# Patient Record
Sex: FEMALE | Race: WHITE | Employment: OTHER | URBAN - METROPOLITAN AREA
[De-identification: names, ages, dates, MRNs, and addresses within clinical notes are randomized per-mention and may not be internally consistent; named-entity substitution may affect disease eponyms.]

---

## 2019-08-07 ENCOUNTER — HOSPITAL ENCOUNTER (EMERGENCY)
Age: 79
Discharge: HOME | End: 2019-08-07
Payer: MEDICARE

## 2019-08-07 ENCOUNTER — APPOINTMENT (OUTPATIENT)
Dept: GENERAL RADIOLOGY | Age: 79
End: 2019-08-07
Payer: MEDICARE

## 2019-08-07 ENCOUNTER — APPOINTMENT (OUTPATIENT)
Dept: CT IMAGING | Age: 79
End: 2019-08-07
Payer: MEDICARE

## 2019-08-07 VITALS
WEIGHT: 148 LBS | SYSTOLIC BLOOD PRESSURE: 123 MMHG | HEIGHT: 61 IN | HEART RATE: 86 BPM | TEMPERATURE: 98.4 F | OXYGEN SATURATION: 97 % | BODY MASS INDEX: 27.94 KG/M2 | RESPIRATION RATE: 16 BRPM | DIASTOLIC BLOOD PRESSURE: 83 MMHG

## 2019-08-07 DIAGNOSIS — S52.602A CLOSED FRACTURE OF DISTAL ENDS OF LEFT RADIUS AND ULNA, INITIAL ENCOUNTER: ICD-10-CM

## 2019-08-07 DIAGNOSIS — S80.02XA CONTUSION OF LEFT KNEE, INITIAL ENCOUNTER: ICD-10-CM

## 2019-08-07 DIAGNOSIS — S00.81XA ABRASION OF FACE, INITIAL ENCOUNTER: ICD-10-CM

## 2019-08-07 DIAGNOSIS — S52.502A CLOSED FRACTURE OF DISTAL ENDS OF LEFT RADIUS AND ULNA, INITIAL ENCOUNTER: ICD-10-CM

## 2019-08-07 DIAGNOSIS — S09.90XA INJURY OF HEAD, INITIAL ENCOUNTER: Primary | ICD-10-CM

## 2019-08-07 LAB
ANION GAP SERPL CALCULATED.3IONS-SCNC: 15 MEQ/L (ref 9–15)
APTT: 26.6 SEC (ref 24.4–36.8)
BASOPHILS ABSOLUTE: 0.1 K/UL (ref 0–0.2)
BASOPHILS RELATIVE PERCENT: 0.5 %
BUN BLDV-MCNC: 22 MG/DL (ref 8–23)
CALCIUM SERPL-MCNC: 8.7 MG/DL (ref 8.5–9.9)
CHLORIDE BLD-SCNC: 106 MEQ/L (ref 95–107)
CO2: 21 MEQ/L (ref 20–31)
CREAT SERPL-MCNC: 1.51 MG/DL (ref 0.5–0.9)
EOSINOPHILS ABSOLUTE: 0 K/UL (ref 0–0.7)
EOSINOPHILS RELATIVE PERCENT: 0.3 %
GFR AFRICAN AMERICAN: 40.4
GFR NON-AFRICAN AMERICAN: 33.4
GLUCOSE BLD-MCNC: 161 MG/DL (ref 70–99)
HCT VFR BLD CALC: 32.1 % (ref 37–47)
HEMOGLOBIN: 11.2 G/DL (ref 12–16)
INR BLD: 1
LYMPHOCYTES ABSOLUTE: 0.8 K/UL (ref 1–4.8)
LYMPHOCYTES RELATIVE PERCENT: 5.8 %
MCH RBC QN AUTO: 31.6 PG (ref 27–31.3)
MCHC RBC AUTO-ENTMCNC: 35 % (ref 33–37)
MCV RBC AUTO: 90.3 FL (ref 82–100)
MONOCYTES ABSOLUTE: 0.8 K/UL (ref 0.2–0.8)
MONOCYTES RELATIVE PERCENT: 5.5 %
NEUTROPHILS ABSOLUTE: 12.9 K/UL (ref 1.4–6.5)
NEUTROPHILS RELATIVE PERCENT: 87.9 %
PDW BLD-RTO: 13 % (ref 11.5–14.5)
PLATELET # BLD: 248 K/UL (ref 130–400)
POTASSIUM SERPL-SCNC: 4.3 MEQ/L (ref 3.4–4.9)
PROTHROMBIN TIME: 13.5 SEC (ref 12.3–14.9)
RBC # BLD: 3.55 M/UL (ref 4.2–5.4)
SODIUM BLD-SCNC: 142 MEQ/L (ref 135–144)
WBC # BLD: 14.6 K/UL (ref 4.8–10.8)

## 2019-08-07 PROCEDURE — 85025 COMPLETE CBC W/AUTO DIFF WBC: CPT

## 2019-08-07 PROCEDURE — 96372 THER/PROPH/DIAG INJ SC/IM: CPT

## 2019-08-07 PROCEDURE — 73590 X-RAY EXAM OF LOWER LEG: CPT

## 2019-08-07 PROCEDURE — 6360000002 HC RX W HCPCS: Performed by: NURSE PRACTITIONER

## 2019-08-07 PROCEDURE — 36415 COLL VENOUS BLD VENIPUNCTURE: CPT

## 2019-08-07 PROCEDURE — 6830039000 HC L3 TRAUMA ALERT

## 2019-08-07 PROCEDURE — 70486 CT MAXILLOFACIAL W/O DYE: CPT

## 2019-08-07 PROCEDURE — 85610 PROTHROMBIN TIME: CPT

## 2019-08-07 PROCEDURE — 73110 X-RAY EXAM OF WRIST: CPT

## 2019-08-07 PROCEDURE — 29125 APPL SHORT ARM SPLINT STATIC: CPT

## 2019-08-07 PROCEDURE — 72125 CT NECK SPINE W/O DYE: CPT

## 2019-08-07 PROCEDURE — 70450 CT HEAD/BRAIN W/O DYE: CPT

## 2019-08-07 PROCEDURE — 85730 THROMBOPLASTIN TIME PARTIAL: CPT

## 2019-08-07 PROCEDURE — 73552 X-RAY EXAM OF FEMUR 2/>: CPT

## 2019-08-07 PROCEDURE — 99284 EMERGENCY DEPT VISIT MOD MDM: CPT

## 2019-08-07 PROCEDURE — 73562 X-RAY EXAM OF KNEE 3: CPT

## 2019-08-07 PROCEDURE — 80048 BASIC METABOLIC PNL TOTAL CA: CPT

## 2019-08-07 RX ORDER — ONDANSETRON 4 MG/1
4 TABLET, ORALLY DISINTEGRATING ORAL ONCE
Status: DISCONTINUED | OUTPATIENT
Start: 2019-08-07 | End: 2019-08-07 | Stop reason: HOSPADM

## 2019-08-07 RX ORDER — OXYCODONE HYDROCHLORIDE AND ACETAMINOPHEN 5; 325 MG/1; MG/1
1 TABLET ORAL EVERY 6 HOURS PRN
Qty: 10 TABLET | Refills: 0 | Status: SHIPPED | OUTPATIENT
Start: 2019-08-07 | End: 2019-08-12

## 2019-08-07 RX ORDER — ONDANSETRON 2 MG/ML
4 INJECTION INTRAMUSCULAR; INTRAVENOUS EVERY 10 MIN PRN
Status: DISCONTINUED | OUTPATIENT
Start: 2019-08-07 | End: 2019-08-07 | Stop reason: HOSPADM

## 2019-08-07 RX ADMIN — HYDROMORPHONE HYDROCHLORIDE 0.5 MG: 1 INJECTION, SOLUTION INTRAMUSCULAR; INTRAVENOUS; SUBCUTANEOUS at 14:34

## 2019-08-07 SDOH — HEALTH STABILITY: MENTAL HEALTH: HOW OFTEN DO YOU HAVE A DRINK CONTAINING ALCOHOL?: NEVER

## 2019-08-07 ASSESSMENT — ENCOUNTER SYMPTOMS
SHORTNESS OF BREATH: 0
VOMITING: 0
SORE THROAT: 0
PHOTOPHOBIA: 0
EYE PAIN: 0
RHINORRHEA: 0
ABDOMINAL PAIN: 0
BACK PAIN: 0
DIARRHEA: 0
COUGH: 0
NAUSEA: 0

## 2019-08-07 ASSESSMENT — PAIN SCALES - GENERAL
PAINLEVEL_OUTOF10: 10
PAINLEVEL_OUTOF10: 10

## 2019-08-07 ASSESSMENT — PAIN DESCRIPTION - DESCRIPTORS: DESCRIPTORS: THROBBING

## 2019-08-07 ASSESSMENT — PAIN DESCRIPTION - FREQUENCY: FREQUENCY: CONTINUOUS

## 2019-08-07 ASSESSMENT — PAIN DESCRIPTION - LOCATION: LOCATION: WRIST;KNEE

## 2019-08-07 ASSESSMENT — PAIN DESCRIPTION - ORIENTATION: ORIENTATION: LEFT

## 2019-09-19 ENCOUNTER — HOSPITAL ENCOUNTER (OUTPATIENT)
Dept: ORTHOPEDIC SURGERY | Age: 79
Discharge: HOME | End: 2019-09-21
Payer: MEDICARE

## 2019-09-19 DIAGNOSIS — S52.502G CLOSED FRACTURE OF DISTAL ENDS OF LEFT RADIUS AND ULNA WITH DELAYED HEALING, SUBSEQUENT ENCOUNTER: ICD-10-CM

## 2019-09-19 DIAGNOSIS — S52.602G CLOSED FRACTURE OF DISTAL ENDS OF LEFT RADIUS AND ULNA WITH DELAYED HEALING, SUBSEQUENT ENCOUNTER: ICD-10-CM

## 2019-09-19 PROCEDURE — 73110 X-RAY EXAM OF WRIST: CPT

## 2019-10-17 ENCOUNTER — HOSPITAL ENCOUNTER (OUTPATIENT)
Dept: ORTHOPEDIC SURGERY | Age: 79
Discharge: HOME | End: 2019-10-19
Payer: MEDICARE

## 2019-10-17 DIAGNOSIS — S52.509A CLOSED FRACTURE OF DISTAL END OF RADIUS, UNSPECIFIED FRACTURE MORPHOLOGY, UNSPECIFIED LATERALITY, INITIAL ENCOUNTER: ICD-10-CM

## 2019-10-17 PROCEDURE — 73110 X-RAY EXAM OF WRIST: CPT

## 2019-11-15 ENCOUNTER — HOSPITAL ENCOUNTER (INPATIENT)
Age: 79
LOS: 5 days | Discharge: NURSING, REHAB OR LONG-TERM CARE | DRG: 515 | End: 2019-11-20
Attending: EMERGENCY MEDICINE | Admitting: INTERNAL MEDICINE
Payer: MEDICARE

## 2019-11-15 ENCOUNTER — APPOINTMENT (OUTPATIENT)
Dept: CT IMAGING | Age: 79
DRG: 515 | End: 2019-11-15
Payer: MEDICARE

## 2019-11-15 ENCOUNTER — APPOINTMENT (OUTPATIENT)
Dept: GENERAL RADIOLOGY | Age: 79
DRG: 515 | End: 2019-11-15
Payer: MEDICARE

## 2019-11-15 ENCOUNTER — APPOINTMENT (OUTPATIENT)
Dept: ULTRASOUND IMAGING | Age: 79
DRG: 515 | End: 2019-11-15
Payer: MEDICARE

## 2019-11-15 DIAGNOSIS — T79.6XXA TRAUMATIC RHABDOMYOLYSIS, INITIAL ENCOUNTER (HCC): ICD-10-CM

## 2019-11-15 DIAGNOSIS — N17.9 AKI (ACUTE KIDNEY INJURY) (HCC): Primary | ICD-10-CM

## 2019-11-15 DIAGNOSIS — S82.001A CLOSED NONDISPLACED FRACTURE OF RIGHT PATELLA, UNSPECIFIED FRACTURE MORPHOLOGY, INITIAL ENCOUNTER: ICD-10-CM

## 2019-11-15 DIAGNOSIS — R77.8 ELEVATED TROPONIN: ICD-10-CM

## 2019-11-15 PROBLEM — N17.0 ACUTE KIDNEY INJURY (AKI) WITH ACUTE TUBULAR NECROSIS (ATN) (HCC): Status: ACTIVE | Noted: 2019-11-15

## 2019-11-15 LAB
ALBUMIN SERPL-MCNC: 3.1 G/DL (ref 3.5–4.6)
ALBUMIN SERPL-MCNC: 3.6 G/DL (ref 3.5–4.6)
ALP BLD-CCNC: 77 U/L (ref 40–130)
ALP BLD-CCNC: 99 U/L (ref 40–130)
ALT SERPL-CCNC: 24 U/L (ref 0–33)
ALT SERPL-CCNC: 27 U/L (ref 0–33)
ANION GAP SERPL CALCULATED.3IONS-SCNC: 23 MEQ/L (ref 9–15)
ANION GAP SERPL CALCULATED.3IONS-SCNC: 24 MEQ/L (ref 9–15)
AST SERPL-CCNC: 112 U/L (ref 0–35)
AST SERPL-CCNC: 89 U/L (ref 0–35)
BASOPHILS ABSOLUTE: 0 K/UL (ref 0–0.2)
BASOPHILS RELATIVE PERCENT: 0.1 %
BILIRUB SERPL-MCNC: 0.3 MG/DL (ref 0.2–0.7)
BILIRUB SERPL-MCNC: 0.4 MG/DL (ref 0.2–0.7)
BUN BLDV-MCNC: 53 MG/DL (ref 8–23)
BUN BLDV-MCNC: 59 MG/DL (ref 8–23)
CALCIUM SERPL-MCNC: 7.7 MG/DL (ref 8.5–9.9)
CALCIUM SERPL-MCNC: 8.5 MG/DL (ref 8.5–9.9)
CHLORIDE BLD-SCNC: 95 MEQ/L (ref 95–107)
CHLORIDE BLD-SCNC: 96 MEQ/L (ref 95–107)
CK MB: 73.5 NG/ML (ref 0–3.8)
CO2: 15 MEQ/L (ref 20–31)
CO2: 16 MEQ/L (ref 20–31)
CREAT SERPL-MCNC: 1.78 MG/DL (ref 0.5–0.9)
CREAT SERPL-MCNC: 2.05 MG/DL (ref 0.5–0.9)
CREATINE KINASE-MB INDEX: 1 % (ref 0–3.5)
EOSINOPHILS ABSOLUTE: 0 K/UL (ref 0–0.7)
EOSINOPHILS RELATIVE PERCENT: 0 %
GFR AFRICAN AMERICAN: 28.3
GFR AFRICAN AMERICAN: 33.4
GFR NON-AFRICAN AMERICAN: 23.4
GFR NON-AFRICAN AMERICAN: 27.6
GLOBULIN: 3.3 G/DL (ref 2.3–3.5)
GLOBULIN: 4.4 G/DL (ref 2.3–3.5)
GLUCOSE BLD-MCNC: 119 MG/DL (ref 70–99)
GLUCOSE BLD-MCNC: 157 MG/DL (ref 70–99)
HCT VFR BLD CALC: 40.2 % (ref 37–47)
HEMOGLOBIN: 12.7 G/DL (ref 12–16)
LACTIC ACID: 2.6 MMOL/L (ref 0.5–2.2)
LYMPHOCYTES ABSOLUTE: 1 K/UL (ref 1–4.8)
LYMPHOCYTES RELATIVE PERCENT: 4 %
MAGNESIUM: 2 MG/DL (ref 1.7–2.4)
MCH RBC QN AUTO: 29.3 PG (ref 27–31.3)
MCHC RBC AUTO-ENTMCNC: 31.6 % (ref 33–37)
MCV RBC AUTO: 92.9 FL (ref 82–100)
MONOCYTES ABSOLUTE: 1.2 K/UL (ref 0.2–0.8)
MONOCYTES RELATIVE PERCENT: 4.8 %
NEUTROPHILS ABSOLUTE: 21.8 K/UL (ref 1.4–6.5)
NEUTROPHILS RELATIVE PERCENT: 91 %
PDW BLD-RTO: 13.4 % (ref 11.5–14.5)
PLATELET # BLD: 291 K/UL (ref 130–400)
PLATELET SLIDE REVIEW: ADEQUATE
POTASSIUM REFLEX MAGNESIUM: 3.9 MEQ/L (ref 3.4–4.9)
POTASSIUM SERPL-SCNC: 5.7 MEQ/L (ref 3.4–4.9)
RBC # BLD: 4.33 M/UL (ref 4.2–5.4)
SODIUM BLD-SCNC: 134 MEQ/L (ref 135–144)
SODIUM BLD-SCNC: 135 MEQ/L (ref 135–144)
TOTAL CK: 7232 U/L (ref 0–170)
TOTAL PROTEIN: 6.4 G/DL (ref 6.3–8)
TOTAL PROTEIN: 8 G/DL (ref 6.3–8)
TROPONIN: 0.21 NG/ML (ref 0–0.01)
WBC # BLD: 24 K/UL (ref 4.8–10.8)

## 2019-11-15 PROCEDURE — 93005 ELECTROCARDIOGRAM TRACING: CPT | Performed by: EMERGENCY MEDICINE

## 2019-11-15 PROCEDURE — 73030 X-RAY EXAM OF SHOULDER: CPT

## 2019-11-15 PROCEDURE — 83605 ASSAY OF LACTIC ACID: CPT

## 2019-11-15 PROCEDURE — 72128 CT CHEST SPINE W/O DYE: CPT

## 2019-11-15 PROCEDURE — 72125 CT NECK SPINE W/O DYE: CPT

## 2019-11-15 PROCEDURE — 6360000002 HC RX W HCPCS: Performed by: EMERGENCY MEDICINE

## 2019-11-15 PROCEDURE — 2580000003 HC RX 258: Performed by: EMERGENCY MEDICINE

## 2019-11-15 PROCEDURE — 84484 ASSAY OF TROPONIN QUANT: CPT

## 2019-11-15 PROCEDURE — 82553 CREATINE MB FRACTION: CPT

## 2019-11-15 PROCEDURE — 94667 MNPJ CHEST WALL 1ST: CPT

## 2019-11-15 PROCEDURE — 84300 ASSAY OF URINE SODIUM: CPT

## 2019-11-15 PROCEDURE — 73562 X-RAY EXAM OF KNEE 3: CPT

## 2019-11-15 PROCEDURE — 2700000000 HC OXYGEN THERAPY PER DAY

## 2019-11-15 PROCEDURE — 71250 CT THORAX DX C-: CPT

## 2019-11-15 PROCEDURE — 6830039001 HC L3 TRAUMA PRIORITY

## 2019-11-15 PROCEDURE — 51798 US URINE CAPACITY MEASURE: CPT

## 2019-11-15 PROCEDURE — 80053 COMPREHEN METABOLIC PANEL: CPT

## 2019-11-15 PROCEDURE — 72131 CT LUMBAR SPINE W/O DYE: CPT

## 2019-11-15 PROCEDURE — 94640 AIRWAY INHALATION TREATMENT: CPT

## 2019-11-15 PROCEDURE — 99223 1ST HOSP IP/OBS HIGH 75: CPT | Performed by: SURGERY

## 2019-11-15 PROCEDURE — 6360000002 HC RX W HCPCS: Performed by: NURSE PRACTITIONER

## 2019-11-15 PROCEDURE — 36415 COLL VENOUS BLD VENIPUNCTURE: CPT

## 2019-11-15 PROCEDURE — 82550 ASSAY OF CK (CPK): CPT

## 2019-11-15 PROCEDURE — 96374 THER/PROPH/DIAG INJ IV PUSH: CPT

## 2019-11-15 PROCEDURE — 85025 COMPLETE CBC W/AUTO DIFF WBC: CPT

## 2019-11-15 PROCEDURE — 82570 ASSAY OF URINE CREATININE: CPT

## 2019-11-15 PROCEDURE — 83735 ASSAY OF MAGNESIUM: CPT

## 2019-11-15 PROCEDURE — 76775 US EXAM ABDO BACK WALL LIM: CPT

## 2019-11-15 PROCEDURE — 70450 CT HEAD/BRAIN W/O DYE: CPT

## 2019-11-15 PROCEDURE — 99285 EMERGENCY DEPT VISIT HI MDM: CPT

## 2019-11-15 PROCEDURE — 1210000000 HC MED SURG R&B

## 2019-11-15 PROCEDURE — 96375 TX/PRO/DX INJ NEW DRUG ADDON: CPT

## 2019-11-15 PROCEDURE — 6370000000 HC RX 637 (ALT 250 FOR IP): Performed by: NURSE PRACTITIONER

## 2019-11-15 PROCEDURE — 2580000003 HC RX 258: Performed by: INTERNAL MEDICINE

## 2019-11-15 PROCEDURE — 74176 CT ABD & PELVIS W/O CONTRAST: CPT

## 2019-11-15 RX ORDER — SODIUM CHLORIDE 9 MG/ML
INJECTION, SOLUTION INTRAVENOUS CONTINUOUS
Status: DISCONTINUED | OUTPATIENT
Start: 2019-11-15 | End: 2019-11-16

## 2019-11-15 RX ORDER — IPRATROPIUM BROMIDE AND ALBUTEROL SULFATE 2.5; .5 MG/3ML; MG/3ML
1 SOLUTION RESPIRATORY (INHALATION)
Status: DISCONTINUED | OUTPATIENT
Start: 2019-11-15 | End: 2019-11-16

## 2019-11-15 RX ORDER — FUROSEMIDE 10 MG/ML
40 INJECTION INTRAMUSCULAR; INTRAVENOUS ONCE
Status: DISCONTINUED | OUTPATIENT
Start: 2019-11-15 | End: 2019-11-16

## 2019-11-15 RX ORDER — ATORVASTATIN CALCIUM 10 MG/1
10 TABLET, FILM COATED ORAL DAILY
Status: ON HOLD | COMMUNITY
End: 2019-12-07 | Stop reason: HOSPADM

## 2019-11-15 RX ORDER — LEVOTHYROXINE SODIUM 0.03 MG/1
100 TABLET ORAL DAILY
Status: ON HOLD | COMMUNITY
End: 2019-12-07 | Stop reason: HOSPADM

## 2019-11-15 RX ORDER — LISINOPRIL 20 MG/1
20 TABLET ORAL DAILY
Status: ON HOLD | COMMUNITY
End: 2019-12-07 | Stop reason: HOSPADM

## 2019-11-15 RX ORDER — ALBUTEROL SULFATE 2.5 MG/3ML
2.5 SOLUTION RESPIRATORY (INHALATION) ONCE
Status: COMPLETED | OUTPATIENT
Start: 2019-11-15 | End: 2019-11-15

## 2019-11-15 RX ORDER — 0.9 % SODIUM CHLORIDE 0.9 %
1000 INTRAVENOUS SOLUTION INTRAVENOUS ONCE
Status: COMPLETED | OUTPATIENT
Start: 2019-11-15 | End: 2019-11-15

## 2019-11-15 RX ORDER — HYDROCHLOROTHIAZIDE 12.5 MG/1
12.5 CAPSULE, GELATIN COATED ORAL DAILY
Status: ON HOLD | COMMUNITY
End: 2019-12-07 | Stop reason: HOSPADM

## 2019-11-15 RX ORDER — CARBAMAZEPINE 200 MG/1
200 TABLET ORAL 3 TIMES DAILY
Status: ON HOLD | COMMUNITY
End: 2019-12-07 | Stop reason: HOSPADM

## 2019-11-15 RX ORDER — ACETAMINOPHEN 325 MG/1
650 TABLET ORAL EVERY 4 HOURS PRN
Status: DISCONTINUED | OUTPATIENT
Start: 2019-11-15 | End: 2019-11-20 | Stop reason: HOSPADM

## 2019-11-15 RX ORDER — VENLAFAXINE 37.5 MG/1
37.5 TABLET ORAL 3 TIMES DAILY
Status: ON HOLD | COMMUNITY
End: 2019-12-07 | Stop reason: HOSPADM

## 2019-11-15 RX ORDER — SODIUM POLYSTYRENE SULFONATE 15 G/60ML
15 SUSPENSION ORAL; RECTAL ONCE
Status: DISCONTINUED | OUTPATIENT
Start: 2019-11-15 | End: 2019-11-20 | Stop reason: HOSPADM

## 2019-11-15 RX ORDER — SODIUM CHLORIDE 0.9 % (FLUSH) 0.9 %
10 SYRINGE (ML) INJECTION PRN
Status: DISCONTINUED | OUTPATIENT
Start: 2019-11-15 | End: 2019-11-20 | Stop reason: HOSPADM

## 2019-11-15 RX ORDER — 0.9 % SODIUM CHLORIDE 0.9 %
250 INTRAVENOUS SOLUTION INTRAVENOUS ONCE
Status: COMPLETED | OUTPATIENT
Start: 2019-11-15 | End: 2019-11-15

## 2019-11-15 RX ORDER — HEPARIN SODIUM 5000 [USP'U]/ML
5000 INJECTION, SOLUTION INTRAVENOUS; SUBCUTANEOUS EVERY 8 HOURS SCHEDULED
Status: DISCONTINUED | OUTPATIENT
Start: 2019-11-15 | End: 2019-11-16

## 2019-11-15 RX ORDER — 0.9 % SODIUM CHLORIDE 0.9 %
250 INTRAVENOUS SOLUTION INTRAVENOUS ONCE
Status: COMPLETED | OUTPATIENT
Start: 2019-11-15 | End: 2019-11-16

## 2019-11-15 RX ORDER — SODIUM CHLORIDE 0.9 % (FLUSH) 0.9 %
10 SYRINGE (ML) INJECTION EVERY 12 HOURS SCHEDULED
Status: DISCONTINUED | OUTPATIENT
Start: 2019-11-15 | End: 2019-11-20 | Stop reason: HOSPADM

## 2019-11-15 RX ORDER — FENTANYL CITRATE 50 UG/ML
25 INJECTION, SOLUTION INTRAMUSCULAR; INTRAVENOUS ONCE
Status: COMPLETED | OUTPATIENT
Start: 2019-11-15 | End: 2019-11-15

## 2019-11-15 RX ORDER — ONDANSETRON 2 MG/ML
4 INJECTION INTRAMUSCULAR; INTRAVENOUS EVERY 6 HOURS PRN
Status: DISCONTINUED | OUTPATIENT
Start: 2019-11-15 | End: 2019-11-18 | Stop reason: ALTCHOICE

## 2019-11-15 RX ADMIN — FENTANYL CITRATE 25 MCG: 50 INJECTION, SOLUTION INTRAMUSCULAR; INTRAVENOUS at 13:53

## 2019-11-15 RX ADMIN — SODIUM CHLORIDE 1000 ML: 9 INJECTION, SOLUTION INTRAVENOUS at 15:37

## 2019-11-15 RX ADMIN — SODIUM CHLORIDE 250 ML: 9 INJECTION, SOLUTION INTRAVENOUS at 21:04

## 2019-11-15 RX ADMIN — HEPARIN SODIUM 5000 UNITS: 5000 INJECTION INTRAVENOUS; SUBCUTANEOUS at 22:19

## 2019-11-15 RX ADMIN — SODIUM CHLORIDE 250 ML: 9 INJECTION, SOLUTION INTRAVENOUS at 22:19

## 2019-11-15 RX ADMIN — ALBUTEROL SULFATE 2.5 MG: 2.5 SOLUTION RESPIRATORY (INHALATION) at 16:25

## 2019-11-15 RX ADMIN — ALBUTEROL SULFATE 2.5 MG: 2.5 SOLUTION RESPIRATORY (INHALATION) at 13:48

## 2019-11-15 RX ADMIN — SODIUM CHLORIDE 1000 ML: 9 INJECTION, SOLUTION INTRAVENOUS at 13:53

## 2019-11-15 RX ADMIN — IPRATROPIUM BROMIDE AND ALBUTEROL SULFATE 1 AMPULE: .5; 3 SOLUTION RESPIRATORY (INHALATION) at 19:28

## 2019-11-15 ASSESSMENT — PAIN DESCRIPTION - FREQUENCY
FREQUENCY: CONTINUOUS
FREQUENCY: CONTINUOUS

## 2019-11-15 ASSESSMENT — PAIN DESCRIPTION - LOCATION
LOCATION: BACK;NECK
LOCATION: BACK;HEAD

## 2019-11-15 ASSESSMENT — PAIN SCALES - GENERAL
PAINLEVEL_OUTOF10: 3
PAINLEVEL_OUTOF10: 8

## 2019-11-15 ASSESSMENT — ENCOUNTER SYMPTOMS
ABDOMINAL PAIN: 0
COUGH: 0
VOMITING: 0
SORE THROAT: 0
NAUSEA: 0
SHORTNESS OF BREATH: 0
DIARRHEA: 0
BACK PAIN: 0

## 2019-11-15 ASSESSMENT — PAIN DESCRIPTION - ONSET: ONSET: ON-GOING

## 2019-11-15 ASSESSMENT — PAIN DESCRIPTION - ORIENTATION
ORIENTATION: RIGHT;LEFT
ORIENTATION: LEFT

## 2019-11-15 ASSESSMENT — PAIN DESCRIPTION - PAIN TYPE
TYPE: ACUTE PAIN
TYPE: ACUTE PAIN

## 2019-11-15 ASSESSMENT — PAIN DESCRIPTION - PROGRESSION
CLINICAL_PROGRESSION: GRADUALLY IMPROVING
CLINICAL_PROGRESSION: GRADUALLY IMPROVING

## 2019-11-15 ASSESSMENT — PAIN DESCRIPTION - DESCRIPTORS
DESCRIPTORS: ACHING
DESCRIPTORS: ACHING

## 2019-11-16 ENCOUNTER — APPOINTMENT (OUTPATIENT)
Dept: GENERAL RADIOLOGY | Age: 79
DRG: 515 | End: 2019-11-16
Payer: MEDICARE

## 2019-11-16 ENCOUNTER — APPOINTMENT (OUTPATIENT)
Dept: MRI IMAGING | Age: 79
DRG: 515 | End: 2019-11-16
Payer: MEDICARE

## 2019-11-16 LAB
ALBUMIN SERPL-MCNC: 2.8 G/DL (ref 3.5–4.6)
ALP BLD-CCNC: 69 U/L (ref 40–130)
ALT SERPL-CCNC: 21 U/L (ref 0–33)
ANION GAP SERPL CALCULATED.3IONS-SCNC: 15 MEQ/L (ref 9–15)
AST SERPL-CCNC: 77 U/L (ref 0–35)
BASOPHILS ABSOLUTE: 0 K/UL (ref 0–0.2)
BASOPHILS RELATIVE PERCENT: 0.2 %
BILIRUB SERPL-MCNC: 0.3 MG/DL (ref 0.2–0.7)
BUN BLDV-MCNC: 60 MG/DL (ref 8–23)
CALCIUM SERPL-MCNC: 7.4 MG/DL (ref 8.5–9.9)
CHLORIDE BLD-SCNC: 103 MEQ/L (ref 95–107)
CK MB: 24.6 NG/ML (ref 0–3.8)
CO2: 17 MEQ/L (ref 20–31)
CREAT SERPL-MCNC: 2.05 MG/DL (ref 0.5–0.9)
CREATINE KINASE-MB INDEX: 0.5 % (ref 0–3.5)
CREATININE URINE: 141.9 MG/DL
EOSINOPHILS ABSOLUTE: 0 K/UL (ref 0–0.7)
EOSINOPHILS RELATIVE PERCENT: 0 %
GFR AFRICAN AMERICAN: 28.3
GFR NON-AFRICAN AMERICAN: 23.4
GLOBULIN: 3.3 G/DL (ref 2.3–3.5)
GLUCOSE BLD-MCNC: 129 MG/DL (ref 70–99)
HCT VFR BLD CALC: 32.2 % (ref 37–47)
HEMOGLOBIN: 10.5 G/DL (ref 12–16)
LACTATE DEHYDROGENASE: 327 U/L (ref 135–214)
LACTIC ACID: 1.3 MMOL/L (ref 0.5–2.2)
LV EF: 75 %
LVEF MODALITY: NORMAL
LYMPHOCYTES ABSOLUTE: 2.2 K/UL (ref 1–4.8)
LYMPHOCYTES RELATIVE PERCENT: 12.7 %
MAGNESIUM: 1.7 MG/DL (ref 1.7–2.4)
MCH RBC QN AUTO: 30.4 PG (ref 27–31.3)
MCHC RBC AUTO-ENTMCNC: 32.5 % (ref 33–37)
MCV RBC AUTO: 93.7 FL (ref 82–100)
MONOCYTES ABSOLUTE: 1.8 K/UL (ref 0.2–0.8)
MONOCYTES RELATIVE PERCENT: 10.4 %
NEUTROPHILS ABSOLUTE: 13.3 K/UL (ref 1.4–6.5)
NEUTROPHILS RELATIVE PERCENT: 76.7 %
PDW BLD-RTO: 13.3 % (ref 11.5–14.5)
PLATELET # BLD: 243 K/UL (ref 130–400)
POTASSIUM REFLEX MAGNESIUM: 3.8 MEQ/L (ref 3.4–4.9)
RBC # BLD: 3.44 M/UL (ref 4.2–5.4)
SODIUM BLD-SCNC: 135 MEQ/L (ref 135–144)
SODIUM URINE: 84 MEQ/L
TOTAL CK: 5067 U/L (ref 0–170)
TOTAL PROTEIN: 6.1 G/DL (ref 6.3–8)
WBC # BLD: 17.4 K/UL (ref 4.8–10.8)

## 2019-11-16 PROCEDURE — 6360000002 HC RX W HCPCS: Performed by: NURSE PRACTITIONER

## 2019-11-16 PROCEDURE — 83735 ASSAY OF MAGNESIUM: CPT

## 2019-11-16 PROCEDURE — 2580000003 HC RX 258: Performed by: INTERNAL MEDICINE

## 2019-11-16 PROCEDURE — 6370000000 HC RX 637 (ALT 250 FOR IP): Performed by: INTERNAL MEDICINE

## 2019-11-16 PROCEDURE — 80053 COMPREHEN METABOLIC PANEL: CPT

## 2019-11-16 PROCEDURE — 36415 COLL VENOUS BLD VENIPUNCTURE: CPT

## 2019-11-16 PROCEDURE — 83605 ASSAY OF LACTIC ACID: CPT

## 2019-11-16 PROCEDURE — 6360000002 HC RX W HCPCS: Performed by: INTERNAL MEDICINE

## 2019-11-16 PROCEDURE — 83615 LACTATE (LD) (LDH) ENZYME: CPT

## 2019-11-16 PROCEDURE — 1210000000 HC MED SURG R&B

## 2019-11-16 PROCEDURE — 82553 CREATINE MB FRACTION: CPT

## 2019-11-16 PROCEDURE — 71046 X-RAY EXAM CHEST 2 VIEWS: CPT

## 2019-11-16 PROCEDURE — 94640 AIRWAY INHALATION TREATMENT: CPT

## 2019-11-16 PROCEDURE — 85025 COMPLETE CBC W/AUTO DIFF WBC: CPT

## 2019-11-16 PROCEDURE — 6370000000 HC RX 637 (ALT 250 FOR IP): Performed by: NURSE PRACTITIONER

## 2019-11-16 PROCEDURE — 93306 TTE W/DOPPLER COMPLETE: CPT

## 2019-11-16 PROCEDURE — 94668 MNPJ CHEST WALL SBSQ: CPT

## 2019-11-16 PROCEDURE — 70551 MRI BRAIN STEM W/O DYE: CPT

## 2019-11-16 PROCEDURE — 94761 N-INVAS EAR/PLS OXIMETRY MLT: CPT

## 2019-11-16 PROCEDURE — 51702 INSERT TEMP BLADDER CATH: CPT

## 2019-11-16 PROCEDURE — 94664 DEMO&/EVAL PT USE INHALER: CPT

## 2019-11-16 PROCEDURE — 2700000000 HC OXYGEN THERAPY PER DAY

## 2019-11-16 PROCEDURE — 82550 ASSAY OF CK (CPK): CPT

## 2019-11-16 PROCEDURE — 2500000003 HC RX 250 WO HCPCS: Performed by: INTERNAL MEDICINE

## 2019-11-16 RX ORDER — 0.9 % SODIUM CHLORIDE 0.9 %
500 INTRAVENOUS SOLUTION INTRAVENOUS ONCE
Status: COMPLETED | OUTPATIENT
Start: 2019-11-16 | End: 2019-11-16

## 2019-11-16 RX ORDER — MAGNESIUM SULFATE IN WATER 40 MG/ML
2 INJECTION, SOLUTION INTRAVENOUS ONCE
Status: COMPLETED | OUTPATIENT
Start: 2019-11-16 | End: 2019-11-16

## 2019-11-16 RX ORDER — METHOCARBAMOL 750 MG/1
750 TABLET, FILM COATED ORAL EVERY 8 HOURS PRN
Status: DISCONTINUED | OUTPATIENT
Start: 2019-11-16 | End: 2019-11-16

## 2019-11-16 RX ORDER — LEVOTHYROXINE SODIUM 0.05 MG/1
50 TABLET ORAL DAILY
Status: DISCONTINUED | OUTPATIENT
Start: 2019-11-16 | End: 2019-11-20 | Stop reason: HOSPADM

## 2019-11-16 RX ORDER — MAGNESIUM SULFATE HEPTAHYDRATE 500 MG/ML
2 INJECTION, SOLUTION INTRAMUSCULAR; INTRAVENOUS ONCE
Status: DISCONTINUED | OUTPATIENT
Start: 2019-11-16 | End: 2019-11-16

## 2019-11-16 RX ORDER — IPRATROPIUM BROMIDE AND ALBUTEROL SULFATE 2.5; .5 MG/3ML; MG/3ML
1 SOLUTION RESPIRATORY (INHALATION) 3 TIMES DAILY
Status: DISCONTINUED | OUTPATIENT
Start: 2019-11-16 | End: 2019-11-19

## 2019-11-16 RX ORDER — HYDROCODONE BITARTRATE AND ACETAMINOPHEN 5; 325 MG/1; MG/1
1 TABLET ORAL EVERY 6 HOURS PRN
Status: DISCONTINUED | OUTPATIENT
Start: 2019-11-16 | End: 2019-11-20 | Stop reason: HOSPADM

## 2019-11-16 RX ORDER — ALBUTEROL SULFATE 2.5 MG/3ML
2.5 SOLUTION RESPIRATORY (INHALATION)
Status: DISCONTINUED | OUTPATIENT
Start: 2019-11-16 | End: 2019-11-19

## 2019-11-16 RX ORDER — CYCLOBENZAPRINE HCL 10 MG
10 TABLET ORAL 3 TIMES DAILY PRN
Status: DISCONTINUED | OUTPATIENT
Start: 2019-11-16 | End: 2019-11-16

## 2019-11-16 RX ADMIN — Medication: at 12:14

## 2019-11-16 RX ADMIN — LEVOTHYROXINE SODIUM 50 MCG: 50 TABLET ORAL at 16:10

## 2019-11-16 RX ADMIN — METOPROLOL TARTRATE 25 MG: 25 TABLET ORAL at 21:59

## 2019-11-16 RX ADMIN — MAGNESIUM SULFATE HEPTAHYDRATE 2 G: 40 INJECTION, SOLUTION INTRAVENOUS at 16:46

## 2019-11-16 RX ADMIN — IPRATROPIUM BROMIDE AND ALBUTEROL SULFATE 1 AMPULE: .5; 3 SOLUTION RESPIRATORY (INHALATION) at 08:39

## 2019-11-16 RX ADMIN — HYDROCODONE BITARTRATE AND ACETAMINOPHEN 1 TABLET: 5; 325 TABLET ORAL at 22:36

## 2019-11-16 RX ADMIN — HYDROCODONE BITARTRATE AND ACETAMINOPHEN 1 TABLET: 5; 325 TABLET ORAL at 16:11

## 2019-11-16 RX ADMIN — IPRATROPIUM BROMIDE AND ALBUTEROL SULFATE 1 AMPULE: .5; 3 SOLUTION RESPIRATORY (INHALATION) at 19:02

## 2019-11-16 RX ADMIN — POTASSIUM BICARBONATE 10 MEQ: 782 TABLET, EFFERVESCENT ORAL at 16:10

## 2019-11-16 RX ADMIN — HEPARIN SODIUM 5000 UNITS: 5000 INJECTION INTRAVENOUS; SUBCUTANEOUS at 06:38

## 2019-11-16 RX ADMIN — SODIUM CHLORIDE 500 ML: 9 INJECTION, SOLUTION INTRAVENOUS at 11:08

## 2019-11-16 ASSESSMENT — PAIN DESCRIPTION - LOCATION: LOCATION: KNEE

## 2019-11-16 ASSESSMENT — PAIN SCALES - GENERAL
PAINLEVEL_OUTOF10: 7
PAINLEVEL_OUTOF10: 8

## 2019-11-16 ASSESSMENT — PAIN DESCRIPTION - PAIN TYPE: TYPE: ACUTE PAIN

## 2019-11-16 ASSESSMENT — PAIN DESCRIPTION - ONSET: ONSET: ON-GOING

## 2019-11-16 ASSESSMENT — PAIN DESCRIPTION - ORIENTATION: ORIENTATION: RIGHT

## 2019-11-16 ASSESSMENT — PAIN DESCRIPTION - FREQUENCY: FREQUENCY: INTERMITTENT

## 2019-11-16 ASSESSMENT — PAIN DESCRIPTION - DESCRIPTORS: DESCRIPTORS: DULL;ACHING

## 2019-11-16 ASSESSMENT — PAIN DESCRIPTION - PROGRESSION: CLINICAL_PROGRESSION: GRADUALLY WORSENING

## 2019-11-17 ENCOUNTER — APPOINTMENT (OUTPATIENT)
Dept: ULTRASOUND IMAGING | Age: 79
DRG: 515 | End: 2019-11-17
Payer: MEDICARE

## 2019-11-17 LAB
ALBUMIN SERPL-MCNC: 2.5 G/DL (ref 3.5–4.6)
ALP BLD-CCNC: 61 U/L (ref 40–130)
ALT SERPL-CCNC: 21 U/L (ref 0–33)
ANION GAP SERPL CALCULATED.3IONS-SCNC: 15 MEQ/L (ref 9–15)
AST SERPL-CCNC: 56 U/L (ref 0–35)
BASOPHILS ABSOLUTE: 0.1 K/UL (ref 0–0.2)
BASOPHILS RELATIVE PERCENT: 0.4 %
BILIRUB SERPL-MCNC: 0.3 MG/DL (ref 0.2–0.7)
BUN BLDV-MCNC: 44 MG/DL (ref 8–23)
CALCIUM SERPL-MCNC: 7.6 MG/DL (ref 8.5–9.9)
CHLORIDE BLD-SCNC: 102 MEQ/L (ref 95–107)
CK MB: 7.7 NG/ML (ref 0–3.8)
CO2: 20 MEQ/L (ref 20–31)
CREAT SERPL-MCNC: 1.13 MG/DL (ref 0.5–0.9)
CREATINE KINASE-MB INDEX: 0.3 % (ref 0–3.5)
EOSINOPHILS ABSOLUTE: 0.1 K/UL (ref 0–0.7)
EOSINOPHILS RELATIVE PERCENT: 1.1 %
GFR AFRICAN AMERICAN: 56.4
GFR NON-AFRICAN AMERICAN: 46.6
GLOBULIN: 3.2 G/DL (ref 2.3–3.5)
GLUCOSE BLD-MCNC: 122 MG/DL (ref 70–99)
HCT VFR BLD CALC: 29.1 % (ref 37–47)
HEMOGLOBIN: 9.6 G/DL (ref 12–16)
LYMPHOCYTES ABSOLUTE: 3 K/UL (ref 1–4.8)
LYMPHOCYTES RELATIVE PERCENT: 21.3 %
MCH RBC QN AUTO: 30.7 PG (ref 27–31.3)
MCHC RBC AUTO-ENTMCNC: 33.2 % (ref 33–37)
MCV RBC AUTO: 92.6 FL (ref 82–100)
MONOCYTES ABSOLUTE: 1.8 K/UL (ref 0.2–0.8)
MONOCYTES RELATIVE PERCENT: 12.5 %
NEUTROPHILS ABSOLUTE: 9.1 K/UL (ref 1.4–6.5)
NEUTROPHILS RELATIVE PERCENT: 64.7 %
PDW BLD-RTO: 13.3 % (ref 11.5–14.5)
PLATELET # BLD: 210 K/UL (ref 130–400)
POTASSIUM SERPL-SCNC: 3.6 MEQ/L (ref 3.4–4.9)
PROCALCITONIN: 0.28 NG/ML (ref 0–0.15)
RBC # BLD: 3.14 M/UL (ref 4.2–5.4)
SODIUM BLD-SCNC: 137 MEQ/L (ref 135–144)
TOTAL CK: 2523 U/L (ref 0–170)
TOTAL PROTEIN: 5.7 G/DL (ref 6.3–8)
WBC # BLD: 14.1 K/UL (ref 4.8–10.8)

## 2019-11-17 PROCEDURE — 6360000002 HC RX W HCPCS: Performed by: INTERNAL MEDICINE

## 2019-11-17 PROCEDURE — 6370000000 HC RX 637 (ALT 250 FOR IP): Performed by: NURSE PRACTITIONER

## 2019-11-17 PROCEDURE — 82550 ASSAY OF CK (CPK): CPT

## 2019-11-17 PROCEDURE — 94640 AIRWAY INHALATION TREATMENT: CPT

## 2019-11-17 PROCEDURE — 2580000003 HC RX 258: Performed by: INTERNAL MEDICINE

## 2019-11-17 PROCEDURE — 93005 ELECTROCARDIOGRAM TRACING: CPT | Performed by: INTERNAL MEDICINE

## 2019-11-17 PROCEDURE — 36415 COLL VENOUS BLD VENIPUNCTURE: CPT

## 2019-11-17 PROCEDURE — 2500000003 HC RX 250 WO HCPCS: Performed by: INTERNAL MEDICINE

## 2019-11-17 PROCEDURE — 94761 N-INVAS EAR/PLS OXIMETRY MLT: CPT

## 2019-11-17 PROCEDURE — 93971 EXTREMITY STUDY: CPT

## 2019-11-17 PROCEDURE — 2060000000 HC ICU INTERMEDIATE R&B

## 2019-11-17 PROCEDURE — 6370000000 HC RX 637 (ALT 250 FOR IP): Performed by: INTERNAL MEDICINE

## 2019-11-17 PROCEDURE — P9047 ALBUMIN (HUMAN), 25%, 50ML: HCPCS | Performed by: INTERNAL MEDICINE

## 2019-11-17 PROCEDURE — 80053 COMPREHEN METABOLIC PANEL: CPT

## 2019-11-17 PROCEDURE — 82553 CREATINE MB FRACTION: CPT

## 2019-11-17 PROCEDURE — 84145 PROCALCITONIN (PCT): CPT

## 2019-11-17 PROCEDURE — 2580000003 HC RX 258: Performed by: NURSE PRACTITIONER

## 2019-11-17 PROCEDURE — 2700000000 HC OXYGEN THERAPY PER DAY

## 2019-11-17 PROCEDURE — 51702 INSERT TEMP BLADDER CATH: CPT

## 2019-11-17 PROCEDURE — 85025 COMPLETE CBC W/AUTO DIFF WBC: CPT

## 2019-11-17 RX ORDER — ALBUMIN (HUMAN) 12.5 G/50ML
25 SOLUTION INTRAVENOUS ONCE
Status: COMPLETED | OUTPATIENT
Start: 2019-11-17 | End: 2019-11-17

## 2019-11-17 RX ORDER — MAGNESIUM SULFATE IN WATER 40 MG/ML
2 INJECTION, SOLUTION INTRAVENOUS ONCE
Status: COMPLETED | OUTPATIENT
Start: 2019-11-17 | End: 2019-11-17

## 2019-11-17 RX ORDER — FUROSEMIDE 10 MG/ML
20 INJECTION INTRAMUSCULAR; INTRAVENOUS ONCE
Status: COMPLETED | OUTPATIENT
Start: 2019-11-17 | End: 2019-11-17

## 2019-11-17 RX ADMIN — METOPROLOL TARTRATE 25 MG: 25 TABLET ORAL at 09:51

## 2019-11-17 RX ADMIN — Medication: at 02:37

## 2019-11-17 RX ADMIN — HYDROCODONE BITARTRATE AND ACETAMINOPHEN 1 TABLET: 5; 325 TABLET ORAL at 16:19

## 2019-11-17 RX ADMIN — Medication 10 ML: at 11:41

## 2019-11-17 RX ADMIN — MAGNESIUM SULFATE HEPTAHYDRATE 2 G: 40 INJECTION, SOLUTION INTRAVENOUS at 16:19

## 2019-11-17 RX ADMIN — IPRATROPIUM BROMIDE AND ALBUTEROL SULFATE 1 AMPULE: .5; 3 SOLUTION RESPIRATORY (INHALATION) at 09:07

## 2019-11-17 RX ADMIN — AMIODARONE HYDROCHLORIDE 0.5 MG/MIN: 50 INJECTION, SOLUTION INTRAVENOUS at 17:13

## 2019-11-17 RX ADMIN — HYDROCODONE BITARTRATE AND ACETAMINOPHEN 1 TABLET: 5; 325 TABLET ORAL at 09:51

## 2019-11-17 RX ADMIN — FUROSEMIDE 20 MG: 10 INJECTION, SOLUTION INTRAMUSCULAR; INTRAVENOUS at 09:52

## 2019-11-17 RX ADMIN — IPRATROPIUM BROMIDE AND ALBUTEROL SULFATE 1 AMPULE: .5; 3 SOLUTION RESPIRATORY (INHALATION) at 20:51

## 2019-11-17 RX ADMIN — ALBUMIN (HUMAN) 25 G: 0.25 INJECTION, SOLUTION INTRAVENOUS at 11:41

## 2019-11-17 RX ADMIN — Medication: at 20:40

## 2019-11-17 ASSESSMENT — PAIN SCALES - GENERAL
PAINLEVEL_OUTOF10: 8
PAINLEVEL_OUTOF10: 6
PAINLEVEL_OUTOF10: 0

## 2019-11-18 ENCOUNTER — ANESTHESIA (OUTPATIENT)
Dept: OPERATING ROOM | Age: 79
DRG: 515 | End: 2019-11-18
Payer: MEDICARE

## 2019-11-18 ENCOUNTER — ANESTHESIA EVENT (OUTPATIENT)
Dept: OPERATING ROOM | Age: 79
DRG: 515 | End: 2019-11-18
Payer: MEDICARE

## 2019-11-18 ENCOUNTER — APPOINTMENT (OUTPATIENT)
Dept: GENERAL RADIOLOGY | Age: 79
DRG: 515 | End: 2019-11-18
Payer: MEDICARE

## 2019-11-18 VITALS — OXYGEN SATURATION: 100 % | TEMPERATURE: 97.7 F | SYSTOLIC BLOOD PRESSURE: 150 MMHG | DIASTOLIC BLOOD PRESSURE: 72 MMHG

## 2019-11-18 LAB
ANION GAP SERPL CALCULATED.3IONS-SCNC: 14 MEQ/L (ref 9–15)
BUN BLDV-MCNC: 33 MG/DL (ref 8–23)
CALCIUM SERPL-MCNC: 8.1 MG/DL (ref 8.5–9.9)
CHLORIDE BLD-SCNC: 95 MEQ/L (ref 95–107)
CK MB: 4 NG/ML (ref 0–3.8)
CO2: 22 MEQ/L (ref 20–31)
CREAT SERPL-MCNC: 1.05 MG/DL (ref 0.5–0.9)
CREATINE KINASE-MB INDEX: 0.4 % (ref 0–3.5)
GFR AFRICAN AMERICAN: >60
GFR NON-AFRICAN AMERICAN: 50.7
GLUCOSE BLD-MCNC: 115 MG/DL (ref 70–99)
POTASSIUM SERPL-SCNC: 4.2 MEQ/L (ref 3.4–4.9)
SODIUM BLD-SCNC: 131 MEQ/L (ref 135–144)
TOTAL CK: 936 U/L (ref 0–170)

## 2019-11-18 PROCEDURE — 2580000003 HC RX 258: Performed by: ORTHOPAEDIC SURGERY

## 2019-11-18 PROCEDURE — 2780000010 HC IMPLANT OTHER: Performed by: ORTHOPAEDIC SURGERY

## 2019-11-18 PROCEDURE — 6360000002 HC RX W HCPCS: Performed by: ANESTHESIOLOGY

## 2019-11-18 PROCEDURE — 3600000004 HC SURGERY LEVEL 4 BASE: Performed by: ORTHOPAEDIC SURGERY

## 2019-11-18 PROCEDURE — 64447 NJX AA&/STRD FEMORAL NRV IMG: CPT | Performed by: STUDENT IN AN ORGANIZED HEALTH CARE EDUCATION/TRAINING PROGRAM

## 2019-11-18 PROCEDURE — 6370000000 HC RX 637 (ALT 250 FOR IP): Performed by: INTERNAL MEDICINE

## 2019-11-18 PROCEDURE — 6370000000 HC RX 637 (ALT 250 FOR IP): Performed by: ORTHOPAEDIC SURGERY

## 2019-11-18 PROCEDURE — 2580000003 HC RX 258: Performed by: INTERNAL MEDICINE

## 2019-11-18 PROCEDURE — 2060000000 HC ICU INTERMEDIATE R&B

## 2019-11-18 PROCEDURE — 94761 N-INVAS EAR/PLS OXIMETRY MLT: CPT

## 2019-11-18 PROCEDURE — 76000 FLUOROSCOPY <1 HR PHYS/QHP: CPT

## 2019-11-18 PROCEDURE — 3700000001 HC ADD 15 MINUTES (ANESTHESIA): Performed by: ORTHOPAEDIC SURGERY

## 2019-11-18 PROCEDURE — 80048 BASIC METABOLIC PNL TOTAL CA: CPT

## 2019-11-18 PROCEDURE — 7100000000 HC PACU RECOVERY - FIRST 15 MIN: Performed by: ORTHOPAEDIC SURGERY

## 2019-11-18 PROCEDURE — 2720000010 HC SURG SUPPLY STERILE: Performed by: ORTHOPAEDIC SURGERY

## 2019-11-18 PROCEDURE — 82550 ASSAY OF CK (CPK): CPT

## 2019-11-18 PROCEDURE — 93010 ELECTROCARDIOGRAM REPORT: CPT | Performed by: INTERNAL MEDICINE

## 2019-11-18 PROCEDURE — 3600000014 HC SURGERY LEVEL 4 ADDTL 15MIN: Performed by: ORTHOPAEDIC SURGERY

## 2019-11-18 PROCEDURE — 82553 CREATINE MB FRACTION: CPT

## 2019-11-18 PROCEDURE — 3700000000 HC ANESTHESIA ATTENDED CARE: Performed by: ORTHOPAEDIC SURGERY

## 2019-11-18 PROCEDURE — C1713 ANCHOR/SCREW BN/BN,TIS/BN: HCPCS | Performed by: ORTHOPAEDIC SURGERY

## 2019-11-18 PROCEDURE — 36415 COLL VENOUS BLD VENIPUNCTURE: CPT

## 2019-11-18 PROCEDURE — 94640 AIRWAY INHALATION TREATMENT: CPT

## 2019-11-18 PROCEDURE — 93005 ELECTROCARDIOGRAM TRACING: CPT | Performed by: INTERNAL MEDICINE

## 2019-11-18 PROCEDURE — 6360000002 HC RX W HCPCS: Performed by: ORTHOPAEDIC SURGERY

## 2019-11-18 PROCEDURE — 6360000002 HC RX W HCPCS: Performed by: NURSE ANESTHETIST, CERTIFIED REGISTERED

## 2019-11-18 PROCEDURE — 6360000002 HC RX W HCPCS: Performed by: INTERNAL MEDICINE

## 2019-11-18 PROCEDURE — 6370000000 HC RX 637 (ALT 250 FOR IP): Performed by: NURSE PRACTITIONER

## 2019-11-18 PROCEDURE — 7100000001 HC PACU RECOVERY - ADDTL 15 MIN: Performed by: ORTHOPAEDIC SURGERY

## 2019-11-18 PROCEDURE — 0QSD04Z REPOSITION RIGHT PATELLA WITH INTERNAL FIXATION DEVICE, OPEN APPROACH: ICD-10-PCS | Performed by: ORTHOPAEDIC SURGERY

## 2019-11-18 PROCEDURE — 2580000003 HC RX 258: Performed by: ANESTHESIOLOGY

## 2019-11-18 PROCEDURE — C1769 GUIDE WIRE: HCPCS | Performed by: ORTHOPAEDIC SURGERY

## 2019-11-18 PROCEDURE — 2709999900 HC NON-CHARGEABLE SUPPLY: Performed by: ORTHOPAEDIC SURGERY

## 2019-11-18 DEVICE — SCREW BNE L26MM DIA4MM S STL CANN SHT 1/3 THRD SM HEX SOCK: Type: IMPLANTABLE DEVICE | Site: PATELLA | Status: FUNCTIONAL

## 2019-11-18 DEVICE — SCREW BNE L24MM DIA2.7MM CORT S STL ST T8 STARDRV RECESS: Type: IMPLANTABLE DEVICE | Site: PATELLA | Status: FUNCTIONAL

## 2019-11-18 DEVICE — SCREW BNE L32MM DIA4MM S STL CANN SHT 1/3 THRD SM HEX SOCK: Type: IMPLANTABLE DEVICE | Site: PATELLA | Status: FUNCTIONAL

## 2019-11-18 RX ORDER — HYDROCODONE BITARTRATE AND ACETAMINOPHEN 5; 325 MG/1; MG/1
2 TABLET ORAL PRN
Status: DISCONTINUED | OUTPATIENT
Start: 2019-11-18 | End: 2019-11-18 | Stop reason: HOSPADM

## 2019-11-18 RX ORDER — AMIODARONE HYDROCHLORIDE 200 MG/1
200 TABLET ORAL 2 TIMES DAILY
Status: DISCONTINUED | OUTPATIENT
Start: 2019-11-18 | End: 2019-11-20 | Stop reason: HOSPADM

## 2019-11-18 RX ORDER — FUROSEMIDE 10 MG/ML
20 INJECTION INTRAMUSCULAR; INTRAVENOUS ONCE
Status: COMPLETED | OUTPATIENT
Start: 2019-11-18 | End: 2019-11-18

## 2019-11-18 RX ORDER — ONDANSETRON 2 MG/ML
4 INJECTION INTRAMUSCULAR; INTRAVENOUS EVERY 6 HOURS PRN
Status: DISCONTINUED | OUTPATIENT
Start: 2019-11-18 | End: 2019-11-20 | Stop reason: HOSPADM

## 2019-11-18 RX ORDER — MORPHINE SULFATE 4 MG/ML
4 INJECTION, SOLUTION INTRAMUSCULAR; INTRAVENOUS
Status: DISCONTINUED | OUTPATIENT
Start: 2019-11-18 | End: 2019-11-19

## 2019-11-18 RX ORDER — MORPHINE SULFATE 2 MG/ML
2 INJECTION, SOLUTION INTRAMUSCULAR; INTRAVENOUS
Status: DISCONTINUED | OUTPATIENT
Start: 2019-11-18 | End: 2019-11-19

## 2019-11-18 RX ORDER — SODIUM CHLORIDE, SODIUM LACTATE, POTASSIUM CHLORIDE, CALCIUM CHLORIDE 600; 310; 30; 20 MG/100ML; MG/100ML; MG/100ML; MG/100ML
INJECTION, SOLUTION INTRAVENOUS CONTINUOUS
Status: DISCONTINUED | OUTPATIENT
Start: 2019-11-18 | End: 2019-11-18

## 2019-11-18 RX ORDER — LIDOCAINE HYDROCHLORIDE 10 MG/ML
1 INJECTION, SOLUTION EPIDURAL; INFILTRATION; INTRACAUDAL; PERINEURAL
Status: DISCONTINUED | OUTPATIENT
Start: 2019-11-18 | End: 2019-11-18 | Stop reason: HOSPADM

## 2019-11-18 RX ORDER — MEPERIDINE HYDROCHLORIDE 25 MG/ML
12.5 INJECTION INTRAMUSCULAR; INTRAVENOUS; SUBCUTANEOUS EVERY 5 MIN PRN
Status: DISCONTINUED | OUTPATIENT
Start: 2019-11-18 | End: 2019-11-18 | Stop reason: HOSPADM

## 2019-11-18 RX ORDER — TRAMADOL HYDROCHLORIDE 50 MG/1
50 TABLET ORAL EVERY 6 HOURS PRN
Status: DISCONTINUED | OUTPATIENT
Start: 2019-11-18 | End: 2019-11-20 | Stop reason: HOSPADM

## 2019-11-18 RX ORDER — ROPIVACAINE HYDROCHLORIDE 5 MG/ML
INJECTION, SOLUTION EPIDURAL; INFILTRATION; PERINEURAL PRN
Status: DISCONTINUED | OUTPATIENT
Start: 2019-11-18 | End: 2019-11-18 | Stop reason: SDUPTHER

## 2019-11-18 RX ORDER — MIDAZOLAM HYDROCHLORIDE 1 MG/ML
INJECTION INTRAMUSCULAR; INTRAVENOUS PRN
Status: DISCONTINUED | OUTPATIENT
Start: 2019-11-18 | End: 2019-11-18 | Stop reason: SDUPTHER

## 2019-11-18 RX ORDER — HYDROCODONE BITARTRATE AND ACETAMINOPHEN 5; 325 MG/1; MG/1
1 TABLET ORAL PRN
Status: DISCONTINUED | OUTPATIENT
Start: 2019-11-18 | End: 2019-11-18 | Stop reason: HOSPADM

## 2019-11-18 RX ORDER — ONDANSETRON 2 MG/ML
INJECTION INTRAMUSCULAR; INTRAVENOUS PRN
Status: DISCONTINUED | OUTPATIENT
Start: 2019-11-18 | End: 2019-11-18 | Stop reason: SDUPTHER

## 2019-11-18 RX ORDER — DEXAMETHASONE SODIUM PHOSPHATE 10 MG/ML
INJECTION INTRAMUSCULAR; INTRAVENOUS PRN
Status: DISCONTINUED | OUTPATIENT
Start: 2019-11-18 | End: 2019-11-18 | Stop reason: SDUPTHER

## 2019-11-18 RX ORDER — DIPHENHYDRAMINE HYDROCHLORIDE 50 MG/ML
12.5 INJECTION INTRAMUSCULAR; INTRAVENOUS
Status: DISCONTINUED | OUTPATIENT
Start: 2019-11-18 | End: 2019-11-18 | Stop reason: HOSPADM

## 2019-11-18 RX ORDER — FENTANYL CITRATE 50 UG/ML
50 INJECTION, SOLUTION INTRAMUSCULAR; INTRAVENOUS EVERY 10 MIN PRN
Status: DISCONTINUED | OUTPATIENT
Start: 2019-11-18 | End: 2019-11-18 | Stop reason: HOSPADM

## 2019-11-18 RX ORDER — SODIUM CHLORIDE 0.9 % (FLUSH) 0.9 %
10 SYRINGE (ML) INJECTION PRN
Status: DISCONTINUED | OUTPATIENT
Start: 2019-11-18 | End: 2019-11-18 | Stop reason: HOSPADM

## 2019-11-18 RX ORDER — SODIUM CHLORIDE 0.9 % (FLUSH) 0.9 %
10 SYRINGE (ML) INJECTION EVERY 12 HOURS SCHEDULED
Status: DISCONTINUED | OUTPATIENT
Start: 2019-11-18 | End: 2019-11-18 | Stop reason: HOSPADM

## 2019-11-18 RX ORDER — SODIUM CHLORIDE 9 MG/ML
INJECTION, SOLUTION INTRAVENOUS CONTINUOUS
Status: DISCONTINUED | OUTPATIENT
Start: 2019-11-18 | End: 2019-11-20

## 2019-11-18 RX ORDER — CEFAZOLIN SODIUM 1 G/3ML
INJECTION, POWDER, FOR SOLUTION INTRAMUSCULAR; INTRAVENOUS PRN
Status: DISCONTINUED | OUTPATIENT
Start: 2019-11-18 | End: 2019-11-18 | Stop reason: SDUPTHER

## 2019-11-18 RX ORDER — MAGNESIUM HYDROXIDE 1200 MG/15ML
LIQUID ORAL CONTINUOUS PRN
Status: COMPLETED | OUTPATIENT
Start: 2019-11-18 | End: 2019-11-18

## 2019-11-18 RX ORDER — DOCUSATE SODIUM 100 MG/1
100 CAPSULE, LIQUID FILLED ORAL 2 TIMES DAILY
Status: DISCONTINUED | OUTPATIENT
Start: 2019-11-18 | End: 2019-11-20 | Stop reason: HOSPADM

## 2019-11-18 RX ORDER — METOCLOPRAMIDE HYDROCHLORIDE 5 MG/ML
10 INJECTION INTRAMUSCULAR; INTRAVENOUS
Status: DISCONTINUED | OUTPATIENT
Start: 2019-11-18 | End: 2019-11-18 | Stop reason: HOSPADM

## 2019-11-18 RX ORDER — LIDOCAINE HYDROCHLORIDE 20 MG/ML
INJECTION, SOLUTION INTRAVENOUS PRN
Status: DISCONTINUED | OUTPATIENT
Start: 2019-11-18 | End: 2019-11-18 | Stop reason: SDUPTHER

## 2019-11-18 RX ORDER — PROPOFOL 10 MG/ML
INJECTION, EMULSION INTRAVENOUS PRN
Status: DISCONTINUED | OUTPATIENT
Start: 2019-11-18 | End: 2019-11-18 | Stop reason: SDUPTHER

## 2019-11-18 RX ORDER — ONDANSETRON 2 MG/ML
4 INJECTION INTRAMUSCULAR; INTRAVENOUS
Status: DISCONTINUED | OUTPATIENT
Start: 2019-11-18 | End: 2019-11-18 | Stop reason: HOSPADM

## 2019-11-18 RX ADMIN — LIDOCAINE HYDROCHLORIDE 50 MG: 20 INJECTION, SOLUTION INTRAVENOUS at 15:51

## 2019-11-18 RX ADMIN — MEPERIDINE HYDROCHLORIDE 12.5 MG: 25 INJECTION INTRAMUSCULAR; INTRAVENOUS; SUBCUTANEOUS at 18:08

## 2019-11-18 RX ADMIN — DEXAMETHASONE SODIUM PHOSPHATE 10 MG: 10 INJECTION INTRAMUSCULAR; INTRAVENOUS at 16:05

## 2019-11-18 RX ADMIN — FUROSEMIDE 20 MG: 10 INJECTION, SOLUTION INTRAMUSCULAR; INTRAVENOUS at 14:56

## 2019-11-18 RX ADMIN — IPRATROPIUM BROMIDE AND ALBUTEROL SULFATE 1 AMPULE: .5; 3 SOLUTION RESPIRATORY (INHALATION) at 22:26

## 2019-11-18 RX ADMIN — CEFAZOLIN 2000 MG: 330 INJECTION, POWDER, FOR SOLUTION INTRAMUSCULAR; INTRAVENOUS at 15:31

## 2019-11-18 RX ADMIN — METOPROLOL TARTRATE 25 MG: 25 TABLET ORAL at 09:06

## 2019-11-18 RX ADMIN — AMIODARONE HYDROCHLORIDE 0.5 MG/MIN: 50 INJECTION, SOLUTION INTRAVENOUS at 10:41

## 2019-11-18 RX ADMIN — HYDROCODONE BITARTRATE AND ACETAMINOPHEN 1 TABLET: 5; 325 TABLET ORAL at 09:07

## 2019-11-18 RX ADMIN — DOCUSATE SODIUM 100 MG: 100 CAPSULE, LIQUID FILLED ORAL at 22:31

## 2019-11-18 RX ADMIN — Medication 10 ML: at 22:41

## 2019-11-18 RX ADMIN — ONDANSETRON 4 MG: 2 INJECTION INTRAMUSCULAR; INTRAVENOUS at 16:05

## 2019-11-18 RX ADMIN — LEVOTHYROXINE SODIUM 50 MCG: 50 TABLET ORAL at 07:19

## 2019-11-18 RX ADMIN — SODIUM CHLORIDE, POTASSIUM CHLORIDE, SODIUM LACTATE AND CALCIUM CHLORIDE: 600; 310; 30; 20 INJECTION, SOLUTION INTRAVENOUS at 13:49

## 2019-11-18 RX ADMIN — IPRATROPIUM BROMIDE AND ALBUTEROL SULFATE 1 AMPULE: .5; 3 SOLUTION RESPIRATORY (INHALATION) at 07:21

## 2019-11-18 RX ADMIN — MORPHINE SULFATE 2 MG: 2 INJECTION, SOLUTION INTRAMUSCULAR; INTRAVENOUS at 22:32

## 2019-11-18 RX ADMIN — PROPOFOL 120 MG: 10 INJECTION, EMULSION INTRAVENOUS at 15:51

## 2019-11-18 RX ADMIN — METOPROLOL TARTRATE 25 MG: 25 TABLET ORAL at 22:32

## 2019-11-18 RX ADMIN — ROPIVACAINE HYDROCHLORIDE 30 ML: 5 INJECTION, SOLUTION EPIDURAL; INFILTRATION; PERINEURAL at 14:45

## 2019-11-18 RX ADMIN — MIDAZOLAM HYDROCHLORIDE 2 MG: 2 INJECTION, SOLUTION INTRAMUSCULAR; INTRAVENOUS at 14:40

## 2019-11-18 ASSESSMENT — PULMONARY FUNCTION TESTS
PIF_VALUE: 10
PIF_VALUE: 10
PIF_VALUE: 0
PIF_VALUE: 10
PIF_VALUE: 11
PIF_VALUE: 10
PIF_VALUE: 11
PIF_VALUE: 10
PIF_VALUE: 11
PIF_VALUE: 12
PIF_VALUE: 10
PIF_VALUE: 0
PIF_VALUE: 10
PIF_VALUE: 11
PIF_VALUE: 10
PIF_VALUE: 9
PIF_VALUE: 10
PIF_VALUE: 12
PIF_VALUE: 11
PIF_VALUE: 10
PIF_VALUE: 1
PIF_VALUE: 10
PIF_VALUE: 0
PIF_VALUE: 10
PIF_VALUE: 11
PIF_VALUE: 10
PIF_VALUE: 11
PIF_VALUE: 10
PIF_VALUE: 11
PIF_VALUE: 10
PIF_VALUE: 10
PIF_VALUE: 11
PIF_VALUE: 0
PIF_VALUE: 10
PIF_VALUE: 11
PIF_VALUE: 10
PIF_VALUE: 11
PIF_VALUE: 10
PIF_VALUE: 10
PIF_VALUE: 15
PIF_VALUE: 10
PIF_VALUE: 13
PIF_VALUE: 10
PIF_VALUE: 11
PIF_VALUE: 10
PIF_VALUE: 10
PIF_VALUE: 11
PIF_VALUE: 11
PIF_VALUE: 10
PIF_VALUE: 11
PIF_VALUE: 10
PIF_VALUE: 10
PIF_VALUE: 13
PIF_VALUE: 0
PIF_VALUE: 3
PIF_VALUE: 0
PIF_VALUE: 11
PIF_VALUE: 10
PIF_VALUE: 12
PIF_VALUE: 10
PIF_VALUE: 0
PIF_VALUE: 10
PIF_VALUE: 10
PIF_VALUE: 11
PIF_VALUE: 10
PIF_VALUE: 12
PIF_VALUE: 10
PIF_VALUE: 0
PIF_VALUE: 10
PIF_VALUE: 13
PIF_VALUE: 10
PIF_VALUE: 1
PIF_VALUE: 11
PIF_VALUE: 10

## 2019-11-18 ASSESSMENT — PAIN SCALES - GENERAL
PAINLEVEL_OUTOF10: 5
PAINLEVEL_OUTOF10: 3
PAINLEVEL_OUTOF10: 0
PAINLEVEL_OUTOF10: 6
PAINLEVEL_OUTOF10: 5
PAINLEVEL_OUTOF10: 6
PAINLEVEL_OUTOF10: 5
PAINLEVEL_OUTOF10: 6

## 2019-11-18 ASSESSMENT — PAIN DESCRIPTION - ONSET
ONSET: ON-GOING

## 2019-11-18 ASSESSMENT — PAIN DESCRIPTION - DESCRIPTORS: DESCRIPTORS: ACHING

## 2019-11-18 ASSESSMENT — PAIN DESCRIPTION - LOCATION
LOCATION: HAND;WRIST
LOCATION: HAND;WRIST
LOCATION: KNEE
LOCATION: HAND;WRIST
LOCATION: KNEE
LOCATION: HAND;WRIST

## 2019-11-18 ASSESSMENT — PAIN DESCRIPTION - FREQUENCY: FREQUENCY: INTERMITTENT

## 2019-11-18 ASSESSMENT — PAIN DESCRIPTION - PAIN TYPE: TYPE: ACUTE PAIN

## 2019-11-18 ASSESSMENT — PAIN DESCRIPTION - ORIENTATION
ORIENTATION: RIGHT
ORIENTATION: RIGHT

## 2019-11-18 ASSESSMENT — PAIN DESCRIPTION - PROGRESSION: CLINICAL_PROGRESSION: NOT CHANGED

## 2019-11-19 PROBLEM — I71.21 THORACIC ASCENDING AORTIC ANEURYSM: Status: ACTIVE | Noted: 2017-03-24

## 2019-11-19 PROBLEM — S82.001A CLOSED FRACTURE OF RIGHT PATELLA: Status: ACTIVE | Noted: 2019-11-19

## 2019-11-19 PROBLEM — Z72.0 TOBACCO ABUSE: Status: ACTIVE | Noted: 2019-11-19

## 2019-11-19 PROBLEM — I35.0 AORTIC VALVE STENOSIS: Status: ACTIVE | Noted: 2017-11-20

## 2019-11-19 LAB
ANION GAP SERPL CALCULATED.3IONS-SCNC: 14 MEQ/L (ref 9–15)
BUN BLDV-MCNC: 28 MG/DL (ref 8–23)
CALCIUM SERPL-MCNC: 8.3 MG/DL (ref 8.5–9.9)
CHLORIDE BLD-SCNC: 95 MEQ/L (ref 95–107)
CO2: 25 MEQ/L (ref 20–31)
CREAT SERPL-MCNC: 1.19 MG/DL (ref 0.5–0.9)
GFR AFRICAN AMERICAN: 53.1
GFR NON-AFRICAN AMERICAN: 43.9
GLUCOSE BLD-MCNC: 169 MG/DL (ref 70–99)
HCT VFR BLD CALC: 29.6 % (ref 37–47)
HEMOGLOBIN: 9.9 G/DL (ref 12–16)
MCH RBC QN AUTO: 30.7 PG (ref 27–31.3)
MCHC RBC AUTO-ENTMCNC: 33.5 % (ref 33–37)
MCV RBC AUTO: 91.5 FL (ref 82–100)
PDW BLD-RTO: 13.2 % (ref 11.5–14.5)
PLATELET # BLD: 255 K/UL (ref 130–400)
POTASSIUM SERPL-SCNC: 3.9 MEQ/L (ref 3.4–4.9)
RBC # BLD: 3.23 M/UL (ref 4.2–5.4)
SODIUM BLD-SCNC: 134 MEQ/L (ref 135–144)
WBC # BLD: 13.3 K/UL (ref 4.8–10.8)

## 2019-11-19 PROCEDURE — 97163 PT EVAL HIGH COMPLEX 45 MIN: CPT

## 2019-11-19 PROCEDURE — 97535 SELF CARE MNGMENT TRAINING: CPT

## 2019-11-19 PROCEDURE — 2060000000 HC ICU INTERMEDIATE R&B

## 2019-11-19 PROCEDURE — 6360000002 HC RX W HCPCS: Performed by: ORTHOPAEDIC SURGERY

## 2019-11-19 PROCEDURE — 2580000003 HC RX 258: Performed by: ORTHOPAEDIC SURGERY

## 2019-11-19 PROCEDURE — 85027 COMPLETE CBC AUTOMATED: CPT

## 2019-11-19 PROCEDURE — 6370000000 HC RX 637 (ALT 250 FOR IP): Performed by: ORTHOPAEDIC SURGERY

## 2019-11-19 PROCEDURE — 6370000000 HC RX 637 (ALT 250 FOR IP): Performed by: INTERNAL MEDICINE

## 2019-11-19 PROCEDURE — 97167 OT EVAL HIGH COMPLEX 60 MIN: CPT

## 2019-11-19 PROCEDURE — 93005 ELECTROCARDIOGRAM TRACING: CPT | Performed by: ORTHOPAEDIC SURGERY

## 2019-11-19 PROCEDURE — 93005 ELECTROCARDIOGRAM TRACING: CPT | Performed by: INTERNAL MEDICINE

## 2019-11-19 PROCEDURE — 99222 1ST HOSP IP/OBS MODERATE 55: CPT | Performed by: PHYSICAL MEDICINE & REHABILITATION

## 2019-11-19 PROCEDURE — 94664 DEMO&/EVAL PT USE INHALER: CPT

## 2019-11-19 PROCEDURE — 36415 COLL VENOUS BLD VENIPUNCTURE: CPT

## 2019-11-19 PROCEDURE — 80048 BASIC METABOLIC PNL TOTAL CA: CPT

## 2019-11-19 RX ORDER — CARBAMAZEPINE 200 MG/1
200 TABLET ORAL 3 TIMES DAILY
Status: DISCONTINUED | OUTPATIENT
Start: 2019-11-19 | End: 2019-11-20 | Stop reason: HOSPADM

## 2019-11-19 RX ORDER — VENLAFAXINE 37.5 MG/1
37.5 TABLET ORAL 3 TIMES DAILY
Status: DISCONTINUED | OUTPATIENT
Start: 2019-11-19 | End: 2019-11-20 | Stop reason: HOSPADM

## 2019-11-19 RX ORDER — IPRATROPIUM BROMIDE AND ALBUTEROL SULFATE 2.5; .5 MG/3ML; MG/3ML
1 SOLUTION RESPIRATORY (INHALATION) EVERY 4 HOURS PRN
Status: DISCONTINUED | OUTPATIENT
Start: 2019-11-19 | End: 2019-11-20 | Stop reason: HOSPADM

## 2019-11-19 RX ADMIN — Medication 2 G: at 09:27

## 2019-11-19 RX ADMIN — Medication 10 ML: at 08:32

## 2019-11-19 RX ADMIN — HYDROCODONE BITARTRATE AND ACETAMINOPHEN 1 TABLET: 5; 325 TABLET ORAL at 13:07

## 2019-11-19 RX ADMIN — HYDROCODONE BITARTRATE AND ACETAMINOPHEN 1 TABLET: 5; 325 TABLET ORAL at 06:25

## 2019-11-19 RX ADMIN — DOCUSATE SODIUM 100 MG: 100 CAPSULE, LIQUID FILLED ORAL at 08:32

## 2019-11-19 RX ADMIN — Medication 10 ML: at 20:50

## 2019-11-19 RX ADMIN — DOCUSATE SODIUM 100 MG: 100 CAPSULE, LIQUID FILLED ORAL at 20:47

## 2019-11-19 RX ADMIN — HYDROCODONE BITARTRATE AND ACETAMINOPHEN 1 TABLET: 5; 325 TABLET ORAL at 00:25

## 2019-11-19 RX ADMIN — AMIODARONE HYDROCHLORIDE 200 MG: 200 TABLET ORAL at 00:25

## 2019-11-19 RX ADMIN — METOPROLOL TARTRATE 25 MG: 25 TABLET ORAL at 20:48

## 2019-11-19 RX ADMIN — VENLAFAXINE 37.5 MG: 37.5 TABLET ORAL at 20:48

## 2019-11-19 RX ADMIN — CARBAMAZEPINE 200 MG: 200 TABLET ORAL at 15:48

## 2019-11-19 RX ADMIN — Medication 2 G: at 00:00

## 2019-11-19 RX ADMIN — TRAMADOL HYDROCHLORIDE 50 MG: 50 TABLET, FILM COATED ORAL at 20:47

## 2019-11-19 RX ADMIN — AMIODARONE HYDROCHLORIDE 200 MG: 200 TABLET ORAL at 08:32

## 2019-11-19 RX ADMIN — CARBAMAZEPINE 200 MG: 200 TABLET ORAL at 20:47

## 2019-11-19 RX ADMIN — LEVOTHYROXINE SODIUM 50 MCG: 50 TABLET ORAL at 06:21

## 2019-11-19 RX ADMIN — VENLAFAXINE 37.5 MG: 37.5 TABLET ORAL at 15:48

## 2019-11-19 RX ADMIN — METOPROLOL TARTRATE 25 MG: 25 TABLET ORAL at 08:32

## 2019-11-19 RX ADMIN — AMIODARONE HYDROCHLORIDE 200 MG: 200 TABLET ORAL at 20:47

## 2019-11-19 SDOH — SOCIAL STABILITY: SOCIAL NETWORK: ARE YOU MARRIED, WIDOWED, DIVORCED, SEPARATED, NEVER MARRIED, OR LIVING WITH A PARTNER?: WIDOWED

## 2019-11-19 SDOH — SOCIAL STABILITY: SOCIAL INSECURITY
WITHIN THE LAST YEAR, HAVE TO BEEN RAPED OR FORCED TO HAVE ANY KIND OF SEXUAL ACTIVITY BY YOUR PARTNER OR EX-PARTNER?: NO

## 2019-11-19 SDOH — ECONOMIC STABILITY: FOOD INSECURITY: WITHIN THE PAST 12 MONTHS, THE FOOD YOU BOUGHT JUST DIDN'T LAST AND YOU DIDN'T HAVE MONEY TO GET MORE.: NEVER TRUE

## 2019-11-19 SDOH — SOCIAL STABILITY: SOCIAL NETWORK
DO YOU BELONG TO ANY CLUBS OR ORGANIZATIONS SUCH AS CHURCH GROUPS UNIONS, FRATERNAL OR ATHLETIC GROUPS, OR SCHOOL GROUPS?: YES

## 2019-11-19 SDOH — ECONOMIC STABILITY: FOOD INSECURITY: WITHIN THE PAST 12 MONTHS, YOU WORRIED THAT YOUR FOOD WOULD RUN OUT BEFORE YOU GOT MONEY TO BUY MORE.: NEVER TRUE

## 2019-11-19 SDOH — SOCIAL STABILITY: SOCIAL NETWORK: HOW OFTEN DO YOU ATTENT MEETINGS OF THE CLUB OR ORGANIZATION YOU BELONG TO?: 1 TO 4 TIMES PER YEAR

## 2019-11-19 SDOH — HEALTH STABILITY: MENTAL HEALTH
STRESS IS WHEN SOMEONE FEELS TENSE, NERVOUS, ANXIOUS, OR CAN'T SLEEP AT NIGHT BECAUSE THEIR MIND IS TROUBLED. HOW STRESSED ARE YOU?: ONLY A LITTLE

## 2019-11-19 SDOH — SOCIAL STABILITY: SOCIAL NETWORK: HOW OFTEN DO YOU GET TOGETHER WITH FRIENDS OR RELATIVES?: ONCE A WEEK

## 2019-11-19 SDOH — SOCIAL STABILITY: SOCIAL INSECURITY
WITHIN THE LAST YEAR, HAVE YOU BEEN KICKED, HIT, SLAPPED, OR OTHERWISE PHYSICALLY HURT BY YOUR PARTNER OR EX-PARTNER?: NO

## 2019-11-19 SDOH — SOCIAL STABILITY: SOCIAL INSECURITY: WITHIN THE LAST YEAR, HAVE YOU BEEN AFRAID OF YOUR PARTNER OR EX-PARTNER?: NO

## 2019-11-19 SDOH — HEALTH STABILITY: PHYSICAL HEALTH: ON AVERAGE, HOW MANY DAYS PER WEEK DO YOU ENGAGE IN MODERATE TO STRENUOUS EXERCISE (LIKE A BRISK WALK)?: 0 DAYS

## 2019-11-19 SDOH — SOCIAL STABILITY: SOCIAL NETWORK
IN A TYPICAL WEEK, HOW MANY TIMES DO YOU TALK ON THE PHONE WITH FAMILY, FRIENDS, OR NEIGHBORS?: MORE THAN THREE TIMES A WEEK

## 2019-11-19 SDOH — SOCIAL STABILITY: SOCIAL INSECURITY: WITHIN THE LAST YEAR, HAVE YOU BEEN HUMILIATED OR EMOTIONALLY ABUSED IN OTHER WAYS BY YOUR PARTNER OR EX-PARTNER?: NO

## 2019-11-19 SDOH — ECONOMIC STABILITY: TRANSPORTATION INSECURITY
IN THE PAST 12 MONTHS, HAS LACK OF TRANSPORTATION KEPT YOU FROM MEETINGS, WORK, OR FROM GETTING THINGS NEEDED FOR DAILY LIVING?: NO

## 2019-11-19 SDOH — SOCIAL STABILITY: SOCIAL NETWORK: HOW OFTEN DO YOU ATTEND CHURCH OR RELIGIOUS SERVICES?: MORE THAN 4 TIMES PER YEAR

## 2019-11-19 SDOH — ECONOMIC STABILITY: INCOME INSECURITY: HOW HARD IS IT FOR YOU TO PAY FOR THE VERY BASICS LIKE FOOD, HOUSING, MEDICAL CARE, AND HEATING?: NOT HARD AT ALL

## 2019-11-19 SDOH — ECONOMIC STABILITY: TRANSPORTATION INSECURITY
IN THE PAST 12 MONTHS, HAS THE LACK OF TRANSPORTATION KEPT YOU FROM MEDICAL APPOINTMENTS OR FROM GETTING MEDICATIONS?: NO

## 2019-11-19 ASSESSMENT — PAIN SCALES - GENERAL
PAINLEVEL_OUTOF10: 7
PAINLEVEL_OUTOF10: 2
PAINLEVEL_OUTOF10: 2
PAINLEVEL_OUTOF10: 5
PAINLEVEL_OUTOF10: 6
PAINLEVEL_OUTOF10: 5

## 2019-11-19 ASSESSMENT — PAIN DESCRIPTION - PROGRESSION: CLINICAL_PROGRESSION: GRADUALLY WORSENING

## 2019-11-19 ASSESSMENT — PAIN DESCRIPTION - ORIENTATION
ORIENTATION: RIGHT
ORIENTATION: RIGHT;LOWER
ORIENTATION: RIGHT

## 2019-11-19 ASSESSMENT — PAIN DESCRIPTION - LOCATION
LOCATION: KNEE
LOCATION: BACK;KNEE
LOCATION: KNEE

## 2019-11-19 ASSESSMENT — PAIN DESCRIPTION - PAIN TYPE
TYPE: SURGICAL PAIN
TYPE: SURGICAL PAIN
TYPE: ACUTE PAIN

## 2019-11-19 ASSESSMENT — ENCOUNTER SYMPTOMS
BOWEL INCONTINENCE: 0
ABDOMINAL PAIN: 0

## 2019-11-19 ASSESSMENT — PAIN DESCRIPTION - FREQUENCY: FREQUENCY: INTERMITTENT

## 2019-11-19 ASSESSMENT — PAIN DESCRIPTION - DESCRIPTORS
DESCRIPTORS: ACHING
DESCRIPTORS: ACHING

## 2019-11-19 ASSESSMENT — PAIN DESCRIPTION - ONSET: ONSET: ON-GOING

## 2019-11-20 ENCOUNTER — HOSPITAL ENCOUNTER (INPATIENT)
Age: 79
LOS: 17 days | Discharge: NURSING, REHAB OR LONG-TERM CARE | DRG: 092 | End: 2019-12-07
Attending: PHYSICAL MEDICINE & REHABILITATION | Admitting: PHYSICAL MEDICINE & REHABILITATION
Payer: MEDICARE

## 2019-11-20 VITALS
DIASTOLIC BLOOD PRESSURE: 70 MMHG | RESPIRATION RATE: 18 BRPM | SYSTOLIC BLOOD PRESSURE: 98 MMHG | BODY MASS INDEX: 27.06 KG/M2 | TEMPERATURE: 98.3 F | HEIGHT: 61 IN | OXYGEN SATURATION: 93 % | WEIGHT: 143.3 LBS | HEART RATE: 65 BPM

## 2019-11-20 PROBLEM — M62.82 RHABDOMYOLYSIS: Status: ACTIVE | Noted: 2019-11-20

## 2019-11-20 PROBLEM — I10 HTN (HYPERTENSION): Status: ACTIVE | Noted: 2019-11-20

## 2019-11-20 PROBLEM — E03.9 HYPOTHYROID: Status: ACTIVE | Noted: 2019-11-20

## 2019-11-20 PROBLEM — N18.30 STAGE 3 CHRONIC KIDNEY DISEASE (HCC): Status: ACTIVE | Noted: 2019-11-20

## 2019-11-20 PROBLEM — I48.0 PAROXYSMAL ATRIAL FIBRILLATION (HCC): Status: ACTIVE | Noted: 2019-11-20

## 2019-11-20 PROBLEM — E87.1 HYPONATREMIA: Status: ACTIVE | Noted: 2019-11-20

## 2019-11-20 PROBLEM — R26.9 ABNORMALITY OF GAIT AND MOBILITY: Status: ACTIVE | Noted: 2019-11-20

## 2019-11-20 LAB
ANION GAP SERPL CALCULATED.3IONS-SCNC: 11 MEQ/L (ref 9–15)
BUN BLDV-MCNC: 33 MG/DL (ref 8–23)
CALCIUM SERPL-MCNC: 8.2 MG/DL (ref 8.5–9.9)
CHLORIDE BLD-SCNC: 93 MEQ/L (ref 95–107)
CO2: 27 MEQ/L (ref 20–31)
CREAT SERPL-MCNC: 1.15 MG/DL (ref 0.5–0.9)
EKG ATRIAL RATE: 102 BPM
EKG ATRIAL RATE: 75 BPM
EKG ATRIAL RATE: 76 BPM
EKG ATRIAL RATE: 81 BPM
EKG ATRIAL RATE: 82 BPM
EKG ATRIAL RATE: 92 BPM
EKG P AXIS: -2 DEGREES
EKG P AXIS: -27 DEGREES
EKG P AXIS: -3 DEGREES
EKG P AXIS: 23 DEGREES
EKG P AXIS: 24 DEGREES
EKG P AXIS: 43 DEGREES
EKG P-R INTERVAL: 130 MS
EKG P-R INTERVAL: 136 MS
EKG P-R INTERVAL: 142 MS
EKG P-R INTERVAL: 148 MS
EKG Q-T INTERVAL: 346 MS
EKG Q-T INTERVAL: 362 MS
EKG Q-T INTERVAL: 372 MS
EKG Q-T INTERVAL: 382 MS
EKG Q-T INTERVAL: 392 MS
EKG Q-T INTERVAL: 490 MS
EKG QRS DURATION: 70 MS
EKG QRS DURATION: 72 MS
EKG QRS DURATION: 76 MS
EKG QRS DURATION: 76 MS
EKG QTC CALCULATION (BAZETT): 389 MS
EKG QTC CALCULATION (BAZETT): 422 MS
EKG QTC CALCULATION (BAZETT): 472 MS
EKG QTC CALCULATION (BAZETT): 482 MS
EKG QTC CALCULATION (BAZETT): 484 MS
EKG QTC CALCULATION (BAZETT): 569 MS
EKG R AXIS: -20 DEGREES
EKG R AXIS: -21 DEGREES
EKG R AXIS: -22 DEGREES
EKG R AXIS: -22 DEGREES
EKG R AXIS: -26 DEGREES
EKG R AXIS: -38 DEGREES
EKG T AXIS: -48 DEGREES
EKG T AXIS: -59 DEGREES
EKG T AXIS: -71 DEGREES
EKG T AXIS: 10 DEGREES
EKG T AXIS: 10 DEGREES
EKG T AXIS: 184 DEGREES
EKG VENTRICULAR RATE: 102 BPM
EKG VENTRICULAR RATE: 76 BPM
EKG VENTRICULAR RATE: 81 BPM
EKG VENTRICULAR RATE: 82 BPM
EKG VENTRICULAR RATE: 91 BPM
EKG VENTRICULAR RATE: 92 BPM
GFR AFRICAN AMERICAN: 55.2
GFR NON-AFRICAN AMERICAN: 45.6
GLUCOSE BLD-MCNC: 123 MG/DL (ref 70–99)
HCT VFR BLD CALC: 28.2 % (ref 37–47)
HEMOGLOBIN: 9.3 G/DL (ref 12–16)
MCH RBC QN AUTO: 30.4 PG (ref 27–31.3)
MCHC RBC AUTO-ENTMCNC: 33.1 % (ref 33–37)
MCV RBC AUTO: 92 FL (ref 82–100)
PDW BLD-RTO: 13.3 % (ref 11.5–14.5)
PLATELET # BLD: 258 K/UL (ref 130–400)
POTASSIUM SERPL-SCNC: 3.7 MEQ/L (ref 3.4–4.9)
RBC # BLD: 3.06 M/UL (ref 4.2–5.4)
SODIUM BLD-SCNC: 131 MEQ/L (ref 135–144)
WBC # BLD: 15.2 K/UL (ref 4.8–10.8)

## 2019-11-20 PROCEDURE — 6360000002 HC RX W HCPCS: Performed by: ORTHOPAEDIC SURGERY

## 2019-11-20 PROCEDURE — 6370000000 HC RX 637 (ALT 250 FOR IP): Performed by: INTERNAL MEDICINE

## 2019-11-20 PROCEDURE — 85027 COMPLETE CBC AUTOMATED: CPT

## 2019-11-20 PROCEDURE — 87086 URINE CULTURE/COLONY COUNT: CPT

## 2019-11-20 PROCEDURE — 1180000000 HC REHAB R&B

## 2019-11-20 PROCEDURE — 97535 SELF CARE MNGMENT TRAINING: CPT

## 2019-11-20 PROCEDURE — 99231 SBSQ HOSP IP/OBS SF/LOW 25: CPT | Performed by: PHYSICAL MEDICINE & REHABILITATION

## 2019-11-20 PROCEDURE — 93005 ELECTROCARDIOGRAM TRACING: CPT

## 2019-11-20 PROCEDURE — 94664 DEMO&/EVAL PT USE INHALER: CPT

## 2019-11-20 PROCEDURE — 36415 COLL VENOUS BLD VENIPUNCTURE: CPT

## 2019-11-20 PROCEDURE — 6370000000 HC RX 637 (ALT 250 FOR IP): Performed by: ORTHOPAEDIC SURGERY

## 2019-11-20 PROCEDURE — 2580000003 HC RX 258: Performed by: ORTHOPAEDIC SURGERY

## 2019-11-20 PROCEDURE — 97116 GAIT TRAINING THERAPY: CPT

## 2019-11-20 PROCEDURE — 93005 ELECTROCARDIOGRAM TRACING: CPT | Performed by: ORTHOPAEDIC SURGERY

## 2019-11-20 PROCEDURE — 80048 BASIC METABOLIC PNL TOTAL CA: CPT

## 2019-11-20 PROCEDURE — 81001 URINALYSIS AUTO W/SCOPE: CPT

## 2019-11-20 RX ORDER — HYDROCODONE BITARTRATE AND ACETAMINOPHEN 5; 325 MG/1; MG/1
1 TABLET ORAL EVERY 6 HOURS PRN
Status: CANCELLED | OUTPATIENT
Start: 2019-11-20

## 2019-11-20 RX ORDER — VENLAFAXINE 37.5 MG/1
37.5 TABLET ORAL 3 TIMES DAILY
Status: DISCONTINUED | OUTPATIENT
Start: 2019-11-20 | End: 2019-12-07 | Stop reason: HOSPADM

## 2019-11-20 RX ORDER — LEVOTHYROXINE SODIUM 0.05 MG/1
50 TABLET ORAL DAILY
Status: DISCONTINUED | OUTPATIENT
Start: 2019-11-21 | End: 2019-12-07 | Stop reason: HOSPADM

## 2019-11-20 RX ORDER — CYANOCOBALAMIN 1000 UG/ML
1000 INJECTION INTRAMUSCULAR; SUBCUTANEOUS WEEKLY
Status: DISCONTINUED | OUTPATIENT
Start: 2019-11-20 | End: 2019-12-07 | Stop reason: HOSPADM

## 2019-11-20 RX ORDER — AMIODARONE HYDROCHLORIDE 200 MG/1
200 TABLET ORAL 2 TIMES DAILY
Status: DISCONTINUED | OUTPATIENT
Start: 2019-11-20 | End: 2019-11-21

## 2019-11-20 RX ORDER — LEVOTHYROXINE SODIUM 0.05 MG/1
50 TABLET ORAL DAILY
Status: CANCELLED | OUTPATIENT
Start: 2019-11-21

## 2019-11-20 RX ORDER — TRAMADOL HYDROCHLORIDE 50 MG/1
50 TABLET ORAL EVERY 6 HOURS PRN
Status: DISCONTINUED | OUTPATIENT
Start: 2019-11-20 | End: 2019-11-21

## 2019-11-20 RX ORDER — VENLAFAXINE 37.5 MG/1
37.5 TABLET ORAL 3 TIMES DAILY
Status: CANCELLED | OUTPATIENT
Start: 2019-11-20

## 2019-11-20 RX ORDER — DOCUSATE SODIUM 100 MG/1
100 CAPSULE, LIQUID FILLED ORAL 2 TIMES DAILY
Status: DISCONTINUED | OUTPATIENT
Start: 2019-11-20 | End: 2019-11-22

## 2019-11-20 RX ORDER — IPRATROPIUM BROMIDE AND ALBUTEROL SULFATE 2.5; .5 MG/3ML; MG/3ML
1 SOLUTION RESPIRATORY (INHALATION) EVERY 4 HOURS PRN
Status: DISCONTINUED | OUTPATIENT
Start: 2019-11-20 | End: 2019-12-07 | Stop reason: HOSPADM

## 2019-11-20 RX ORDER — CARBAMAZEPINE 200 MG/1
200 TABLET ORAL 3 TIMES DAILY
Status: CANCELLED | OUTPATIENT
Start: 2019-11-20

## 2019-11-20 RX ORDER — DOCUSATE SODIUM 100 MG/1
100 CAPSULE, LIQUID FILLED ORAL 2 TIMES DAILY
Status: CANCELLED | OUTPATIENT
Start: 2019-11-20

## 2019-11-20 RX ORDER — ACETAMINOPHEN 325 MG/1
650 TABLET ORAL EVERY 4 HOURS PRN
Status: DISCONTINUED | OUTPATIENT
Start: 2019-11-20 | End: 2019-12-07 | Stop reason: HOSPADM

## 2019-11-20 RX ORDER — ONDANSETRON 2 MG/ML
4 INJECTION INTRAMUSCULAR; INTRAVENOUS EVERY 6 HOURS PRN
Status: CANCELLED | OUTPATIENT
Start: 2019-11-20

## 2019-11-20 RX ORDER — TRAMADOL HYDROCHLORIDE 50 MG/1
50 TABLET ORAL EVERY 6 HOURS PRN
Status: CANCELLED | OUTPATIENT
Start: 2019-11-20

## 2019-11-20 RX ORDER — CARBAMAZEPINE 200 MG/1
200 TABLET ORAL 3 TIMES DAILY
Status: DISCONTINUED | OUTPATIENT
Start: 2019-11-20 | End: 2019-11-22

## 2019-11-20 RX ORDER — ACETAMINOPHEN 325 MG/1
650 TABLET ORAL EVERY 4 HOURS PRN
Status: CANCELLED | OUTPATIENT
Start: 2019-11-20

## 2019-11-20 RX ORDER — AMIODARONE HYDROCHLORIDE 200 MG/1
200 TABLET ORAL 2 TIMES DAILY
Status: CANCELLED | OUTPATIENT
Start: 2019-11-20

## 2019-11-20 RX ORDER — ERGOCALCIFEROL 1.25 MG/1
50000 CAPSULE ORAL WEEKLY
Status: DISCONTINUED | OUTPATIENT
Start: 2019-11-20 | End: 2019-12-07 | Stop reason: HOSPADM

## 2019-11-20 RX ORDER — ONDANSETRON 2 MG/ML
4 INJECTION INTRAMUSCULAR; INTRAVENOUS EVERY 6 HOURS PRN
Status: DISCONTINUED | OUTPATIENT
Start: 2019-11-20 | End: 2019-12-07 | Stop reason: HOSPADM

## 2019-11-20 RX ORDER — IPRATROPIUM BROMIDE AND ALBUTEROL SULFATE 2.5; .5 MG/3ML; MG/3ML
1 SOLUTION RESPIRATORY (INHALATION) EVERY 4 HOURS PRN
Status: CANCELLED | OUTPATIENT
Start: 2019-11-20

## 2019-11-20 RX ORDER — HYDROCODONE BITARTRATE AND ACETAMINOPHEN 5; 325 MG/1; MG/1
1 TABLET ORAL EVERY 6 HOURS PRN
Status: DISCONTINUED | OUTPATIENT
Start: 2019-11-20 | End: 2019-11-22

## 2019-11-20 RX ADMIN — CARBAMAZEPINE 200 MG: 200 TABLET ORAL at 09:19

## 2019-11-20 RX ADMIN — VENLAFAXINE 37.5 MG: 37.5 TABLET ORAL at 09:18

## 2019-11-20 RX ADMIN — AMIODARONE HYDROCHLORIDE 200 MG: 200 TABLET ORAL at 21:12

## 2019-11-20 RX ADMIN — ONDANSETRON 4 MG: 2 INJECTION INTRAMUSCULAR; INTRAVENOUS at 14:16

## 2019-11-20 RX ADMIN — VENLAFAXINE 37.5 MG: 37.5 TABLET ORAL at 14:16

## 2019-11-20 RX ADMIN — CARBAMAZEPINE 200 MG: 200 TABLET ORAL at 14:16

## 2019-11-20 RX ADMIN — VENLAFAXINE 37.5 MG: 37.5 TABLET ORAL at 21:12

## 2019-11-20 RX ADMIN — DOCUSATE SODIUM 100 MG: 100 CAPSULE, LIQUID FILLED ORAL at 21:12

## 2019-11-20 RX ADMIN — LEVOTHYROXINE SODIUM 50 MCG: 50 TABLET ORAL at 05:46

## 2019-11-20 RX ADMIN — TRAMADOL HYDROCHLORIDE 50 MG: 50 TABLET, FILM COATED ORAL at 05:46

## 2019-11-20 RX ADMIN — METOPROLOL TARTRATE 25 MG: 25 TABLET ORAL at 09:18

## 2019-11-20 RX ADMIN — Medication 10 ML: at 09:19

## 2019-11-20 RX ADMIN — METOPROLOL TARTRATE 25 MG: 25 TABLET ORAL at 21:12

## 2019-11-20 RX ADMIN — DOCUSATE SODIUM 100 MG: 100 CAPSULE, LIQUID FILLED ORAL at 09:19

## 2019-11-20 RX ADMIN — CARBAMAZEPINE 200 MG: 200 TABLET ORAL at 21:12

## 2019-11-20 RX ADMIN — AMIODARONE HYDROCHLORIDE 200 MG: 200 TABLET ORAL at 09:18

## 2019-11-20 ASSESSMENT — PAIN DESCRIPTION - ORIENTATION
ORIENTATION: RIGHT
ORIENTATION: RIGHT

## 2019-11-20 ASSESSMENT — PAIN DESCRIPTION - LOCATION
LOCATION: KNEE
LOCATION: KNEE

## 2019-11-20 ASSESSMENT — PAIN DESCRIPTION - DESCRIPTORS: DESCRIPTORS: ACHING

## 2019-11-20 ASSESSMENT — PAIN SCALES - GENERAL
PAINLEVEL_OUTOF10: 0
PAINLEVEL_OUTOF10: 2
PAINLEVEL_OUTOF10: 5

## 2019-11-20 ASSESSMENT — PAIN DESCRIPTION - PAIN TYPE
TYPE: SURGICAL PAIN
TYPE: SURGICAL PAIN

## 2019-11-21 LAB
BACTERIA: NEGATIVE /HPF
BILIRUBIN URINE: NEGATIVE
BLOOD, URINE: ABNORMAL
CLARITY: ABNORMAL
COLOR: YELLOW
EPITHELIAL CELLS, UA: ABNORMAL /HPF (ref 0–5)
GLUCOSE URINE: NEGATIVE MG/DL
HCT VFR BLD CALC: 25.5 % (ref 37–47)
HEMOGLOBIN: 8.3 G/DL (ref 12–16)
HYALINE CASTS: ABNORMAL /HPF (ref 0–5)
KETONES, URINE: NEGATIVE MG/DL
LEUKOCYTE ESTERASE, URINE: ABNORMAL
MCH RBC QN AUTO: 30 PG (ref 27–31.3)
MCHC RBC AUTO-ENTMCNC: 32.4 % (ref 33–37)
MCV RBC AUTO: 92.4 FL (ref 82–100)
NITRITE, URINE: NEGATIVE
PDW BLD-RTO: 13.4 % (ref 11.5–14.5)
PH UA: 5 (ref 5–9)
PLATELET # BLD: 244 K/UL (ref 130–400)
PROTEIN UA: 30 MG/DL
RBC # BLD: 2.76 M/UL (ref 4.2–5.4)
RBC UA: ABNORMAL /HPF (ref 0–2)
SPECIFIC GRAVITY UA: 1.02 (ref 1–1.03)
URINE REFLEX TO CULTURE: YES
UROBILINOGEN, URINE: 0.2 E.U./DL
WBC # BLD: 11.8 K/UL (ref 4.8–10.8)
WBC UA: ABNORMAL /HPF (ref 0–5)

## 2019-11-21 PROCEDURE — 97163 PT EVAL HIGH COMPLEX 45 MIN: CPT

## 2019-11-21 PROCEDURE — 6370000000 HC RX 637 (ALT 250 FOR IP): Performed by: PHYSICAL MEDICINE & REHABILITATION

## 2019-11-21 PROCEDURE — 99223 1ST HOSP IP/OBS HIGH 75: CPT | Performed by: PHYSICAL MEDICINE & REHABILITATION

## 2019-11-21 PROCEDURE — 85027 COMPLETE CBC AUTOMATED: CPT

## 2019-11-21 PROCEDURE — 97127 HC OT THER IVNTJ W/FOCUS COG FUNCJ: CPT

## 2019-11-21 PROCEDURE — 6360000002 HC RX W HCPCS: Performed by: PHYSICAL MEDICINE & REHABILITATION

## 2019-11-21 PROCEDURE — 92523 SPEECH SOUND LANG COMPREHEN: CPT

## 2019-11-21 PROCEDURE — 97535 SELF CARE MNGMENT TRAINING: CPT

## 2019-11-21 PROCEDURE — 97110 THERAPEUTIC EXERCISES: CPT

## 2019-11-21 PROCEDURE — 97167 OT EVAL HIGH COMPLEX 60 MIN: CPT

## 2019-11-21 PROCEDURE — 94762 N-INVAS EAR/PLS OXIMTRY CONT: CPT

## 2019-11-21 PROCEDURE — 6370000000 HC RX 637 (ALT 250 FOR IP): Performed by: INTERNAL MEDICINE

## 2019-11-21 PROCEDURE — 1180000000 HC REHAB R&B

## 2019-11-21 PROCEDURE — 36415 COLL VENOUS BLD VENIPUNCTURE: CPT

## 2019-11-21 PROCEDURE — 97112 NEUROMUSCULAR REEDUCATION: CPT

## 2019-11-21 RX ORDER — TRAMADOL HYDROCHLORIDE 50 MG/1
25 TABLET ORAL EVERY 6 HOURS PRN
Status: DISCONTINUED | OUTPATIENT
Start: 2019-11-21 | End: 2019-11-22

## 2019-11-21 RX ORDER — AMIODARONE HYDROCHLORIDE 200 MG/1
200 TABLET ORAL DAILY
Status: DISCONTINUED | OUTPATIENT
Start: 2019-11-22 | End: 2019-12-07 | Stop reason: HOSPADM

## 2019-11-21 RX ORDER — POTASSIUM CHLORIDE 20 MEQ/1
40 TABLET, EXTENDED RELEASE ORAL ONCE
Status: COMPLETED | OUTPATIENT
Start: 2019-11-21 | End: 2019-11-21

## 2019-11-21 RX ORDER — LIDOCAINE 4 G/G
3 PATCH TOPICAL DAILY
Status: DISCONTINUED | OUTPATIENT
Start: 2019-11-21 | End: 2019-11-25

## 2019-11-21 RX ORDER — UBIDECARENONE 100 MG
100 CAPSULE ORAL
Status: DISCONTINUED | OUTPATIENT
Start: 2019-11-21 | End: 2019-12-07 | Stop reason: HOSPADM

## 2019-11-21 RX ADMIN — ERGOCALCIFEROL 50000 UNITS: 1.25 CAPSULE ORAL at 08:41

## 2019-11-21 RX ADMIN — METOPROLOL TARTRATE 25 MG: 25 TABLET ORAL at 21:48

## 2019-11-21 RX ADMIN — CARBAMAZEPINE 200 MG: 200 TABLET ORAL at 08:37

## 2019-11-21 RX ADMIN — DOCUSATE SODIUM 100 MG: 100 CAPSULE, LIQUID FILLED ORAL at 08:37

## 2019-11-21 RX ADMIN — DOCUSATE SODIUM 100 MG: 100 CAPSULE, LIQUID FILLED ORAL at 21:48

## 2019-11-21 RX ADMIN — VENLAFAXINE 37.5 MG: 37.5 TABLET ORAL at 13:38

## 2019-11-21 RX ADMIN — AMIODARONE HYDROCHLORIDE 200 MG: 200 TABLET ORAL at 08:37

## 2019-11-21 RX ADMIN — CYANOCOBALAMIN 1000 MCG: 1000 INJECTION, SOLUTION INTRAMUSCULAR; SUBCUTANEOUS at 13:38

## 2019-11-21 RX ADMIN — LEVOTHYROXINE SODIUM 50 MCG: 50 TABLET ORAL at 05:46

## 2019-11-21 RX ADMIN — ACETAMINOPHEN 650 MG: 325 TABLET ORAL at 08:37

## 2019-11-21 RX ADMIN — CARBAMAZEPINE 200 MG: 200 TABLET ORAL at 21:47

## 2019-11-21 RX ADMIN — Medication 100 MG: at 17:02

## 2019-11-21 RX ADMIN — POTASSIUM CHLORIDE 40 MEQ: 20 TABLET, EXTENDED RELEASE ORAL at 22:00

## 2019-11-21 RX ADMIN — CARBAMAZEPINE 200 MG: 200 TABLET ORAL at 13:38

## 2019-11-21 RX ADMIN — VENLAFAXINE 37.5 MG: 37.5 TABLET ORAL at 08:37

## 2019-11-21 ASSESSMENT — ENCOUNTER SYMPTOMS
PHOTOPHOBIA: 0
EYE REDNESS: 0
STRIDOR: 0
ABDOMINAL PAIN: 0
NAUSEA: 0
BACK PAIN: 1
CONSTIPATION: 1
DIARRHEA: 0
EYE PAIN: 0
SORE THROAT: 0
SHORTNESS OF BREATH: 1
WHEEZING: 0
VOMITING: 0
BLOOD IN STOOL: 0
COUGH: 0

## 2019-11-21 ASSESSMENT — PAIN SCALES - GENERAL
PAINLEVEL_OUTOF10: 4
PAINLEVEL_OUTOF10: 0

## 2019-11-22 ENCOUNTER — APPOINTMENT (OUTPATIENT)
Dept: MRI IMAGING | Age: 79
DRG: 092 | End: 2019-11-22
Attending: PHYSICAL MEDICINE & REHABILITATION
Payer: MEDICARE

## 2019-11-22 ENCOUNTER — APPOINTMENT (OUTPATIENT)
Dept: CT IMAGING | Age: 79
DRG: 092 | End: 2019-11-22
Attending: PHYSICAL MEDICINE & REHABILITATION
Payer: MEDICARE

## 2019-11-22 PROBLEM — R26.9 ABNORMALITY OF GAIT AND MOBILITY: Chronic | Status: ACTIVE | Noted: 2019-11-20

## 2019-11-22 LAB
AMMONIA: 32 UMOL/L (ref 11–51)
ANION GAP SERPL CALCULATED.3IONS-SCNC: 11 MEQ/L (ref 9–15)
BASE EXCESS ARTERIAL: 2 (ref -3–3)
BASOPHILS ABSOLUTE: 0 K/UL (ref 0–0.2)
BASOPHILS RELATIVE PERCENT: 0.2 %
BUN BLDV-MCNC: 31 MG/DL (ref 8–23)
CALCIUM IONIZED: 1.09 MMOL/L (ref 1.12–1.32)
CALCIUM SERPL-MCNC: 8.4 MG/DL (ref 8.5–9.9)
CARBAMAZEPINE LEVEL: 12.4 UG/ML (ref 4–10)
CHLORIDE BLD-SCNC: 95 MEQ/L (ref 95–107)
CO2: 26 MEQ/L (ref 20–31)
CREAT SERPL-MCNC: 1.06 MG/DL (ref 0.5–0.9)
EKG ATRIAL RATE: 69 BPM
EKG P AXIS: -12 DEGREES
EKG P-R INTERVAL: 166 MS
EKG Q-T INTERVAL: 378 MS
EKG QRS DURATION: 82 MS
EKG QTC CALCULATION (BAZETT): 405 MS
EKG R AXIS: -26 DEGREES
EKG T AXIS: 66 DEGREES
EKG VENTRICULAR RATE: 69 BPM
EOSINOPHILS ABSOLUTE: 0.2 K/UL (ref 0–0.7)
EOSINOPHILS RELATIVE PERCENT: 2.2 %
GFR AFRICAN AMERICAN: >60
GFR AFRICAN AMERICAN: >60
GFR NON-AFRICAN AMERICAN: 50.1
GFR NON-AFRICAN AMERICAN: 54
GLUCOSE BLD-MCNC: 100 MG/DL (ref 70–99)
GLUCOSE BLD-MCNC: 105 MG/DL (ref 60–115)
GLUCOSE BLD-MCNC: 115 MG/DL (ref 60–115)
HCO3 ARTERIAL: 24.5 MMOL/L (ref 21–29)
HCT VFR BLD CALC: 25.7 % (ref 37–47)
HEMOGLOBIN: 8.4 G/DL (ref 12–16)
HEMOGLOBIN: 8.8 GM/DL (ref 12–16)
LACTATE: 0.64 MMOL/L (ref 0.4–2)
LYMPHOCYTES ABSOLUTE: 2.3 K/UL (ref 1–4.8)
LYMPHOCYTES RELATIVE PERCENT: 24.4 %
MCH RBC QN AUTO: 30.3 PG (ref 27–31.3)
MCHC RBC AUTO-ENTMCNC: 32.6 % (ref 33–37)
MCV RBC AUTO: 93.1 FL (ref 82–100)
MONOCYTES ABSOLUTE: 1.2 K/UL (ref 0.2–0.8)
MONOCYTES RELATIVE PERCENT: 12.2 %
NEUTROPHILS ABSOLUTE: 5.8 K/UL (ref 1.4–6.5)
NEUTROPHILS RELATIVE PERCENT: 61 %
O2 SAT, ARTERIAL: 97 % (ref 93–100)
PCO2 ARTERIAL: 31 MM HG (ref 35–45)
PDW BLD-RTO: 13.3 % (ref 11.5–14.5)
PERFORMED ON: ABNORMAL
PERFORMED ON: NORMAL
PH ARTERIAL: 7.51 (ref 7.35–7.45)
PLATELET # BLD: 214 K/UL (ref 130–400)
PO2 ARTERIAL: 78 MM HG (ref 75–108)
POC CHLORIDE: 101 MEQ/L (ref 99–110)
POC CREATININE: 1 MG/DL (ref 0.6–1.2)
POC HEMATOCRIT: 26 % (ref 36–48)
POC POTASSIUM: 3.8 MEQ/L (ref 3.5–5.1)
POC SAMPLE TYPE: ABNORMAL
POC SODIUM: 135 MEQ/L (ref 136–145)
POTASSIUM SERPL-SCNC: 4.2 MEQ/L (ref 3.4–4.9)
RBC # BLD: 2.76 M/UL (ref 4.2–5.4)
SODIUM BLD-SCNC: 132 MEQ/L (ref 135–144)
TCO2 ARTERIAL: 25 (ref 22–29)
TOTAL CK: 149 U/L (ref 0–170)
URINE CULTURE, ROUTINE: NORMAL
WBC # BLD: 9.4 K/UL (ref 4.8–10.8)

## 2019-11-22 PROCEDURE — 92610 EVALUATE SWALLOWING FUNCTION: CPT

## 2019-11-22 PROCEDURE — 84295 ASSAY OF SERUM SODIUM: CPT

## 2019-11-22 PROCEDURE — 70551 MRI BRAIN STEM W/O DYE: CPT

## 2019-11-22 PROCEDURE — 93005 ELECTROCARDIOGRAM TRACING: CPT

## 2019-11-22 PROCEDURE — 85014 HEMATOCRIT: CPT

## 2019-11-22 PROCEDURE — 99233 SBSQ HOSP IP/OBS HIGH 50: CPT | Performed by: PHYSICAL MEDICINE & REHABILITATION

## 2019-11-22 PROCEDURE — 82435 ASSAY OF BLOOD CHLORIDE: CPT

## 2019-11-22 PROCEDURE — 2580000003 HC RX 258: Performed by: PHYSICAL MEDICINE & REHABILITATION

## 2019-11-22 PROCEDURE — 97127 HC OT THER IVNTJ W/FOCUS COG FUNCJ: CPT

## 2019-11-22 PROCEDURE — 97535 SELF CARE MNGMENT TRAINING: CPT

## 2019-11-22 PROCEDURE — 84132 ASSAY OF SERUM POTASSIUM: CPT

## 2019-11-22 PROCEDURE — 80156 ASSAY CARBAMAZEPINE TOTAL: CPT

## 2019-11-22 PROCEDURE — 82330 ASSAY OF CALCIUM: CPT

## 2019-11-22 PROCEDURE — 82565 ASSAY OF CREATININE: CPT

## 2019-11-22 PROCEDURE — 1180000000 HC REHAB R&B

## 2019-11-22 PROCEDURE — 36600 WITHDRAWAL OF ARTERIAL BLOOD: CPT

## 2019-11-22 PROCEDURE — 82550 ASSAY OF CK (CPK): CPT

## 2019-11-22 PROCEDURE — 6370000000 HC RX 637 (ALT 250 FOR IP): Performed by: INTERNAL MEDICINE

## 2019-11-22 PROCEDURE — 83605 ASSAY OF LACTIC ACID: CPT

## 2019-11-22 PROCEDURE — 6370000000 HC RX 637 (ALT 250 FOR IP): Performed by: PHYSICAL MEDICINE & REHABILITATION

## 2019-11-22 PROCEDURE — 99223 1ST HOSP IP/OBS HIGH 75: CPT | Performed by: NURSE PRACTITIONER

## 2019-11-22 PROCEDURE — 82140 ASSAY OF AMMONIA: CPT

## 2019-11-22 PROCEDURE — 82803 BLOOD GASES ANY COMBINATION: CPT

## 2019-11-22 PROCEDURE — 97110 THERAPEUTIC EXERCISES: CPT

## 2019-11-22 PROCEDURE — 97530 THERAPEUTIC ACTIVITIES: CPT

## 2019-11-22 PROCEDURE — 82948 REAGENT STRIP/BLOOD GLUCOSE: CPT

## 2019-11-22 PROCEDURE — 70450 CT HEAD/BRAIN W/O DYE: CPT

## 2019-11-22 PROCEDURE — 85025 COMPLETE CBC W/AUTO DIFF WBC: CPT

## 2019-11-22 PROCEDURE — 80048 BASIC METABOLIC PNL TOTAL CA: CPT

## 2019-11-22 PROCEDURE — 99233 SBSQ HOSP IP/OBS HIGH 50: CPT | Performed by: PSYCHIATRY & NEUROLOGY

## 2019-11-22 PROCEDURE — 36415 COLL VENOUS BLD VENIPUNCTURE: CPT

## 2019-11-22 RX ORDER — SODIUM CHLORIDE 9 MG/ML
INJECTION, SOLUTION INTRAVENOUS CONTINUOUS
Status: DISPENSED | OUTPATIENT
Start: 2019-11-22 | End: 2019-11-23

## 2019-11-22 RX ORDER — CARBAMAZEPINE 200 MG/1
200 TABLET ORAL 2 TIMES DAILY
Status: DISCONTINUED | OUTPATIENT
Start: 2019-11-22 | End: 2019-12-07 | Stop reason: HOSPADM

## 2019-11-22 RX ORDER — DOCUSATE SODIUM 100 MG/1
100 CAPSULE, LIQUID FILLED ORAL DAILY
Status: DISCONTINUED | OUTPATIENT
Start: 2019-11-23 | End: 2019-11-22

## 2019-11-22 RX ADMIN — Medication 100 MG: at 06:04

## 2019-11-22 RX ADMIN — CARBAMAZEPINE 200 MG: 200 TABLET ORAL at 09:51

## 2019-11-22 RX ADMIN — LEVOTHYROXINE SODIUM 50 MCG: 50 TABLET ORAL at 06:03

## 2019-11-22 RX ADMIN — Medication 100 MG: at 17:34

## 2019-11-22 RX ADMIN — DOCUSATE SODIUM 100 MG: 100 CAPSULE, LIQUID FILLED ORAL at 09:51

## 2019-11-22 RX ADMIN — SODIUM CHLORIDE: 9 INJECTION, SOLUTION INTRAVENOUS at 17:35

## 2019-11-22 RX ADMIN — AMIODARONE HYDROCHLORIDE 200 MG: 200 TABLET ORAL at 09:51

## 2019-11-22 RX ADMIN — METOPROLOL TARTRATE 25 MG: 25 TABLET ORAL at 09:51

## 2019-11-22 ASSESSMENT — ENCOUNTER SYMPTOMS
COLOR CHANGE: 0
TROUBLE SWALLOWING: 0
WHEEZING: 0
COUGH: 0
SWOLLEN GLANDS: 0
VOMITING: 0
VISUAL CHANGE: 0

## 2019-11-23 ENCOUNTER — APPOINTMENT (OUTPATIENT)
Dept: ULTRASOUND IMAGING | Age: 79
DRG: 092 | End: 2019-11-23
Attending: PHYSICAL MEDICINE & REHABILITATION
Payer: MEDICARE

## 2019-11-23 PROCEDURE — 1180000000 HC REHAB R&B

## 2019-11-23 PROCEDURE — 6370000000 HC RX 637 (ALT 250 FOR IP): Performed by: NURSE PRACTITIONER

## 2019-11-23 PROCEDURE — 97110 THERAPEUTIC EXERCISES: CPT

## 2019-11-23 PROCEDURE — 6370000000 HC RX 637 (ALT 250 FOR IP): Performed by: PHYSICAL MEDICINE & REHABILITATION

## 2019-11-23 PROCEDURE — 6370000000 HC RX 637 (ALT 250 FOR IP): Performed by: INTERNAL MEDICINE

## 2019-11-23 PROCEDURE — 97535 SELF CARE MNGMENT TRAINING: CPT

## 2019-11-23 PROCEDURE — 97530 THERAPEUTIC ACTIVITIES: CPT

## 2019-11-23 PROCEDURE — 93880 EXTRACRANIAL BILAT STUDY: CPT

## 2019-11-23 PROCEDURE — 99232 SBSQ HOSP IP/OBS MODERATE 35: CPT | Performed by: PHYSICAL MEDICINE & REHABILITATION

## 2019-11-23 RX ORDER — ASPIRIN 81 MG/1
81 TABLET, CHEWABLE ORAL DAILY
Status: DISCONTINUED | OUTPATIENT
Start: 2019-11-24 | End: 2019-12-07 | Stop reason: HOSPADM

## 2019-11-23 RX ORDER — ATORVASTATIN CALCIUM 80 MG/1
80 TABLET, FILM COATED ORAL DAILY
Status: DISCONTINUED | OUTPATIENT
Start: 2019-11-23 | End: 2019-12-07 | Stop reason: HOSPADM

## 2019-11-23 RX ADMIN — Medication 100 MG: at 07:04

## 2019-11-23 RX ADMIN — ATORVASTATIN CALCIUM 80 MG: 80 TABLET, FILM COATED ORAL at 20:35

## 2019-11-23 RX ADMIN — CARBAMAZEPINE 200 MG: 200 TABLET ORAL at 20:35

## 2019-11-23 RX ADMIN — AMIODARONE HYDROCHLORIDE 200 MG: 200 TABLET ORAL at 08:58

## 2019-11-23 RX ADMIN — METOPROLOL TARTRATE 25 MG: 25 TABLET ORAL at 08:57

## 2019-11-23 RX ADMIN — LEVOTHYROXINE SODIUM 50 MCG: 50 TABLET ORAL at 07:04

## 2019-11-23 RX ADMIN — CARBAMAZEPINE 200 MG: 200 TABLET ORAL at 08:57

## 2019-11-23 RX ADMIN — ASPIRIN 325 MG: 325 TABLET, DELAYED RELEASE ORAL at 03:48

## 2019-11-23 RX ADMIN — Medication 100 MG: at 17:26

## 2019-11-23 ASSESSMENT — PAIN SCALES - GENERAL
PAINLEVEL_OUTOF10: 0
PAINLEVEL_OUTOF10: 5

## 2019-11-23 ASSESSMENT — PAIN DESCRIPTION - LOCATION: LOCATION: BACK

## 2019-11-23 ASSESSMENT — PAIN DESCRIPTION - DESCRIPTORS: DESCRIPTORS: ACHING

## 2019-11-23 ASSESSMENT — PAIN DESCRIPTION - PAIN TYPE: TYPE: ACUTE PAIN

## 2019-11-24 LAB
CHOLESTEROL, TOTAL: 131 MG/DL (ref 0–199)
HDLC SERPL-MCNC: 45 MG/DL (ref 40–59)
LDL CHOLESTEROL CALCULATED: 58 MG/DL (ref 0–129)
TRIGL SERPL-MCNC: 142 MG/DL (ref 0–150)

## 2019-11-24 PROCEDURE — 99232 SBSQ HOSP IP/OBS MODERATE 35: CPT | Performed by: PHYSICAL MEDICINE & REHABILITATION

## 2019-11-24 PROCEDURE — 6370000000 HC RX 637 (ALT 250 FOR IP): Performed by: NURSE PRACTITIONER

## 2019-11-24 PROCEDURE — 6370000000 HC RX 637 (ALT 250 FOR IP): Performed by: INTERNAL MEDICINE

## 2019-11-24 PROCEDURE — 1180000000 HC REHAB R&B

## 2019-11-24 PROCEDURE — 6370000000 HC RX 637 (ALT 250 FOR IP): Performed by: PHYSICAL MEDICINE & REHABILITATION

## 2019-11-24 PROCEDURE — 97116 GAIT TRAINING THERAPY: CPT

## 2019-11-24 PROCEDURE — 97110 THERAPEUTIC EXERCISES: CPT

## 2019-11-24 PROCEDURE — 36415 COLL VENOUS BLD VENIPUNCTURE: CPT

## 2019-11-24 PROCEDURE — 99233 SBSQ HOSP IP/OBS HIGH 50: CPT | Performed by: PSYCHIATRY & NEUROLOGY

## 2019-11-24 PROCEDURE — 80061 LIPID PANEL: CPT

## 2019-11-24 RX ADMIN — AMIODARONE HYDROCHLORIDE 200 MG: 200 TABLET ORAL at 08:44

## 2019-11-24 RX ADMIN — CARBAMAZEPINE 200 MG: 200 TABLET ORAL at 20:32

## 2019-11-24 RX ADMIN — CARBAMAZEPINE 200 MG: 200 TABLET ORAL at 08:44

## 2019-11-24 RX ADMIN — LEVOTHYROXINE SODIUM 50 MCG: 50 TABLET ORAL at 06:43

## 2019-11-24 RX ADMIN — Medication 100 MG: at 17:59

## 2019-11-24 RX ADMIN — METOPROLOL TARTRATE 25 MG: 25 TABLET ORAL at 08:44

## 2019-11-24 RX ADMIN — Medication 100 MG: at 06:43

## 2019-11-24 RX ADMIN — ASPIRIN 81 MG 81 MG: 81 TABLET ORAL at 08:50

## 2019-11-24 RX ADMIN — ATORVASTATIN CALCIUM 80 MG: 80 TABLET, FILM COATED ORAL at 08:50

## 2019-11-24 ASSESSMENT — PAIN SCALES - GENERAL
PAINLEVEL_OUTOF10: 0
PAINLEVEL_OUTOF10: 0

## 2019-11-25 PROCEDURE — 97116 GAIT TRAINING THERAPY: CPT

## 2019-11-25 PROCEDURE — 6370000000 HC RX 637 (ALT 250 FOR IP): Performed by: INTERNAL MEDICINE

## 2019-11-25 PROCEDURE — 97127 HC OT THER IVNTJ W/FOCUS COG FUNCJ: CPT

## 2019-11-25 PROCEDURE — 6370000000 HC RX 637 (ALT 250 FOR IP): Performed by: NURSE PRACTITIONER

## 2019-11-25 PROCEDURE — 2500000003 HC RX 250 WO HCPCS: Performed by: PHYSICAL MEDICINE & REHABILITATION

## 2019-11-25 PROCEDURE — 6370000000 HC RX 637 (ALT 250 FOR IP): Performed by: PHYSICAL MEDICINE & REHABILITATION

## 2019-11-25 PROCEDURE — 99233 SBSQ HOSP IP/OBS HIGH 50: CPT | Performed by: PSYCHIATRY & NEUROLOGY

## 2019-11-25 PROCEDURE — 1180000000 HC REHAB R&B

## 2019-11-25 PROCEDURE — 97530 THERAPEUTIC ACTIVITIES: CPT

## 2019-11-25 PROCEDURE — 97110 THERAPEUTIC EXERCISES: CPT

## 2019-11-25 PROCEDURE — 99233 SBSQ HOSP IP/OBS HIGH 50: CPT | Performed by: PHYSICAL MEDICINE & REHABILITATION

## 2019-11-25 PROCEDURE — 97535 SELF CARE MNGMENT TRAINING: CPT

## 2019-11-25 RX ORDER — LIDOCAINE 4 G/G
3 PATCH TOPICAL DAILY PRN
Status: DISCONTINUED | OUTPATIENT
Start: 2019-11-25 | End: 2019-12-07 | Stop reason: HOSPADM

## 2019-11-25 RX ADMIN — ACETAMINOPHEN 650 MG: 325 TABLET ORAL at 14:13

## 2019-11-25 RX ADMIN — Medication 100 MG: at 05:34

## 2019-11-25 RX ADMIN — MICONAZOLE NITRATE: 20 POWDER TOPICAL at 22:31

## 2019-11-25 RX ADMIN — Medication 100 MG: at 14:13

## 2019-11-25 RX ADMIN — CARBAMAZEPINE 200 MG: 200 TABLET ORAL at 08:46

## 2019-11-25 RX ADMIN — AMIODARONE HYDROCHLORIDE 200 MG: 200 TABLET ORAL at 08:46

## 2019-11-25 RX ADMIN — ATORVASTATIN CALCIUM 80 MG: 80 TABLET, FILM COATED ORAL at 08:46

## 2019-11-25 RX ADMIN — CARBAMAZEPINE 200 MG: 200 TABLET ORAL at 19:51

## 2019-11-25 RX ADMIN — METOPROLOL TARTRATE 25 MG: 25 TABLET ORAL at 08:46

## 2019-11-25 RX ADMIN — LEVOTHYROXINE SODIUM 50 MCG: 50 TABLET ORAL at 05:34

## 2019-11-25 RX ADMIN — ASPIRIN 81 MG 81 MG: 81 TABLET ORAL at 08:46

## 2019-11-25 ASSESSMENT — ENCOUNTER SYMPTOMS
CHEST TIGHTNESS: 0
TROUBLE SWALLOWING: 0
COLOR CHANGE: 0
SHORTNESS OF BREATH: 0
NAUSEA: 0
VOMITING: 0
WHEEZING: 0
COUGH: 0

## 2019-11-25 ASSESSMENT — PAIN SCALES - GENERAL
PAINLEVEL_OUTOF10: 0
PAINLEVEL_OUTOF10: 0
PAINLEVEL_OUTOF10: 5
PAINLEVEL_OUTOF10: 4
PAINLEVEL_OUTOF10: 0
PAINLEVEL_OUTOF10: 0
PAINLEVEL_OUTOF10: 5

## 2019-11-25 ASSESSMENT — PAIN DESCRIPTION - ORIENTATION
ORIENTATION: RIGHT
ORIENTATION: RIGHT

## 2019-11-25 ASSESSMENT — PAIN DESCRIPTION - LOCATION
LOCATION: KNEE;LEG
LOCATION: LEG

## 2019-11-25 ASSESSMENT — PAIN DESCRIPTION - DESCRIPTORS
DESCRIPTORS: SORE
DESCRIPTORS: ACHING

## 2019-11-26 PROCEDURE — 1180000000 HC REHAB R&B

## 2019-11-26 PROCEDURE — 97535 SELF CARE MNGMENT TRAINING: CPT

## 2019-11-26 PROCEDURE — 92526 ORAL FUNCTION THERAPY: CPT

## 2019-11-26 PROCEDURE — 6370000000 HC RX 637 (ALT 250 FOR IP): Performed by: NURSE PRACTITIONER

## 2019-11-26 PROCEDURE — 97110 THERAPEUTIC EXERCISES: CPT

## 2019-11-26 PROCEDURE — 51702 INSERT TEMP BLADDER CATH: CPT

## 2019-11-26 PROCEDURE — 6370000000 HC RX 637 (ALT 250 FOR IP): Performed by: INTERNAL MEDICINE

## 2019-11-26 PROCEDURE — 6370000000 HC RX 637 (ALT 250 FOR IP): Performed by: PHYSICAL MEDICINE & REHABILITATION

## 2019-11-26 PROCEDURE — 97116 GAIT TRAINING THERAPY: CPT

## 2019-11-26 PROCEDURE — 97112 NEUROMUSCULAR REEDUCATION: CPT

## 2019-11-26 PROCEDURE — 92507 TX SP LANG VOICE COMM INDIV: CPT

## 2019-11-26 PROCEDURE — 99232 SBSQ HOSP IP/OBS MODERATE 35: CPT | Performed by: PHYSICAL MEDICINE & REHABILITATION

## 2019-11-26 PROCEDURE — 97530 THERAPEUTIC ACTIVITIES: CPT

## 2019-11-26 PROCEDURE — 95816 EEG AWAKE AND DROWSY: CPT

## 2019-11-26 RX ORDER — ONDANSETRON 4 MG/1
4 TABLET, FILM COATED ORAL EVERY 8 HOURS PRN
Status: DISCONTINUED | OUTPATIENT
Start: 2019-11-26 | End: 2019-12-07 | Stop reason: HOSPADM

## 2019-11-26 RX ADMIN — AMIODARONE HYDROCHLORIDE 200 MG: 200 TABLET ORAL at 10:03

## 2019-11-26 RX ADMIN — CARBAMAZEPINE 200 MG: 200 TABLET ORAL at 20:17

## 2019-11-26 RX ADMIN — Medication 100 MG: at 10:02

## 2019-11-26 RX ADMIN — ATORVASTATIN CALCIUM 80 MG: 80 TABLET, FILM COATED ORAL at 10:02

## 2019-11-26 RX ADMIN — CARBAMAZEPINE 200 MG: 200 TABLET ORAL at 10:02

## 2019-11-26 RX ADMIN — ASPIRIN 81 MG 81 MG: 81 TABLET ORAL at 10:02

## 2019-11-26 RX ADMIN — METOPROLOL TARTRATE 25 MG: 25 TABLET ORAL at 10:02

## 2019-11-26 RX ADMIN — LEVOTHYROXINE SODIUM 50 MCG: 50 TABLET ORAL at 06:22

## 2019-11-26 RX ADMIN — ACETAMINOPHEN 650 MG: 325 TABLET ORAL at 02:31

## 2019-11-26 RX ADMIN — Medication 100 MG: at 13:54

## 2019-11-26 RX ADMIN — MICONAZOLE NITRATE: 20 POWDER TOPICAL at 10:06

## 2019-11-26 RX ADMIN — ONDANSETRON HYDROCHLORIDE 4 MG: 4 TABLET, FILM COATED ORAL at 13:54

## 2019-11-26 ASSESSMENT — PAIN SCALES - GENERAL
PAINLEVEL_OUTOF10: 0
PAINLEVEL_OUTOF10: 5
PAINLEVEL_OUTOF10: 0
PAINLEVEL_OUTOF10: 3
PAINLEVEL_OUTOF10: 0

## 2019-11-26 ASSESSMENT — PAIN DESCRIPTION - FREQUENCY: FREQUENCY: CONTINUOUS

## 2019-11-26 ASSESSMENT — PAIN DESCRIPTION - PROGRESSION
CLINICAL_PROGRESSION: NOT CHANGED
CLINICAL_PROGRESSION: NOT CHANGED

## 2019-11-26 ASSESSMENT — PAIN - FUNCTIONAL ASSESSMENT: PAIN_FUNCTIONAL_ASSESSMENT: ACTIVITIES ARE NOT PREVENTED

## 2019-11-26 ASSESSMENT — PAIN DESCRIPTION - ORIENTATION
ORIENTATION: LOWER
ORIENTATION: RIGHT

## 2019-11-26 ASSESSMENT — PAIN DESCRIPTION - PAIN TYPE
TYPE: ACUTE PAIN
TYPE: ACUTE PAIN

## 2019-11-26 ASSESSMENT — PAIN DESCRIPTION - DESCRIPTORS
DESCRIPTORS: ACHING
DESCRIPTORS: ACHING;SORE

## 2019-11-26 ASSESSMENT — PAIN DESCRIPTION - LOCATION
LOCATION: BACK
LOCATION: KNEE

## 2019-11-26 ASSESSMENT — PAIN DESCRIPTION - ONSET: ONSET: ON-GOING

## 2019-11-27 PROCEDURE — 97112 NEUROMUSCULAR REEDUCATION: CPT

## 2019-11-27 PROCEDURE — 92507 TX SP LANG VOICE COMM INDIV: CPT

## 2019-11-27 PROCEDURE — 6370000000 HC RX 637 (ALT 250 FOR IP): Performed by: PHYSICAL MEDICINE & REHABILITATION

## 2019-11-27 PROCEDURE — 92526 ORAL FUNCTION THERAPY: CPT

## 2019-11-27 PROCEDURE — 97116 GAIT TRAINING THERAPY: CPT

## 2019-11-27 PROCEDURE — 99212 OFFICE O/P EST SF 10 MIN: CPT

## 2019-11-27 PROCEDURE — 6370000000 HC RX 637 (ALT 250 FOR IP): Performed by: INTERNAL MEDICINE

## 2019-11-27 PROCEDURE — 97530 THERAPEUTIC ACTIVITIES: CPT

## 2019-11-27 PROCEDURE — 6370000000 HC RX 637 (ALT 250 FOR IP): Performed by: NURSE PRACTITIONER

## 2019-11-27 PROCEDURE — 99232 SBSQ HOSP IP/OBS MODERATE 35: CPT | Performed by: PHYSICAL MEDICINE & REHABILITATION

## 2019-11-27 PROCEDURE — 97535 SELF CARE MNGMENT TRAINING: CPT

## 2019-11-27 PROCEDURE — 6360000002 HC RX W HCPCS: Performed by: PHYSICAL MEDICINE & REHABILITATION

## 2019-11-27 PROCEDURE — 1180000000 HC REHAB R&B

## 2019-11-27 PROCEDURE — 99233 SBSQ HOSP IP/OBS HIGH 50: CPT | Performed by: PSYCHIATRY & NEUROLOGY

## 2019-11-27 RX ORDER — FAMOTIDINE 20 MG/1
20 TABLET, FILM COATED ORAL DAILY
Status: DISCONTINUED | OUTPATIENT
Start: 2019-11-27 | End: 2019-12-07 | Stop reason: HOSPADM

## 2019-11-27 RX ADMIN — APIXABAN 5 MG: 5 TABLET, FILM COATED ORAL at 21:16

## 2019-11-27 RX ADMIN — LEVOTHYROXINE SODIUM 50 MCG: 50 TABLET ORAL at 06:07

## 2019-11-27 RX ADMIN — ERGOCALCIFEROL 50000 UNITS: 1.25 CAPSULE ORAL at 09:12

## 2019-11-27 RX ADMIN — Medication 100 MG: at 13:37

## 2019-11-27 RX ADMIN — ATORVASTATIN CALCIUM 80 MG: 80 TABLET, FILM COATED ORAL at 09:12

## 2019-11-27 RX ADMIN — ACETAMINOPHEN 650 MG: 325 TABLET ORAL at 13:40

## 2019-11-27 RX ADMIN — Medication 100 MG: at 09:12

## 2019-11-27 RX ADMIN — FAMOTIDINE 20 MG: 20 TABLET, FILM COATED ORAL at 17:00

## 2019-11-27 RX ADMIN — CYANOCOBALAMIN 1000 MCG: 1000 INJECTION, SOLUTION INTRAMUSCULAR; SUBCUTANEOUS at 09:14

## 2019-11-27 RX ADMIN — AMIODARONE HYDROCHLORIDE 200 MG: 200 TABLET ORAL at 09:12

## 2019-11-27 RX ADMIN — METOPROLOL TARTRATE 12.5 MG: 25 TABLET ORAL at 21:15

## 2019-11-27 RX ADMIN — CARBAMAZEPINE 200 MG: 200 TABLET ORAL at 21:16

## 2019-11-27 RX ADMIN — ASPIRIN 81 MG 81 MG: 81 TABLET ORAL at 09:12

## 2019-11-27 RX ADMIN — MICONAZOLE NITRATE: 20 POWDER TOPICAL at 10:29

## 2019-11-27 RX ADMIN — MICONAZOLE NITRATE: 20 POWDER TOPICAL at 21:17

## 2019-11-27 RX ADMIN — CARBAMAZEPINE 200 MG: 200 TABLET ORAL at 09:12

## 2019-11-27 ASSESSMENT — PAIN DESCRIPTION - ORIENTATION: ORIENTATION: RIGHT

## 2019-11-27 ASSESSMENT — PAIN DESCRIPTION - LOCATION: LOCATION: KNEE

## 2019-11-27 ASSESSMENT — ENCOUNTER SYMPTOMS
COUGH: 0
SHORTNESS OF BREATH: 0
TROUBLE SWALLOWING: 0
WHEEZING: 0
ABDOMINAL PAIN: 0
CHEST TIGHTNESS: 0
COLOR CHANGE: 0
VOMITING: 0
NAUSEA: 0

## 2019-11-27 ASSESSMENT — PAIN DESCRIPTION - DESCRIPTORS: DESCRIPTORS: ACHING

## 2019-11-27 ASSESSMENT — PAIN SCALES - GENERAL
PAINLEVEL_OUTOF10: 0
PAINLEVEL_OUTOF10: 5
PAINLEVEL_OUTOF10: 5

## 2019-11-28 PROCEDURE — 6370000000 HC RX 637 (ALT 250 FOR IP): Performed by: INTERNAL MEDICINE

## 2019-11-28 PROCEDURE — 1180000000 HC REHAB R&B

## 2019-11-28 PROCEDURE — 6370000000 HC RX 637 (ALT 250 FOR IP): Performed by: PHYSICAL MEDICINE & REHABILITATION

## 2019-11-28 PROCEDURE — 6370000000 HC RX 637 (ALT 250 FOR IP): Performed by: NURSE PRACTITIONER

## 2019-11-28 PROCEDURE — 99232 SBSQ HOSP IP/OBS MODERATE 35: CPT | Performed by: PHYSICAL MEDICINE & REHABILITATION

## 2019-11-28 RX ADMIN — APIXABAN 5 MG: 5 TABLET, FILM COATED ORAL at 20:26

## 2019-11-28 RX ADMIN — FAMOTIDINE 20 MG: 20 TABLET, FILM COATED ORAL at 09:18

## 2019-11-28 RX ADMIN — CARBAMAZEPINE 200 MG: 200 TABLET ORAL at 09:17

## 2019-11-28 RX ADMIN — Medication 100 MG: at 09:17

## 2019-11-28 RX ADMIN — CARBAMAZEPINE 200 MG: 200 TABLET ORAL at 20:26

## 2019-11-28 RX ADMIN — LEVOTHYROXINE SODIUM 50 MCG: 50 TABLET ORAL at 06:12

## 2019-11-28 RX ADMIN — APIXABAN 5 MG: 5 TABLET, FILM COATED ORAL at 09:17

## 2019-11-28 RX ADMIN — MICONAZOLE NITRATE: 20 POWDER TOPICAL at 20:34

## 2019-11-28 RX ADMIN — AMIODARONE HYDROCHLORIDE 200 MG: 200 TABLET ORAL at 09:18

## 2019-11-28 RX ADMIN — MICONAZOLE NITRATE: 20 POWDER TOPICAL at 09:19

## 2019-11-28 RX ADMIN — Medication 100 MG: at 12:55

## 2019-11-28 RX ADMIN — METOPROLOL TARTRATE 12.5 MG: 25 TABLET ORAL at 09:16

## 2019-11-28 RX ADMIN — ATORVASTATIN CALCIUM 80 MG: 80 TABLET, FILM COATED ORAL at 09:17

## 2019-11-28 RX ADMIN — ASPIRIN 81 MG 81 MG: 81 TABLET ORAL at 09:18

## 2019-11-28 ASSESSMENT — PAIN SCALES - GENERAL
PAINLEVEL_OUTOF10: 0
PAINLEVEL_OUTOF10: 0

## 2019-11-29 PROCEDURE — 97535 SELF CARE MNGMENT TRAINING: CPT

## 2019-11-29 PROCEDURE — 97530 THERAPEUTIC ACTIVITIES: CPT

## 2019-11-29 PROCEDURE — 6370000000 HC RX 637 (ALT 250 FOR IP): Performed by: PHYSICAL MEDICINE & REHABILITATION

## 2019-11-29 PROCEDURE — 6370000000 HC RX 637 (ALT 250 FOR IP): Performed by: INTERNAL MEDICINE

## 2019-11-29 PROCEDURE — 1180000000 HC REHAB R&B

## 2019-11-29 PROCEDURE — 6370000000 HC RX 637 (ALT 250 FOR IP): Performed by: NURSE PRACTITIONER

## 2019-11-29 PROCEDURE — 97110 THERAPEUTIC EXERCISES: CPT

## 2019-11-29 PROCEDURE — 97116 GAIT TRAINING THERAPY: CPT

## 2019-11-29 PROCEDURE — 99232 SBSQ HOSP IP/OBS MODERATE 35: CPT | Performed by: PHYSICAL MEDICINE & REHABILITATION

## 2019-11-29 PROCEDURE — 99232 SBSQ HOSP IP/OBS MODERATE 35: CPT | Performed by: PSYCHIATRY & NEUROLOGY

## 2019-11-29 RX ADMIN — APIXABAN 5 MG: 5 TABLET, FILM COATED ORAL at 09:51

## 2019-11-29 RX ADMIN — ACETAMINOPHEN 650 MG: 325 TABLET ORAL at 21:31

## 2019-11-29 RX ADMIN — METOPROLOL TARTRATE 12.5 MG: 25 TABLET ORAL at 09:51

## 2019-11-29 RX ADMIN — Medication 100 MG: at 14:43

## 2019-11-29 RX ADMIN — LEVOTHYROXINE SODIUM 50 MCG: 50 TABLET ORAL at 06:04

## 2019-11-29 RX ADMIN — CARBAMAZEPINE 200 MG: 200 TABLET ORAL at 21:31

## 2019-11-29 RX ADMIN — AMIODARONE HYDROCHLORIDE 200 MG: 200 TABLET ORAL at 09:51

## 2019-11-29 RX ADMIN — MICONAZOLE NITRATE: 20 POWDER TOPICAL at 09:53

## 2019-11-29 RX ADMIN — METOPROLOL TARTRATE 12.5 MG: 25 TABLET ORAL at 21:30

## 2019-11-29 RX ADMIN — ACETAMINOPHEN 650 MG: 325 TABLET ORAL at 00:32

## 2019-11-29 RX ADMIN — ACETAMINOPHEN 650 MG: 325 TABLET ORAL at 09:51

## 2019-11-29 RX ADMIN — Medication 100 MG: at 09:51

## 2019-11-29 RX ADMIN — ATORVASTATIN CALCIUM 80 MG: 80 TABLET, FILM COATED ORAL at 21:31

## 2019-11-29 RX ADMIN — CARBAMAZEPINE 200 MG: 200 TABLET ORAL at 09:51

## 2019-11-29 RX ADMIN — ACETAMINOPHEN 650 MG: 325 TABLET ORAL at 14:43

## 2019-11-29 RX ADMIN — FAMOTIDINE 20 MG: 20 TABLET, FILM COATED ORAL at 09:51

## 2019-11-29 RX ADMIN — ASPIRIN 81 MG 81 MG: 81 TABLET ORAL at 09:51

## 2019-11-29 RX ADMIN — MICONAZOLE NITRATE: 20 POWDER TOPICAL at 21:31

## 2019-11-29 RX ADMIN — APIXABAN 5 MG: 5 TABLET, FILM COATED ORAL at 21:31

## 2019-11-29 ASSESSMENT — PAIN SCALES - GENERAL
PAINLEVEL_OUTOF10: 5
PAINLEVEL_OUTOF10: 5
PAINLEVEL_OUTOF10: 0
PAINLEVEL_OUTOF10: 5
PAINLEVEL_OUTOF10: 3
PAINLEVEL_OUTOF10: 0

## 2019-11-29 ASSESSMENT — PAIN DESCRIPTION - LOCATION
LOCATION: KNEE

## 2019-11-29 ASSESSMENT — PAIN DESCRIPTION - DESCRIPTORS
DESCRIPTORS: ACHING

## 2019-11-29 ASSESSMENT — PAIN DESCRIPTION - ORIENTATION
ORIENTATION: RIGHT

## 2019-11-29 ASSESSMENT — PAIN DESCRIPTION - FREQUENCY: FREQUENCY: INTERMITTENT

## 2019-11-29 ASSESSMENT — PAIN DESCRIPTION - PAIN TYPE
TYPE: ACUTE PAIN
TYPE: ACUTE PAIN

## 2019-11-30 PROCEDURE — 6370000000 HC RX 637 (ALT 250 FOR IP): Performed by: PHYSICAL MEDICINE & REHABILITATION

## 2019-11-30 PROCEDURE — 6370000000 HC RX 637 (ALT 250 FOR IP): Performed by: INTERNAL MEDICINE

## 2019-11-30 PROCEDURE — 6370000000 HC RX 637 (ALT 250 FOR IP): Performed by: NURSE PRACTITIONER

## 2019-11-30 PROCEDURE — 97127 HC SP THER IVNTJ W/FOCUS COG FUNCJ: CPT

## 2019-11-30 PROCEDURE — 1180000000 HC REHAB R&B

## 2019-11-30 PROCEDURE — 95816 EEG AWAKE AND DROWSY: CPT | Performed by: PSYCHIATRY & NEUROLOGY

## 2019-11-30 PROCEDURE — 92507 TX SP LANG VOICE COMM INDIV: CPT

## 2019-11-30 RX ADMIN — MICONAZOLE NITRATE: 20 POWDER TOPICAL at 08:52

## 2019-11-30 RX ADMIN — APIXABAN 5 MG: 5 TABLET, FILM COATED ORAL at 20:00

## 2019-11-30 RX ADMIN — METOPROLOL TARTRATE 12.5 MG: 25 TABLET ORAL at 08:53

## 2019-11-30 RX ADMIN — CARBAMAZEPINE 200 MG: 200 TABLET ORAL at 08:54

## 2019-11-30 RX ADMIN — Medication 100 MG: at 12:43

## 2019-11-30 RX ADMIN — LEVOTHYROXINE SODIUM 50 MCG: 50 TABLET ORAL at 06:13

## 2019-11-30 RX ADMIN — Medication 100 MG: at 08:53

## 2019-11-30 RX ADMIN — MICONAZOLE NITRATE: 20 POWDER TOPICAL at 20:01

## 2019-11-30 RX ADMIN — ATORVASTATIN CALCIUM 80 MG: 80 TABLET, FILM COATED ORAL at 20:00

## 2019-11-30 RX ADMIN — FAMOTIDINE 20 MG: 20 TABLET, FILM COATED ORAL at 08:53

## 2019-11-30 RX ADMIN — AMIODARONE HYDROCHLORIDE 200 MG: 200 TABLET ORAL at 08:53

## 2019-11-30 RX ADMIN — ACETAMINOPHEN 650 MG: 325 TABLET ORAL at 08:55

## 2019-11-30 RX ADMIN — APIXABAN 5 MG: 5 TABLET, FILM COATED ORAL at 08:53

## 2019-11-30 RX ADMIN — ASPIRIN 81 MG 81 MG: 81 TABLET ORAL at 08:53

## 2019-11-30 RX ADMIN — CARBAMAZEPINE 200 MG: 200 TABLET ORAL at 20:00

## 2019-11-30 ASSESSMENT — PAIN SCALES - GENERAL
PAINLEVEL_OUTOF10: 5
PAINLEVEL_OUTOF10: 5
PAINLEVEL_OUTOF10: 0
PAINLEVEL_OUTOF10: 0

## 2019-12-01 LAB
BACTERIA: ABNORMAL /HPF
BILIRUBIN URINE: NEGATIVE
BLOOD, URINE: ABNORMAL
CLARITY: ABNORMAL
COLOR: YELLOW
EPITHELIAL CELLS, UA: ABNORMAL /HPF (ref 0–5)
GLUCOSE URINE: NEGATIVE MG/DL
HYALINE CASTS: ABNORMAL /HPF (ref 0–5)
KETONES, URINE: NEGATIVE MG/DL
LEUKOCYTE ESTERASE, URINE: ABNORMAL
NITRITE, URINE: POSITIVE
PH UA: 5.5 (ref 5–9)
PROTEIN UA: 30 MG/DL
RBC UA: >100 /HPF (ref 0–5)
SPECIFIC GRAVITY UA: 1.02 (ref 1–1.03)
URINE REFLEX TO CULTURE: YES
UROBILINOGEN, URINE: 0.2 E.U./DL
WBC UA: >100 /HPF (ref 0–5)

## 2019-12-01 PROCEDURE — 6370000000 HC RX 637 (ALT 250 FOR IP): Performed by: NURSE PRACTITIONER

## 2019-12-01 PROCEDURE — 81001 URINALYSIS AUTO W/SCOPE: CPT

## 2019-12-01 PROCEDURE — 6370000000 HC RX 637 (ALT 250 FOR IP): Performed by: INTERNAL MEDICINE

## 2019-12-01 PROCEDURE — 6370000000 HC RX 637 (ALT 250 FOR IP): Performed by: PHYSICAL MEDICINE & REHABILITATION

## 2019-12-01 PROCEDURE — 87077 CULTURE AEROBIC IDENTIFY: CPT

## 2019-12-01 PROCEDURE — 51702 INSERT TEMP BLADDER CATH: CPT

## 2019-12-01 PROCEDURE — 87186 SC STD MICRODIL/AGAR DIL: CPT

## 2019-12-01 PROCEDURE — 1180000000 HC REHAB R&B

## 2019-12-01 PROCEDURE — 87086 URINE CULTURE/COLONY COUNT: CPT

## 2019-12-01 RX ORDER — CIPROFLOXACIN 500 MG/1
500 TABLET, FILM COATED ORAL EVERY 12 HOURS SCHEDULED
Status: DISCONTINUED | OUTPATIENT
Start: 2019-12-01 | End: 2019-12-07 | Stop reason: HOSPADM

## 2019-12-01 RX ADMIN — CARBAMAZEPINE 200 MG: 200 TABLET ORAL at 10:52

## 2019-12-01 RX ADMIN — Medication 100 MG: at 10:50

## 2019-12-01 RX ADMIN — LEVOTHYROXINE SODIUM 50 MCG: 50 TABLET ORAL at 05:38

## 2019-12-01 RX ADMIN — FAMOTIDINE 20 MG: 20 TABLET, FILM COATED ORAL at 10:51

## 2019-12-01 RX ADMIN — CARBAMAZEPINE 200 MG: 200 TABLET ORAL at 20:12

## 2019-12-01 RX ADMIN — MICONAZOLE NITRATE: 20 POWDER TOPICAL at 10:55

## 2019-12-01 RX ADMIN — ATORVASTATIN CALCIUM 80 MG: 80 TABLET, FILM COATED ORAL at 20:12

## 2019-12-01 RX ADMIN — APIXABAN 5 MG: 5 TABLET, FILM COATED ORAL at 10:52

## 2019-12-01 RX ADMIN — ACETAMINOPHEN 650 MG: 325 TABLET ORAL at 00:06

## 2019-12-01 RX ADMIN — ASPIRIN 81 MG 81 MG: 81 TABLET ORAL at 10:51

## 2019-12-01 RX ADMIN — ONDANSETRON HYDROCHLORIDE 4 MG: 4 TABLET, FILM COATED ORAL at 10:51

## 2019-12-01 RX ADMIN — CIPROFLOXACIN 500 MG: 500 TABLET, FILM COATED ORAL at 20:12

## 2019-12-01 RX ADMIN — AMIODARONE HYDROCHLORIDE 200 MG: 200 TABLET ORAL at 10:51

## 2019-12-01 RX ADMIN — APIXABAN 5 MG: 5 TABLET, FILM COATED ORAL at 20:12

## 2019-12-01 RX ADMIN — Medication 100 MG: at 13:41

## 2019-12-01 RX ADMIN — METOPROLOL TARTRATE 12.5 MG: 25 TABLET ORAL at 10:51

## 2019-12-01 ASSESSMENT — PAIN DESCRIPTION - PAIN TYPE: TYPE: ACUTE PAIN

## 2019-12-01 ASSESSMENT — PAIN SCALES - GENERAL
PAINLEVEL_OUTOF10: 0
PAINLEVEL_OUTOF10: 5

## 2019-12-01 ASSESSMENT — PAIN DESCRIPTION - DESCRIPTORS: DESCRIPTORS: ACHING

## 2019-12-01 ASSESSMENT — PAIN DESCRIPTION - PROGRESSION: CLINICAL_PROGRESSION: NOT CHANGED

## 2019-12-01 ASSESSMENT — PAIN - FUNCTIONAL ASSESSMENT: PAIN_FUNCTIONAL_ASSESSMENT: ACTIVITIES ARE NOT PREVENTED

## 2019-12-01 ASSESSMENT — PAIN DESCRIPTION - FREQUENCY: FREQUENCY: INTERMITTENT

## 2019-12-01 ASSESSMENT — PAIN DESCRIPTION - ORIENTATION: ORIENTATION: RIGHT

## 2019-12-01 ASSESSMENT — PAIN DESCRIPTION - ONSET: ONSET: ON-GOING

## 2019-12-01 ASSESSMENT — PAIN DESCRIPTION - LOCATION: LOCATION: KNEE

## 2019-12-02 LAB
ANION GAP SERPL CALCULATED.3IONS-SCNC: 10 MEQ/L (ref 9–15)
BUN BLDV-MCNC: 16 MG/DL (ref 8–23)
CALCIUM SERPL-MCNC: 7.7 MG/DL (ref 8.5–9.9)
CHLORIDE BLD-SCNC: 101 MEQ/L (ref 95–107)
CO2: 23 MEQ/L (ref 20–31)
CREAT SERPL-MCNC: 1.25 MG/DL (ref 0.5–0.9)
GFR AFRICAN AMERICAN: 50.2
GFR NON-AFRICAN AMERICAN: 41.5
GLUCOSE BLD-MCNC: 110 MG/DL (ref 70–99)
HCT VFR BLD CALC: 25.3 % (ref 37–47)
HEMOGLOBIN: 8.4 G/DL (ref 12–16)
MCH RBC QN AUTO: 30.5 PG (ref 27–31.3)
MCHC RBC AUTO-ENTMCNC: 33 % (ref 33–37)
MCV RBC AUTO: 92.4 FL (ref 82–100)
PDW BLD-RTO: 13.7 % (ref 11.5–14.5)
PLATELET # BLD: 372 K/UL (ref 130–400)
POTASSIUM SERPL-SCNC: 4.6 MEQ/L (ref 3.4–4.9)
RBC # BLD: 2.74 M/UL (ref 4.2–5.4)
SODIUM BLD-SCNC: 134 MEQ/L (ref 135–144)
WBC # BLD: 7 K/UL (ref 4.8–10.8)

## 2019-12-02 PROCEDURE — 80048 BASIC METABOLIC PNL TOTAL CA: CPT

## 2019-12-02 PROCEDURE — 6370000000 HC RX 637 (ALT 250 FOR IP): Performed by: NURSE PRACTITIONER

## 2019-12-02 PROCEDURE — 6370000000 HC RX 637 (ALT 250 FOR IP): Performed by: PHYSICAL MEDICINE & REHABILITATION

## 2019-12-02 PROCEDURE — 97535 SELF CARE MNGMENT TRAINING: CPT

## 2019-12-02 PROCEDURE — 6370000000 HC RX 637 (ALT 250 FOR IP): Performed by: INTERNAL MEDICINE

## 2019-12-02 PROCEDURE — 97112 NEUROMUSCULAR REEDUCATION: CPT

## 2019-12-02 PROCEDURE — 36415 COLL VENOUS BLD VENIPUNCTURE: CPT

## 2019-12-02 PROCEDURE — 97116 GAIT TRAINING THERAPY: CPT

## 2019-12-02 PROCEDURE — 99232 SBSQ HOSP IP/OBS MODERATE 35: CPT | Performed by: PHYSICAL MEDICINE & REHABILITATION

## 2019-12-02 PROCEDURE — 99222 1ST HOSP IP/OBS MODERATE 55: CPT | Performed by: INTERNAL MEDICINE

## 2019-12-02 PROCEDURE — 1180000000 HC REHAB R&B

## 2019-12-02 PROCEDURE — 97127 HC SP THER IVNTJ W/FOCUS COG FUNCJ: CPT

## 2019-12-02 PROCEDURE — 97150 GROUP THERAPEUTIC PROCEDURES: CPT

## 2019-12-02 PROCEDURE — 85027 COMPLETE CBC AUTOMATED: CPT

## 2019-12-02 RX ORDER — OXYMETAZOLINE HYDROCHLORIDE 0.05 G/100ML
2 SPRAY NASAL 2 TIMES DAILY
Status: DISPENSED | OUTPATIENT
Start: 2019-12-02 | End: 2019-12-05

## 2019-12-02 RX ADMIN — APIXABAN 5 MG: 5 TABLET, FILM COATED ORAL at 08:35

## 2019-12-02 RX ADMIN — ACETAMINOPHEN 650 MG: 325 TABLET ORAL at 00:44

## 2019-12-02 RX ADMIN — ACETAMINOPHEN 650 MG: 325 TABLET ORAL at 20:29

## 2019-12-02 RX ADMIN — ATORVASTATIN CALCIUM 80 MG: 80 TABLET, FILM COATED ORAL at 20:30

## 2019-12-02 RX ADMIN — Medication 2 SPRAY: at 20:34

## 2019-12-02 RX ADMIN — MICONAZOLE NITRATE: 20 POWDER TOPICAL at 08:36

## 2019-12-02 RX ADMIN — ACETAMINOPHEN 650 MG: 325 TABLET ORAL at 14:30

## 2019-12-02 RX ADMIN — AMIODARONE HYDROCHLORIDE 200 MG: 200 TABLET ORAL at 08:29

## 2019-12-02 RX ADMIN — ASPIRIN 81 MG 81 MG: 81 TABLET ORAL at 08:29

## 2019-12-02 RX ADMIN — MICONAZOLE NITRATE: 20 POWDER TOPICAL at 20:33

## 2019-12-02 RX ADMIN — LEVOTHYROXINE SODIUM 50 MCG: 50 TABLET ORAL at 05:48

## 2019-12-02 RX ADMIN — CIPROFLOXACIN 500 MG: 500 TABLET, FILM COATED ORAL at 20:30

## 2019-12-02 RX ADMIN — CIPROFLOXACIN 500 MG: 500 TABLET, FILM COATED ORAL at 08:29

## 2019-12-02 RX ADMIN — METOPROLOL TARTRATE 12.5 MG: 25 TABLET ORAL at 08:29

## 2019-12-02 RX ADMIN — CARBAMAZEPINE 200 MG: 200 TABLET ORAL at 20:30

## 2019-12-02 RX ADMIN — FAMOTIDINE 20 MG: 20 TABLET, FILM COATED ORAL at 08:29

## 2019-12-02 RX ADMIN — APIXABAN 5 MG: 5 TABLET, FILM COATED ORAL at 20:29

## 2019-12-02 RX ADMIN — Medication 100 MG: at 08:28

## 2019-12-02 RX ADMIN — METOPROLOL TARTRATE 12.5 MG: 25 TABLET ORAL at 20:30

## 2019-12-02 RX ADMIN — CARBAMAZEPINE 200 MG: 200 TABLET ORAL at 08:29

## 2019-12-02 ASSESSMENT — ENCOUNTER SYMPTOMS
WHEEZING: 0
COLOR CHANGE: 0
CHEST TIGHTNESS: 0
CONSTIPATION: 0
NAUSEA: 0
VOMITING: 0
TROUBLE SWALLOWING: 0
SHORTNESS OF BREATH: 0
DIARRHEA: 0
COUGH: 0

## 2019-12-02 ASSESSMENT — PAIN SCALES - GENERAL
PAINLEVEL_OUTOF10: 5
PAINLEVEL_OUTOF10: 0
PAINLEVEL_OUTOF10: 5
PAINLEVEL_OUTOF10: 5
PAINLEVEL_OUTOF10: 2
PAINLEVEL_OUTOF10: 0

## 2019-12-02 ASSESSMENT — PAIN DESCRIPTION - LOCATION
LOCATION: KNEE
LOCATION: KNEE

## 2019-12-02 ASSESSMENT — PAIN DESCRIPTION - DESCRIPTORS
DESCRIPTORS: ACHING
DESCRIPTORS: THROBBING

## 2019-12-02 ASSESSMENT — PAIN DESCRIPTION - ORIENTATION
ORIENTATION: RIGHT
ORIENTATION: RIGHT

## 2019-12-03 LAB
ORGANISM: ABNORMAL
URINE CULTURE, ROUTINE: ABNORMAL

## 2019-12-03 PROCEDURE — 51798 US URINE CAPACITY MEASURE: CPT

## 2019-12-03 PROCEDURE — 6370000000 HC RX 637 (ALT 250 FOR IP): Performed by: NURSE PRACTITIONER

## 2019-12-03 PROCEDURE — 99232 SBSQ HOSP IP/OBS MODERATE 35: CPT | Performed by: INTERNAL MEDICINE

## 2019-12-03 PROCEDURE — 6370000000 HC RX 637 (ALT 250 FOR IP): Performed by: PHYSICAL MEDICINE & REHABILITATION

## 2019-12-03 PROCEDURE — 6370000000 HC RX 637 (ALT 250 FOR IP): Performed by: INTERNAL MEDICINE

## 2019-12-03 PROCEDURE — 99232 SBSQ HOSP IP/OBS MODERATE 35: CPT | Performed by: PHYSICAL MEDICINE & REHABILITATION

## 2019-12-03 PROCEDURE — 99233 SBSQ HOSP IP/OBS HIGH 50: CPT | Performed by: PSYCHIATRY & NEUROLOGY

## 2019-12-03 PROCEDURE — 2700000000 HC OXYGEN THERAPY PER DAY

## 2019-12-03 PROCEDURE — 97535 SELF CARE MNGMENT TRAINING: CPT

## 2019-12-03 PROCEDURE — 97112 NEUROMUSCULAR REEDUCATION: CPT

## 2019-12-03 PROCEDURE — 1180000000 HC REHAB R&B

## 2019-12-03 PROCEDURE — 97127 HC SP THER IVNTJ W/FOCUS COG FUNCJ: CPT

## 2019-12-03 PROCEDURE — 97530 THERAPEUTIC ACTIVITIES: CPT

## 2019-12-03 PROCEDURE — 97116 GAIT TRAINING THERAPY: CPT

## 2019-12-03 RX ORDER — LIDOCAINE HYDROCHLORIDE 20 MG/ML
JELLY TOPICAL PRN
Status: DISCONTINUED | OUTPATIENT
Start: 2019-12-03 | End: 2019-12-07 | Stop reason: HOSPADM

## 2019-12-03 RX ADMIN — AMIODARONE HYDROCHLORIDE 200 MG: 200 TABLET ORAL at 08:42

## 2019-12-03 RX ADMIN — MICONAZOLE NITRATE: 20 POWDER TOPICAL at 08:43

## 2019-12-03 RX ADMIN — CARBAMAZEPINE 200 MG: 200 TABLET ORAL at 08:41

## 2019-12-03 RX ADMIN — ATORVASTATIN CALCIUM 80 MG: 80 TABLET, FILM COATED ORAL at 21:38

## 2019-12-03 RX ADMIN — Medication 2 SPRAY: at 21:39

## 2019-12-03 RX ADMIN — APIXABAN 5 MG: 5 TABLET, FILM COATED ORAL at 21:39

## 2019-12-03 RX ADMIN — Medication 100 MG: at 08:42

## 2019-12-03 RX ADMIN — ACETAMINOPHEN 650 MG: 325 TABLET ORAL at 21:38

## 2019-12-03 RX ADMIN — LEVOTHYROXINE SODIUM 50 MCG: 50 TABLET ORAL at 06:15

## 2019-12-03 RX ADMIN — CARBAMAZEPINE 200 MG: 200 TABLET ORAL at 21:38

## 2019-12-03 RX ADMIN — METOPROLOL TARTRATE 12.5 MG: 25 TABLET ORAL at 21:38

## 2019-12-03 RX ADMIN — CIPROFLOXACIN 500 MG: 500 TABLET, FILM COATED ORAL at 21:38

## 2019-12-03 RX ADMIN — CIPROFLOXACIN 500 MG: 500 TABLET, FILM COATED ORAL at 08:43

## 2019-12-03 RX ADMIN — APIXABAN 5 MG: 5 TABLET, FILM COATED ORAL at 08:41

## 2019-12-03 RX ADMIN — MICONAZOLE NITRATE: 20 POWDER TOPICAL at 21:39

## 2019-12-03 RX ADMIN — FAMOTIDINE 20 MG: 20 TABLET, FILM COATED ORAL at 08:42

## 2019-12-03 RX ADMIN — ASPIRIN 81 MG 81 MG: 81 TABLET ORAL at 08:42

## 2019-12-03 RX ADMIN — Medication 2 SPRAY: at 08:43

## 2019-12-03 ASSESSMENT — PAIN SCALES - GENERAL
PAINLEVEL_OUTOF10: 0
PAINLEVEL_OUTOF10: 0
PAINLEVEL_OUTOF10: 2

## 2019-12-03 ASSESSMENT — PAIN DESCRIPTION - PAIN TYPE: TYPE: ACUTE PAIN

## 2019-12-04 PROCEDURE — 6370000000 HC RX 637 (ALT 250 FOR IP): Performed by: INTERNAL MEDICINE

## 2019-12-04 PROCEDURE — 92526 ORAL FUNCTION THERAPY: CPT

## 2019-12-04 PROCEDURE — 97116 GAIT TRAINING THERAPY: CPT

## 2019-12-04 PROCEDURE — 6370000000 HC RX 637 (ALT 250 FOR IP): Performed by: PHYSICAL MEDICINE & REHABILITATION

## 2019-12-04 PROCEDURE — 97127 HC SP THER IVNTJ W/FOCUS COG FUNCJ: CPT

## 2019-12-04 PROCEDURE — 6370000000 HC RX 637 (ALT 250 FOR IP): Performed by: NURSE PRACTITIONER

## 2019-12-04 PROCEDURE — 97110 THERAPEUTIC EXERCISES: CPT

## 2019-12-04 PROCEDURE — 2700000000 HC OXYGEN THERAPY PER DAY

## 2019-12-04 PROCEDURE — 1180000000 HC REHAB R&B

## 2019-12-04 PROCEDURE — 97530 THERAPEUTIC ACTIVITIES: CPT

## 2019-12-04 PROCEDURE — 99232 SBSQ HOSP IP/OBS MODERATE 35: CPT | Performed by: PSYCHIATRY & NEUROLOGY

## 2019-12-04 PROCEDURE — 6360000002 HC RX W HCPCS: Performed by: PHYSICAL MEDICINE & REHABILITATION

## 2019-12-04 PROCEDURE — 97535 SELF CARE MNGMENT TRAINING: CPT

## 2019-12-04 PROCEDURE — 99232 SBSQ HOSP IP/OBS MODERATE 35: CPT | Performed by: PHYSICAL MEDICINE & REHABILITATION

## 2019-12-04 RX ADMIN — APIXABAN 5 MG: 5 TABLET, FILM COATED ORAL at 09:29

## 2019-12-04 RX ADMIN — CIPROFLOXACIN 500 MG: 500 TABLET, FILM COATED ORAL at 09:34

## 2019-12-04 RX ADMIN — FAMOTIDINE 20 MG: 20 TABLET, FILM COATED ORAL at 09:29

## 2019-12-04 RX ADMIN — ERGOCALCIFEROL 50000 UNITS: 1.25 CAPSULE ORAL at 09:29

## 2019-12-04 RX ADMIN — Medication 2 SPRAY: at 09:40

## 2019-12-04 RX ADMIN — CYANOCOBALAMIN 1000 MCG: 1000 INJECTION, SOLUTION INTRAMUSCULAR; SUBCUTANEOUS at 12:57

## 2019-12-04 RX ADMIN — LEVOTHYROXINE SODIUM 50 MCG: 50 TABLET ORAL at 06:28

## 2019-12-04 RX ADMIN — CIPROFLOXACIN 500 MG: 500 TABLET, FILM COATED ORAL at 19:41

## 2019-12-04 RX ADMIN — Medication 100 MG: at 12:56

## 2019-12-04 RX ADMIN — CARBAMAZEPINE 200 MG: 200 TABLET ORAL at 19:42

## 2019-12-04 RX ADMIN — ATORVASTATIN CALCIUM 80 MG: 80 TABLET, FILM COATED ORAL at 19:42

## 2019-12-04 RX ADMIN — ACETAMINOPHEN 650 MG: 325 TABLET ORAL at 09:29

## 2019-12-04 RX ADMIN — MICONAZOLE NITRATE: 20 POWDER TOPICAL at 09:40

## 2019-12-04 RX ADMIN — APIXABAN 5 MG: 5 TABLET, FILM COATED ORAL at 19:42

## 2019-12-04 RX ADMIN — ASPIRIN 81 MG 81 MG: 81 TABLET ORAL at 09:29

## 2019-12-04 ASSESSMENT — ENCOUNTER SYMPTOMS
COUGH: 0
VOMITING: 0
COLOR CHANGE: 0
TROUBLE SWALLOWING: 0
CHEST TIGHTNESS: 0
SHORTNESS OF BREATH: 0
WHEEZING: 0
NAUSEA: 0

## 2019-12-04 ASSESSMENT — PAIN SCALES - GENERAL
PAINLEVEL_OUTOF10: 5
PAINLEVEL_OUTOF10: 0
PAINLEVEL_OUTOF10: 0
PAINLEVEL_OUTOF10: 5

## 2019-12-04 ASSESSMENT — PAIN DESCRIPTION - LOCATION
LOCATION: KNEE
LOCATION: LEG

## 2019-12-04 ASSESSMENT — PAIN DESCRIPTION - ORIENTATION
ORIENTATION: RIGHT
ORIENTATION: RIGHT

## 2019-12-04 ASSESSMENT — PAIN DESCRIPTION - DESCRIPTORS: DESCRIPTORS: ACHING

## 2019-12-04 ASSESSMENT — PAIN SCALES - WONG BAKER: WONGBAKER_NUMERICALRESPONSE: 2

## 2019-12-04 ASSESSMENT — PAIN DESCRIPTION - PAIN TYPE: TYPE: ACUTE PAIN

## 2019-12-05 PROCEDURE — 97112 NEUROMUSCULAR REEDUCATION: CPT

## 2019-12-05 PROCEDURE — 6370000000 HC RX 637 (ALT 250 FOR IP): Performed by: PHYSICAL MEDICINE & REHABILITATION

## 2019-12-05 PROCEDURE — 97530 THERAPEUTIC ACTIVITIES: CPT

## 2019-12-05 PROCEDURE — 97535 SELF CARE MNGMENT TRAINING: CPT

## 2019-12-05 PROCEDURE — 99232 SBSQ HOSP IP/OBS MODERATE 35: CPT | Performed by: INTERNAL MEDICINE

## 2019-12-05 PROCEDURE — 97116 GAIT TRAINING THERAPY: CPT

## 2019-12-05 PROCEDURE — 97127 HC SP THER IVNTJ W/FOCUS COG FUNCJ: CPT

## 2019-12-05 PROCEDURE — 6370000000 HC RX 637 (ALT 250 FOR IP): Performed by: NURSE PRACTITIONER

## 2019-12-05 PROCEDURE — 6370000000 HC RX 637 (ALT 250 FOR IP): Performed by: INTERNAL MEDICINE

## 2019-12-05 PROCEDURE — 97110 THERAPEUTIC EXERCISES: CPT

## 2019-12-05 PROCEDURE — 99231 SBSQ HOSP IP/OBS SF/LOW 25: CPT | Performed by: PHYSICAL MEDICINE & REHABILITATION

## 2019-12-05 PROCEDURE — 1180000000 HC REHAB R&B

## 2019-12-05 RX ADMIN — LEVOTHYROXINE SODIUM 50 MCG: 50 TABLET ORAL at 06:07

## 2019-12-05 RX ADMIN — ASPIRIN 81 MG 81 MG: 81 TABLET ORAL at 09:42

## 2019-12-05 RX ADMIN — FAMOTIDINE 20 MG: 20 TABLET, FILM COATED ORAL at 09:42

## 2019-12-05 RX ADMIN — CARBAMAZEPINE 200 MG: 200 TABLET ORAL at 09:41

## 2019-12-05 RX ADMIN — ACETAMINOPHEN 650 MG: 325 TABLET ORAL at 13:15

## 2019-12-05 RX ADMIN — APIXABAN 5 MG: 5 TABLET, FILM COATED ORAL at 09:42

## 2019-12-05 RX ADMIN — ATORVASTATIN CALCIUM 80 MG: 80 TABLET, FILM COATED ORAL at 23:00

## 2019-12-05 RX ADMIN — Medication 100 MG: at 12:25

## 2019-12-05 RX ADMIN — MICONAZOLE NITRATE: 20 POWDER TOPICAL at 09:44

## 2019-12-05 RX ADMIN — CIPROFLOXACIN 500 MG: 500 TABLET, FILM COATED ORAL at 23:00

## 2019-12-05 RX ADMIN — MICONAZOLE NITRATE: 20 POWDER TOPICAL at 23:01

## 2019-12-05 RX ADMIN — APIXABAN 5 MG: 5 TABLET, FILM COATED ORAL at 23:00

## 2019-12-05 RX ADMIN — CARBAMAZEPINE 200 MG: 200 TABLET ORAL at 23:00

## 2019-12-05 RX ADMIN — ACETAMINOPHEN 650 MG: 325 TABLET ORAL at 23:00

## 2019-12-05 RX ADMIN — METOPROLOL TARTRATE 12.5 MG: 25 TABLET ORAL at 23:00

## 2019-12-05 RX ADMIN — Medication 100 MG: at 09:42

## 2019-12-05 RX ADMIN — AMIODARONE HYDROCHLORIDE 200 MG: 200 TABLET ORAL at 09:39

## 2019-12-05 RX ADMIN — CIPROFLOXACIN 500 MG: 500 TABLET, FILM COATED ORAL at 09:41

## 2019-12-05 ASSESSMENT — PAIN DESCRIPTION - LOCATION: LOCATION: LEG;KNEE

## 2019-12-05 ASSESSMENT — PAIN DESCRIPTION - FREQUENCY: FREQUENCY: INTERMITTENT

## 2019-12-05 ASSESSMENT — PAIN SCALES - GENERAL
PAINLEVEL_OUTOF10: 5
PAINLEVEL_OUTOF10: 0
PAINLEVEL_OUTOF10: 0
PAINLEVEL_OUTOF10: 3
PAINLEVEL_OUTOF10: 0

## 2019-12-05 ASSESSMENT — PAIN DESCRIPTION - ORIENTATION: ORIENTATION: RIGHT

## 2019-12-05 ASSESSMENT — PAIN DESCRIPTION - DESCRIPTORS: DESCRIPTORS: ACHING

## 2019-12-05 ASSESSMENT — PAIN DESCRIPTION - PAIN TYPE: TYPE: ACUTE PAIN

## 2019-12-06 PROCEDURE — 94762 N-INVAS EAR/PLS OXIMTRY CONT: CPT

## 2019-12-06 PROCEDURE — 97116 GAIT TRAINING THERAPY: CPT

## 2019-12-06 PROCEDURE — 6370000000 HC RX 637 (ALT 250 FOR IP): Performed by: INTERNAL MEDICINE

## 2019-12-06 PROCEDURE — 97110 THERAPEUTIC EXERCISES: CPT

## 2019-12-06 PROCEDURE — 6370000000 HC RX 637 (ALT 250 FOR IP): Performed by: PHYSICAL MEDICINE & REHABILITATION

## 2019-12-06 PROCEDURE — 97535 SELF CARE MNGMENT TRAINING: CPT

## 2019-12-06 PROCEDURE — 6370000000 HC RX 637 (ALT 250 FOR IP): Performed by: NURSE PRACTITIONER

## 2019-12-06 PROCEDURE — 97112 NEUROMUSCULAR REEDUCATION: CPT

## 2019-12-06 PROCEDURE — 99231 SBSQ HOSP IP/OBS SF/LOW 25: CPT | Performed by: PHYSICAL MEDICINE & REHABILITATION

## 2019-12-06 PROCEDURE — 1180000000 HC REHAB R&B

## 2019-12-06 RX ADMIN — ACETAMINOPHEN 650 MG: 325 TABLET ORAL at 04:38

## 2019-12-06 RX ADMIN — CIPROFLOXACIN 500 MG: 500 TABLET, FILM COATED ORAL at 08:50

## 2019-12-06 RX ADMIN — FAMOTIDINE 20 MG: 20 TABLET, FILM COATED ORAL at 08:50

## 2019-12-06 RX ADMIN — Medication 100 MG: at 13:20

## 2019-12-06 RX ADMIN — ASPIRIN 81 MG 81 MG: 81 TABLET ORAL at 08:50

## 2019-12-06 RX ADMIN — METOPROLOL TARTRATE 12.5 MG: 25 TABLET ORAL at 13:20

## 2019-12-06 RX ADMIN — APIXABAN 5 MG: 5 TABLET, FILM COATED ORAL at 19:51

## 2019-12-06 RX ADMIN — APIXABAN 5 MG: 5 TABLET, FILM COATED ORAL at 08:50

## 2019-12-06 RX ADMIN — Medication 100 MG: at 08:50

## 2019-12-06 RX ADMIN — CARBAMAZEPINE 200 MG: 200 TABLET ORAL at 19:51

## 2019-12-06 RX ADMIN — LEVOTHYROXINE SODIUM 50 MCG: 50 TABLET ORAL at 06:24

## 2019-12-06 RX ADMIN — AMIODARONE HYDROCHLORIDE 200 MG: 200 TABLET ORAL at 08:50

## 2019-12-06 RX ADMIN — CARBAMAZEPINE 200 MG: 200 TABLET ORAL at 08:50

## 2019-12-06 RX ADMIN — METOPROLOL TARTRATE 12.5 MG: 25 TABLET ORAL at 19:51

## 2019-12-06 RX ADMIN — MICONAZOLE NITRATE: 20 POWDER TOPICAL at 08:55

## 2019-12-06 RX ADMIN — CIPROFLOXACIN 500 MG: 500 TABLET, FILM COATED ORAL at 19:51

## 2019-12-06 RX ADMIN — ATORVASTATIN CALCIUM 80 MG: 80 TABLET, FILM COATED ORAL at 19:51

## 2019-12-06 ASSESSMENT — PAIN SCALES - GENERAL: PAINLEVEL_OUTOF10: 4

## 2019-12-07 VITALS
RESPIRATION RATE: 18 BRPM | HEIGHT: 60 IN | WEIGHT: 157.19 LBS | OXYGEN SATURATION: 100 % | HEART RATE: 69 BPM | TEMPERATURE: 98 F | BODY MASS INDEX: 30.86 KG/M2 | SYSTOLIC BLOOD PRESSURE: 131 MMHG | DIASTOLIC BLOOD PRESSURE: 74 MMHG

## 2019-12-07 PROCEDURE — 99238 HOSP IP/OBS DSCHRG MGMT 30/<: CPT | Performed by: PHYSICAL MEDICINE & REHABILITATION

## 2019-12-07 PROCEDURE — 51798 US URINE CAPACITY MEASURE: CPT

## 2019-12-07 PROCEDURE — 6370000000 HC RX 637 (ALT 250 FOR IP): Performed by: PHYSICAL MEDICINE & REHABILITATION

## 2019-12-07 PROCEDURE — 6370000000 HC RX 637 (ALT 250 FOR IP): Performed by: INTERNAL MEDICINE

## 2019-12-07 PROCEDURE — 6370000000 HC RX 637 (ALT 250 FOR IP): Performed by: NURSE PRACTITIONER

## 2019-12-07 RX ORDER — ERGOCALCIFEROL 1.25 MG/1
50000 CAPSULE ORAL WEEKLY
Qty: 5 CAPSULE | Refills: 0 | Status: SHIPPED | OUTPATIENT
Start: 2019-12-11

## 2019-12-07 RX ORDER — ATORVASTATIN CALCIUM 80 MG/1
80 TABLET, FILM COATED ORAL DAILY
Qty: 30 TABLET | Refills: 3 | Status: SHIPPED | OUTPATIENT
Start: 2019-12-07 | End: 2020-01-08

## 2019-12-07 RX ORDER — CIPROFLOXACIN 500 MG/1
500 TABLET, FILM COATED ORAL EVERY 12 HOURS SCHEDULED
Qty: 20 TABLET | Refills: 0 | Status: ON HOLD | OUTPATIENT
Start: 2019-12-07 | End: 2019-12-16

## 2019-12-07 RX ORDER — CARBAMAZEPINE 200 MG/1
200 TABLET ORAL 2 TIMES DAILY
Qty: 90 TABLET | Refills: 3 | Status: SHIPPED | OUTPATIENT
Start: 2019-12-07

## 2019-12-07 RX ORDER — AMIODARONE HYDROCHLORIDE 200 MG/1
200 TABLET ORAL DAILY
Qty: 30 TABLET | Refills: 3 | Status: SHIPPED | OUTPATIENT
Start: 2019-12-07

## 2019-12-07 RX ORDER — LEVOTHYROXINE SODIUM 0.05 MG/1
50 TABLET ORAL DAILY
Qty: 30 TABLET | Refills: 3 | Status: SHIPPED | OUTPATIENT
Start: 2019-12-08 | End: 2020-02-18 | Stop reason: SDUPTHER

## 2019-12-07 RX ORDER — IPRATROPIUM BROMIDE AND ALBUTEROL SULFATE 2.5; .5 MG/3ML; MG/3ML
3 SOLUTION RESPIRATORY (INHALATION) EVERY 4 HOURS PRN
Qty: 360 ML | Refills: 0 | Status: SHIPPED | OUTPATIENT
Start: 2019-12-07

## 2019-12-07 RX ORDER — FAMOTIDINE 20 MG/1
20 TABLET, FILM COATED ORAL DAILY
Qty: 60 TABLET | Refills: 3 | Status: ON HOLD | OUTPATIENT
Start: 2019-12-07 | End: 2019-12-20 | Stop reason: HOSPADM

## 2019-12-07 RX ORDER — UBIDECARENONE 100 MG
100 CAPSULE ORAL
Qty: 60 CAPSULE | Refills: 0 | Status: SHIPPED | OUTPATIENT
Start: 2019-12-07

## 2019-12-07 RX ORDER — ASPIRIN 81 MG/1
81 TABLET, CHEWABLE ORAL DAILY
Qty: 30 TABLET | Refills: 3 | Status: SHIPPED | OUTPATIENT
Start: 2019-12-07

## 2019-12-07 RX ADMIN — LEVOTHYROXINE SODIUM 50 MCG: 50 TABLET ORAL at 05:55

## 2019-12-07 RX ADMIN — CARBAMAZEPINE 200 MG: 200 TABLET ORAL at 08:58

## 2019-12-07 RX ADMIN — AMIODARONE HYDROCHLORIDE 200 MG: 200 TABLET ORAL at 08:58

## 2019-12-07 RX ADMIN — FAMOTIDINE 20 MG: 20 TABLET, FILM COATED ORAL at 08:58

## 2019-12-07 RX ADMIN — ASPIRIN 81 MG 81 MG: 81 TABLET ORAL at 08:57

## 2019-12-07 RX ADMIN — CIPROFLOXACIN 500 MG: 500 TABLET, FILM COATED ORAL at 08:59

## 2019-12-07 RX ADMIN — Medication 100 MG: at 12:49

## 2019-12-07 RX ADMIN — APIXABAN 5 MG: 5 TABLET, FILM COATED ORAL at 08:58

## 2019-12-07 RX ADMIN — Medication 100 MG: at 08:58

## 2019-12-08 ASSESSMENT — ENCOUNTER SYMPTOMS
SHORTNESS OF BREATH: 0
CHEST TIGHTNESS: 0
NAUSEA: 0
COLOR CHANGE: 0
VOMITING: 0
WHEEZING: 0
TROUBLE SWALLOWING: 0
COUGH: 0

## 2019-12-16 ENCOUNTER — HOSPITAL ENCOUNTER (INPATIENT)
Age: 79
LOS: 4 days | Discharge: NURSING, REHAB OR LONG-TERM CARE | DRG: 811 | End: 2019-12-20
Attending: EMERGENCY MEDICINE | Admitting: INTERNAL MEDICINE
Payer: MEDICARE

## 2019-12-16 DIAGNOSIS — D62 ACUTE BLOOD LOSS ANEMIA: Primary | ICD-10-CM

## 2019-12-16 DIAGNOSIS — K92.1 GASTROINTESTINAL HEMORRHAGE WITH MELENA: ICD-10-CM

## 2019-12-16 LAB
ABO/RH: NORMAL
ALBUMIN SERPL-MCNC: 3.3 G/DL (ref 3.5–4.6)
ALP BLD-CCNC: 70 U/L (ref 40–130)
ALT SERPL-CCNC: 9 U/L (ref 0–33)
ANION GAP SERPL CALCULATED.3IONS-SCNC: 12 MEQ/L (ref 9–15)
ANTIBODY SCREEN: NORMAL
APTT: 35.3 SEC (ref 24.4–36.8)
AST SERPL-CCNC: 27 U/L (ref 0–35)
BILIRUB SERPL-MCNC: <0.2 MG/DL (ref 0.2–0.7)
BLOOD BANK DISPENSE STATUS: NORMAL
BLOOD BANK PRODUCT CODE: NORMAL
BPU ID: NORMAL
BUN BLDV-MCNC: 24 MG/DL (ref 8–23)
CALCIUM SERPL-MCNC: 8 MG/DL (ref 8.5–9.9)
CHLORIDE BLD-SCNC: 103 MEQ/L (ref 95–107)
CO2: 22 MEQ/L (ref 20–31)
CREAT SERPL-MCNC: 1.29 MG/DL (ref 0.5–0.9)
DESCRIPTION BLOOD BANK: NORMAL
GFR AFRICAN AMERICAN: 48.4
GFR NON-AFRICAN AMERICAN: 40
GLOBULIN: 2.7 G/DL (ref 2.3–3.5)
GLUCOSE BLD-MCNC: 106 MG/DL (ref 70–99)
HCT VFR BLD CALC: 15.8 % (ref 37–47)
HCT VFR BLD CALC: 20.3 % (ref 37–47)
HEMOGLOBIN: 5.3 G/DL (ref 12–16)
HEMOGLOBIN: 6.8 G/DL (ref 12–16)
INR BLD: 1.5
MCH RBC QN AUTO: 31.9 PG (ref 27–31.3)
MCHC RBC AUTO-ENTMCNC: 33.8 % (ref 33–37)
MCV RBC AUTO: 94.5 FL (ref 82–100)
PDW BLD-RTO: 16.1 % (ref 11.5–14.5)
PLATELET # BLD: 295 K/UL (ref 130–400)
POTASSIUM SERPL-SCNC: 4.2 MEQ/L (ref 3.4–4.9)
PROTHROMBIN TIME: 18.7 SEC (ref 12.3–14.9)
RBC # BLD: 1.67 M/UL (ref 4.2–5.4)
SODIUM BLD-SCNC: 137 MEQ/L (ref 135–144)
TOTAL PROTEIN: 6 G/DL (ref 6.3–8)
WBC # BLD: 7.9 K/UL (ref 4.8–10.8)

## 2019-12-16 PROCEDURE — 85014 HEMATOCRIT: CPT

## 2019-12-16 PROCEDURE — 86901 BLOOD TYPING SEROLOGIC RH(D): CPT

## 2019-12-16 PROCEDURE — 85027 COMPLETE CBC AUTOMATED: CPT

## 2019-12-16 PROCEDURE — 2580000003 HC RX 258: Performed by: PHYSICIAN ASSISTANT

## 2019-12-16 PROCEDURE — 86850 RBC ANTIBODY SCREEN: CPT

## 2019-12-16 PROCEDURE — 36415 COLL VENOUS BLD VENIPUNCTURE: CPT

## 2019-12-16 PROCEDURE — 85610 PROTHROMBIN TIME: CPT

## 2019-12-16 PROCEDURE — 94664 DEMO&/EVAL PT USE INHALER: CPT

## 2019-12-16 PROCEDURE — P9016 RBC LEUKOCYTES REDUCED: HCPCS

## 2019-12-16 PROCEDURE — 85018 HEMOGLOBIN: CPT

## 2019-12-16 PROCEDURE — 6370000000 HC RX 637 (ALT 250 FOR IP): Performed by: INTERNAL MEDICINE

## 2019-12-16 PROCEDURE — 99222 1ST HOSP IP/OBS MODERATE 55: CPT | Performed by: INTERNAL MEDICINE

## 2019-12-16 PROCEDURE — 80053 COMPREHEN METABOLIC PANEL: CPT

## 2019-12-16 PROCEDURE — 86900 BLOOD TYPING SEROLOGIC ABO: CPT

## 2019-12-16 PROCEDURE — 94760 N-INVAS EAR/PLS OXIMETRY 1: CPT

## 2019-12-16 PROCEDURE — 99284 EMERGENCY DEPT VISIT MOD MDM: CPT

## 2019-12-16 PROCEDURE — 2060000000 HC ICU INTERMEDIATE R&B

## 2019-12-16 PROCEDURE — 36430 TRANSFUSION BLD/BLD COMPNT: CPT

## 2019-12-16 PROCEDURE — 86923 COMPATIBILITY TEST ELECTRIC: CPT

## 2019-12-16 PROCEDURE — 85730 THROMBOPLASTIN TIME PARTIAL: CPT

## 2019-12-16 RX ORDER — AMIODARONE HYDROCHLORIDE 200 MG/1
200 TABLET ORAL DAILY
Status: DISCONTINUED | OUTPATIENT
Start: 2019-12-16 | End: 2019-12-20 | Stop reason: HOSPADM

## 2019-12-16 RX ORDER — ONDANSETRON 2 MG/ML
4 INJECTION INTRAMUSCULAR; INTRAVENOUS EVERY 6 HOURS PRN
Status: DISCONTINUED | OUTPATIENT
Start: 2019-12-16 | End: 2019-12-20 | Stop reason: HOSPADM

## 2019-12-16 RX ORDER — ATORVASTATIN CALCIUM 80 MG/1
80 TABLET, FILM COATED ORAL DAILY
Status: DISCONTINUED | OUTPATIENT
Start: 2019-12-16 | End: 2019-12-20 | Stop reason: HOSPADM

## 2019-12-16 RX ORDER — ACETAMINOPHEN 325 MG/1
650 TABLET ORAL EVERY 4 HOURS PRN
Status: DISCONTINUED | OUTPATIENT
Start: 2019-12-16 | End: 2019-12-20 | Stop reason: HOSPADM

## 2019-12-16 RX ORDER — LEVOTHYROXINE SODIUM 0.05 MG/1
50 TABLET ORAL DAILY
Status: DISCONTINUED | OUTPATIENT
Start: 2019-12-16 | End: 2019-12-20 | Stop reason: HOSPADM

## 2019-12-16 RX ORDER — IPRATROPIUM BROMIDE AND ALBUTEROL SULFATE 2.5; .5 MG/3ML; MG/3ML
3 SOLUTION RESPIRATORY (INHALATION) EVERY 4 HOURS PRN
Status: DISCONTINUED | OUTPATIENT
Start: 2019-12-16 | End: 2019-12-20 | Stop reason: HOSPADM

## 2019-12-16 RX ORDER — FOLIC ACID 1 MG/1
1 TABLET ORAL DAILY
COMMUNITY

## 2019-12-16 RX ORDER — SODIUM CHLORIDE 0.9 % (FLUSH) 0.9 %
10 SYRINGE (ML) INJECTION PRN
Status: DISCONTINUED | OUTPATIENT
Start: 2019-12-16 | End: 2019-12-20 | Stop reason: HOSPADM

## 2019-12-16 RX ORDER — 0.9 % SODIUM CHLORIDE 0.9 %
250 INTRAVENOUS SOLUTION INTRAVENOUS ONCE
Status: DISCONTINUED | OUTPATIENT
Start: 2019-12-16 | End: 2019-12-20 | Stop reason: HOSPADM

## 2019-12-16 RX ORDER — SODIUM CHLORIDE 9 MG/ML
10 INJECTION INTRAVENOUS EVERY 12 HOURS
Status: DISCONTINUED | OUTPATIENT
Start: 2019-12-16 | End: 2019-12-20 | Stop reason: HOSPADM

## 2019-12-16 RX ORDER — SODIUM CHLORIDE 0.9 % (FLUSH) 0.9 %
10 SYRINGE (ML) INJECTION EVERY 12 HOURS SCHEDULED
Status: DISCONTINUED | OUTPATIENT
Start: 2019-12-16 | End: 2019-12-20 | Stop reason: HOSPADM

## 2019-12-16 RX ORDER — SODIUM CHLORIDE 9 MG/ML
INJECTION, SOLUTION INTRAVENOUS CONTINUOUS
Status: DISCONTINUED | OUTPATIENT
Start: 2019-12-16 | End: 2019-12-17

## 2019-12-16 RX ORDER — CHOLECALCIFEROL (VITAMIN D3) 125 MCG
500 CAPSULE ORAL DAILY
COMMUNITY

## 2019-12-16 RX ORDER — PANTOPRAZOLE SODIUM 40 MG/10ML
40 INJECTION, POWDER, LYOPHILIZED, FOR SOLUTION INTRAVENOUS EVERY 12 HOURS
Status: DISCONTINUED | OUTPATIENT
Start: 2019-12-16 | End: 2019-12-20 | Stop reason: HOSPADM

## 2019-12-16 RX ORDER — CARBAMAZEPINE 200 MG/1
200 TABLET ORAL 2 TIMES DAILY
Status: DISCONTINUED | OUTPATIENT
Start: 2019-12-16 | End: 2019-12-20 | Stop reason: HOSPADM

## 2019-12-16 RX ADMIN — CARBAMAZEPINE 200 MG: 200 TABLET ORAL at 22:13

## 2019-12-16 RX ADMIN — Medication 10 ML: at 22:14

## 2019-12-16 RX ADMIN — METOPROLOL TARTRATE 12.5 MG: 25 TABLET ORAL at 22:12

## 2019-12-16 ASSESSMENT — PAIN SCALES - GENERAL
PAINLEVEL_OUTOF10: 0
PAINLEVEL_OUTOF10: 0

## 2019-12-16 ASSESSMENT — ENCOUNTER SYMPTOMS
PHOTOPHOBIA: 0
ABDOMINAL DISTENTION: 0
COLOR CHANGE: 0
VOMITING: 0
RHINORRHEA: 0
FACIAL SWELLING: 0
WHEEZING: 0
SHORTNESS OF BREATH: 0
ABDOMINAL PAIN: 0
EYE DISCHARGE: 0

## 2019-12-17 ENCOUNTER — ANESTHESIA (OUTPATIENT)
Dept: OPERATING ROOM | Age: 79
DRG: 811 | End: 2019-12-17
Payer: MEDICARE

## 2019-12-17 ENCOUNTER — ANESTHESIA EVENT (OUTPATIENT)
Dept: OPERATING ROOM | Age: 79
DRG: 811 | End: 2019-12-17
Payer: MEDICARE

## 2019-12-17 VITALS
DIASTOLIC BLOOD PRESSURE: 66 MMHG | OXYGEN SATURATION: 99 % | SYSTOLIC BLOOD PRESSURE: 121 MMHG | RESPIRATION RATE: 15 BRPM

## 2019-12-17 LAB
ANION GAP SERPL CALCULATED.3IONS-SCNC: 11 MEQ/L (ref 9–15)
BASOPHILS ABSOLUTE: 0.1 K/UL (ref 0–0.2)
BASOPHILS RELATIVE PERCENT: 1 %
BUN BLDV-MCNC: 19 MG/DL (ref 8–23)
CALCIUM SERPL-MCNC: 7.8 MG/DL (ref 8.5–9.9)
CHLORIDE BLD-SCNC: 105 MEQ/L (ref 95–107)
CO2: 21 MEQ/L (ref 20–31)
CREAT SERPL-MCNC: 1.11 MG/DL (ref 0.5–0.9)
EOSINOPHILS ABSOLUTE: 0.3 K/UL (ref 0–0.7)
EOSINOPHILS RELATIVE PERCENT: 3.1 %
GFR AFRICAN AMERICAN: 57.5
GFR NON-AFRICAN AMERICAN: 47.5
GLUCOSE BLD-MCNC: 87 MG/DL (ref 70–99)
HCT VFR BLD CALC: 30.4 % (ref 37–47)
HCT VFR BLD CALC: 30.6 % (ref 37–47)
HCT VFR BLD CALC: 31.5 % (ref 37–47)
HCT VFR BLD CALC: 32.4 % (ref 37–47)
HCT VFR BLD CALC: 33.6 % (ref 37–47)
HEMOGLOBIN: 10.5 G/DL (ref 12–16)
HEMOGLOBIN: 10.6 G/DL (ref 12–16)
HEMOGLOBIN: 10.7 G/DL (ref 12–16)
HEMOGLOBIN: 10.9 G/DL (ref 12–16)
HEMOGLOBIN: 11.4 G/DL (ref 12–16)
LYMPHOCYTES ABSOLUTE: 2.9 K/UL (ref 1–4.8)
LYMPHOCYTES RELATIVE PERCENT: 33.1 %
MCH RBC QN AUTO: 31.2 PG (ref 27–31.3)
MCHC RBC AUTO-ENTMCNC: 34.1 % (ref 33–37)
MCV RBC AUTO: 91.5 FL (ref 82–100)
MONOCYTES ABSOLUTE: 0.9 K/UL (ref 0.2–0.8)
MONOCYTES RELATIVE PERCENT: 10.4 %
NEUTROPHILS ABSOLUTE: 4.7 K/UL (ref 1.4–6.5)
NEUTROPHILS RELATIVE PERCENT: 52.4 %
PDW BLD-RTO: 14.9 % (ref 11.5–14.5)
PLATELET # BLD: 255 K/UL (ref 130–400)
POTASSIUM REFLEX MAGNESIUM: 3.9 MEQ/L (ref 3.4–4.9)
RBC # BLD: 3.67 M/UL (ref 4.2–5.4)
SODIUM BLD-SCNC: 137 MEQ/L (ref 135–144)
WBC # BLD: 8.9 K/UL (ref 4.8–10.8)

## 2019-12-17 PROCEDURE — 97166 OT EVAL MOD COMPLEX 45 MIN: CPT

## 2019-12-17 PROCEDURE — 3609017100 HC EGD: Performed by: INTERNAL MEDICINE

## 2019-12-17 PROCEDURE — 85025 COMPLETE CBC W/AUTO DIFF WBC: CPT

## 2019-12-17 PROCEDURE — 36415 COLL VENOUS BLD VENIPUNCTURE: CPT

## 2019-12-17 PROCEDURE — 6360000002 HC RX W HCPCS: Performed by: PHYSICIAN ASSISTANT

## 2019-12-17 PROCEDURE — 7100000000 HC PACU RECOVERY - FIRST 15 MIN: Performed by: INTERNAL MEDICINE

## 2019-12-17 PROCEDURE — 3700000000 HC ANESTHESIA ATTENDED CARE: Performed by: INTERNAL MEDICINE

## 2019-12-17 PROCEDURE — 0DJ08ZZ INSPECTION OF UPPER INTESTINAL TRACT, VIA NATURAL OR ARTIFICIAL OPENING ENDOSCOPIC: ICD-10-PCS | Performed by: INTERNAL MEDICINE

## 2019-12-17 PROCEDURE — 7100000001 HC PACU RECOVERY - ADDTL 15 MIN: Performed by: INTERNAL MEDICINE

## 2019-12-17 PROCEDURE — 6360000002 HC RX W HCPCS: Performed by: NURSE ANESTHETIST, CERTIFIED REGISTERED

## 2019-12-17 PROCEDURE — 94150 VITAL CAPACITY TEST: CPT

## 2019-12-17 PROCEDURE — 85014 HEMATOCRIT: CPT

## 2019-12-17 PROCEDURE — 80048 BASIC METABOLIC PNL TOTAL CA: CPT

## 2019-12-17 PROCEDURE — 2709999900 HC NON-CHARGEABLE SUPPLY: Performed by: INTERNAL MEDICINE

## 2019-12-17 PROCEDURE — 2580000003 HC RX 258: Performed by: PHYSICIAN ASSISTANT

## 2019-12-17 PROCEDURE — 97162 PT EVAL MOD COMPLEX 30 MIN: CPT

## 2019-12-17 PROCEDURE — 6370000000 HC RX 637 (ALT 250 FOR IP): Performed by: INTERNAL MEDICINE

## 2019-12-17 PROCEDURE — C9113 INJ PANTOPRAZOLE SODIUM, VIA: HCPCS | Performed by: PHYSICIAN ASSISTANT

## 2019-12-17 PROCEDURE — 2060000000 HC ICU INTERMEDIATE R&B

## 2019-12-17 PROCEDURE — 43235 EGD DIAGNOSTIC BRUSH WASH: CPT | Performed by: INTERNAL MEDICINE

## 2019-12-17 PROCEDURE — 85018 HEMOGLOBIN: CPT

## 2019-12-17 RX ORDER — METOCLOPRAMIDE HYDROCHLORIDE 5 MG/ML
10 INJECTION INTRAMUSCULAR; INTRAVENOUS
Status: DISCONTINUED | OUTPATIENT
Start: 2019-12-17 | End: 2019-12-17 | Stop reason: HOSPADM

## 2019-12-17 RX ORDER — LIDOCAINE HYDROCHLORIDE 10 MG/ML
1 INJECTION, SOLUTION EPIDURAL; INFILTRATION; INTRACAUDAL; PERINEURAL
Status: DISCONTINUED | OUTPATIENT
Start: 2019-12-17 | End: 2019-12-17 | Stop reason: HOSPADM

## 2019-12-17 RX ORDER — SODIUM CHLORIDE 0.9 % (FLUSH) 0.9 %
10 SYRINGE (ML) INJECTION EVERY 12 HOURS SCHEDULED
Status: DISCONTINUED | OUTPATIENT
Start: 2019-12-17 | End: 2019-12-17 | Stop reason: HOSPADM

## 2019-12-17 RX ORDER — SODIUM CHLORIDE 9 MG/ML
INJECTION, SOLUTION INTRAVENOUS CONTINUOUS
Status: DISCONTINUED | OUTPATIENT
Start: 2019-12-17 | End: 2019-12-17

## 2019-12-17 RX ORDER — FENTANYL CITRATE 50 UG/ML
50 INJECTION, SOLUTION INTRAMUSCULAR; INTRAVENOUS EVERY 10 MIN PRN
Status: DISCONTINUED | OUTPATIENT
Start: 2019-12-17 | End: 2019-12-17 | Stop reason: HOSPADM

## 2019-12-17 RX ORDER — ONDANSETRON 2 MG/ML
4 INJECTION INTRAMUSCULAR; INTRAVENOUS
Status: DISCONTINUED | OUTPATIENT
Start: 2019-12-17 | End: 2019-12-17 | Stop reason: HOSPADM

## 2019-12-17 RX ORDER — HYDROCODONE BITARTRATE AND ACETAMINOPHEN 5; 325 MG/1; MG/1
2 TABLET ORAL PRN
Status: DISCONTINUED | OUTPATIENT
Start: 2019-12-17 | End: 2019-12-17 | Stop reason: HOSPADM

## 2019-12-17 RX ORDER — MEPERIDINE HYDROCHLORIDE 25 MG/ML
12.5 INJECTION INTRAMUSCULAR; INTRAVENOUS; SUBCUTANEOUS EVERY 5 MIN PRN
Status: DISCONTINUED | OUTPATIENT
Start: 2019-12-17 | End: 2019-12-17 | Stop reason: HOSPADM

## 2019-12-17 RX ORDER — DIPHENHYDRAMINE HYDROCHLORIDE 50 MG/ML
12.5 INJECTION INTRAMUSCULAR; INTRAVENOUS
Status: DISCONTINUED | OUTPATIENT
Start: 2019-12-17 | End: 2019-12-17 | Stop reason: HOSPADM

## 2019-12-17 RX ORDER — PROPOFOL 10 MG/ML
INJECTION, EMULSION INTRAVENOUS PRN
Status: DISCONTINUED | OUTPATIENT
Start: 2019-12-17 | End: 2019-12-17 | Stop reason: SDUPTHER

## 2019-12-17 RX ORDER — SODIUM CHLORIDE 0.9 % (FLUSH) 0.9 %
10 SYRINGE (ML) INJECTION PRN
Status: DISCONTINUED | OUTPATIENT
Start: 2019-12-17 | End: 2019-12-17 | Stop reason: HOSPADM

## 2019-12-17 RX ORDER — HYDROCODONE BITARTRATE AND ACETAMINOPHEN 5; 325 MG/1; MG/1
1 TABLET ORAL PRN
Status: DISCONTINUED | OUTPATIENT
Start: 2019-12-17 | End: 2019-12-17 | Stop reason: HOSPADM

## 2019-12-17 RX ADMIN — METOPROLOL TARTRATE 12.5 MG: 25 TABLET ORAL at 08:02

## 2019-12-17 RX ADMIN — Medication 10 ML: at 03:05

## 2019-12-17 RX ADMIN — CARBAMAZEPINE 200 MG: 200 TABLET ORAL at 08:02

## 2019-12-17 RX ADMIN — Medication 10 ML: at 12:26

## 2019-12-17 RX ADMIN — METOPROLOL TARTRATE 12.5 MG: 25 TABLET ORAL at 19:57

## 2019-12-17 RX ADMIN — Medication 10 ML: at 03:04

## 2019-12-17 RX ADMIN — Medication 10 ML: at 08:03

## 2019-12-17 RX ADMIN — CARBAMAZEPINE 200 MG: 200 TABLET ORAL at 19:56

## 2019-12-17 RX ADMIN — PANTOPRAZOLE SODIUM 40 MG: 40 INJECTION, POWDER, FOR SOLUTION INTRAVENOUS at 03:03

## 2019-12-17 RX ADMIN — Medication 10 ML: at 19:57

## 2019-12-17 RX ADMIN — PANTOPRAZOLE SODIUM 40 MG: 40 INJECTION, POWDER, FOR SOLUTION INTRAVENOUS at 12:25

## 2019-12-17 RX ADMIN — LEVOTHYROXINE SODIUM 50 MCG: 50 TABLET ORAL at 08:03

## 2019-12-17 RX ADMIN — AMIODARONE HYDROCHLORIDE 200 MG: 200 TABLET ORAL at 08:02

## 2019-12-17 RX ADMIN — PROPOFOL 50 MG: 10 INJECTION, EMULSION INTRAVENOUS at 15:53

## 2019-12-17 RX ADMIN — ATORVASTATIN CALCIUM 80 MG: 80 TABLET, FILM COATED ORAL at 08:02

## 2019-12-17 ASSESSMENT — PAIN SCALES - GENERAL
PAINLEVEL_OUTOF10: 0

## 2019-12-17 ASSESSMENT — PAIN DESCRIPTION - ORIENTATION
ORIENTATION: RIGHT
ORIENTATION: RIGHT

## 2019-12-17 ASSESSMENT — PULMONARY FUNCTION TESTS
PIF_VALUE: 0
PIF_VALUE: 1
PIF_VALUE: 0

## 2019-12-17 ASSESSMENT — PAIN DESCRIPTION - LOCATION
LOCATION: KNEE
LOCATION: KNEE

## 2019-12-17 ASSESSMENT — PAIN DESCRIPTION - PROGRESSION
CLINICAL_PROGRESSION: NOT CHANGED

## 2019-12-17 ASSESSMENT — PAIN DESCRIPTION - DESCRIPTORS: DESCRIPTORS: DULL

## 2019-12-17 ASSESSMENT — PAIN DESCRIPTION - PAIN TYPE
TYPE: ACUTE PAIN
TYPE: ACUTE PAIN

## 2019-12-17 ASSESSMENT — PAIN DESCRIPTION - FREQUENCY: FREQUENCY: INTERMITTENT

## 2019-12-18 LAB
ANION GAP SERPL CALCULATED.3IONS-SCNC: 15 MEQ/L (ref 9–15)
BASOPHILS ABSOLUTE: 0.1 K/UL (ref 0–0.2)
BASOPHILS RELATIVE PERCENT: 1 %
BUN BLDV-MCNC: 14 MG/DL (ref 8–23)
CALCIUM SERPL-MCNC: 7.6 MG/DL (ref 8.5–9.9)
CHLORIDE BLD-SCNC: 102 MEQ/L (ref 95–107)
CO2: 13 MEQ/L (ref 20–31)
CREAT SERPL-MCNC: 0.95 MG/DL (ref 0.5–0.9)
EOSINOPHILS ABSOLUTE: 0.1 K/UL (ref 0–0.7)
EOSINOPHILS RELATIVE PERCENT: 1.5 %
GFR AFRICAN AMERICAN: >60
GFR NON-AFRICAN AMERICAN: 56.9
GLUCOSE BLD-MCNC: 100 MG/DL (ref 70–99)
GLUCOSE BLD-MCNC: 122 MG/DL (ref 60–115)
HCT VFR BLD CALC: 31.6 % (ref 37–47)
HCT VFR BLD CALC: 31.9 % (ref 37–47)
HCT VFR BLD CALC: 32.3 % (ref 37–47)
HCT VFR BLD CALC: 34.5 % (ref 37–47)
HEMOGLOBIN: 10.6 G/DL (ref 12–16)
HEMOGLOBIN: 10.8 G/DL (ref 12–16)
HEMOGLOBIN: 10.8 G/DL (ref 12–16)
HEMOGLOBIN: 11.4 G/DL (ref 12–16)
LYMPHOCYTES ABSOLUTE: 2 K/UL (ref 1–4.8)
LYMPHOCYTES RELATIVE PERCENT: 20.9 %
MCH RBC QN AUTO: 30.6 PG (ref 27–31.3)
MCHC RBC AUTO-ENTMCNC: 33 % (ref 33–37)
MCV RBC AUTO: 92.8 FL (ref 82–100)
MONOCYTES ABSOLUTE: 1.1 K/UL (ref 0.2–0.8)
MONOCYTES RELATIVE PERCENT: 11.1 %
NEUTROPHILS ABSOLUTE: 6.4 K/UL (ref 1.4–6.5)
NEUTROPHILS RELATIVE PERCENT: 65.5 %
PDW BLD-RTO: 15.5 % (ref 11.5–14.5)
PERFORMED ON: ABNORMAL
PLATELET # BLD: 258 K/UL (ref 130–400)
POTASSIUM REFLEX MAGNESIUM: 4.8 MEQ/L (ref 3.4–4.9)
RBC # BLD: 3.71 M/UL (ref 4.2–5.4)
REASON FOR REJECTION: NORMAL
REJECTED TEST: NORMAL
SODIUM BLD-SCNC: 130 MEQ/L (ref 135–144)
WBC # BLD: 9.7 K/UL (ref 4.8–10.8)

## 2019-12-18 PROCEDURE — 2060000000 HC ICU INTERMEDIATE R&B

## 2019-12-18 PROCEDURE — 80048 BASIC METABOLIC PNL TOTAL CA: CPT

## 2019-12-18 PROCEDURE — 97535 SELF CARE MNGMENT TRAINING: CPT

## 2019-12-18 PROCEDURE — 6370000000 HC RX 637 (ALT 250 FOR IP): Performed by: INTERNAL MEDICINE

## 2019-12-18 PROCEDURE — 85014 HEMATOCRIT: CPT

## 2019-12-18 PROCEDURE — 97116 GAIT TRAINING THERAPY: CPT

## 2019-12-18 PROCEDURE — 99232 SBSQ HOSP IP/OBS MODERATE 35: CPT | Performed by: INTERNAL MEDICINE

## 2019-12-18 PROCEDURE — 85025 COMPLETE CBC W/AUTO DIFF WBC: CPT

## 2019-12-18 PROCEDURE — C9113 INJ PANTOPRAZOLE SODIUM, VIA: HCPCS | Performed by: PHYSICIAN ASSISTANT

## 2019-12-18 PROCEDURE — 2580000003 HC RX 258: Performed by: PHYSICIAN ASSISTANT

## 2019-12-18 PROCEDURE — 36415 COLL VENOUS BLD VENIPUNCTURE: CPT

## 2019-12-18 PROCEDURE — 2500000003 HC RX 250 WO HCPCS: Performed by: INTERNAL MEDICINE

## 2019-12-18 PROCEDURE — 6360000002 HC RX W HCPCS: Performed by: PHYSICIAN ASSISTANT

## 2019-12-18 PROCEDURE — 85018 HEMOGLOBIN: CPT

## 2019-12-18 RX ADMIN — PANTOPRAZOLE SODIUM 40 MG: 40 INJECTION, POWDER, FOR SOLUTION INTRAVENOUS at 08:50

## 2019-12-18 RX ADMIN — CARBAMAZEPINE 200 MG: 200 TABLET ORAL at 21:27

## 2019-12-18 RX ADMIN — LEVOTHYROXINE SODIUM 50 MCG: 50 TABLET ORAL at 06:30

## 2019-12-18 RX ADMIN — METOPROLOL TARTRATE 12.5 MG: 25 TABLET ORAL at 21:27

## 2019-12-18 RX ADMIN — PANTOPRAZOLE SODIUM 40 MG: 40 INJECTION, POWDER, FOR SOLUTION INTRAVENOUS at 21:28

## 2019-12-18 RX ADMIN — METOPROLOL TARTRATE 12.5 MG: 25 TABLET ORAL at 08:50

## 2019-12-18 RX ADMIN — AMIODARONE HYDROCHLORIDE 200 MG: 200 TABLET ORAL at 08:50

## 2019-12-18 RX ADMIN — Medication 10 ML: at 08:49

## 2019-12-18 RX ADMIN — MICONAZOLE NITRATE: 20 POWDER TOPICAL at 23:49

## 2019-12-18 RX ADMIN — ATORVASTATIN CALCIUM 80 MG: 80 TABLET, FILM COATED ORAL at 21:27

## 2019-12-18 RX ADMIN — Medication 10 ML: at 08:51

## 2019-12-18 RX ADMIN — CARBAMAZEPINE 200 MG: 200 TABLET ORAL at 08:50

## 2019-12-18 ASSESSMENT — PAIN SCALES - GENERAL
PAINLEVEL_OUTOF10: 0

## 2019-12-19 LAB
ANION GAP SERPL CALCULATED.3IONS-SCNC: 11 MEQ/L (ref 9–15)
BACTERIA: NEGATIVE /HPF
BASOPHILS ABSOLUTE: 0.1 K/UL (ref 0–0.2)
BASOPHILS RELATIVE PERCENT: 1 %
BILIRUBIN URINE: NEGATIVE
BLOOD, URINE: ABNORMAL
BUN BLDV-MCNC: 12 MG/DL (ref 8–23)
CALCIUM SERPL-MCNC: 7.5 MG/DL (ref 8.5–9.9)
CHLORIDE BLD-SCNC: 100 MEQ/L (ref 95–107)
CLARITY: CLEAR
CO2: 19 MEQ/L (ref 20–31)
COLOR: YELLOW
CREAT SERPL-MCNC: 0.96 MG/DL (ref 0.5–0.9)
EOSINOPHILS ABSOLUTE: 0.3 K/UL (ref 0–0.7)
EOSINOPHILS RELATIVE PERCENT: 3.5 %
EPITHELIAL CELLS, UA: NORMAL /HPF (ref 0–5)
GFR AFRICAN AMERICAN: >60
GFR NON-AFRICAN AMERICAN: 56.2
GLUCOSE BLD-MCNC: 86 MG/DL (ref 70–99)
GLUCOSE URINE: NEGATIVE MG/DL
HCT VFR BLD CALC: 29.6 % (ref 37–47)
HCT VFR BLD CALC: 30.6 % (ref 37–47)
HCT VFR BLD CALC: 31.2 % (ref 37–47)
HCT VFR BLD CALC: 33 % (ref 37–47)
HEMOGLOBIN: 10 G/DL (ref 12–16)
HEMOGLOBIN: 10.4 G/DL (ref 12–16)
HEMOGLOBIN: 10.5 G/DL (ref 12–16)
HEMOGLOBIN: 10.9 G/DL (ref 12–16)
HYALINE CASTS: NORMAL /HPF (ref 0–5)
KETONES, URINE: NEGATIVE MG/DL
LEUKOCYTE ESTERASE, URINE: NEGATIVE
LYMPHOCYTES ABSOLUTE: 2.7 K/UL (ref 1–4.8)
LYMPHOCYTES RELATIVE PERCENT: 35.3 %
MAGNESIUM: 1.9 MG/DL (ref 1.7–2.4)
MCH RBC QN AUTO: 31.6 PG (ref 27–31.3)
MCHC RBC AUTO-ENTMCNC: 34 % (ref 33–37)
MCV RBC AUTO: 93.2 FL (ref 82–100)
MONOCYTES ABSOLUTE: 0.8 K/UL (ref 0.2–0.8)
MONOCYTES RELATIVE PERCENT: 10.6 %
NEUTROPHILS ABSOLUTE: 3.8 K/UL (ref 1.4–6.5)
NEUTROPHILS RELATIVE PERCENT: 49.6 %
NITRITE, URINE: NEGATIVE
PDW BLD-RTO: 15.1 % (ref 11.5–14.5)
PH UA: 5.5 (ref 5–9)
PLATELET # BLD: 240 K/UL (ref 130–400)
POTASSIUM REFLEX MAGNESIUM: 3.4 MEQ/L (ref 3.4–4.9)
PROTEIN UA: NEGATIVE MG/DL
RBC # BLD: 3.28 M/UL (ref 4.2–5.4)
RBC UA: NORMAL /HPF (ref 0–5)
SODIUM BLD-SCNC: 130 MEQ/L (ref 135–144)
SPECIFIC GRAVITY UA: 1.02 (ref 1–1.03)
UROBILINOGEN, URINE: 0.2 E.U./DL
WBC # BLD: 7.7 K/UL (ref 4.8–10.8)
WBC UA: NORMAL /HPF (ref 0–5)

## 2019-12-19 PROCEDURE — 2580000003 HC RX 258: Performed by: PHYSICIAN ASSISTANT

## 2019-12-19 PROCEDURE — 85025 COMPLETE CBC W/AUTO DIFF WBC: CPT

## 2019-12-19 PROCEDURE — 85014 HEMATOCRIT: CPT

## 2019-12-19 PROCEDURE — 6360000002 HC RX W HCPCS: Performed by: PHYSICIAN ASSISTANT

## 2019-12-19 PROCEDURE — 99232 SBSQ HOSP IP/OBS MODERATE 35: CPT | Performed by: INTERNAL MEDICINE

## 2019-12-19 PROCEDURE — 2060000000 HC ICU INTERMEDIATE R&B

## 2019-12-19 PROCEDURE — 6370000000 HC RX 637 (ALT 250 FOR IP): Performed by: INTERNAL MEDICINE

## 2019-12-19 PROCEDURE — 36415 COLL VENOUS BLD VENIPUNCTURE: CPT

## 2019-12-19 PROCEDURE — C9113 INJ PANTOPRAZOLE SODIUM, VIA: HCPCS | Performed by: PHYSICIAN ASSISTANT

## 2019-12-19 PROCEDURE — 81001 URINALYSIS AUTO W/SCOPE: CPT

## 2019-12-19 PROCEDURE — 83735 ASSAY OF MAGNESIUM: CPT

## 2019-12-19 PROCEDURE — 85018 HEMOGLOBIN: CPT

## 2019-12-19 PROCEDURE — 80048 BASIC METABOLIC PNL TOTAL CA: CPT

## 2019-12-19 RX ADMIN — CARBAMAZEPINE 200 MG: 200 TABLET ORAL at 20:38

## 2019-12-19 RX ADMIN — METOPROLOL TARTRATE 12.5 MG: 25 TABLET ORAL at 08:42

## 2019-12-19 RX ADMIN — Medication 10 ML: at 20:38

## 2019-12-19 RX ADMIN — MICONAZOLE NITRATE: 20 POWDER TOPICAL at 20:30

## 2019-12-19 RX ADMIN — ATORVASTATIN CALCIUM 80 MG: 80 TABLET, FILM COATED ORAL at 20:38

## 2019-12-19 RX ADMIN — AMIODARONE HYDROCHLORIDE 200 MG: 200 TABLET ORAL at 08:42

## 2019-12-19 RX ADMIN — METOPROLOL TARTRATE 12.5 MG: 25 TABLET ORAL at 20:38

## 2019-12-19 RX ADMIN — Medication 10 ML: at 08:49

## 2019-12-19 RX ADMIN — PANTOPRAZOLE SODIUM 40 MG: 40 INJECTION, POWDER, FOR SOLUTION INTRAVENOUS at 08:48

## 2019-12-19 RX ADMIN — LEVOTHYROXINE SODIUM 50 MCG: 50 TABLET ORAL at 06:31

## 2019-12-19 RX ADMIN — MICONAZOLE NITRATE: 20 POWDER TOPICAL at 08:43

## 2019-12-19 RX ADMIN — CARBAMAZEPINE 200 MG: 200 TABLET ORAL at 08:42

## 2019-12-19 RX ADMIN — Medication 10 ML: at 20:44

## 2019-12-19 RX ADMIN — PANTOPRAZOLE SODIUM 40 MG: 40 INJECTION, POWDER, FOR SOLUTION INTRAVENOUS at 20:38

## 2019-12-19 ASSESSMENT — PAIN SCALES - GENERAL: PAINLEVEL_OUTOF10: 0

## 2019-12-19 ASSESSMENT — PAIN SCALES - WONG BAKER: WONGBAKER_NUMERICALRESPONSE: 0

## 2019-12-20 VITALS
SYSTOLIC BLOOD PRESSURE: 111 MMHG | HEART RATE: 58 BPM | RESPIRATION RATE: 18 BRPM | DIASTOLIC BLOOD PRESSURE: 59 MMHG | HEIGHT: 60 IN | OXYGEN SATURATION: 98 % | WEIGHT: 154.32 LBS | BODY MASS INDEX: 30.3 KG/M2 | TEMPERATURE: 98.2 F

## 2019-12-20 LAB
ANION GAP SERPL CALCULATED.3IONS-SCNC: 12 MEQ/L (ref 9–15)
BASOPHILS ABSOLUTE: 0.1 K/UL (ref 0–0.2)
BASOPHILS RELATIVE PERCENT: 1.1 %
BUN BLDV-MCNC: 19 MG/DL (ref 8–23)
CALCIUM SERPL-MCNC: 7.6 MG/DL (ref 8.5–9.9)
CHLORIDE BLD-SCNC: 103 MEQ/L (ref 95–107)
CO2: 20 MEQ/L (ref 20–31)
CREAT SERPL-MCNC: 1.13 MG/DL (ref 0.5–0.9)
EOSINOPHILS ABSOLUTE: 0.3 K/UL (ref 0–0.7)
EOSINOPHILS RELATIVE PERCENT: 3.9 %
GFR AFRICAN AMERICAN: 56.3
GFR NON-AFRICAN AMERICAN: 46.6
GLUCOSE BLD-MCNC: 98 MG/DL (ref 70–99)
HCT VFR BLD CALC: 29.6 % (ref 37–47)
HCT VFR BLD CALC: 32.2 % (ref 37–47)
HCT VFR BLD CALC: 33.7 % (ref 37–47)
HEMOGLOBIN: 10 G/DL (ref 12–16)
HEMOGLOBIN: 10.7 G/DL (ref 12–16)
HEMOGLOBIN: 11 G/DL (ref 12–16)
LYMPHOCYTES ABSOLUTE: 2.4 K/UL (ref 1–4.8)
LYMPHOCYTES RELATIVE PERCENT: 31.4 %
MCH RBC QN AUTO: 31.3 PG (ref 27–31.3)
MCHC RBC AUTO-ENTMCNC: 33.7 % (ref 33–37)
MCV RBC AUTO: 92.8 FL (ref 82–100)
MONOCYTES ABSOLUTE: 0.9 K/UL (ref 0.2–0.8)
MONOCYTES RELATIVE PERCENT: 11.3 %
NEUTROPHILS ABSOLUTE: 4 K/UL (ref 1.4–6.5)
NEUTROPHILS RELATIVE PERCENT: 52.3 %
PDW BLD-RTO: 15.3 % (ref 11.5–14.5)
PLATELET # BLD: 251 K/UL (ref 130–400)
POTASSIUM REFLEX MAGNESIUM: 3.9 MEQ/L (ref 3.4–4.9)
RBC # BLD: 3.19 M/UL (ref 4.2–5.4)
SODIUM BLD-SCNC: 135 MEQ/L (ref 135–144)
WBC # BLD: 7.6 K/UL (ref 4.8–10.8)

## 2019-12-20 PROCEDURE — C9113 INJ PANTOPRAZOLE SODIUM, VIA: HCPCS | Performed by: PHYSICIAN ASSISTANT

## 2019-12-20 PROCEDURE — 80048 BASIC METABOLIC PNL TOTAL CA: CPT

## 2019-12-20 PROCEDURE — 6370000000 HC RX 637 (ALT 250 FOR IP): Performed by: INTERNAL MEDICINE

## 2019-12-20 PROCEDURE — 85014 HEMATOCRIT: CPT

## 2019-12-20 PROCEDURE — 6360000002 HC RX W HCPCS: Performed by: PHYSICIAN ASSISTANT

## 2019-12-20 PROCEDURE — 85018 HEMOGLOBIN: CPT

## 2019-12-20 PROCEDURE — 2580000003 HC RX 258: Performed by: PHYSICIAN ASSISTANT

## 2019-12-20 PROCEDURE — 97116 GAIT TRAINING THERAPY: CPT

## 2019-12-20 PROCEDURE — 36415 COLL VENOUS BLD VENIPUNCTURE: CPT

## 2019-12-20 PROCEDURE — 97535 SELF CARE MNGMENT TRAINING: CPT

## 2019-12-20 PROCEDURE — 85025 COMPLETE CBC W/AUTO DIFF WBC: CPT

## 2019-12-20 PROCEDURE — 99231 SBSQ HOSP IP/OBS SF/LOW 25: CPT | Performed by: INTERNAL MEDICINE

## 2019-12-20 RX ORDER — PANTOPRAZOLE SODIUM 40 MG/1
40 TABLET, DELAYED RELEASE ORAL
Qty: 60 TABLET | Refills: 0 | Status: SHIPPED | OUTPATIENT
Start: 2019-12-20

## 2019-12-20 RX ADMIN — LEVOTHYROXINE SODIUM 50 MCG: 50 TABLET ORAL at 05:39

## 2019-12-20 RX ADMIN — CARBAMAZEPINE 200 MG: 200 TABLET ORAL at 08:57

## 2019-12-20 RX ADMIN — Medication 10 ML: at 08:58

## 2019-12-20 RX ADMIN — Medication 10 ML: at 08:59

## 2019-12-20 RX ADMIN — PANTOPRAZOLE SODIUM 40 MG: 40 INJECTION, POWDER, FOR SOLUTION INTRAVENOUS at 08:59

## 2019-12-20 RX ADMIN — METOPROLOL TARTRATE 12.5 MG: 25 TABLET ORAL at 08:58

## 2019-12-20 RX ADMIN — Medication 10 ML: at 09:00

## 2019-12-20 RX ADMIN — AMIODARONE HYDROCHLORIDE 200 MG: 200 TABLET ORAL at 08:57

## 2019-12-20 RX ADMIN — MICONAZOLE NITRATE: 20 POWDER TOPICAL at 08:59

## 2019-12-20 ASSESSMENT — PAIN SCALES - WONG BAKER: WONGBAKER_NUMERICALRESPONSE: 0

## 2019-12-30 ENCOUNTER — TELEPHONE (OUTPATIENT)
Dept: GASTROENTEROLOGY | Age: 79
End: 2019-12-30

## 2019-12-30 LAB
HCT VFR BLD CALC: 30.8 % (ref 37–47)
HEMOGLOBIN: 10.3 G/DL (ref 12–16)
MCH RBC QN AUTO: 31.3 PG (ref 27–31.3)
MCHC RBC AUTO-ENTMCNC: 33.5 % (ref 33–37)
MCV RBC AUTO: 93.5 FL (ref 82–100)
PDW BLD-RTO: 14.8 % (ref 11.5–14.5)
PLATELET # BLD: 201 K/UL (ref 130–400)
RBC # BLD: 3.29 M/UL (ref 4.2–5.4)
WBC # BLD: 6.8 K/UL (ref 4.8–10.8)

## 2019-12-30 NOTE — TELEPHONE ENCOUNTER
Patient was a inpatient Dr. John Fletcher wanted patient to have a outpatient Colonoscopy. Can you place a order.  Thanks

## 2020-01-02 RX ORDER — SODIUM, POTASSIUM,MAG SULFATES 17.5-3.13G
SOLUTION, RECONSTITUTED, ORAL ORAL
Qty: 1 BOTTLE | Refills: 0 | Status: SHIPPED | OUTPATIENT
Start: 2020-01-02 | End: 2020-01-08

## 2020-01-06 LAB
BASOPHILS ABSOLUTE: 0.1 K/UL (ref 0–0.2)
BASOPHILS RELATIVE PERCENT: 0.9 %
EOSINOPHILS ABSOLUTE: 0.1 K/UL (ref 0–0.7)
EOSINOPHILS RELATIVE PERCENT: 1.6 %
HCT VFR BLD CALC: 35.6 % (ref 37–47)
HEMOGLOBIN: 11.9 G/DL (ref 12–16)
LYMPHOCYTES ABSOLUTE: 0.9 K/UL (ref 1–4.8)
LYMPHOCYTES RELATIVE PERCENT: 14.6 %
MCH RBC QN AUTO: 31.5 PG (ref 27–31.3)
MCHC RBC AUTO-ENTMCNC: 33.5 % (ref 33–37)
MCV RBC AUTO: 94.2 FL (ref 82–100)
MONOCYTES ABSOLUTE: 0.7 K/UL (ref 0.2–0.8)
MONOCYTES RELATIVE PERCENT: 11.1 %
NEUTROPHILS ABSOLUTE: 4.6 K/UL (ref 1.4–6.5)
NEUTROPHILS RELATIVE PERCENT: 71.8 %
PDW BLD-RTO: 15.3 % (ref 11.5–14.5)
PLATELET # BLD: 389 K/UL (ref 130–400)
RBC # BLD: 3.78 M/UL (ref 4.2–5.4)
TSH SERPL DL<=0.05 MIU/L-ACNC: 36.81 UIU/ML (ref 0.44–3.86)
WBC # BLD: 6.4 K/UL (ref 4.8–10.8)

## 2020-01-08 ENCOUNTER — APPOINTMENT (OUTPATIENT)
Dept: GENERAL RADIOLOGY | Age: 80
DRG: 501 | End: 2020-01-08
Payer: MEDICARE

## 2020-01-08 ENCOUNTER — HOSPITAL ENCOUNTER (INPATIENT)
Age: 80
LOS: 8 days | Discharge: NURSING, REHAB OR LONG-TERM CARE | DRG: 501 | End: 2020-01-16
Attending: STUDENT IN AN ORGANIZED HEALTH CARE EDUCATION/TRAINING PROGRAM | Admitting: INTERNAL MEDICINE
Payer: MEDICARE

## 2020-01-08 LAB
ALBUMIN SERPL-MCNC: 3 G/DL (ref 3.5–4.6)
ALP BLD-CCNC: 88 U/L (ref 40–130)
ALT SERPL-CCNC: 56 U/L (ref 0–33)
ANION GAP SERPL CALCULATED.3IONS-SCNC: 13 MEQ/L (ref 9–15)
APTT: 33.1 SEC (ref 24.4–36.8)
AST SERPL-CCNC: 200 U/L (ref 0–35)
BACTERIA: ABNORMAL /HPF
BASOPHILS ABSOLUTE: 0 K/UL (ref 0–0.2)
BASOPHILS RELATIVE PERCENT: 0.5 %
BILIRUB SERPL-MCNC: 0.3 MG/DL (ref 0.2–0.7)
BILIRUBIN URINE: NEGATIVE
BLOOD, URINE: ABNORMAL
BUN BLDV-MCNC: 17 MG/DL (ref 8–23)
CALCIUM SERPL-MCNC: 8.5 MG/DL (ref 8.5–9.9)
CHLORIDE BLD-SCNC: 94 MEQ/L (ref 95–107)
CK MB: 14.2 NG/ML (ref 0–3.8)
CK MB: 15.7 NG/ML (ref 0–3.8)
CLARITY: ABNORMAL
CO2: 26 MEQ/L (ref 20–31)
COLOR: YELLOW
CREAT SERPL-MCNC: 1.12 MG/DL (ref 0.5–0.9)
CREATINE KINASE-MB INDEX: 0.5 % (ref 0–3.5)
CREATINE KINASE-MB INDEX: 0.5 % (ref 0–3.5)
EKG ATRIAL RATE: 66 BPM
EKG P AXIS: -11 DEGREES
EKG P-R INTERVAL: 178 MS
EKG Q-T INTERVAL: 418 MS
EKG QRS DURATION: 84 MS
EKG QTC CALCULATION (BAZETT): 438 MS
EKG R AXIS: -24 DEGREES
EKG T AXIS: 216 DEGREES
EKG VENTRICULAR RATE: 66 BPM
EOSINOPHILS ABSOLUTE: 0 K/UL (ref 0–0.7)
EOSINOPHILS RELATIVE PERCENT: 0.1 %
EPITHELIAL CELLS, UA: ABNORMAL /HPF (ref 0–5)
GFR AFRICAN AMERICAN: 56.9
GFR NON-AFRICAN AMERICAN: 47
GLOBULIN: 4.4 G/DL (ref 2.3–3.5)
GLUCOSE BLD-MCNC: 132 MG/DL (ref 70–99)
GLUCOSE URINE: NEGATIVE MG/DL
HCT VFR BLD CALC: 37.3 % (ref 37–47)
HEMOGLOBIN: 12.5 G/DL (ref 12–16)
HYALINE CASTS: ABNORMAL /HPF (ref 0–5)
INR BLD: 1
KETONES, URINE: NEGATIVE MG/DL
LACTIC ACID: 1.2 MMOL/L (ref 0.5–2.2)
LACTIC ACID: 2.4 MMOL/L (ref 0.5–2.2)
LEUKOCYTE ESTERASE, URINE: NEGATIVE
LYMPHOCYTES ABSOLUTE: 1.1 K/UL (ref 1–4.8)
LYMPHOCYTES RELATIVE PERCENT: 11.9 %
MAGNESIUM: 1.8 MG/DL (ref 1.7–2.4)
MCH RBC QN AUTO: 31.2 PG (ref 27–31.3)
MCHC RBC AUTO-ENTMCNC: 33.4 % (ref 33–37)
MCV RBC AUTO: 93.4 FL (ref 82–100)
MONOCYTES ABSOLUTE: 0.9 K/UL (ref 0.2–0.8)
MONOCYTES RELATIVE PERCENT: 9.9 %
NEUTROPHILS ABSOLUTE: 7.3 K/UL (ref 1.4–6.5)
NEUTROPHILS RELATIVE PERCENT: 77.6 %
NITRITE, URINE: NEGATIVE
PDW BLD-RTO: 15.4 % (ref 11.5–14.5)
PH UA: 8.5 (ref 5–9)
PLATELET # BLD: 405 K/UL (ref 130–400)
POTASSIUM SERPL-SCNC: 4.9 MEQ/L (ref 3.4–4.9)
PROTEIN UA: 30 MG/DL
PROTHROMBIN TIME: 13.8 SEC (ref 12.3–14.9)
RBC # BLD: 4 M/UL (ref 4.2–5.4)
RBC UA: ABNORMAL /HPF (ref 0–5)
SODIUM BLD-SCNC: 133 MEQ/L (ref 135–144)
SPECIFIC GRAVITY UA: 1.02 (ref 1–1.03)
T4 FREE: 1.15 NG/DL (ref 0.84–1.68)
TOTAL CK: 2921 U/L (ref 0–170)
TOTAL CK: 3147 U/L (ref 0–170)
TOTAL PROTEIN: 7.4 G/DL (ref 6.3–8)
TROPONIN: 0.25 NG/ML (ref 0–0.01)
TSH REFLEX: 32.77 UIU/ML (ref 0.44–3.86)
URINE REFLEX TO CULTURE: YES
UROBILINOGEN, URINE: 0.2 E.U./DL
WBC # BLD: 9.4 K/UL (ref 4.8–10.8)
WBC UA: ABNORMAL /HPF (ref 0–5)

## 2020-01-08 PROCEDURE — 87077 CULTURE AEROBIC IDENTIFY: CPT

## 2020-01-08 PROCEDURE — 6370000000 HC RX 637 (ALT 250 FOR IP): Performed by: NURSE PRACTITIONER

## 2020-01-08 PROCEDURE — 83605 ASSAY OF LACTIC ACID: CPT

## 2020-01-08 PROCEDURE — 85025 COMPLETE CBC W/AUTO DIFF WBC: CPT

## 2020-01-08 PROCEDURE — 2580000003 HC RX 258: Performed by: NURSE PRACTITIONER

## 2020-01-08 PROCEDURE — 83735 ASSAY OF MAGNESIUM: CPT

## 2020-01-08 PROCEDURE — 85610 PROTHROMBIN TIME: CPT

## 2020-01-08 PROCEDURE — 87040 BLOOD CULTURE FOR BACTERIA: CPT

## 2020-01-08 PROCEDURE — 94664 DEMO&/EVAL PT USE INHALER: CPT

## 2020-01-08 PROCEDURE — 2580000003 HC RX 258: Performed by: INTERNAL MEDICINE

## 2020-01-08 PROCEDURE — 1210000000 HC MED SURG R&B

## 2020-01-08 PROCEDURE — 6370000000 HC RX 637 (ALT 250 FOR IP): Performed by: INTERNAL MEDICINE

## 2020-01-08 PROCEDURE — 82553 CREATINE MB FRACTION: CPT

## 2020-01-08 PROCEDURE — 84443 ASSAY THYROID STIM HORMONE: CPT

## 2020-01-08 PROCEDURE — 87186 SC STD MICRODIL/AGAR DIL: CPT

## 2020-01-08 PROCEDURE — 82550 ASSAY OF CK (CPK): CPT

## 2020-01-08 PROCEDURE — 99222 1ST HOSP IP/OBS MODERATE 55: CPT | Performed by: INTERNAL MEDICINE

## 2020-01-08 PROCEDURE — 71045 X-RAY EXAM CHEST 1 VIEW: CPT

## 2020-01-08 PROCEDURE — 99285 EMERGENCY DEPT VISIT HI MDM: CPT

## 2020-01-08 PROCEDURE — 85730 THROMBOPLASTIN TIME PARTIAL: CPT

## 2020-01-08 PROCEDURE — 36415 COLL VENOUS BLD VENIPUNCTURE: CPT

## 2020-01-08 PROCEDURE — 87086 URINE CULTURE/COLONY COUNT: CPT

## 2020-01-08 PROCEDURE — 80053 COMPREHEN METABOLIC PANEL: CPT

## 2020-01-08 PROCEDURE — 81001 URINALYSIS AUTO W/SCOPE: CPT

## 2020-01-08 PROCEDURE — 93005 ELECTROCARDIOGRAM TRACING: CPT | Performed by: NURSE PRACTITIONER

## 2020-01-08 PROCEDURE — 84439 ASSAY OF FREE THYROXINE: CPT

## 2020-01-08 PROCEDURE — 84484 ASSAY OF TROPONIN QUANT: CPT

## 2020-01-08 RX ORDER — SODIUM CHLORIDE 0.9 % (FLUSH) 0.9 %
10 SYRINGE (ML) INJECTION EVERY 12 HOURS SCHEDULED
Status: DISCONTINUED | OUTPATIENT
Start: 2020-01-08 | End: 2020-01-16 | Stop reason: HOSPADM

## 2020-01-08 RX ORDER — SENNA AND DOCUSATE SODIUM 50; 8.6 MG/1; MG/1
2 TABLET, FILM COATED ORAL NIGHTLY
COMMUNITY

## 2020-01-08 RX ORDER — CHOLECALCIFEROL (VITAMIN D3) 125 MCG
500 CAPSULE ORAL DAILY
Status: DISCONTINUED | OUTPATIENT
Start: 2020-01-08 | End: 2020-01-16 | Stop reason: HOSPADM

## 2020-01-08 RX ORDER — ASPIRIN 81 MG/1
324 TABLET, CHEWABLE ORAL ONCE
Status: COMPLETED | OUTPATIENT
Start: 2020-01-08 | End: 2020-01-08

## 2020-01-08 RX ORDER — 0.9 % SODIUM CHLORIDE 0.9 %
1000 INTRAVENOUS SOLUTION INTRAVENOUS ONCE
Status: COMPLETED | OUTPATIENT
Start: 2020-01-08 | End: 2020-01-08

## 2020-01-08 RX ORDER — SODIUM CHLORIDE 9 MG/ML
INJECTION, SOLUTION INTRAVENOUS CONTINUOUS
Status: DISCONTINUED | OUTPATIENT
Start: 2020-01-08 | End: 2020-01-16 | Stop reason: HOSPADM

## 2020-01-08 RX ORDER — SODIUM CHLORIDE 9 MG/ML
INJECTION, SOLUTION INTRAVENOUS
Status: DISPENSED
Start: 2020-01-08 | End: 2020-01-09

## 2020-01-08 RX ORDER — CARBAMAZEPINE 200 MG/1
200 TABLET ORAL 2 TIMES DAILY
Status: DISCONTINUED | OUTPATIENT
Start: 2020-01-08 | End: 2020-01-16 | Stop reason: HOSPADM

## 2020-01-08 RX ORDER — ASPIRIN 81 MG/1
81 TABLET, CHEWABLE ORAL DAILY
Status: DISCONTINUED | OUTPATIENT
Start: 2020-01-08 | End: 2020-01-16 | Stop reason: HOSPADM

## 2020-01-08 RX ORDER — IPRATROPIUM BROMIDE AND ALBUTEROL SULFATE 2.5; .5 MG/3ML; MG/3ML
3 SOLUTION RESPIRATORY (INHALATION) EVERY 4 HOURS PRN
Status: DISCONTINUED | OUTPATIENT
Start: 2020-01-08 | End: 2020-01-09

## 2020-01-08 RX ORDER — ACETAMINOPHEN 325 MG/1
650 TABLET ORAL EVERY 4 HOURS PRN
Status: DISCONTINUED | OUTPATIENT
Start: 2020-01-08 | End: 2020-01-16 | Stop reason: HOSPADM

## 2020-01-08 RX ORDER — BISACODYL 10 MG
10 SUPPOSITORY, RECTAL RECTAL DAILY PRN
COMMUNITY

## 2020-01-08 RX ORDER — AMIODARONE HYDROCHLORIDE 200 MG/1
200 TABLET ORAL DAILY
Status: DISCONTINUED | OUTPATIENT
Start: 2020-01-08 | End: 2020-01-16 | Stop reason: HOSPADM

## 2020-01-08 RX ORDER — SODIUM CHLORIDE 0.9 % (FLUSH) 0.9 %
10 SYRINGE (ML) INJECTION PRN
Status: DISCONTINUED | OUTPATIENT
Start: 2020-01-08 | End: 2020-01-16 | Stop reason: HOSPADM

## 2020-01-08 RX ORDER — OYSTER SHELL CALCIUM WITH VITAMIN D 500; 200 MG/1; [IU]/1
1 TABLET, FILM COATED ORAL 2 TIMES DAILY
COMMUNITY

## 2020-01-08 RX ORDER — NITROFURANTOIN 25; 75 MG/1; MG/1
100 CAPSULE ORAL EVERY 12 HOURS SCHEDULED
Status: DISCONTINUED | OUTPATIENT
Start: 2020-01-08 | End: 2020-01-08

## 2020-01-08 RX ORDER — ACETAMINOPHEN 325 MG/1
650 TABLET ORAL EVERY 4 HOURS PRN
COMMUNITY

## 2020-01-08 RX ORDER — GUAIFENESIN/DEXTROMETHORPHAN 100-10MG/5
5 SYRUP ORAL EVERY 6 HOURS PRN
COMMUNITY

## 2020-01-08 RX ORDER — LEVOTHYROXINE SODIUM 0.12 MG/1
125 TABLET ORAL DAILY
Status: DISCONTINUED | OUTPATIENT
Start: 2020-01-09 | End: 2020-01-16 | Stop reason: HOSPADM

## 2020-01-08 RX ORDER — NITROFURANTOIN 25; 75 MG/1; MG/1
100 CAPSULE ORAL EVERY 12 HOURS SCHEDULED
Status: DISCONTINUED | OUTPATIENT
Start: 2020-01-08 | End: 2020-01-16 | Stop reason: HOSPADM

## 2020-01-08 RX ORDER — ONDANSETRON 2 MG/ML
4 INJECTION INTRAMUSCULAR; INTRAVENOUS EVERY 6 HOURS PRN
Status: DISCONTINUED | OUTPATIENT
Start: 2020-01-08 | End: 2020-01-16 | Stop reason: HOSPADM

## 2020-01-08 RX ORDER — FOLIC ACID 1 MG/1
1 TABLET ORAL DAILY
Status: DISCONTINUED | OUTPATIENT
Start: 2020-01-08 | End: 2020-01-16 | Stop reason: HOSPADM

## 2020-01-08 RX ORDER — MIRTAZAPINE 15 MG/1
7.5 TABLET, FILM COATED ORAL NIGHTLY
Status: DISCONTINUED | OUTPATIENT
Start: 2020-01-08 | End: 2020-01-13

## 2020-01-08 RX ORDER — MIRTAZAPINE 15 MG/1
7.5 TABLET, FILM COATED ORAL NIGHTLY
COMMUNITY

## 2020-01-08 RX ORDER — PANTOPRAZOLE SODIUM 40 MG/1
40 TABLET, DELAYED RELEASE ORAL
Status: DISCONTINUED | OUTPATIENT
Start: 2020-01-08 | End: 2020-01-16 | Stop reason: HOSPADM

## 2020-01-08 RX ADMIN — CARBAMAZEPINE 200 MG: 200 TABLET ORAL at 20:07

## 2020-01-08 RX ADMIN — SODIUM CHLORIDE 1000 ML: 9 INJECTION, SOLUTION INTRAVENOUS at 12:50

## 2020-01-08 RX ADMIN — PANTOPRAZOLE SODIUM 40 MG: 40 TABLET, DELAYED RELEASE ORAL at 15:54

## 2020-01-08 RX ADMIN — SODIUM CHLORIDE 1000 ML: 9 INJECTION, SOLUTION INTRAVENOUS at 11:07

## 2020-01-08 RX ADMIN — SODIUM CHLORIDE: 9 INJECTION, SOLUTION INTRAVENOUS at 15:39

## 2020-01-08 RX ADMIN — METOPROLOL TARTRATE 12.5 MG: 25 TABLET, FILM COATED ORAL at 20:07

## 2020-01-08 RX ADMIN — MIRTAZAPINE 7.5 MG: 15 TABLET, FILM COATED ORAL at 20:07

## 2020-01-08 RX ADMIN — ASPIRIN 81 MG 324 MG: 81 TABLET ORAL at 12:50

## 2020-01-08 RX ADMIN — Medication 10 ML: at 20:11

## 2020-01-08 RX ADMIN — NITROFURANTOIN (MONOHYDRATE/MACROCRYSTALS) 100 MG: 75; 25 CAPSULE ORAL at 15:54

## 2020-01-08 RX ADMIN — SODIUM CHLORIDE 1000 ML: 9 INJECTION, SOLUTION INTRAVENOUS at 19:08

## 2020-01-08 RX ADMIN — SODIUM CHLORIDE: 9 INJECTION, SOLUTION INTRAVENOUS at 23:07

## 2020-01-08 ASSESSMENT — ENCOUNTER SYMPTOMS
EYE PAIN: 0
NAUSEA: 0
COUGH: 0
VOMITING: 0
BACK PAIN: 0
DIARRHEA: 0
SHORTNESS OF BREATH: 0
RHINORRHEA: 0
SORE THROAT: 0
PHOTOPHOBIA: 0
ABDOMINAL PAIN: 0

## 2020-01-08 NOTE — H&P
atraumatic without obvious deformity. Pupils equal, round, and reactive to light. Extra ocular muscles intact. Conjunctivae/corneas clear. Neck: Supple, with full range of motion. No jugular venous distention. Trachea midline. Respiratory:  Normal respiratory effort. Clear to auscultation, bilaterally without Rales/Wheezes/Rhonchi. Cardiovascular:  Regular rate and rhythm with normal S1/S2 without murmurs, rubs or gallops. Abdomen: Soft, non-tender, non-distended with normal bowel sounds. Musculoskeletal:  No clubbing, cyanosis or edema bilaterally. Full range of motion without deformity. Skin: Skin color, texture, turgor normal.  No rashes or lesions. Neurologic:    Alert and oriented, thought content appropriate, normal insight, generalized  weakness  Capillary Refill: Brisk,< 3 seconds   Peripheral Pulses: +2 palpable, equal bilaterally       Labs:     Recent Labs     01/06/20  1220 01/08/20  1100   WBC 6.4 9.4   HGB 11.9* 12.5   HCT 35.6* 37.3    405*     Recent Labs     01/06/20  1220 01/08/20  1100    133*   K 3.6 4.9   CL 96 94*   CO2 26 26   BUN 15 17   CREATININE 1.10* 1.12*   CALCIUM 8.4* 8.5     Recent Labs     01/06/20  1220 01/08/20  1100   * 200*   ALT 43* 56*   BILITOT 0.3 0.3   ALKPHOS 96 88     Recent Labs     01/08/20  1100   INR 1.0     Recent Labs     01/08/20  1100   CKTOTAL 3,147*   TROPONINI 0.254*       Urinalysis:      Lab Results   Component Value Date    NITRU Negative 01/08/2020    WBCUA 0-2 01/08/2020    BACTERIA RARE 01/08/2020    RBCUA 0-2 01/08/2020    BLOODU LARGE 01/08/2020    SPECGRAV 1.019 01/08/2020    GLUCOSEU Negative 01/08/2020       Radiology:     CXR: I have reviewed the CXR with the following interpretation:   EKG:  I have reviewed the EKG with the following interpretation:     XR CHEST PORTABLE   Final Result   Impression:     1. Stable, enlarged cardiomediastinal silhouette with thoracic aortic vascular calcifications.    2. No acute cardiopulmonary disease identified. ASSESSMENT/Plan :    Rhabdomyolysis/LASHAE: aggressive hydration with IV fluid, monitor CK/CKMB level for downward trend, avoid nephrotoxins, monitor daily BMP for improvement    Severe Hypothyroidism : check T4 level and increase Levothyroxine to 125mcg, consult endocrinology     Weakness: d/t all above and UTI, tx as above, and monitor for improvement , f/u blood and urine culture     Elevated troponin:pt denies CP, obtain serial troponin, repeat EKG, telemetry monitoring, consult cardiology    UTI: start oral macrobid d/t several atb allergies and f/u sensitivity result    DVT Prophylaxis:Lovenox   Diet: cardiac   Code Status: full    Dispo - inpatient      Franck Celis, APRN - CNP    Thank you Rene Mancia MD for the opportunity to be involved in this patient's care. If you have any questions or concerns please feel free to contact me.

## 2020-01-08 NOTE — ED NOTES
Bed: 08  Expected date: 1/8/20  Expected time: 10:43 AM  Means of arrival: [facility]  Comments:  68 yo female from [address] [place] [address] in mental status  Patient states she is just tired  Vital Signs stable      Becki Cherry RN  01/08/20 1045

## 2020-01-08 NOTE — CARE COORDINATION
[facility] EMERGENCY [facility] AT [place] Case Management Initial Discharge Assessment    Met with Patient to discuss discharge plan. PCP: Rene Mancia MD                                Date of Last Visit:  A couple of months    If no PCP, list provided? N/A    Discharge Planning    Living Arrangements: independently at home    Who do you live with? Self      Who helps you with your care:  self    If lives at home:     Do you have any barriers navigating in your home? yes - \" couple of steps into house. \"    Patient can perform ADL? No    Current Services (outpatient and in home) :  From SNF ([address] [place] [address] for rehab s/p knee cap fx with)    Dialysis: No     Is transportation available to get to your appointments? Yes    DME Equipment:  [employer] used at [facility], does not have a walker at home     Respiratory equipment: None    Respiratory provider:  no     Pharmacy:  yes - Drug [address] [place] [address] with Medication Assistance Program?  No      Patient agreeable to [employer]? No    Patient agreeable to SNF/Rehab? Yes, Company currently at [facility] for rehab s/p knee cap fracture and surgical repair    Other discharge needs identified? Other to be determined    Freedom of choice list provided with basic dialogue that supports the patient's individualized plan of care/goals and shares the quality data associated with the providers. N/A    Does Patient Have a High-Risk for Readmission Diagnosis (CHF, PN, MI, COPD)? No  Needs reassessed at later time. The plan for Transition of Care is related to the following treatment goals: Expects to be Discharged home after SNF to Son and Daughter in [facility]. Initial Discharge Plan? (Note: please see concurrent daily documentation for any updates after initial note).     To be determined     The Patient and/or patient representative: Patient was provided with choice of any post-acute providers for care and equipment and agrees with discharge plan  No    Electronically signed by Priscilla Middleton

## 2020-01-08 NOTE — ED NOTES
Report given to [facility], awaiting room to be cleaned.       Tanvi Sultana RN  01/08/20 6560

## 2020-01-08 NOTE — PROGRESS NOTES
[facility] Respiratory Therapy Evaluation   Current Order:  duoneb Q4 PRN      Home Regimen: yes      Ordering Physician: elizabeth  Re-evaluation Date:  1/11     Diagnosis: cardiac      Patient Status: Stable / Unstable + Physician notified    The following MDI Criteria must be met in order to convert aerosol to MDI with spacer. If unable to meet, MDI will be converted to aerosol:  []  Patient able to demonstrate the ability to use MDI effectively  []  Patient alert and cooperative  []  Patient able to take deep breath with 5-10 second hold  []  Medication(s) available in this delivery method   []  Peak flow greater than or equal to 200 ml/min            Current Order Substituted To  (same drug, same frequency)   Aerosol to MDI [] Albuterol Sulfate 0.083% unit dose by aerosol Albuterol Sulfate MDI 2 puffs by inhalation with spacer    [] Levalbuterol 1.25 mg unit dose by aerosol Levalbuterol MDI 2 puffs by inhalation with spacer    [] Levalbuterol 0.63 mg unit dose by aerosol Levalbuterol MDI 2 puffs by inhalation with spacer    [] Ipratropium Bromide 0.02% unit dose by aerosol Ipratropium Bromide MDI 2 puffs by inhalation with spacer    [] Duoneb (Ipratropium + Albuterol) unit dose by aerosol Ipratropium MDI + Albuterol MDI 2 puffs by inhalation w/spacer   MDI to Aerosol [] Albuterol Sulfate MDI Albuterol Sulfate 0.083% unit dose by aerosol    [] Levalbuterol MDI 2 puffs by inhalation Levalbuterol 1.25 mg unit dose by aerosol    [] Ipratropium Bromide MDI by inhalation Ipratropium Bromide 0.02% unit dose by aerosol    [] Combivent (Ipratropium + Albuterol) MDI by inhalation Duoneb (Ipratropium + Albuterol) unit dose by aerosol   Treatment Assessment [Frequency/Schedule]:  Change frequency to: _______________no changes ___________________________________per Protocol, P&T, MEC      Points 0 1 2 3 4   Pulmonary Status  Non-Smoker  [x]   Smoking history   < 20 pack years  []   Smoking history  ?  20 pack years  []

## 2020-01-08 NOTE — PROGRESS NOTES
Patient was seen and examined in the ED. Full note to follow soon.      VSS, negative rapid flu    # dehydration- IVF, replete electrolytes   # rhabdomyolysis  # hypothyroidism- increase synthroid, check t3/4, endocrine consult   # elevated troponin- no chest pain, likely noncardiac injury related, tele, trend CE's, cardiology consult       Faustina Franklin, [facility] Medicine

## 2020-01-08 NOTE — ED PROVIDER NOTES
PAST MEDICAL HISTORY     Past Medical History:   Diagnosis Date    Aortic stenosis     High cholesterol     HTN (hypertension)     Hypothyroid     New onset a-fib [facility])      Past Surgical History:   Procedure Laterality Date    PATELLA FRACTURE SURGERY Right 11/18/2019    RIGHT PATELLA OPEN REDUCTION INTERNAL FIXATION  ROOM 180 performed by Aurora Rivera MD at [address] N/A 12/17/2019    EGD performed by Parveen Pryor MD at [facility] History     Socioeconomic History    Marital status:      Spouse name: None    Number of children: 2    Years of education: 15    Highest education level: 12th grade   Occupational History    Occupation:      Comment: [employer]   Social Needs    Financial resource strain: Not hard at all   "[employer] insecurity:     Worry: Never true     Inability: Never true   [employer] needs:     Medical: No     Non-medical: No   Tobacco Use    Smoking status: Current Every Day Smoker     Packs/day: 0.50     Years: 25.00     Pack years: 12.50    Smokeless tobacco: Never Used   Substance and Sexual Activity    Alcohol use: Never     Frequency: Never    Drug use: Never    Sexual activity: None   Lifestyle    Physical activity:     Days per week: 0 days     Minutes per session: None    Stress: Only a little   Relationships    Social connections:     Talks on phone: More than three times a week     Gets together: Once a week     Attends Ep[employer] service: More than 4 times per year     Active member of club or organization: Yes     Attends meetings of clubs or organizations: 1 to 4 times per year     Relationship status:      Intimate partner violence:     Fear of current or ex partner: No     Emotionally abused: No     Physically abused: No     Forced sexual activity: No   Other Topics Concern    None   Social History Narrative         Lives With: Alone  Son Lee lives in [place] fulltime t the [employer] office    Type of Home: Apartment(1st floor) in [facility]: Two level, Able to Live on Main level with bedroom/bathroom(Full flight to second floor)    Home Access: Stairs to enter without rails    Entrance Stairs - Number of Steps: 2    Bathroom Shower/Tub: Tub/Shower unit    Bathroom Equipment: (None)    Home Equipment: (N/A)    ADL Assistance: Independent    Homemaking Assistance: Independent    Homemaking Responsibilities: Yes    Meal Prep Responsibility: Primary    Laundry Responsibility: Primary    Cleaning Responsibility: Primary    Ambulation Assistance: Independent(No AD)    Transfer Assistance: Independent    Active : Yes    Additional Comments: States that she was down on ground X 72 hours following fall       SCREENINGS             PHYSICAL EXAM    (up to 7 for level 4, 8 or more for level 5)     ED Triage Vitals   BP Temp Temp Source Pulse Resp SpO2 Height Weight   01/08/20 1053 01/08/20 1050 01/08/20 1050 01/08/20 1053 01/08/20 1053 01/08/20 1053 01/08/20 1050 01/08/20 1050   98/70 98 °F (36.7 °C) Oral 66 16 95 % 5' (1.524 m) 151 lb (68.5 kg)       Physical Exam  Vitals signs and nursing note reviewed. Constitutional:       General: She is not in acute distress. Appearance: Normal appearance. She is well-developed. She is not diaphoretic. Comments: Appears fatigued. HENT:      Head: Normocephalic and atraumatic. Eyes:      General: Lids are normal.      Conjunctiva/sclera: Conjunctivae normal.   Neck:      Musculoskeletal: Normal range of motion and neck supple. Cardiovascular:      Rate and Rhythm: Normal rate and regular rhythm. Pulses: Normal pulses. Heart sounds: Normal heart sounds. Pulmonary:      Effort: Pulmonary effort is normal.      Breath sounds: Normal breath sounds. Abdominal:      General: Bowel sounds are normal.      Palpations: Abdomen is soft. Tenderness: There is no tenderness.    Lymphadenopathy:      Cervical: [facility], 01/08/2020 12:05, by MILLER   URINE RT REFLEX TO CULTURE - Abnormal; Notable for the following components:    Clarity, UA CLOUDY (*)     Blood, Urine LARGE (*)     Protein, UA 30 (*)     All other components within normal limits   LACTIC ACID, PLASMA - Abnormal; Notable for the following components:    Lactic Acid 2.4 (*)     All other components within normal limits   TROPONIN - Abnormal; Notable for the following components:    Troponin 0.254 (*)     All other components within normal limits    Narrative:     CALL  Gnosalez  [facility] tel. [phone],  TSH and Troponin called to [facility], 01/08/2020 12:05, by MILLER   CK - Abnormal; Notable for the following components: Total CK 3,147 (*)     All other components within normal limits   TSH WITH REFLEX - Abnormal; Notable for the following components:    TSH 32.770 (*)     All other components within normal limits    Narrative:     CALL  Gonsalez  [facility] tel. [phone],  TSH and Troponin called to [facility], 01/08/2020 12:05, by Juvenal 9 - Abnormal; Notable for the following components:    Bacteria, UA RARE (*)     All other components within normal limits   CULTURE BLOOD #2   CULTURE BLOOD #1   URINE CULTURE   MAGNESIUM    Narrative:     CALL  [facility] tel. [phone],  TSH and Troponin called to [facility], 01/08/2020 12:05, by MILLER   APTT   PROTIME-INR   LACTIC ACID, PLASMA   CKMB & RELATIVE PERCENT   T4, FREE       All other labs were within normal range or not returned as of this dictation. EMERGENCY DEPARTMENT COURSE and DIFFERENTIAL DIAGNOSIS/MDM:   Vitals:    Vitals:    01/08/20 1050 01/08/20 1053   BP:  98/70   Pulse:  66   Resp:  16   Temp: 98 °F (36.7 °C)    TempSrc: Oral    SpO2:  95%   Weight: 151 lb (68.5 kg)    Height: 5' (1.524 m)             MDM The patient was also seen and evaluated by Rae Miramontes DO.     patient is fatigued but nontoxic in no distress. Vital signs are noted. She is mildly hypertensive.   She is not

## 2020-01-09 ENCOUNTER — APPOINTMENT (OUTPATIENT)
Dept: GENERAL RADIOLOGY | Age: 80
DRG: 501 | End: 2020-01-09
Payer: MEDICARE

## 2020-01-09 LAB
ANION GAP SERPL CALCULATED.3IONS-SCNC: 13 MEQ/L (ref 9–15)
BASOPHILS ABSOLUTE: 0 K/UL (ref 0–0.2)
BASOPHILS RELATIVE PERCENT: 0.4 %
BUN BLDV-MCNC: 11 MG/DL (ref 8–23)
CALCIUM SERPL-MCNC: 7.7 MG/DL (ref 8.5–9.9)
CHLORIDE BLD-SCNC: 104 MEQ/L (ref 95–107)
CK MB: 12.3 NG/ML (ref 0–3.8)
CK MB: 12.9 NG/ML (ref 0–3.8)
CO2: 21 MEQ/L (ref 20–31)
CREAT SERPL-MCNC: 0.68 MG/DL (ref 0.5–0.9)
CREATINE KINASE-MB INDEX: 0.5 % (ref 0–3.5)
CREATINE KINASE-MB INDEX: 0.5 % (ref 0–3.5)
EOSINOPHILS ABSOLUTE: 0 K/UL (ref 0–0.7)
EOSINOPHILS RELATIVE PERCENT: 0.4 %
GFR AFRICAN AMERICAN: >60
GFR NON-AFRICAN AMERICAN: >60
GLUCOSE BLD-MCNC: 93 MG/DL (ref 70–99)
HCT VFR BLD CALC: 30.2 % (ref 37–47)
HEMOGLOBIN: 10 G/DL (ref 12–16)
LYMPHOCYTES ABSOLUTE: 2.1 K/UL (ref 1–4.8)
LYMPHOCYTES RELATIVE PERCENT: 25.2 %
MAGNESIUM: 1.5 MG/DL (ref 1.7–2.4)
MCH RBC QN AUTO: 31.6 PG (ref 27–31.3)
MCHC RBC AUTO-ENTMCNC: 33.1 % (ref 33–37)
MCV RBC AUTO: 95.4 FL (ref 82–100)
MONOCYTES ABSOLUTE: 0.7 K/UL (ref 0.2–0.8)
MONOCYTES RELATIVE PERCENT: 7.7 %
NEUTROPHILS ABSOLUTE: 5.6 K/UL (ref 1.4–6.5)
NEUTROPHILS RELATIVE PERCENT: 66.3 %
PDW BLD-RTO: 15.2 % (ref 11.5–14.5)
PLATELET # BLD: 334 K/UL (ref 130–400)
POTASSIUM REFLEX MAGNESIUM: 3.2 MEQ/L (ref 3.4–4.9)
RBC # BLD: 3.17 M/UL (ref 4.2–5.4)
SODIUM BLD-SCNC: 138 MEQ/L (ref 135–144)
TOTAL CK: 2406 U/L (ref 0–170)
TOTAL CK: 2443 U/L (ref 0–170)
TROPONIN: 0.18 NG/ML (ref 0–0.01)
TROPONIN: 0.19 NG/ML (ref 0–0.01)
TROPONIN: 0.21 NG/ML (ref 0–0.01)
WBC # BLD: 8.4 K/UL (ref 4.8–10.8)

## 2020-01-09 PROCEDURE — 6370000000 HC RX 637 (ALT 250 FOR IP): Performed by: INTERNAL MEDICINE

## 2020-01-09 PROCEDURE — 82553 CREATINE MB FRACTION: CPT

## 2020-01-09 PROCEDURE — 6360000002 HC RX W HCPCS: Performed by: INTERNAL MEDICINE

## 2020-01-09 PROCEDURE — 71045 X-RAY EXAM CHEST 1 VIEW: CPT

## 2020-01-09 PROCEDURE — 84484 ASSAY OF TROPONIN QUANT: CPT

## 2020-01-09 PROCEDURE — 6370000000 HC RX 637 (ALT 250 FOR IP): Performed by: NURSE PRACTITIONER

## 2020-01-09 PROCEDURE — 83735 ASSAY OF MAGNESIUM: CPT

## 2020-01-09 PROCEDURE — 80048 BASIC METABOLIC PNL TOTAL CA: CPT

## 2020-01-09 PROCEDURE — 85025 COMPLETE CBC W/AUTO DIFF WBC: CPT

## 2020-01-09 PROCEDURE — 82550 ASSAY OF CK (CPK): CPT

## 2020-01-09 PROCEDURE — 2580000003 HC RX 258: Performed by: INTERNAL MEDICINE

## 2020-01-09 PROCEDURE — 1210000000 HC MED SURG R&B

## 2020-01-09 PROCEDURE — 2580000003 HC RX 258: Performed by: NURSE PRACTITIONER

## 2020-01-09 PROCEDURE — 99232 SBSQ HOSP IP/OBS MODERATE 35: CPT | Performed by: PHYSICIAN ASSISTANT

## 2020-01-09 PROCEDURE — 73560 X-RAY EXAM OF KNEE 1 OR 2: CPT

## 2020-01-09 PROCEDURE — 36415 COLL VENOUS BLD VENIPUNCTURE: CPT

## 2020-01-09 RX ORDER — MAGNESIUM SULFATE IN WATER 40 MG/ML
2 INJECTION, SOLUTION INTRAVENOUS ONCE
Status: COMPLETED | OUTPATIENT
Start: 2020-01-09 | End: 2020-01-09

## 2020-01-09 RX ORDER — IPRATROPIUM BROMIDE AND ALBUTEROL SULFATE 2.5; .5 MG/3ML; MG/3ML
1 SOLUTION RESPIRATORY (INHALATION) EVERY 4 HOURS PRN
Status: DISCONTINUED | OUTPATIENT
Start: 2020-01-09 | End: 2020-01-16 | Stop reason: HOSPADM

## 2020-01-09 RX ADMIN — CYANOCOBALAMIN TAB 500 MCG 500 MCG: 500 TAB at 10:51

## 2020-01-09 RX ADMIN — PANTOPRAZOLE SODIUM 40 MG: 40 TABLET, DELAYED RELEASE ORAL at 05:10

## 2020-01-09 RX ADMIN — LEVOTHYROXINE SODIUM 125 MCG: 125 TABLET ORAL at 05:10

## 2020-01-09 RX ADMIN — ASPIRIN 81 MG 81 MG: 81 TABLET ORAL at 10:50

## 2020-01-09 RX ADMIN — SODIUM CHLORIDE: 9 INJECTION, SOLUTION INTRAVENOUS at 05:10

## 2020-01-09 RX ADMIN — SODIUM CHLORIDE: 9 INJECTION, SOLUTION INTRAVENOUS at 21:33

## 2020-01-09 RX ADMIN — PANTOPRAZOLE SODIUM 40 MG: 40 TABLET, DELAYED RELEASE ORAL at 18:41

## 2020-01-09 RX ADMIN — METOPROLOL TARTRATE 12.5 MG: 25 TABLET, FILM COATED ORAL at 21:35

## 2020-01-09 RX ADMIN — FOLIC ACID 1 MG: 1 TABLET ORAL at 10:51

## 2020-01-09 RX ADMIN — AMIODARONE HYDROCHLORIDE 200 MG: 200 TABLET ORAL at 10:50

## 2020-01-09 RX ADMIN — CARBAMAZEPINE 200 MG: 200 TABLET ORAL at 21:34

## 2020-01-09 RX ADMIN — NITROFURANTOIN (MONOHYDRATE/MACROCRYSTALS) 100 MG: 75; 25 CAPSULE ORAL at 10:51

## 2020-01-09 RX ADMIN — MIRTAZAPINE 7.5 MG: 15 TABLET, FILM COATED ORAL at 21:34

## 2020-01-09 RX ADMIN — CARBAMAZEPINE 200 MG: 200 TABLET ORAL at 10:50

## 2020-01-09 RX ADMIN — MAGNESIUM SULFATE HEPTAHYDRATE 2 G: 40 INJECTION, SOLUTION INTRAVENOUS at 10:51

## 2020-01-09 RX ADMIN — Medication 10 ML: at 21:33

## 2020-01-09 RX ADMIN — Medication 10 ML: at 10:51

## 2020-01-09 RX ADMIN — NITROFURANTOIN (MONOHYDRATE/MACROCRYSTALS) 100 MG: 75; 25 CAPSULE ORAL at 21:35

## 2020-01-09 RX ADMIN — METOPROLOL TARTRATE 12.5 MG: 25 TABLET, FILM COATED ORAL at 10:50

## 2020-01-09 ASSESSMENT — PAIN SCALES - GENERAL
PAINLEVEL_OUTOF10: 0
PAINLEVEL_OUTOF10: 0

## 2020-01-09 NOTE — PROGRESS NOTES
suspension 30 mL  30 mL Oral Daily PRN Ladena Bones, APRN - CNP        ondansetron (ZOFRAN) injection 4 mg  4 mg Intravenous Q6H PRN Ladena Bones, APRN - CNP        acetaminophen (TYLENOL) tablet 650 mg  650 mg Oral Q4H PRN Ladena Bones, APRN - CNP        0.9 % sodium chloride infusion   Intravenous Continuous Arpit Delgado,  mL/hr at 01/09/20 0510      levothyroxine (SYNTHROID) tablet 125 mcg  125 mcg Oral Daily Ladena Bones, APRN - CNP   125 mcg at 01/09/20 0510    nitrofurantoin (macrocrystal-monohydrate) (MACROBID) capsule 100 mg  100 mg Oral 2 times per day Ladena Bones, APRN - CNP   100 mg at 01/08/20 1554    amiodarone (CORDARONE) tablet 200 mg  200 mg Oral Daily Jeffrey Garcia, DO        acetaminophen (TYLENOL) tablet 650 mg  650 mg Oral Q4H PRN Arpit Dickersonught, DO        aspirin chewable tablet 81 mg  81 mg Oral Daily Jeffrey Garcia, DO        carBAMazepine (TEGRETOL) tablet 200 mg  200 mg Oral BID Arpit Fought, DO   200 mg at 30/84/24 0330    folic acid (FOLVITE) tablet 1 mg  1 mg Oral Daily Jeffrey Garcia, DO        ipratropium-albuterol (DUONEB) nebulizer solution 3 mL  3 mL Inhalation Q4H PRN Arpit Fought, DO        metoprolol tartrate (LOPRESSOR) tablet 12.5 mg  12.5 mg Oral BID Arpit Fought, DO   12.5 mg at 01/08/20 2007    mirtazapine (REMERON) tablet 7.5 mg  7.5 mg Oral Nightly Arpit Fought, DO   7.5 mg at 01/08/20 2007    pantoprazole (PROTONIX) tablet 40 mg  40 mg Oral BID AC Jeffrey Garcia, DO   40 mg at 01/09/20 0510    vitamin B-12 (CYANOCOBALAMIN) tablet 500 mcg  500 mcg Oral Daily Arpit Fought, DO         Assessment and Plan: Active problems:    # dehydration- IVF, replete electrolytes. Will decrease IVF given mild dyspnea.    # rhabdomyolysis- improving, resume IVF  # wheezing/ h/o COPD- start prn duoneb, CXR portable ordered   # hypothyroidism- increase synthroid, check t3/4, endocrine consult   # elevated troponin- no chest pain, likely noncardiac injury related, tele, trend CE's, cardiology consult      DVT/PPx        DISCHARGE PLANNING  Pending improvement in labs        Electronically signed by Tony Sams DO on 1/9/2020 at 9:59 AM

## 2020-01-09 NOTE — PROGRESS NOTES
chloride 100 mL/hr at 01/09/20 1050       Objective:   Vitals: BP (!) 163/90   Pulse 72   Temp 97.7 °F (36.5 °C) (Oral)   Resp 16   Ht 5' (1.524 m)   Wt 151 lb (68.5 kg)   LMP  (LMP Unknown)   SpO2 96%   BMI 29.49 kg/m²    Wt Readings from Last 3 Encounters:   01/08/20 151 lb (68.5 kg)   12/20/19 154 lb 5.2 oz (70 kg)   12/02/19 157 lb 3 oz (71.3 kg)        General appearance: alert, appears stated age, cooperative and no distress  Skin: Skin color, texture, turgor normal. No rashes or lesions. Neck: no lymphadenopathy  Lungs: clear to auscultation bilaterally  Heart: regular rate and rhythm, S1, S2 normal, no murmur, click, rub or gallop  Abdomen: soft, non-tender. Bowel sounds normal. No masses,  no organomegaly. Extremities: extremities normal, atraumatic, no cyanosis or edema    Patient Active Problem List:     Acute kidney injury (LASHAE) with acute tubular necrosis (ATN) (HCC)     Anxiety state     Aortic valve stenosis     Esophageal reflux     Essential hypertension, benign     Hyperlipidemia     Seborrheic keratosis     Thoracic ascending aortic aneurysm (HCC)     Tobacco abuse     Vitamin D deficiency     Closed fracture of right patella     Abnormality of gait and mobility due to Impaired Mobility and ADL's due to Rhabdomyolysis, Right Patellar Fx. [facility] admit 11/20/19.      Paroxysmal atrial fibrillation (HCC)     HTN (hypertension)     Stage 3 chronic kidney disease ([facility] [address]     Rhabdomyolysis     Hyponatremia     Hypothyroid     BMI 25.0-25.9,adult     Acute blood loss anemia            Electronically signed by MELVINA Kong on 1/9/2020 at 3:36 PM

## 2020-01-09 NOTE — CONSULTS
Yes Terrie Cabrera, DO   vitamin B-12 (CYANOCOBALAMIN) 500 MCG tablet Take 500 mcg by mouth daily   Yes Historical Provider, MD   folic acid (FOLVITE) 1 MG tablet Take 1 mg by mouth daily   Yes Historical Provider, MD   aspirin 81 MG chewable tablet Take 1 tablet by mouth daily 12/7/19  Yes Dacia Lion DO   amiodarone (CORDARONE) 200 MG tablet Take 1 tablet by mouth daily 12/7/19  Yes Shanelle Khanna, DO   ipratropium-albuterol (DUONEB) 0.5-2.5 (3) MG/3ML SOLN nebulizer solution Inhale 3 mLs into the lungs every 4 hours as needed for Shortness of Breath 12/7/19  Yes Dacia Lion DO   carBAMazepine (TEGRETOL) 200 MG tablet Take 1 tablet by mouth 2 times daily 12/7/19  Yes Dacia Lion DO   coenzyme Q10 100 MG CAPS capsule Take 1 capsule by mouth 2 times daily (before meals) 12/7/19  Yes Dacia Lion DO   levothyroxine (SYNTHROID) 50 MCG tablet Take 1 tablet by mouth Daily  Patient taking differently: Take 75 mcg by mouth Daily  12/8/19  Yes Dacia Lion DO   vitamin D (ERGOCALCIFEROL) 1.25 MG (10821 UT) CAPS capsule Take 1 capsule by mouth once a week  Patient taking differently: Take 50,000 Units by mouth once a week mondays 12/11/19  Yes Dacia Lion DO   metoprolol tartrate (LOPRESSOR) 25 MG tablet Take 0.5 tablets by mouth 2 times daily 12/7/19  Yes Zain Helton DO       Current Medications:   sodium chloride 100 mL/hr at 01/09/20 1050       IV Medications:  Current Facility-Administered Medications   Medication Dose Route Frequency Provider Last Rate Last Dose    ipratropium-albuterol (DUONEB) nebulizer solution 1 ampule  1 ampule Inhalation Q4H PRN Celestine Garcia DO        sodium chloride flush 0.9 % injection 10 mL  10 mL Intravenous 2 times per day EDDIE Beasley - CNP   10 mL at 01/09/20 1051    sodium chloride flush 0.9 % injection 10 mL  10 mL Intravenous PRN EDDIE Beasley - CNP        magnesium hydroxide (MILK OF MAGNESIA) 400 MG/5ML suspension 30 mL  30 mL Height:           Intake/Output Summary (Last 24 hours) at 1/9/2020 1656  Last data filed at 1/9/2020 9917  Gross per 24 hour   Intake 2270 ml   Output --   Net 2270 ml       Patient Vitals for the past 96 hrs (Last 3 readings):   Weight   01/08/20 1050 151 lb (68.5 kg)       Physical Examination:  GENERAL APPEARANCE: Well developed, well nourished, in no acute distress. CHEST: Symmetric and non-tender. INTEGUMENT: Skin warm and dry, without gross excoriationis or lesions. HEENT: No gross abnormalities of conjunctiva, teeth, gums, oral mucosa  NECK: Supple, no JVD, no bruit. Thyroid not palpable. Carotid upstrokes normal.  NEURO/PSHCY: Alert and oriented x3; appropriate behavior and responses and responses, grossly normal cerebellar function with normal balance and coordination  LUNGS: Clear to auscultation bilaterally; normal respiratory effort. HEART: Rate and rhythm regular with 3/6 systolic ejection murmur right upper sternal border rating for the carotid arteries; no gallop appreciated. There are no rubs, clicks or heaves. PMI nondisplaced. ABDOMEN: Soft, nontender, no palpable hepatosplenomegaly, no mases, no bruits. Abdominal aorta not noted to be enlarged. MUSCULOSKELETAL: Ambulatory with normal tandem gait. EXTREMITIES: Warm with good color, no clubbing or cyanois. There is no edema noted. PERIPHERAL VASCULAR: Pulses present and equally palpable; 2+ throughout. No femoral bruits. Diagnostics:    EKG:  Normal sinus rhythm  T wave abnormality, consider anterior ischemia  Abnormal ECG    Telemetry: normal sinus . Echocardiogram Summary:   Image quality is fair (7/10)   Left ventricular ejection fraction is visually estimated at 75%. Left ventricular size is decreased ,probably volume depleted. E/A flow reversal noted. Suggestive of diastolic dysfunction. Moderate concentric left ventricular hypertrophy. No regional wall motion abnormality is apparent.    Moderately dilated left atrium. Moderately dilated left atrium. LA measured 4.5cm in transverse diameter. Normal right atrial dimension with no evidence of thrombus or mass noted. Normal right ventricular size with preserved RV function. Aortic valve leaflets are severely thickened. Tricuspid aortic valve. The aortic valve area is 0.8 cm2 with a maximum gradient of 83 mmHg and a   mean gradient of 40 mmHg. Aortic valve leaflets are severely thickened. There is mild aortic regurgitation. Aortic root is mildly dilated. The mitral valve was not well visualized . Mild (1+) mitral regurgitation is present. Tricuspid valve was not well visualized. No evidence of tricuspid regurgitation,hence PA systolic pressure could   not be estimated. No evidence of any pericardial effusion. No evidence of pleural effusion. No prior echocardiogram report is available for comparison.    Signature   ----------------------------------------------------------------   Electronically signed by Leonie Ortiz MD(Interpreting   physician) on 11/16/2019 05:39 PM      Lab Data:  BMP:  Recent Labs     01/08/20  1100 01/09/20  0638   * 138   K 4.9 3.2*   CL 94* 104   CO2 26 21   BUN 17 11   CREATININE 1.12* 0.68   LABGLOM 47.0* >60.0       CBC:  Recent Labs     01/08/20  1100 01/09/20  0638   WBC 9.4 8.4   RBC 4.00* 3.17*   HGB 12.5 10.0*   HCT 37.3 30.2*   MCV 93.4 95.4   MCH 31.2 31.6*   MCHC 33.4 33.1   RDW 15.4* 15.2*   * 334       Cardiac Enzymes:   Recent Labs     01/08/20  1100 01/08/20  2044 01/09/20  0638 01/09/20  1411   CKTOTAL 3,147* 2,921* 2,443*  2,406*  --    CKMB 15.7* 14.2* 12.9*  12.3*  --    TROPONINI 0.254*  --  0.179* 0.194*       Hepatic Function Panel:  Recent Labs     01/08/20  1100   ALKPHOS 88   ALT 56*   *   PROT 7.4   BILITOT 0.3   LABALBU 3.0*       Magnesium:  Recent Labs     01/09/20  0638   MG 1.5*       INR:  Recent Labs     01/08/20  1100   PROTIME 13.8   INR 1.0       TSH:  Lab Results   Component Value Date    TSH 36.810 01/06/2020       Lipid Profile:  Lab Results   Component Value Date    TRIG 142 11/24/2019    HDL 45 11/24/2019    LDLCALC 58 11/24/2019       Lactate Level:  Recent Labs     01/08/20  2044   LACTA 1.2       CMP:  Recent Labs     01/08/20  1100 01/09/20  0638   * 138   K 4.9 3.2*   CL 94* 104   CO2 26 21   BUN 17 11   CREATININE 1.12* 0.68   GLUCOSE 132* 93   CALCIUM 8.5 7.7*   PROT 7.4  --    LABALBU 3.0*  --    BILITOT 0.3  --    ALKPHOS 88  --    *  --    ALT 56*  --        Radiology:   XR KNEE RIGHT (1-2 VIEWS)   Final Result   1. Postoperative changes as detailed above, see above for details. No hardware failure or new fracture. XR CHEST PORTABLE   Final Result   AREA OF ATELECTASIS, PATCHY TO COALESCENT INFILTRATE LEFT LOWER LOBE WITH TRACE LEFT PLEURAL EFFUSION SUPERIMPOSED UPON COPD. XR CHEST PORTABLE   Final Result   Impression:     1. Stable, enlarged cardiomediastinal silhouette with thoracic aortic vascular calcifications. 2. No acute cardiopulmonary disease identified. Impression:     1. Recurrent rhabdomyolysis. 2.  Acute kidney injury. 3.  Elevated troponin levels. Suspect this is related to interference with the assay secondary to highly elevated CPK levels. Doubt acute coronary syndrome. No angina pectoris. No overt congestive heart failure. Nonspecific anterior ST-T wave changes on EKG. 4.  Paroxysmal atrial fibrillation. Currently remains in normal sinus rhythm. 5.  Severe calcific aortic stenosis. Currently compensated. 6.  Severe hypothyroidism. 7.  Urinary tract infection. 8. Volume depletion. Improved. 9.  Recent right patellar fracture requiring surgery secondary to accidental fall. Plan-  1. Continue levothyroxine 125 mcg p.o. daily  2. Repeat thyroid laboratory studies in 6 weeks  3. Patient may be discharged from endocrinology standpoint  4.  Endocrinology will sign off this

## 2020-01-10 LAB
ANION GAP SERPL CALCULATED.3IONS-SCNC: 13 MEQ/L (ref 9–15)
BASOPHILS ABSOLUTE: 0 K/UL (ref 0–0.2)
BASOPHILS RELATIVE PERCENT: 0.3 %
BUN BLDV-MCNC: 7 MG/DL (ref 8–23)
CALCIUM SERPL-MCNC: 7.2 MG/DL (ref 8.5–9.9)
CHLORIDE BLD-SCNC: 103 MEQ/L (ref 95–107)
CK MB: 12.7 NG/ML (ref 0–3.8)
CO2: 20 MEQ/L (ref 20–31)
CREAT SERPL-MCNC: 0.62 MG/DL (ref 0.5–0.9)
CREATINE KINASE-MB INDEX: 0.7 % (ref 0–3.5)
EOSINOPHILS ABSOLUTE: 0 K/UL (ref 0–0.7)
EOSINOPHILS RELATIVE PERCENT: 0.4 %
GFR AFRICAN AMERICAN: >60
GFR NON-AFRICAN AMERICAN: >60
GLUCOSE BLD-MCNC: 95 MG/DL (ref 70–99)
HCT VFR BLD CALC: 28.5 % (ref 37–47)
HEMOGLOBIN: 9.5 G/DL (ref 12–16)
LYMPHOCYTES ABSOLUTE: 1.5 K/UL (ref 1–4.8)
LYMPHOCYTES RELATIVE PERCENT: 20.3 %
MAGNESIUM: 1.9 MG/DL (ref 1.7–2.4)
MCH RBC QN AUTO: 31.4 PG (ref 27–31.3)
MCHC RBC AUTO-ENTMCNC: 33.4 % (ref 33–37)
MCV RBC AUTO: 93.8 FL (ref 82–100)
MONOCYTES ABSOLUTE: 0.5 K/UL (ref 0.2–0.8)
MONOCYTES RELATIVE PERCENT: 6.5 %
NEUTROPHILS ABSOLUTE: 5.5 K/UL (ref 1.4–6.5)
NEUTROPHILS RELATIVE PERCENT: 72.5 %
PDW BLD-RTO: 15 % (ref 11.5–14.5)
PLATELET # BLD: 305 K/UL (ref 130–400)
POTASSIUM REFLEX MAGNESIUM: 2.9 MEQ/L (ref 3.4–4.9)
RBC # BLD: 3.04 M/UL (ref 4.2–5.4)
SODIUM BLD-SCNC: 136 MEQ/L (ref 135–144)
TOTAL CK: 1950 U/L (ref 0–170)
TROPONIN: 0.2 NG/ML (ref 0–0.01)
TROPONIN: 0.21 NG/ML (ref 0–0.01)
WBC # BLD: 7.6 K/UL (ref 4.8–10.8)

## 2020-01-10 PROCEDURE — 84484 ASSAY OF TROPONIN QUANT: CPT

## 2020-01-10 PROCEDURE — 82550 ASSAY OF CK (CPK): CPT

## 2020-01-10 PROCEDURE — 97162 PT EVAL MOD COMPLEX 30 MIN: CPT

## 2020-01-10 PROCEDURE — 80048 BASIC METABOLIC PNL TOTAL CA: CPT

## 2020-01-10 PROCEDURE — 82553 CREATINE MB FRACTION: CPT

## 2020-01-10 PROCEDURE — 1210000000 HC MED SURG R&B

## 2020-01-10 PROCEDURE — 93010 ELECTROCARDIOGRAM REPORT: CPT | Performed by: INTERNAL MEDICINE

## 2020-01-10 PROCEDURE — 6370000000 HC RX 637 (ALT 250 FOR IP): Performed by: NURSE PRACTITIONER

## 2020-01-10 PROCEDURE — 2580000003 HC RX 258: Performed by: INTERNAL MEDICINE

## 2020-01-10 PROCEDURE — 6370000000 HC RX 637 (ALT 250 FOR IP): Performed by: INTERNAL MEDICINE

## 2020-01-10 PROCEDURE — 6360000002 HC RX W HCPCS: Performed by: INTERNAL MEDICINE

## 2020-01-10 PROCEDURE — 85025 COMPLETE CBC W/AUTO DIFF WBC: CPT

## 2020-01-10 PROCEDURE — 83735 ASSAY OF MAGNESIUM: CPT

## 2020-01-10 PROCEDURE — 36415 COLL VENOUS BLD VENIPUNCTURE: CPT

## 2020-01-10 PROCEDURE — 97166 OT EVAL MOD COMPLEX 45 MIN: CPT

## 2020-01-10 RX ORDER — POTASSIUM CHLORIDE 7.45 MG/ML
10 INJECTION INTRAVENOUS
Status: DISPENSED | OUTPATIENT
Start: 2020-01-10 | End: 2020-01-10

## 2020-01-10 RX ORDER — POTASSIUM CHLORIDE 20 MEQ/1
40 TABLET, EXTENDED RELEASE ORAL ONCE
Status: COMPLETED | OUTPATIENT
Start: 2020-01-10 | End: 2020-01-10

## 2020-01-10 RX ADMIN — POTASSIUM CHLORIDE 40 MEQ: 20 TABLET, EXTENDED RELEASE ORAL at 10:00

## 2020-01-10 RX ADMIN — METOPROLOL TARTRATE 12.5 MG: 25 TABLET, FILM COATED ORAL at 20:45

## 2020-01-10 RX ADMIN — AMIODARONE HYDROCHLORIDE 200 MG: 200 TABLET ORAL at 10:00

## 2020-01-10 RX ADMIN — LEVOTHYROXINE SODIUM 125 MCG: 125 TABLET ORAL at 05:05

## 2020-01-10 RX ADMIN — FOLIC ACID 1 MG: 1 TABLET ORAL at 10:00

## 2020-01-10 RX ADMIN — METOPROLOL TARTRATE 12.5 MG: 25 TABLET, FILM COATED ORAL at 10:00

## 2020-01-10 RX ADMIN — CARBAMAZEPINE 200 MG: 200 TABLET ORAL at 10:21

## 2020-01-10 RX ADMIN — NITROFURANTOIN (MONOHYDRATE/MACROCRYSTALS) 100 MG: 75; 25 CAPSULE ORAL at 09:59

## 2020-01-10 RX ADMIN — CYANOCOBALAMIN TAB 500 MCG 500 MCG: 500 TAB at 10:00

## 2020-01-10 RX ADMIN — MIRTAZAPINE 7.5 MG: 15 TABLET, FILM COATED ORAL at 20:45

## 2020-01-10 RX ADMIN — ASPIRIN 81 MG 81 MG: 81 TABLET ORAL at 10:00

## 2020-01-10 RX ADMIN — CARBAMAZEPINE 200 MG: 200 TABLET ORAL at 20:45

## 2020-01-10 RX ADMIN — SODIUM CHLORIDE: 9 INJECTION, SOLUTION INTRAVENOUS at 05:05

## 2020-01-10 RX ADMIN — POTASSIUM CHLORIDE 10 MEQ: 7.46 INJECTION, SOLUTION INTRAVENOUS at 13:30

## 2020-01-10 RX ADMIN — NITROFURANTOIN (MONOHYDRATE/MACROCRYSTALS) 100 MG: 75; 25 CAPSULE ORAL at 20:45

## 2020-01-10 RX ADMIN — PANTOPRAZOLE SODIUM 40 MG: 40 TABLET, DELAYED RELEASE ORAL at 05:05

## 2020-01-10 ASSESSMENT — PAIN SCALES - GENERAL
PAINLEVEL_OUTOF10: 0

## 2020-01-10 NOTE — PROGRESS NOTES
Physical Therapy Med Surg Initial Assessment  Facility[facility]Department[facility]  Room: C078/E137-40       NAME: Zoey Rodriguez  : 1940 (67 y.o.)  MRN: [id number]  CODE STATUS: Full Code    Date of Service: 1/10/2020    Patient Diagnosis(es): Rhabdomyolysis [M62.82]   Chief Complaint   Patient presents with    Fatigue     Patient Active Problem List    Diagnosis Date Noted    Acute blood loss anemia 2019    Abnormality of gait and mobility due to Impaired Mobility and ADL's due to Rhabdomyolysis, Right Patellar Fx.   [facility] admit 19. 2019    Paroxysmal atrial fibrillation ([facility] 2019    HTN (hypertension) 2019    Stage 3 chronic kidney disease ([address] 2019    Rhabdomyolysis 2019    Hyponatremia 2019    Hypothyroid 2019    BMI 25.0-25.9,adult 2019    Tobacco abuse 2019    Closed fracture of right patella 2019    Acute kidney injury (LASHAE) with acute tubular necrosis (ATN) ([address] 11/15/2019    Aortic valve stenosis 2017    Thoracic ascending aortic aneurysm ([facility] [address] 2017    Vitamin D deficiency 2013    Seborrheic keratosis 10/09/2012    Anxiety state 2008    Esophageal reflux 2008    Hyperlipidemia 2003    Essential hypertension, benign 2002        Past Medical History:   Diagnosis Date    Aortic stenosis     Chronic kidney disease     COPD (chronic obstructive pulmonary disease) (HCC)     Heart failure (HCC)     High cholesterol     HTN (hypertension)     Hypothyroid     New onset a-fib ([address]      Past Surgical History:   Procedure Laterality Date    PATELLA FRACTURE SURGERY Right 2019    RIGHT PATELLA OPEN REDUCTION INTERNAL FIXATION  ROOM 180 performed by Kaylin Morrison MD at [address] [place] [address] N/A 2019    EGD performed by Suraj Burleson MD at [facility] OR       Chart Reviewed: Yes  Patient assessed for rehabilitation services?: Yes  General Comment  Comments: 6 weeks post op R patellar ORIF-weightbearing as tolerated in the knee immobilizer     Restrictions:  Restrictions/Precautions: Weight Bearing  Lower Extremity Weight Bearing Restrictions  Right Lower Extremity Weight Bearing: Weight Bearing As Tolerated  Partial Weight Bearing Percentage Or Pounds: \"weightbearing as tolerated in the knee immobilizer\" per ortho     SUBJECTIVE: Subjective: Pt agreeble to PT taylor. Pt reports that she has been at [facility] for rehab. Prior to this she was at [facility] inpatient rehab. Pt from home indep prior to R patellar ORIF    Pain  Pre Treatment Pain Screening  Pain at present: 0  Scale Used: Numeric Score    Post Treatment Pain Screening:   Pain Screening  Patient Currently in Pain: No  Pain Assessment  Pain Assessment: 0-10  Pain Level: 0    Prior Level of Function:  Social/Functional History  Lives With: Alone  Type of Home: Apartment  Home Layout: One level  Home Access: Level entry  Bathroom Shower/Tub: Tub/Shower unit  ADL Assistance: Independent  Homemaking Assistance: Independent  Ambulation Assistance: Independent  Transfer Assistance: Independent  Active :  Yes  Additional Comments: Has been at [facility] since late November for R knee fx    OBJECTIVE:   Vision: Impaired  Vision Exceptions: Wears glasses at all times  Hearing: Within functional limits    Cognition:  Overall Orientation Status: Within Functional Limits    Observation/Palpation  Posture: Poor(difficulty obtaining upright standing posture)  Observation: resting in bed, no acute distress noted, knee immobizer donned (readjusted for proper fit)    ROM:  RLE General AROM: hip and ankle WFL, knee NT d/t knee immobilizer s/p ORIF (6 wks post op)  LLE AROM : WFL    Strength:  Strength RLE  Comment: grossly 3/5  Strength LLE  Comment: grossly 3+/5  Strength Other  Other: impaired core strength based on mobility assessment    Neuro:  Balance  Sitting - Static: Fair(VC and tactile cues for risk for falls, and improve QOL. REQUIRES PT FOLLOW UP: Yes      PLAN OF CARE:  Plan  Times per week: 3-6  Current Treatment Recommendations: Strengthening, Functional Mobility Training, Neuromuscular Re-education, Home Exercise Program, Equipment Evaluation, Education, & procurement, Safety Education & Training, Gait Training, Transfer Training, Balance Training, Endurance Training, Stair training, Patient/Caregiver Education & Training, Positioning, Modalities  Safety Devices  Type of devices:  All fall risk precautions in place, Bed alarm in place, Left in bed, Call light within reach    Goals:  Long term goals  Long term goal 1: bed mobility with CGA  Long term goal 2: functional transfers with CGA  Long term goal 3: amb 10ft with LRAD and Min A  Long term goal 4: indep with HEP to improve LE strength  Long term goal 5: standing with 2ww and CGA x 60 seconds     AMPAC (6 CLICK) BASIC MOBILITY  AM-PAC Inpatient Mobility Raw Score : 11     Therapy Time:   Individual   Time In 1537   Time Out 1553   Minutes [id number] [place], [place], 01/10/20 at 4:05 PM

## 2020-01-10 NOTE — PROGRESS NOTES
Cardiology Progress Note      Rounding Cardiologist:  Brijesh Raymundo MD  Primary Cardiologist: Dr. Allie Russo     Date:  1/10/2020  Patient:  Yann Dewey  YOB: 1940  MRN:  [id number]       Gunzing 9 was seen and examined today at bedside. Her overnight history is negative. No chest pain or shortness of breath. Very poor appetite. Feels weak and doesn't want to get up. Consult HPI:   Yann Dewey is a 67 y.o.  female patient who is being at the request of EDDIE Perez for inpatient consultation of elevated troponin levels. She was admitted on 1/8/2020. Previous [address] and [address] [address] records have been reviewed in detail. She has a history of severe calcific aortic stenosis. She also has a history of paroxysmal atrial fibrillation, essential hypertension, COPD, hypothyroidism. and chronic kidney disease. She was admitted in November with an episode of rhabdomyolysis related to an accidental fall and a prolonged period of mobility. She was subsequently seen in follow-up by Dr. Delphine Feng and conservative treatment was recommended for now. The patient presented back to the emergency department with complaints of feeling extremely fatigued and weak. She was noted to have elevated CPK levels consistent with underlying rhabdomyolysis. She was also noted to have some mildly elevated troponin levels. She does not have any history of atherosclerotic heart disease. Remote myocardial perfusion study was negative for ischemia. She currently is resting comfortably. She does not have any other specific complaints. She denies any recent chest pain or shortness breath. She denies any orthopnea, PND, or increasing peripheral edema. She denies any palpitations, lightheadedness, near-syncope, or syncope. She denies any fever, chills, or cough. She denies any nausea, vomiting, or diaphoresis.   She denies any hemoptysis, hematemesis, melena, or Component Value Date    TSH 36.810 01/06/2020       Lipid Profile:  Lab Results   Component Value Date    TRIG 142 11/24/2019    HDL 45 11/24/2019    LDLCALC 58 11/24/2019       Lactate Level:  Recent Labs     01/08/20  2044   LACTA 1.2       CMP:  Recent Labs     01/08/20  1100 01/09/20  0638 01/10/20  0620   * 138 136   K 4.9 3.2* 2.9*   CL 94* 104 103   CO2 26 21 20   BUN 17 11 7*   CREATININE 1.12* 0.68 0.62   GLUCOSE 132* 93 95   CALCIUM 8.5 7.7* 7.2*   PROT 7.4  --   --    LABALBU 3.0*  --   --    BILITOT 0.3  --   --    ALKPHOS 88  --   --    *  --   --    ALT 56*  --   --        RADIOLOGY     XR KNEE RIGHT (1-2 VIEWS)   Final Result   1. Postoperative changes as detailed above, see above for details. No hardware failure or new fracture. XR CHEST PORTABLE   Final Result   AREA OF ATELECTASIS, PATCHY TO COALESCENT INFILTRATE LEFT LOWER LOBE WITH TRACE LEFT PLEURAL EFFUSION SUPERIMPOSED UPON COPD. XR CHEST PORTABLE   Final Result   Impression:     1. Stable, enlarged cardiomediastinal silhouette with thoracic aortic vascular calcifications. 2. No acute cardiopulmonary disease identified. CURRENT MEDICATIONS       sodium chloride flush  10 mL Intravenous 2 times per day    levothyroxine  125 mcg Oral Daily    nitrofurantoin (macrocrystal-monohydrate)  100 mg Oral 2 times per day    amiodarone  200 mg Oral Daily    aspirin  81 mg Oral Daily    carBAMazepine  200 mg Oral BID    folic acid  1 mg Oral Daily    metoprolol tartrate  12.5 mg Oral BID    mirtazapine  7.5 mg Oral Nightly    pantoprazole  40 mg Oral BID AC    vitamin B-12  500 mcg Oral Daily       INTRAVENOUS MEDICATIONS       sodium chloride 75 mL/hr at 01/10/20 1022       ASSESSMENT     1. Recurrent rhabdomyolysis. 2.  Acute kidney injury. 3.  Elevated troponin levels. Suspect this is related to interference with the assay secondary to highly elevated CPK levels. Doubt acute coronary syndrome. No angina pectoris. No overt congestive heart failure. Nonspecific anterior ST-T wave changes on EKG. 4.  Paroxysmal atrial fibrillation. Currently remains in normal sinus rhythm. 5.  Severe calcific aortic stenosis. Currently compensated. 6.  Severe hypothyroidism. 7.  Urinary tract infection. 8. Volume depletion. Improved. 9.  Recent right patellar fracture requiring surgery secondary to accidental fall. PLAN     Conservative cardiac care. Cautious hydration. I spoke with patient regarding her need for better nutrition and motivation to participate in physical therapy. She likely need eventual outpatient cardiac catheterization and possible TAVR implant. Please do not hesitate to call with questions.   Electronically signed by Gerber Ramos MD, [facility] on 1/10/2020 at 4:55 PM

## 2020-01-10 NOTE — PROGRESS NOTES
Dr. Hans Sicard received critical lab and did not wish to have any further interventions regarding it. Pt is A&O x3, has leg immobilizer in place with mepalex on R knee cap. There is 2 open areas on that knee that are documented in the chart. Her sacrum is reddened but intact. She used the bedpan this evening and had a large soft/formed BM. Has been repositioned every 2 hours. Pt denies any pain at this time. Shift assessment performed and documented, will continue to monitor pt.      Electronically signed by Karina Aiken RN on 1/10/2020 at 3:04 AM

## 2020-01-10 NOTE — PLAN OF CARE
See OT evaluation for all goals and OT POC.  Electronically signed by JACK Augustin/L on 1/10/2020 at 4:12 PM

## 2020-01-10 NOTE — PROGRESS NOTES
Perfectserved  regarding pt. She has developed a moist weak cough and her breath sounds are very wet sounding when auscultated. Pt has a history of CHF so it concerned me that her fluids were running at 100mL/hr with this new development.  said hold fluids for now. Will continue to monitor.      Electronically signed by Tracey Naranjo RN on 1/10/2020 at 5:32 AM

## 2020-01-10 NOTE — PROGRESS NOTES
Received critical lab value of 0.208 which was trending upward. Contact Dr. Nicolle Pete to let her know, awaiting orders.     Electronically signed by Lazarus Gerhard, RN on 1/9/2020 at 10:15 PM

## 2020-01-10 NOTE — PROGRESS NOTES
mL at 01/09/20 2133    sodium chloride flush 0.9 % injection 10 mL  10 mL Intravenous PRN Carlen Hamper, APRN - CNP        magnesium hydroxide (MILK OF MAGNESIA) 400 MG/5ML suspension 30 mL  30 mL Oral Daily PRN Carlen Hamper, APRN - CNP        ondansetron (ZOFRAN) injection 4 mg  4 mg Intravenous Q6H PRN Carlen Hamper, APRN - CNP        acetaminophen (TYLENOL) tablet 650 mg  650 mg Oral Q4H PRN Carlen Hamper, APRN - CNP        0.9 % sodium chloride infusion   Intravenous Continuous Erle Roads, DO 75 mL/hr at 01/10/20 1022      levothyroxine (SYNTHROID) tablet 125 mcg  125 mcg Oral Daily Carlen Hamper, APRN - CNP   125 mcg at 01/10/20 0505    nitrofurantoin (macrocrystal-monohydrate) (MACROBID) capsule 100 mg  100 mg Oral 2 times per day Carlen Hamper, APRN - CNP   100 mg at 01/10/20 0959    amiodarone (CORDARONE) tablet 200 mg  200 mg Oral Daily Erle Roads, DO   200 mg at 01/10/20 1000    acetaminophen (TYLENOL) tablet 650 mg  650 mg Oral Q4H PRN Erle Roads, DO        aspirin chewable tablet 81 mg  81 mg Oral Daily Erle Roads, DO   81 mg at 01/10/20 1000    carBAMazepine (TEGRETOL) tablet 200 mg  200 mg Oral BID Erle Roads, DO   200 mg at 45/83/17 9481    folic acid (FOLVITE) tablet 1 mg  1 mg Oral Daily Erle Roads, DO   1 mg at 01/10/20 1000    metoprolol tartrate (LOPRESSOR) tablet 12.5 mg  12.5 mg Oral BID Erle Roads, DO   12.5 mg at 01/10/20 1000    mirtazapine (REMERON) tablet 7.5 mg  7.5 mg Oral Nightly Erle Roads, DO   7.5 mg at 01/09/20 2134    pantoprazole (PROTONIX) tablet 40 mg  40 mg Oral BID AC Yazid R Jose, DO   40 mg at 01/10/20 0505    vitamin B-12 (CYANOCOBALAMIN) tablet 500 mcg  500 mcg Oral Daily Erle Roads, DO   500 mcg at 01/09/20 1051     Assessment and Plan: Active problems:    # dehydration- IVF, replete electrolytes. Decrease IV fluid to 75/hour.      # rhabdomyolysis- improving, resume IVF    # wheezing/ h/o

## 2020-01-10 NOTE — PROGRESS NOTES
old woman from SNF who demonstrates the above deficits which impact her ability to perform ADLs and IADLs. Pt. would benefit from skilled OT to maximize independence and safety with ADL tasks.    Performance deficits / Impairments: Decreased functional mobility , Decreased ADL status, Decreased strength, Decreased endurance, Decreased balance, Decreased high-level IADLs, Decreased fine motor control, Decreased coordination  Prognosis: Good  Discharge Recommendations: Continue to assess pending progress  Decision Making: Medium Complexity  History: Pt's medical history is moderately complex  Exam: Pt. has 8 performance deficits  Assistance / Modification: Pt. requires mod A    Six Click Score   How much help for putting on and taking off regular lower body clothing?: A Lot  How much help for Bathing?: A Lot  How much help for Toileting?: A Lot  How much help for putting on and taking off regular upper body clothing?: A Little  How much help for taking care of personal grooming?: None  How much help for eating meals?: None  AM-[facility] Inpatient Daily Activity Raw Score: 17  AM-PAC Inpatient ADL T-Scale Score : 37.26  ADL Inpatient CMS 0-100% Score: 50.11    Plan:  Plan  Times per week: 1-3x  Plan weeks: Length of acute stay  Current Treatment Recommendations: Strengthening, Balance Training, Functional Mobility Training, Endurance Training, Pain Management, Safety Education & Training, Patient/Caregiver Education & Training, Equipment Evaluation, Education, & procurement, Positioning, Self-Care / ADL, Home Management Training    Goals:   Patient will:    - Improve functional endurance to tolerate/complete 60 mins of ADL's  - Be Mod I in UB ADLs   - Be Min A in LB ADLs  - Be Min A in ADL transfers without LOB  - Be Min A in toileting tasks  - Improve B hand fine motor coordination to [facility] in order to manage clothing fasteners/self-care containers in a timely manner  - Improve B UE strength and endurance to 3+/5 in order to participate in self-care activities as projected. - Access appropriate D/C site with as few architectural barriers as possible. - Sequence self-care tasks with no verbal cues for safety    Patient Goal: Patient goals : \"I want to get moving\"     Discussed and agreed upon: Yes Comments:     Therapy Time:   OT Individual Minutes  Time In: 1536  Time Out: 3328  Minutes: 12    Eval: 12 minutes     Electronically signed by:     TIM Dobbs  1/10/2020, 4:10 PM

## 2020-01-11 LAB
ANION GAP SERPL CALCULATED.3IONS-SCNC: 14 MEQ/L (ref 9–15)
BASOPHILS ABSOLUTE: 0 K/UL (ref 0–0.2)
BASOPHILS RELATIVE PERCENT: 0.5 %
BUN BLDV-MCNC: 6 MG/DL (ref 8–23)
CALCIUM SERPL-MCNC: 7.4 MG/DL (ref 8.5–9.9)
CHLORIDE BLD-SCNC: 102 MEQ/L (ref 95–107)
CK MB: 15.9 NG/ML (ref 0–3.8)
CO2: 20 MEQ/L (ref 20–31)
CREAT SERPL-MCNC: 0.65 MG/DL (ref 0.5–0.9)
CREATINE KINASE-MB INDEX: 0.6 % (ref 0–3.5)
EOSINOPHILS ABSOLUTE: 0.1 K/UL (ref 0–0.7)
EOSINOPHILS RELATIVE PERCENT: 1.8 %
GFR AFRICAN AMERICAN: >60
GFR NON-AFRICAN AMERICAN: >60
GLUCOSE BLD-MCNC: 84 MG/DL (ref 70–99)
HCT VFR BLD CALC: 30.7 % (ref 37–47)
HEMOGLOBIN: 10.3 G/DL (ref 12–16)
LYMPHOCYTES ABSOLUTE: 2.3 K/UL (ref 1–4.8)
LYMPHOCYTES RELATIVE PERCENT: 30.3 %
MAGNESIUM: 1.6 MG/DL (ref 1.7–2.4)
MCH RBC QN AUTO: 31.4 PG (ref 27–31.3)
MCHC RBC AUTO-ENTMCNC: 33.6 % (ref 33–37)
MCV RBC AUTO: 93.5 FL (ref 82–100)
MONOCYTES ABSOLUTE: 0.7 K/UL (ref 0.2–0.8)
MONOCYTES RELATIVE PERCENT: 8.6 %
NEUTROPHILS ABSOLUTE: 4.5 K/UL (ref 1.4–6.5)
NEUTROPHILS RELATIVE PERCENT: 58.8 %
ORGANISM: ABNORMAL
PDW BLD-RTO: 14.9 % (ref 11.5–14.5)
PLATELET # BLD: 330 K/UL (ref 130–400)
POTASSIUM REFLEX MAGNESIUM: 3.4 MEQ/L (ref 3.4–4.9)
RBC # BLD: 3.28 M/UL (ref 4.2–5.4)
SODIUM BLD-SCNC: 136 MEQ/L (ref 135–144)
TOTAL CK: 2768 U/L (ref 0–170)
URINE CULTURE, ROUTINE: ABNORMAL
WBC # BLD: 7.7 K/UL (ref 4.8–10.8)

## 2020-01-11 PROCEDURE — 80048 BASIC METABOLIC PNL TOTAL CA: CPT

## 2020-01-11 PROCEDURE — 6370000000 HC RX 637 (ALT 250 FOR IP): Performed by: NURSE PRACTITIONER

## 2020-01-11 PROCEDURE — 82553 CREATINE MB FRACTION: CPT

## 2020-01-11 PROCEDURE — 82550 ASSAY OF CK (CPK): CPT

## 2020-01-11 PROCEDURE — 36415 COLL VENOUS BLD VENIPUNCTURE: CPT

## 2020-01-11 PROCEDURE — 83735 ASSAY OF MAGNESIUM: CPT

## 2020-01-11 PROCEDURE — 2580000003 HC RX 258: Performed by: INTERNAL MEDICINE

## 2020-01-11 PROCEDURE — 1210000000 HC MED SURG R&B

## 2020-01-11 PROCEDURE — 85025 COMPLETE CBC W/AUTO DIFF WBC: CPT

## 2020-01-11 PROCEDURE — 2580000003 HC RX 258: Performed by: NURSE PRACTITIONER

## 2020-01-11 PROCEDURE — 6370000000 HC RX 637 (ALT 250 FOR IP): Performed by: INTERNAL MEDICINE

## 2020-01-11 RX ADMIN — NITROFURANTOIN (MONOHYDRATE/MACROCRYSTALS) 100 MG: 75; 25 CAPSULE ORAL at 21:56

## 2020-01-11 RX ADMIN — CARBAMAZEPINE 200 MG: 200 TABLET ORAL at 08:24

## 2020-01-11 RX ADMIN — PANTOPRAZOLE SODIUM 40 MG: 40 TABLET, DELAYED RELEASE ORAL at 07:49

## 2020-01-11 RX ADMIN — SODIUM CHLORIDE: 9 INJECTION, SOLUTION INTRAVENOUS at 16:19

## 2020-01-11 RX ADMIN — MIRTAZAPINE 7.5 MG: 15 TABLET, FILM COATED ORAL at 21:56

## 2020-01-11 RX ADMIN — CARBAMAZEPINE 200 MG: 200 TABLET ORAL at 21:56

## 2020-01-11 RX ADMIN — SODIUM CHLORIDE: 9 INJECTION, SOLUTION INTRAVENOUS at 04:02

## 2020-01-11 RX ADMIN — ASPIRIN 81 MG 81 MG: 81 TABLET ORAL at 08:25

## 2020-01-11 RX ADMIN — FOLIC ACID 1 MG: 1 TABLET ORAL at 08:24

## 2020-01-11 RX ADMIN — Medication 10 ML: at 22:01

## 2020-01-11 RX ADMIN — METOPROLOL TARTRATE 12.5 MG: 25 TABLET, FILM COATED ORAL at 21:56

## 2020-01-11 RX ADMIN — PANTOPRAZOLE SODIUM 40 MG: 40 TABLET, DELAYED RELEASE ORAL at 16:14

## 2020-01-11 RX ADMIN — NITROFURANTOIN (MONOHYDRATE/MACROCRYSTALS) 100 MG: 75; 25 CAPSULE ORAL at 08:25

## 2020-01-11 RX ADMIN — CYANOCOBALAMIN TAB 500 MCG 500 MCG: 500 TAB at 08:25

## 2020-01-11 RX ADMIN — METOPROLOL TARTRATE 12.5 MG: 25 TABLET, FILM COATED ORAL at 08:24

## 2020-01-11 RX ADMIN — AMIODARONE HYDROCHLORIDE 200 MG: 200 TABLET ORAL at 08:24

## 2020-01-11 RX ADMIN — LEVOTHYROXINE SODIUM 125 MCG: 125 TABLET ORAL at 07:49

## 2020-01-11 NOTE — PROGRESS NOTES
Cardiology Progress Note      Rounding Cardiologist:  Dana Leonard MD  Primary Cardiologist: Dr. Andrea Hernández     Date:  1/11/2020  Patient:  Tisha Aviles  YOB: 1940  MRN:  [id number]       Gunzing 9 was seen and examined today at bedside. Her overnight history is negative. No chest pain or shortness of breath. Very poor appetite. Feels weak and doesn't want to get up. Consult HPI:   Tisha Aviles is a 67 y.o.  female patient who is being at the request of EDDIE Jiang for inpatient consultation of elevated troponin levels. She was admitted on 1/8/2020. Previous [address] [place] [address] and [facility] records have been reviewed in detail. She has a history of severe calcific aortic stenosis. She also has a history of paroxysmal atrial fibrillation, essential hypertension, COPD, hypothyroidism. and chronic kidney disease. She was admitted in November with an episode of rhabdomyolysis related to an accidental fall and a prolonged period of mobility. She was subsequently seen in follow-up by Dr. Emili White and conservative treatment was recommended for now. The patient presented back to the emergency department with complaints of feeling extremely fatigued and weak. She was noted to have elevated CPK levels consistent with underlying rhabdomyolysis. She was also noted to have some mildly elevated troponin levels. She does not have any history of atherosclerotic heart disease. Remote myocardial perfusion study was negative for ischemia. She currently is resting comfortably. She does not have any other specific complaints. She denies any recent chest pain or shortness breath. She denies any orthopnea, PND, or increasing peripheral edema. She denies any palpitations, lightheadedness, near-syncope, or syncope. She denies any fever, chills, or cough. She denies any nausea, vomiting, or diaphoresis.   She denies any hemoptysis, hematemesis, melena, or function. Aortic valve leaflets are severely thickened. Tricuspid aortic valve. The aortic valve area is 0.8 cm2 with a maximum gradient of 83 mmHg and a   mean gradient of 40 mmHg. Aortic valve leaflets are severely thickened. There is mild aortic regurgitation. Aortic root is mildly dilated. The mitral valve was not well visualized . Mild (1+) mitral regurgitation is present. Tricuspid valve was not well visualized. No evidence of tricuspid regurgitation,hence PA systolic pressure could   not be estimated. No evidence of any pericardial effusion. No evidence of pleural effusion. No prior echocardiogram report is available for comparison. Signature   ----------------------------------------------------------------   Electronically signed by Leonie Delgado MD(Interpreting   physician) on 11/16/2019 05:39 PM      LAB DATA   BMP:  Recent Labs     01/09/20  6729 01/10/20  0620 01/11/20  0635    136 136   K 3.2* 2.9* 3.4    103 102   CO2 21 20 20   BUN 11 7* 6*   CREATININE 0.68 0.62 0.65   LABGLOM >60.0 >60.0 >60.0       CBC:  Recent Labs     01/09/20  0638 01/10/20  0620 01/11/20  0635   WBC 8.4 7.6 7.7   RBC 3.17* 3.04* 3.28*   HGB 10.0* 9.5* 10.3*   HCT 30.2* 28.5* 30.7*   MCV 95.4 93.8 93.5   MCH 31.6* 31.4* 31.4*   MCHC 33.1 33.4 33.6   RDW 15.2* 15.0* 14.9*    305 330       Cardiac Enzymes:   Recent Labs     01/09/20  0638  01/09/20  1955 01/10/20  0202 01/10/20  0620 01/11/20  0635   CKTOTAL 2,443*  2,406*  --   --   --  1,950* 2,768*   CKMB 12.9*  12.3*  --   --   --  12.7* 15.9*   TROPONINI 0.179*   < > 0.208* 0.205* 0.210*  --     < > = values in this interval not displayed.        Hepatic Function Panel:  Recent Labs     01/08/20  1100   ALKPHOS 88   ALT 56*   *   PROT 7.4   BILITOT 0.3   LABALBU 3.0*       Magnesium:  Recent Labs     01/11/20  0635   MG 1.6*       INR:  Recent Labs     01/08/20  1100   PROTIME 13.8   INR 1.0       TSH:  Lab Results Component Value Date    TSH 36.810 01/06/2020       Lipid Profile:  Lab Results   Component Value Date    TRIG 142 11/24/2019    HDL 45 11/24/2019    LDLCALC 58 11/24/2019       Lactate Level:  Recent Labs     01/08/20  2044   LACTA 1.2       CMP:  Recent Labs     01/08/20  1100 01/09/20  0638 01/10/20  0620 01/11/20  0635   * 138 136 136   K 4.9 3.2* 2.9* 3.4   CL 94* 104 103 102   CO2 26 21 20 20   BUN 17 11 7* 6*   CREATININE 1.12* 0.68 0.62 0.65   GLUCOSE 132* 93 95 84   CALCIUM 8.5 7.7* 7.2* 7.4*   PROT 7.4  --   --   --    LABALBU 3.0*  --   --   --    BILITOT 0.3  --   --   --    ALKPHOS 88  --   --   --    *  --   --   --    ALT 56*  --   --   --        RADIOLOGY     XR KNEE RIGHT (1-2 VIEWS)   Final Result   1. Postoperative changes as detailed above, see above for details. No hardware failure or new fracture. XR CHEST PORTABLE   Final Result   AREA OF ATELECTASIS, PATCHY TO COALESCENT INFILTRATE LEFT LOWER LOBE WITH TRACE LEFT PLEURAL EFFUSION SUPERIMPOSED UPON COPD. XR CHEST PORTABLE   Final Result   Impression:     1. Stable, enlarged cardiomediastinal silhouette with thoracic aortic vascular calcifications. 2. No acute cardiopulmonary disease identified. CURRENT MEDICATIONS       sodium chloride flush  10 mL Intravenous 2 times per day    levothyroxine  125 mcg Oral Daily    nitrofurantoin (macrocrystal-monohydrate)  100 mg Oral 2 times per day    amiodarone  200 mg Oral Daily    aspirin  81 mg Oral Daily    carBAMazepine  200 mg Oral BID    folic acid  1 mg Oral Daily    metoprolol tartrate  12.5 mg Oral BID    mirtazapine  7.5 mg Oral Nightly    pantoprazole  40 mg Oral BID AC    vitamin B-12  500 mcg Oral Daily       INTRAVENOUS MEDICATIONS       sodium chloride 75 mL/hr at 01/11/20 0402       ASSESSMENT     1. Recurrent rhabdomyolysis. 2.  Acute kidney injury. 3.  Elevated troponin levels.  Suspect this is related to interference with the

## 2020-01-11 NOTE — PROGRESS NOTES
Physician Progress Note    2020   12:26 PM    Name:  Jessica Alexander  MRN:    [id number]      Day: 3     Admit Date: 2020 10:48 AM  PCP: Mavis Garland MD    Code Status:  Full Code    Subjective:     Patient is doing about the same since yesterday. Her appetite still low. She is not moving much. She worked with PT yesterday. She seemed resistant to getting out of the bed. Her CK is worse today. She has a moist cough. Physical Examination:      Vitals:  BP (!) 150/78   Pulse 64   Temp 97.9 °F (36.6 °C) (Oral)   Resp 17   Ht 5' (1.524 m)   Wt 151 lb (68.5 kg)   LMP  (LMP Unknown)   SpO2 95%   BMI 29.49 kg/m²   Temp (24hrs), Av.4 °F (36.9 °C), Min:97.9 °F (36.6 °C), Max:98.8 °F (37.1 °C)      General appearance: alert, cooperative and no distress  Mental Status: oriented to person, place and time and normal affect  Lungs: clear to auscultation bilaterally, normal effort  Heart: regular rate and rhythm, no murmur  Abdomen: soft, nontender, nondistended, bowel sounds present, no masses  Extremities: no edema, redness, tenderness in the calves  Skin: no gross lesions, rashes    Data:     Labs:  Recent Labs     01/10/20  0620 20  0635   WBC 7.6 7.7   HGB 9.5* 10.3*    330     Recent Labs     01/10/20  0620 20  0635    136   K 2.9* 3.4    102   CO2 20 20   BUN 7* 6*   CREATININE 0.62 0.65   GLUCOSE 95 84     No results for input(s): AST, ALT, ALB, BILITOT, ALKPHOS in the last 72 hours.     Current Facility-Administered Medications   Medication Dose Route Frequency Provider Last Rate Last Dose    ipratropium-albuterol (DUONEB) nebulizer solution 1 ampule  1 ampule Inhalation Q4H PRN Ann Melena, DO        sodium chloride flush 0.9 % injection 10 mL  10 mL Intravenous 2 times per day EDDIE Sidhu - CNP   10 mL at 20 2133    sodium chloride flush 0.9 % injection 10 mL  10 mL Intravenous PRN EDDIE Sidhu - CNP        magnesium hydroxide elevated troponin- no chest pain, likely noncardiac injury related, tele, stop trending CE's, discussed with cardiologist, no plan for any intervention. Think elevated enzymes are related to rhabdo. # Chronic Diastolic CHF/HFpEF  -Watch her respiratory status while on IV hydration. Resume home meds. #LASHAE ruled out    #Recent right knee surgery-has some pain there. Seen by orthopedics. No new recommendation. Follow-up as outpatient. #Sickle deconditioning  -PT/OT.   Encourage out of bed to the chair.     DVT/PPx        DISCHARGE PLANNING  Pending improvement in labs        Electronically signed by Luke Crow DO on 1/11/2020 at 12:26 PM

## 2020-01-12 LAB
ANION GAP SERPL CALCULATED.3IONS-SCNC: 10 MEQ/L (ref 9–15)
BASOPHILS ABSOLUTE: 0.1 K/UL (ref 0–0.2)
BASOPHILS RELATIVE PERCENT: 0.8 %
BUN BLDV-MCNC: 6 MG/DL (ref 8–23)
CALCIUM SERPL-MCNC: 7.5 MG/DL (ref 8.5–9.9)
CHLORIDE BLD-SCNC: 102 MEQ/L (ref 95–107)
CK MB: 19.7 NG/ML (ref 0–3.8)
CO2: 19 MEQ/L (ref 20–31)
CREAT SERPL-MCNC: 0.59 MG/DL (ref 0.5–0.9)
CREATINE KINASE-MB INDEX: 0.6 % (ref 0–3.5)
EOSINOPHILS ABSOLUTE: 0.2 K/UL (ref 0–0.7)
EOSINOPHILS RELATIVE PERCENT: 3.1 %
GFR AFRICAN AMERICAN: >60
GFR NON-AFRICAN AMERICAN: >60
GLUCOSE BLD-MCNC: 84 MG/DL (ref 70–99)
HCT VFR BLD CALC: 34.1 % (ref 37–47)
HEMOGLOBIN: 11 G/DL (ref 12–16)
LYMPHOCYTES ABSOLUTE: 2 K/UL (ref 1–4.8)
LYMPHOCYTES RELATIVE PERCENT: 25.6 %
MAGNESIUM: 1.6 MG/DL (ref 1.7–2.4)
MCH RBC QN AUTO: 31.5 PG (ref 27–31.3)
MCHC RBC AUTO-ENTMCNC: 32.4 % (ref 33–37)
MCV RBC AUTO: 97.1 FL (ref 82–100)
MONOCYTES ABSOLUTE: 0.7 K/UL (ref 0.2–0.8)
MONOCYTES RELATIVE PERCENT: 9.2 %
NEUTROPHILS ABSOLUTE: 4.9 K/UL (ref 1.4–6.5)
NEUTROPHILS RELATIVE PERCENT: 61.3 %
PDW BLD-RTO: 15.2 % (ref 11.5–14.5)
PLATELET # BLD: 337 K/UL (ref 130–400)
POTASSIUM REFLEX MAGNESIUM: 3.4 MEQ/L (ref 3.4–4.9)
RBC # BLD: 3.51 M/UL (ref 4.2–5.4)
SODIUM BLD-SCNC: 131 MEQ/L (ref 135–144)
TOTAL CK: 3039 U/L (ref 0–170)
WBC # BLD: 7.9 K/UL (ref 4.8–10.8)

## 2020-01-12 PROCEDURE — 1210000000 HC MED SURG R&B

## 2020-01-12 PROCEDURE — 83735 ASSAY OF MAGNESIUM: CPT

## 2020-01-12 PROCEDURE — 80048 BASIC METABOLIC PNL TOTAL CA: CPT

## 2020-01-12 PROCEDURE — 6370000000 HC RX 637 (ALT 250 FOR IP): Performed by: INTERNAL MEDICINE

## 2020-01-12 PROCEDURE — 2580000003 HC RX 258: Performed by: NURSE PRACTITIONER

## 2020-01-12 PROCEDURE — 6360000002 HC RX W HCPCS: Performed by: INTERNAL MEDICINE

## 2020-01-12 PROCEDURE — 36415 COLL VENOUS BLD VENIPUNCTURE: CPT

## 2020-01-12 PROCEDURE — 82553 CREATINE MB FRACTION: CPT

## 2020-01-12 PROCEDURE — 2580000003 HC RX 258: Performed by: INTERNAL MEDICINE

## 2020-01-12 PROCEDURE — 85025 COMPLETE CBC W/AUTO DIFF WBC: CPT

## 2020-01-12 PROCEDURE — 82550 ASSAY OF CK (CPK): CPT

## 2020-01-12 PROCEDURE — 6370000000 HC RX 637 (ALT 250 FOR IP): Performed by: NURSE PRACTITIONER

## 2020-01-12 RX ORDER — MAGNESIUM SULFATE IN WATER 40 MG/ML
4 INJECTION, SOLUTION INTRAVENOUS ONCE
Status: COMPLETED | OUTPATIENT
Start: 2020-01-12 | End: 2020-01-12

## 2020-01-12 RX ORDER — POTASSIUM CHLORIDE 20 MEQ/1
60 TABLET, EXTENDED RELEASE ORAL ONCE
Status: COMPLETED | OUTPATIENT
Start: 2020-01-12 | End: 2020-01-12

## 2020-01-12 RX ORDER — AMLODIPINE BESYLATE 5 MG/1
5 TABLET ORAL DAILY
Status: DISCONTINUED | OUTPATIENT
Start: 2020-01-12 | End: 2020-01-16 | Stop reason: HOSPADM

## 2020-01-12 RX ADMIN — LEVOTHYROXINE SODIUM 125 MCG: 125 TABLET ORAL at 07:11

## 2020-01-12 RX ADMIN — Medication 10 ML: at 22:43

## 2020-01-12 RX ADMIN — FOLIC ACID 1 MG: 1 TABLET ORAL at 11:23

## 2020-01-12 RX ADMIN — CARBAMAZEPINE 200 MG: 200 TABLET ORAL at 22:40

## 2020-01-12 RX ADMIN — PANTOPRAZOLE SODIUM 40 MG: 40 TABLET, DELAYED RELEASE ORAL at 15:04

## 2020-01-12 RX ADMIN — AMLODIPINE BESYLATE 5 MG: 5 TABLET ORAL at 13:04

## 2020-01-12 RX ADMIN — SODIUM CHLORIDE: 9 INJECTION, SOLUTION INTRAVENOUS at 22:40

## 2020-01-12 RX ADMIN — PANTOPRAZOLE SODIUM 40 MG: 40 TABLET, DELAYED RELEASE ORAL at 07:11

## 2020-01-12 RX ADMIN — ASPIRIN 81 MG 81 MG: 81 TABLET ORAL at 11:23

## 2020-01-12 RX ADMIN — POTASSIUM CHLORIDE 60 MEQ: 20 TABLET, EXTENDED RELEASE ORAL at 13:05

## 2020-01-12 RX ADMIN — METOPROLOL TARTRATE 12.5 MG: 25 TABLET, FILM COATED ORAL at 22:40

## 2020-01-12 RX ADMIN — SODIUM CHLORIDE: 9 INJECTION, SOLUTION INTRAVENOUS at 15:04

## 2020-01-12 RX ADMIN — NITROFURANTOIN (MONOHYDRATE/MACROCRYSTALS) 100 MG: 75; 25 CAPSULE ORAL at 11:23

## 2020-01-12 RX ADMIN — CARBAMAZEPINE 200 MG: 200 TABLET ORAL at 11:51

## 2020-01-12 RX ADMIN — MAGNESIUM SULFATE HEPTAHYDRATE 4 G: 40 INJECTION, SOLUTION INTRAVENOUS at 15:28

## 2020-01-12 RX ADMIN — MIRTAZAPINE 7.5 MG: 15 TABLET, FILM COATED ORAL at 22:41

## 2020-01-12 RX ADMIN — NITROFURANTOIN (MONOHYDRATE/MACROCRYSTALS) 100 MG: 75; 25 CAPSULE ORAL at 22:40

## 2020-01-12 RX ADMIN — Medication 10 ML: at 11:24

## 2020-01-12 RX ADMIN — METOPROLOL TARTRATE 12.5 MG: 25 TABLET, FILM COATED ORAL at 11:22

## 2020-01-12 RX ADMIN — CYANOCOBALAMIN TAB 500 MCG 500 MCG: 500 TAB at 11:23

## 2020-01-12 RX ADMIN — AMIODARONE HYDROCHLORIDE 200 MG: 200 TABLET ORAL at 11:23

## 2020-01-12 ASSESSMENT — PAIN SCALES - GENERAL: PAINLEVEL_OUTOF10: 0

## 2020-01-12 NOTE — PROGRESS NOTES
Physician Progress Note    2020   12:17 PM    Name:  Kulwinder Billy  MRN:    [id number]      Day: 4     Admit Date: 2020 10:48 AM  PCP: Joselyn Pride MD    Code Status:  Full Code    Subjective:     Patient denies dyspnea and cough today. She however has not moved much since yesterday although she was out of the bed for about an hour. She has not worked with physical therapy yet. She is not eating much also. Her magnesium and potassium are low. Physical Examination:      Vitals:  BP (!) 176/82   Pulse 68   Temp 97.9 °F (36.6 °C) (Oral)   Resp 16   Ht 5' (1.524 m)   Wt 151 lb (68.5 kg)   LMP  (LMP Unknown)   SpO2 96%   BMI 29.49 kg/m²   Temp (24hrs), Av °F (36.7 °C), Min:97.9 °F (36.6 °C), Max:98.1 °F (36.7 °C)      General appearance: alert, cooperative and no distress  Mental Status: oriented to person, place and time and normal affect  Lungs: clear to auscultation bilaterally, normal effort  Heart: regular rate and rhythm, no murmur  Abdomen: soft, nontender, nondistended, bowel sounds present, no masses  Extremities: no edema, redness, tenderness in the calves  Skin: no gross lesions, rashes    Data:     Labs:  Recent Labs     20  0635 20  0741   WBC 7.7 7.9   HGB 10.3* 11.0*    337     Recent Labs     20  0635 20  0741    131*   K 3.4 3.4    102   CO2 20 19*   BUN 6* 6*   CREATININE 0.65 0.59   GLUCOSE 84 84     No results for input(s): AST, ALT, ALB, BILITOT, ALKPHOS in the last 72 hours.     Current Facility-Administered Medications   Medication Dose Route Frequency Provider Last Rate Last Dose    amLODIPine (NORVASC) tablet 5 mg  5 mg Oral Daily Robert Bishop MD        magnesium sulfate 4 g in 100 mL IVPB premix  4 g Intravenous Once Jacy Ring, DO        potassium chloride (KLOR-CON M) extended release tablet 60 mEq  60 mEq Oral Once Jacy Ring, DO        ipratropium-albuterol (DUONEB) nebulizer solution 1 ampule  1

## 2020-01-12 NOTE — PROGRESS NOTES
Improved. 9.  Recent right patellar fracture requiring surgery secondary to accidental fall. 10.  Worsening HTN. PLAN     Conservative cardiac care. Will add amlodipine 5 mg daily in lieu of HCTZ or lisinopril. Continue amiodarone and low dose beta blocker. I once spoke with patient regarding her need for better nutrition and motivation to participate in physical therapy. She likely need eventual outpatient cardiac catheterization and possible TAVR implant. Please do not hesitate to call with questions.   Electronically signed by Harsh Pritchard MD, [facility] on 1/12/2020 at 11:48 AM

## 2020-01-13 LAB
ANION GAP SERPL CALCULATED.3IONS-SCNC: 13 MEQ/L (ref 9–15)
BASOPHILS ABSOLUTE: 0.1 K/UL (ref 0–0.2)
BASOPHILS RELATIVE PERCENT: 0.8 %
BLOOD CULTURE, ROUTINE: NORMAL
BUN BLDV-MCNC: 6 MG/DL (ref 8–23)
CALCIUM SERPL-MCNC: 7.5 MG/DL (ref 8.5–9.9)
CHLORIDE BLD-SCNC: 102 MEQ/L (ref 95–107)
CK MB: 21.2 NG/ML (ref 0–3.8)
CO2: 19 MEQ/L (ref 20–31)
CREAT SERPL-MCNC: 0.59 MG/DL (ref 0.5–0.9)
CREATINE KINASE-MB INDEX: 0.8 % (ref 0–3.5)
CULTURE, BLOOD 2: NORMAL
EOSINOPHILS ABSOLUTE: 0.3 K/UL (ref 0–0.7)
EOSINOPHILS RELATIVE PERCENT: 3.3 %
GFR AFRICAN AMERICAN: >60
GFR NON-AFRICAN AMERICAN: >60
GLUCOSE BLD-MCNC: 84 MG/DL (ref 70–99)
HCT VFR BLD CALC: 31.8 % (ref 37–47)
HEMOGLOBIN: 10.7 G/DL (ref 12–16)
LYMPHOCYTES ABSOLUTE: 1.7 K/UL (ref 1–4.8)
LYMPHOCYTES RELATIVE PERCENT: 22.5 %
MAGNESIUM: 2.1 MG/DL (ref 1.7–2.4)
MCH RBC QN AUTO: 31.5 PG (ref 27–31.3)
MCHC RBC AUTO-ENTMCNC: 33.7 % (ref 33–37)
MCV RBC AUTO: 93.6 FL (ref 82–100)
MONOCYTES ABSOLUTE: 0.8 K/UL (ref 0.2–0.8)
MONOCYTES RELATIVE PERCENT: 9.9 %
NEUTROPHILS ABSOLUTE: 4.8 K/UL (ref 1.4–6.5)
NEUTROPHILS RELATIVE PERCENT: 63.5 %
PDW BLD-RTO: 14.9 % (ref 11.5–14.5)
PLATELET # BLD: 346 K/UL (ref 130–400)
POTASSIUM REFLEX MAGNESIUM: 3.8 MEQ/L (ref 3.4–4.9)
RBC # BLD: 3.4 M/UL (ref 4.2–5.4)
SODIUM BLD-SCNC: 134 MEQ/L (ref 135–144)
TOTAL CK: 2774 U/L (ref 0–170)
WBC # BLD: 7.6 K/UL (ref 4.8–10.8)

## 2020-01-13 PROCEDURE — 99222 1ST HOSP IP/OBS MODERATE 55: CPT | Performed by: PSYCHIATRY & NEUROLOGY

## 2020-01-13 PROCEDURE — 2580000003 HC RX 258: Performed by: NURSE PRACTITIONER

## 2020-01-13 PROCEDURE — 85025 COMPLETE CBC W/AUTO DIFF WBC: CPT

## 2020-01-13 PROCEDURE — 80048 BASIC METABOLIC PNL TOTAL CA: CPT

## 2020-01-13 PROCEDURE — 6370000000 HC RX 637 (ALT 250 FOR IP): Performed by: INTERNAL MEDICINE

## 2020-01-13 PROCEDURE — 97535 SELF CARE MNGMENT TRAINING: CPT

## 2020-01-13 PROCEDURE — 36415 COLL VENOUS BLD VENIPUNCTURE: CPT

## 2020-01-13 PROCEDURE — 6370000000 HC RX 637 (ALT 250 FOR IP): Performed by: NURSE PRACTITIONER

## 2020-01-13 PROCEDURE — 83735 ASSAY OF MAGNESIUM: CPT

## 2020-01-13 PROCEDURE — 1210000000 HC MED SURG R&B

## 2020-01-13 PROCEDURE — 6370000000 HC RX 637 (ALT 250 FOR IP): Performed by: PSYCHIATRY & NEUROLOGY

## 2020-01-13 PROCEDURE — 2580000003 HC RX 258: Performed by: INTERNAL MEDICINE

## 2020-01-13 PROCEDURE — 82550 ASSAY OF CK (CPK): CPT

## 2020-01-13 PROCEDURE — 82553 CREATINE MB FRACTION: CPT

## 2020-01-13 RX ORDER — MIRTAZAPINE 15 MG/1
15 TABLET, FILM COATED ORAL NIGHTLY
Status: DISCONTINUED | OUTPATIENT
Start: 2020-01-13 | End: 2020-01-16 | Stop reason: HOSPADM

## 2020-01-13 RX ADMIN — AMLODIPINE BESYLATE 5 MG: 5 TABLET ORAL at 10:55

## 2020-01-13 RX ADMIN — SODIUM CHLORIDE: 9 INJECTION, SOLUTION INTRAVENOUS at 07:15

## 2020-01-13 RX ADMIN — AMIODARONE HYDROCHLORIDE 200 MG: 200 TABLET ORAL at 10:54

## 2020-01-13 RX ADMIN — FOLIC ACID 1 MG: 1 TABLET ORAL at 10:55

## 2020-01-13 RX ADMIN — CYANOCOBALAMIN TAB 500 MCG 500 MCG: 500 TAB at 10:54

## 2020-01-13 RX ADMIN — CARBAMAZEPINE 200 MG: 200 TABLET ORAL at 10:55

## 2020-01-13 RX ADMIN — Medication 10 ML: at 21:07

## 2020-01-13 RX ADMIN — METOPROLOL TARTRATE 12.5 MG: 25 TABLET, FILM COATED ORAL at 21:04

## 2020-01-13 RX ADMIN — MIRTAZAPINE 15 MG: 15 TABLET, FILM COATED ORAL at 21:03

## 2020-01-13 RX ADMIN — CARBAMAZEPINE 200 MG: 200 TABLET ORAL at 21:03

## 2020-01-13 RX ADMIN — ASPIRIN 81 MG 81 MG: 81 TABLET ORAL at 10:54

## 2020-01-13 RX ADMIN — METOPROLOL TARTRATE 12.5 MG: 25 TABLET, FILM COATED ORAL at 10:57

## 2020-01-13 RX ADMIN — NITROFURANTOIN (MONOHYDRATE/MACROCRYSTALS) 100 MG: 75; 25 CAPSULE ORAL at 10:54

## 2020-01-13 RX ADMIN — NITROFURANTOIN (MONOHYDRATE/MACROCRYSTALS) 100 MG: 75; 25 CAPSULE ORAL at 21:03

## 2020-01-13 RX ADMIN — SODIUM CHLORIDE: 9 INJECTION, SOLUTION INTRAVENOUS at 21:03

## 2020-01-13 ASSESSMENT — PAIN SCALES - GENERAL: PAINLEVEL_OUTOF10: 0

## 2020-01-13 NOTE — CONSULTS
Packs/day: 0.50     Years: 25.00     Pack years: 12.50    Smokeless tobacco: Never Used   Substance and Sexual Activity    Alcohol use: Never     Frequency: Never    Drug use: Never    Sexual activity: Not on file   Lifestyle    Physical activity:     Days per week: 0 days     Minutes per session: Not on file    Stress: Only a little   Relationships    Social connections:     Talks on phone: More than three times a week     Gets together: Once a week     Attends Congregation service: More than 4 times per year     Active member of club or organization: Yes     Attends meetings of clubs or organizations: 1 to 4 times per year     Relationship status:     Intimate partner violence:     Fear of current or ex partner: No     Emotionally abused: No     Physically abused: No     Forced sexual activity: No   Other Topics Concern    Not on file   Social History Narrative         Lives With: Florence Tao lives in [place] fulltime t [address]    Type of Home: Apartment(1st floor) in [facility] [address]: Two level, Able to Live on Main level with bedroom/bathroom(Full flight to second floor)    Home Access: Stairs to enter without rails    Entrance Stairs - Number of Steps: 2    Bathroom Shower/Tub: Tub/Shower unit    Bathroom Equipment: (None)    Home Equipment: (N/A)    ADL Assistance: Independent    Homemaking Assistance: Independent    Homemaking Responsibilities: Yes    Meal Prep Responsibility: Primary    Laundry Responsibility: Primary    Cleaning Responsibility: Primary    Ambulation Assistance: Independent(No AD)    Transfer Assistance: Independent    Active :  Yes    Additional Comments: States that she was down on ground X 72 hours following fall       REVIEW OF SYSTEMS    Constitutional: [] fever  [] chills  [] weight loss  []weakness [] Other:  Eyes:  [] photophobia  [] discharge [] acuity change   [] Diplopia   [] Other:  HENT:  [] sore throat  [] ear pain [] Tinnitus  102 102   CO2 20 19* 19*   BUN 6* 6* 6*   CREATININE 0.65 0.59 0.59   GLUCOSE 84 84 84     No results for input(s): BILITOT, ALKPHOS, AST, ALT in the last 72 hours. No results found for: Wadie Balls, LABBENZ, CANNAB, COCAINESCRN, LABMETH, OPIATESCREENURINE, PHENCYCLIDINESCREENURINE, PPXUR, ETOH  Lab Results   Component Value Date    TSH 36.810 2020     No results found for: LITHIUM  Lab Results   Component Value Date    CBMZ 12.4 (H) 2019     No results found for: LITHIUM, VALPROATE    FURTHER LABS ORDERED :      Radiology   Xr Knee Right (1-2 Views)    Result Date: 2020  Patient MRN: [id number] : 1940 Age:  67 years Gender: Female Order Date: 2020 1:30 PM. Exam: XR KNEE RIGHT (1-2 VIEWS) Number of Views: 2 Indication:  History of right open reduction and internal fixation patella repair Comparison: 11/15/2019 Findings: Multifocal areas of hardware traversing a comminuted patellar fracture seen on previous exam. No hardware failure. Vascular calcifications. No new fracture. 1. Postoperative changes as detailed above, see above for details. No hardware failure or new fracture. Xr Chest Portable    Result Date: 2020  EXAMINATION: CHEST AP PORTABLE  CLINICAL HISTORY: Wheezing COMPARISONS: 2020  FINDINGS: Single view of the chest are submitted. The cardiac silhouette is enlarged. Pulmonary vascular is attenuated, lungs are hyperinflated. Interstitium. Right sided trachea. Area of atelectasis, patchy to coalescent infiltrate left lower lobe with trace left pleural effusion. Pneumothoraces. AREA OF ATELECTASIS, PATCHY TO COALESCENT INFILTRATE LEFT LOWER LOBE WITH TRACE LEFT PLEURAL EFFUSION SUPERIMPOSED UPON COPD.     Xr Chest Portable    Result Date: 2020  Patient MRN: [id number] : 1940 Age:  67 years Gender: Female Order Date: 2020 11:00 AM. Exam: XR CHEST

## 2020-01-13 NOTE — PROGRESS NOTES
diaphoresis. She denies any hemoptysis, hematemesis, melena, or hematochezia. VITALS     Vitals:    01/11/20 0758 01/11/20 1939 01/12/20 0720 01/12/20 1956   BP: (!) 150/78 (!) 156/81 (!) 176/82 (!) 145/70   Pulse: 64 64 68 65   Resp: 17 16 16 16   Temp: 97.9 °F (36.6 °C) 98.1 °F (36.7 °C) 97.9 °F (36.6 °C) 98.1 °F (36.7 °C)   TempSrc: Oral Oral Oral Oral   SpO2: 95% 95% 96% 96%   Weight:       Height:         No intake or output data in the 24 hours ending 01/13/20 1604    No data found. PHYSICAL EXAM   GENERAL:  Well developed, well nourished, in no acute distress. Appears fatigued. CHEST:  Symmetric and nontender. NEURO/PSYCH:  Alert and oriented times three with approppriate behavior and responses. NECK:  Supple, no JVD, no bruit. LUNGS:  Clear to auscultation bilaterally, normal respiratory effort. HEART:  Rate and rhythm regular with III/VI JIMMY RUSB, c/w AS no gallop appreciated. There are no rubs, clicks or heaves. EXTREMITIES:  Warm with good color, no clubbing or cyanosis. There is no edema noted. PERIPHERAL VASCULAR:  Pulses present and equally palpable; 2+ throughout. DIAGNOSTIC RESULTS   EKG:  Normal sinus rhythm  T wave abnormality, consider anterior ischemia  Abnormal ECG     Telemetry: normal sinus . Echocardiogram Summary:   Image quality is fair (7/10)   Left ventricular ejection fraction is visually estimated at 75%. Left ventricular size is decreased ,probably volume depleted. E/A flow reversal noted. Suggestive of diastolic dysfunction. Moderate concentric left ventricular hypertrophy. No regional wall motion abnormality is apparent. Moderately dilated left atrium. Moderately dilated left atrium. LA measured 4.5cm in transverse diameter. Normal right atrial dimension with no evidence of thrombus or mass noted. Normal right ventricular size with preserved RV function. Aortic valve leaflets are severely thickened. Tricuspid aortic valve.    The aortic valve area is 0.8 cm2 with a maximum gradient of 83 mmHg and a   mean gradient of 40 mmHg. Aortic valve leaflets are severely thickened. There is mild aortic regurgitation. Aortic root is mildly dilated. The mitral valve was not well visualized . Mild (1+) mitral regurgitation is present. Tricuspid valve was not well visualized. No evidence of tricuspid regurgitation,hence PA systolic pressure could   not be estimated. No evidence of any pericardial effusion. No evidence of pleural effusion. No prior echocardiogram report is available for comparison. Signature   ----------------------------------------------------------------   Electronically signed by Roslyn [facility]so [facility](Interpreting   physician) on 11/16/2019 05:39 PM      LAB DATA   BMP:  Recent Labs     01/11/20  0635 01/12/20  0741 01/13/20  0652    131* 134*   K 3.4 3.4 3.8    102 102   CO2 20 19* 19*   BUN 6* 6* 6*   CREATININE 0.65 0.59 0.59   LABGLOM >60.0 >60.0 >60.0       CBC:  Recent Labs     01/11/20  0635 01/12/20  0741 01/13/20  0652   WBC 7.7 7.9 7.6   RBC 3.28* 3.51* 3.40*   HGB 10.3* 11.0* 10.7*   HCT 30.7* 34.1* 31.8*   MCV 93.5 97.1 93.6   MCH 31.4* 31.5* 31.5*   MCHC 33.6 32.4* 33.7   RDW 14.9* 15.2* 14.9*    337 346       Cardiac Enzymes:   Recent Labs     01/11/20  0635 01/12/20  0741 01/13/20  0652   CKTOTAL 2,768* 3,039* 2,774*   CKMB 15.9* 19.7* 21.2*       Magnesium:  Recent Labs     01/13/20  0652   MG 2.1     TSH:  Lab Results   Component Value Date    TSH 36.810 01/06/2020       Lipid Profile:  Lab Results   Component Value Date    TRIG 142 11/24/2019    HDL 45 11/24/2019    LDLCALC 58 11/24/2019       CMP:  Recent Labs     01/11/20  0635 01/12/20  0741 01/13/20  0652    131* 134*   K 3.4 3.4 3.8    102 102   CO2 20 19* 19*   BUN 6* 6* 6*   CREATININE 0.65 0.59 0.59   GLUCOSE 84 84 84   CALCIUM 7.4* 7.5* 7.5*       RADIOLOGY     XR KNEE RIGHT (1-2 VIEWS)   Final Result   1. if need be. Continue amiodarone and low dose beta blocker. Rhabdo will resolve faster as thyroid normalizes. May need to change amio to different agent if thyroid can't be controlled. I once spoke with patient regarding her need for better nutrition and motivation to participate in physical therapy. She likely need eventual outpatient cardiac catheterization and possible TAVR implant. Will keep her appointment with Dr. Tomasa Jacob later this month to review options. Cardiology to sign off. Please do not hesitate to call with questions.   Electronically signed by Jono Miguel MD, [facility] on 1/13/2020 at 4:04 PM

## 2020-01-13 NOTE — PROGRESS NOTES
Patient takes a lot of encouraging to take medications, eat or get OOB. In with PT, and it took x2 assist to have patient sit on side of bed. Attempted to use sera steady, but unsuccessful. Perwick in place, but when perwick dislocated, patient did not let know she was wet. Currently working. Patient denies any pain or need for patient medication, but when attempting to use sera steady patient stated her knee hurt. No reddness noted in right knee.

## 2020-01-13 NOTE — PROGRESS NOTES
Hospitalist Progress Note      PCP: Hermelinda Bellamy MD    Date of Admission: 1/8/2020    Chief Complaint:      Subjective: :Seen and examined at bedside. No new complaints today. Medications:  Reviewed    Infusion Medications    sodium chloride 150 mL/hr at 01/13/20 0715     Scheduled Medications    amLODIPine  5 mg Oral Daily    sodium chloride flush  10 mL Intravenous 2 times per day    levothyroxine  125 mcg Oral Daily    nitrofurantoin (macrocrystal-monohydrate)  100 mg Oral 2 times per day    amiodarone  200 mg Oral Daily    aspirin  81 mg Oral Daily    carBAMazepine  200 mg Oral BID    folic acid  1 mg Oral Daily    metoprolol tartrate  12.5 mg Oral BID    mirtazapine  7.5 mg Oral Nightly    pantoprazole  40 mg Oral BID AC    vitamin B-12  500 mcg Oral Daily     PRN Meds: ipratropium-albuterol, sodium chloride flush, magnesium hydroxide, ondansetron, acetaminophen, acetaminophen    No intake or output data in the 24 hours ending 01/13/20 0955    Exam:    BP (!) 145/70   Pulse 65   Temp 98.1 °F (36.7 °C) (Oral)   Resp 16   Ht 5' (1.524 m)   Wt 151 lb (68.5 kg)   LMP  (LMP Unknown)   SpO2 96%   BMI 29.49 kg/m²     General appearance: No apparent distress, appears stated age and cooperative. HEENT: Pupils equal, round, and reactive to light. Conjunctivae/corneas clear. Neck: Supple, with full range of motion. No jugular venous distention. Trachea midline. Respiratory:  Normal respiratory effort. Clear to auscultation, bilaterally without Rales/Wheezes/Rhonchi. Cardiovascular: Regular rate and rhythm with normal S1/S2 without murmurs, rubs or gallops. Abdomen: Soft, non-tender, non-distended with normal bowel sounds. Musculoskeletal: No clubbing, cyanosis or edema bilaterally. Full range of motion without deformity. Skin: Skin color, texture, turgor normal.  No rashes or lesions. Neuro: Non Focal. Symetrical motor and tone. Nl Comprehension, Alert,awake and oriented.  NL CN. Symetrical tone and reflexes. Psychiatric: Flat affect         Labs:   Recent Labs     01/11/20  0635 01/12/20  0741 01/13/20  0652   WBC 7.7 7.9 7.6   HGB 10.3* 11.0* 10.7*   HCT 30.7* 34.1* 31.8*    337 346     Recent Labs     01/11/20  0635 01/12/20  0741 01/13/20  0652    131* 134*   K 3.4 3.4 3.8    102 102   CO2 20 19* 19*   BUN 6* 6* 6*   CREATININE 0.65 0.59 0.59   CALCIUM 7.4* 7.5* 7.5*     No results for input(s): AST, ALT, BILIDIR, BILITOT, ALKPHOS in the last 72 hours. No results for input(s): INR in the last 72 hours. Recent Labs     01/11/20  0635 01/12/20  0741 01/13/20  0652   CKTOTAL 2,768* 3,039* 2,774*       Urinalysis:      Lab Results   Component Value Date    NITRU Negative 01/08/2020    WBCUA 0-2 01/08/2020    BACTERIA RARE 01/08/2020    RBCUA 0-2 01/08/2020    BLOODU LARGE 01/08/2020    SPECGRAV 1.019 01/08/2020    GLUCOSEU Negative 01/08/2020       Radiology:  XR KNEE RIGHT (1-2 VIEWS)   Final Result   1. Postoperative changes as detailed above, see above for details. No hardware failure or new fracture. XR CHEST PORTABLE   Final Result   AREA OF ATELECTASIS, PATCHY TO COALESCENT INFILTRATE LEFT LOWER LOBE WITH TRACE LEFT PLEURAL EFFUSION SUPERIMPOSED UPON COPD. XR CHEST PORTABLE   Final Result   Impression:     1. Stable, enlarged cardiomediastinal silhouette with thoracic aortic vascular calcifications. 2. No acute cardiopulmonary disease identified. Assessment/Plan:    Active Hospital Problems    Diagnosis Date Noted    Rhabdomyolysis [M62.82] 11/20/2019      1) Rhabdomyolysis   2) Severe dehydration   3) UTI   4) Hypothyroidism   5) Elevated troponin: Likely non cardiac   6) Chronic diastolic HF: Not in exacerbation   7) LASHAE ruled out   8) Right knee pain   9)? depression    Plan:  - Continue to monitor CK  - Continue IVF rehydration  - Encourage OOB, PT/OT  - Monitor lytes      Additional work up or/and treatment plan may be added today or then after based on clinical progression. I am managing a portion of pt care. Some medical issues are handled by other specialists. Additional work up and treatment should be done in out pt setting by pt PCP and other out pt providers. In addition to examining and evaluating pt, I spent additional time explaining care, normal and abnormal findings, and treatment plan. All of pt questions were answered. Counseling, diet and education were  provided. Case will be discussed with nursing staff when appropriate. Family will be updated if and when appropriate.       Diet: DIET CARDIAC;    Code Status: Full Code    PT/OT Eval           Electronically signed by Alexandra Connolly DO on 1/13/2020 at 9:55 AM

## 2020-01-13 NOTE — CARE COORDINATION
Per Millicent Carranza, pt has applied for Medicaid which they are using to hold her bed. Pt currently not available. Message left for son Sánchez Tan. Phone call back from geneva Tan. He expressed concern that pt is not done Rehabbing, but just keeps coming back to the hospital. He was wondering about DC to [facility]. LSW explained that Rehab would not be indicated due to her current limited mobility and inability to participate with therapy. Kenneth expressed that pt is on a limited income and he has got to let go of pt apartment, but pt is not at a point that she can live with him. LSW empathized, but explained options available. Pt may need to go LTC if she cannot participate with therapy. He is requesting to speak with physician.  sent a perfect serve to Dr. Rika Rodriguez.

## 2020-01-13 NOTE — PROGRESS NOTES
Physical Therapy Med Surg Daily Treatment Note  Facility/Department: [facility]  Room: X70/F866-39       NAME: Jessica Alexander  : 1940 (67 y.o.)  MRN: [id number]  CODE STATUS: Full Code    Date of Service: 2020    Patient Diagnosis(es): Rhabdomyolysis [M62.82]   Chief Complaint   Patient presents with    Fatigue     Patient Active Problem List    Diagnosis Date Noted    Acute blood loss anemia 2019    Abnormality of gait and mobility due to Impaired Mobility and ADL's due to Rhabdomyolysis, Right Patellar Fx.   [facility] admit 19. 2019    Paroxysmal atrial fibrillation ([address] 2019    HTN (hypertension) 2019    Stage 3 chronic kidney disease ([address] 2019    Rhabdomyolysis 2019    Hyponatremia 2019    Hypothyroid 2019    BMI 25.0-25.9,adult 2019    Tobacco abuse 2019    Closed fracture of right patella 2019    Acute kidney injury (LASHAE) with acute tubular necrosis (ATN) ([address] 11/15/2019    Aortic valve stenosis 2017    Thoracic ascending aortic aneurysm ([address] 2017    Vitamin D deficiency 2013    Seborrheic keratosis 10/09/2012    Anxiety state 2008    Esophageal reflux 2008    Hyperlipidemia 2003    Essential hypertension, benign 2002        Past Medical History:   Diagnosis Date    Aortic stenosis     Chronic kidney disease     COPD (chronic obstructive pulmonary disease) (HCC)     Heart failure (HCC)     High cholesterol     HTN (hypertension)     Hypothyroid     New onset a-fib ([address]      Past Surgical History:   Procedure Laterality Date    PATELLA FRACTURE SURGERY Right 2019    RIGHT PATELLA OPEN REDUCTION INTERNAL FIXATION  ROOM 180 performed by Lauryn Brown MD at [address] [place] [address] N/A 2019    EGD performed by Emperatriz Sellers MD at [facility]     Restrictions  Restrictions/Precautions: Weight Bearing  Lower Extremity strength  Long term goal 5: standing with 2ww and CGA x 60 seconds     PLAN          AMPAC (6 CLICK) BASIC MOBILITY  AM-PAC Inpatient Mobility Raw Score : 7     Therapy Time   Individual   Time In  1315   Time Out 1345   Minutes 30   30 minutes bed mobility and transfer training activities          [facility] SUJEY AU PTA, 01/13/20 at 1:46 PM

## 2020-01-14 LAB
ANION GAP SERPL CALCULATED.3IONS-SCNC: 16 MEQ/L (ref 9–15)
BUN BLDV-MCNC: 6 MG/DL (ref 8–23)
CALCIUM SERPL-MCNC: 7.9 MG/DL (ref 8.5–9.9)
CHLORIDE BLD-SCNC: 100 MEQ/L (ref 95–107)
CK MB: 27.6 NG/ML (ref 0–3.8)
CO2: 14 MEQ/L (ref 20–31)
CREAT SERPL-MCNC: 0.55 MG/DL (ref 0.5–0.9)
CREATINE KINASE-MB INDEX: 0.9 % (ref 0–3.5)
GFR AFRICAN AMERICAN: >60
GFR NON-AFRICAN AMERICAN: >60
GLUCOSE BLD-MCNC: 75 MG/DL (ref 70–99)
POTASSIUM REFLEX MAGNESIUM: 4 MEQ/L (ref 3.4–4.9)
SODIUM BLD-SCNC: 130 MEQ/L (ref 135–144)
TOTAL CK: 3162 U/L (ref 0–170)

## 2020-01-14 PROCEDURE — 6370000000 HC RX 637 (ALT 250 FOR IP): Performed by: INTERNAL MEDICINE

## 2020-01-14 PROCEDURE — 82553 CREATINE MB FRACTION: CPT

## 2020-01-14 PROCEDURE — 1210000000 HC MED SURG R&B

## 2020-01-14 PROCEDURE — 6370000000 HC RX 637 (ALT 250 FOR IP): Performed by: PSYCHIATRY & NEUROLOGY

## 2020-01-14 PROCEDURE — 6370000000 HC RX 637 (ALT 250 FOR IP): Performed by: NURSE PRACTITIONER

## 2020-01-14 PROCEDURE — 2580000003 HC RX 258: Performed by: NURSE PRACTITIONER

## 2020-01-14 PROCEDURE — 97535 SELF CARE MNGMENT TRAINING: CPT

## 2020-01-14 PROCEDURE — 82550 ASSAY OF CK (CPK): CPT

## 2020-01-14 PROCEDURE — 36415 COLL VENOUS BLD VENIPUNCTURE: CPT

## 2020-01-14 PROCEDURE — 80048 BASIC METABOLIC PNL TOTAL CA: CPT

## 2020-01-14 PROCEDURE — 2580000003 HC RX 258: Performed by: INTERNAL MEDICINE

## 2020-01-14 RX ADMIN — AMIODARONE HYDROCHLORIDE 200 MG: 200 TABLET ORAL at 09:50

## 2020-01-14 RX ADMIN — CARBAMAZEPINE 200 MG: 200 TABLET ORAL at 09:50

## 2020-01-14 RX ADMIN — ASPIRIN 81 MG 81 MG: 81 TABLET ORAL at 09:50

## 2020-01-14 RX ADMIN — CYANOCOBALAMIN TAB 500 MCG 500 MCG: 500 TAB at 09:50

## 2020-01-14 RX ADMIN — NITROFURANTOIN (MONOHYDRATE/MACROCRYSTALS) 100 MG: 75; 25 CAPSULE ORAL at 09:50

## 2020-01-14 RX ADMIN — CARBAMAZEPINE 200 MG: 200 TABLET ORAL at 22:41

## 2020-01-14 RX ADMIN — Medication 10 ML: at 09:50

## 2020-01-14 RX ADMIN — AMLODIPINE BESYLATE 5 MG: 5 TABLET ORAL at 09:50

## 2020-01-14 RX ADMIN — PANTOPRAZOLE SODIUM 40 MG: 40 TABLET, DELAYED RELEASE ORAL at 05:35

## 2020-01-14 RX ADMIN — SODIUM CHLORIDE: 9 INJECTION, SOLUTION INTRAVENOUS at 03:00

## 2020-01-14 RX ADMIN — FOLIC ACID 1 MG: 1 TABLET ORAL at 09:50

## 2020-01-14 RX ADMIN — PANTOPRAZOLE SODIUM 40 MG: 40 TABLET, DELAYED RELEASE ORAL at 16:26

## 2020-01-14 RX ADMIN — METOPROLOL TARTRATE 12.5 MG: 25 TABLET, FILM COATED ORAL at 22:41

## 2020-01-14 RX ADMIN — METOPROLOL TARTRATE 12.5 MG: 25 TABLET, FILM COATED ORAL at 09:50

## 2020-01-14 RX ADMIN — MIRTAZAPINE 15 MG: 15 TABLET, FILM COATED ORAL at 22:40

## 2020-01-14 RX ADMIN — LEVOTHYROXINE SODIUM 125 MCG: 125 TABLET ORAL at 05:35

## 2020-01-14 RX ADMIN — NITROFURANTOIN (MONOHYDRATE/MACROCRYSTALS) 100 MG: 75; 25 CAPSULE ORAL at 22:41

## 2020-01-14 ASSESSMENT — PAIN SCALES - GENERAL
PAINLEVEL_OUTOF10: 0
PAINLEVEL_OUTOF10: 0

## 2020-01-14 ASSESSMENT — PAIN SCALES - WONG BAKER: WONGBAKER_NUMERICALRESPONSE: 0

## 2020-01-14 ASSESSMENT — PAIN DESCRIPTION - PROGRESSION: CLINICAL_PROGRESSION: NOT CHANGED

## 2020-01-14 NOTE — PROGRESS NOTES
[facility]    Respiratory Care Assessment Sheet for Pulmonary Rehab Patients    DATE: Please enter order for Pulmonary Rehab Consult if patient meets criteria for one of the following:      TIME:         Pulmonary Rehab Assessment  Check the box if the patient is limited by any of the following:      [x]  COPD       [] Lung Transplant  [] Pulmonary Hypertension  []  Pulmonary Fibrosis               [] Lung Resection  [] Current Smoker   []  Chronic Asthma         [] Long Term Oxygen Use  For COPD diagnosis GOLD stage 2,3 or 4 only. (Determined by complete PFT)            Per Respiratory Protocol   Doctor per the above assessment(s), your patient will have a. .       Pulmonary Rehab Consult []       Please consider them for a referral/ consult before discharge

## 2020-01-14 NOTE — PROGRESS NOTES
Right knee pain   9)? depression    Plan:  - Continue to monitor CK  - Continue IVF rehydration  - Encourage OOB, PT/OT  - Monitor lytes  - Neurology consulted      Additional work up or/and treatment plan may be added today or then after based on clinical progression. I am managing a portion of pt care. Some medical issues are handled by other specialists. Additional work up and treatment should be done in out pt setting by pt PCP and other out pt providers. In addition to examining and evaluating pt, I spent additional time explaining care, normal and abnormal findings, and treatment plan. All of pt questions were answered. Counseling, diet and education were  provided. Case will be discussed with nursing staff when appropriate. Family will be updated if and when appropriate.       Diet: DIET CARDIAC;    Code Status: Full Code    PT/OT Eval           Electronically signed by Vanesa Albright DO on 1/14/2020 at 12:23 PM

## 2020-01-14 NOTE — PROGRESS NOTES
mobility with CGA  Long term goal 2: functional transfers with CGA  Long term goal 3: amb 10ft with LRAD and Min A  Long term goal 4: indep with HEP to improve LE strength  Long term goal 5: standing with 2ww and CGA x 60 seconds     PLAN    Safety Devices  Type of devices: All fall risk precautions in place; Bed alarm in place; Left in bed;Call light within reach     [facility] (6 CLICK) Amy Parrish 28 Inpatient Mobility Raw Score : 7      Therapy Time   Individual   Time In 0954   Time Out 1010   Minutes 16      BM/Trsf: 177 Galileo Ramsey PTA, 01/14/20 at 10:17 AM

## 2020-01-15 LAB
ANION GAP SERPL CALCULATED.3IONS-SCNC: 16 MEQ/L (ref 9–15)
BASOPHILS ABSOLUTE: 0.2 K/UL (ref 0–0.2)
BASOPHILS RELATIVE PERCENT: 1.9 %
BUN BLDV-MCNC: 6 MG/DL (ref 8–23)
CALCIUM SERPL-MCNC: 8.1 MG/DL (ref 8.5–9.9)
CHLORIDE BLD-SCNC: 101 MEQ/L (ref 95–107)
CK MB: 33.5 NG/ML (ref 0–3.8)
CO2: 17 MEQ/L (ref 20–31)
CREAT SERPL-MCNC: 0.56 MG/DL (ref 0.5–0.9)
CREATINE KINASE-MB INDEX: 1 % (ref 0–3.5)
EOSINOPHILS ABSOLUTE: 0.3 K/UL (ref 0–0.7)
EOSINOPHILS RELATIVE PERCENT: 2.7 %
GFR AFRICAN AMERICAN: >60
GFR NON-AFRICAN AMERICAN: >60
GLUCOSE BLD-MCNC: 78 MG/DL (ref 70–99)
HCT VFR BLD CALC: 38.9 % (ref 37–47)
HEMOGLOBIN: 13 G/DL (ref 12–16)
LYMPHOCYTES ABSOLUTE: 1.9 K/UL (ref 1–4.8)
LYMPHOCYTES RELATIVE PERCENT: 20.1 %
MAGNESIUM: 1.6 MG/DL (ref 1.7–2.4)
MCH RBC QN AUTO: 31.7 PG (ref 27–31.3)
MCHC RBC AUTO-ENTMCNC: 33.4 % (ref 33–37)
MCV RBC AUTO: 95.1 FL (ref 82–100)
MONOCYTES ABSOLUTE: 1 K/UL (ref 0.2–0.8)
MONOCYTES RELATIVE PERCENT: 10.1 %
NEUTROPHILS ABSOLUTE: 6.2 K/UL (ref 1.4–6.5)
NEUTROPHILS RELATIVE PERCENT: 65.2 %
PDW BLD-RTO: 15.2 % (ref 11.5–14.5)
PLATELET # BLD: 436 K/UL (ref 130–400)
POTASSIUM REFLEX MAGNESIUM: 3.4 MEQ/L (ref 3.4–4.9)
RBC # BLD: 4.09 M/UL (ref 4.2–5.4)
SODIUM BLD-SCNC: 134 MEQ/L (ref 135–144)
TOTAL CK: 3410 U/L (ref 0–170)
WBC # BLD: 9.5 K/UL (ref 4.8–10.8)

## 2020-01-15 PROCEDURE — 97535 SELF CARE MNGMENT TRAINING: CPT

## 2020-01-15 PROCEDURE — 6370000000 HC RX 637 (ALT 250 FOR IP): Performed by: INTERNAL MEDICINE

## 2020-01-15 PROCEDURE — 80048 BASIC METABOLIC PNL TOTAL CA: CPT

## 2020-01-15 PROCEDURE — 2580000003 HC RX 258: Performed by: NURSE PRACTITIONER

## 2020-01-15 PROCEDURE — 85025 COMPLETE CBC W/AUTO DIFF WBC: CPT

## 2020-01-15 PROCEDURE — 6370000000 HC RX 637 (ALT 250 FOR IP): Performed by: NURSE PRACTITIONER

## 2020-01-15 PROCEDURE — 2580000003 HC RX 258: Performed by: INTERNAL MEDICINE

## 2020-01-15 PROCEDURE — 83735 ASSAY OF MAGNESIUM: CPT

## 2020-01-15 PROCEDURE — 99222 1ST HOSP IP/OBS MODERATE 55: CPT | Performed by: PSYCHIATRY & NEUROLOGY

## 2020-01-15 PROCEDURE — 99222 1ST HOSP IP/OBS MODERATE 55: CPT | Performed by: SURGERY

## 2020-01-15 PROCEDURE — 97112 NEUROMUSCULAR REEDUCATION: CPT

## 2020-01-15 PROCEDURE — 82553 CREATINE MB FRACTION: CPT

## 2020-01-15 PROCEDURE — 6370000000 HC RX 637 (ALT 250 FOR IP): Performed by: PSYCHIATRY & NEUROLOGY

## 2020-01-15 PROCEDURE — 36415 COLL VENOUS BLD VENIPUNCTURE: CPT

## 2020-01-15 PROCEDURE — 1210000000 HC MED SURG R&B

## 2020-01-15 PROCEDURE — 82550 ASSAY OF CK (CPK): CPT

## 2020-01-15 RX ADMIN — METOPROLOL TARTRATE 12.5 MG: 25 TABLET, FILM COATED ORAL at 09:01

## 2020-01-15 RX ADMIN — ACETAMINOPHEN 650 MG: 325 TABLET ORAL at 13:24

## 2020-01-15 RX ADMIN — ASPIRIN 81 MG 81 MG: 81 TABLET ORAL at 09:01

## 2020-01-15 RX ADMIN — METOPROLOL TARTRATE 12.5 MG: 25 TABLET, FILM COATED ORAL at 21:17

## 2020-01-15 RX ADMIN — PANTOPRAZOLE SODIUM 40 MG: 40 TABLET, DELAYED RELEASE ORAL at 15:52

## 2020-01-15 RX ADMIN — FOLIC ACID 1 MG: 1 TABLET ORAL at 09:01

## 2020-01-15 RX ADMIN — LEVOTHYROXINE SODIUM 125 MCG: 125 TABLET ORAL at 06:29

## 2020-01-15 RX ADMIN — NITROFURANTOIN (MONOHYDRATE/MACROCRYSTALS) 100 MG: 75; 25 CAPSULE ORAL at 09:01

## 2020-01-15 RX ADMIN — SODIUM CHLORIDE: 9 INJECTION, SOLUTION INTRAVENOUS at 13:23

## 2020-01-15 RX ADMIN — CARBAMAZEPINE 200 MG: 200 TABLET ORAL at 21:16

## 2020-01-15 RX ADMIN — CYANOCOBALAMIN TAB 500 MCG 500 MCG: 500 TAB at 09:01

## 2020-01-15 RX ADMIN — AMIODARONE HYDROCHLORIDE 200 MG: 200 TABLET ORAL at 09:01

## 2020-01-15 RX ADMIN — MIRTAZAPINE 15 MG: 15 TABLET, FILM COATED ORAL at 21:17

## 2020-01-15 RX ADMIN — Medication 10 ML: at 21:30

## 2020-01-15 RX ADMIN — CARBAMAZEPINE 200 MG: 200 TABLET ORAL at 09:01

## 2020-01-15 RX ADMIN — NITROFURANTOIN (MONOHYDRATE/MACROCRYSTALS) 100 MG: 75; 25 CAPSULE ORAL at 21:16

## 2020-01-15 RX ADMIN — PANTOPRAZOLE SODIUM 40 MG: 40 TABLET, DELAYED RELEASE ORAL at 06:29

## 2020-01-15 RX ADMIN — AMLODIPINE BESYLATE 5 MG: 5 TABLET ORAL at 09:01

## 2020-01-15 ASSESSMENT — PAIN SCALES - GENERAL
PAINLEVEL_OUTOF10: 0
PAINLEVEL_OUTOF10: 5
PAINLEVEL_OUTOF10: 3

## 2020-01-15 ASSESSMENT — ENCOUNTER SYMPTOMS
VOMITING: 0
BACK PAIN: 0
PHOTOPHOBIA: 0
SHORTNESS OF BREATH: 0
TROUBLE SWALLOWING: 0
NAUSEA: 0
CHOKING: 0

## 2020-01-15 ASSESSMENT — PAIN DESCRIPTION - LOCATION: LOCATION: BACK

## 2020-01-15 ASSESSMENT — PAIN DESCRIPTION - DESCRIPTORS: DESCRIPTORS: ACHING

## 2020-01-15 ASSESSMENT — PAIN DESCRIPTION - PAIN TYPE: TYPE: ACUTE PAIN

## 2020-01-15 NOTE — PROGRESS NOTES
Physical Therapy Med Surg Daily Treatment Note  [facility]  [facility]       NAME: Cheri Soto  : 1940 (67 y.o.)  MRN: [id number]  CODE STATUS: Full Code    Date of Service: 1/15/2020    Patient Diagnosis(es): Rhabdomyolysis [M62.82]   Chief Complaint   Patient presents with    Fatigue     Patient Active Problem List    Diagnosis Date Noted    Acute blood loss anemia 2019    Abnormality of gait and mobility due to Impaired Mobility and ADL's due to Rhabdomyolysis, Right Patellar Fx.   [facility] Re[facility] admit 19. 2019    Paroxysmal atrial fibrillation ([facility] 2019    HTN (hypertension) 2019    Stage 3 chronic kidney disease ([address] 2019    Rhabdomyolysis 2019    Hyponatremia 2019    Hypothyroid 2019    BMI 25.0-25.9,adult 2019    Tobacco abuse 2019    Closed fracture of right patella 2019    Acute kidney injury (LASHAE) with acute tubular necrosis (ATN) ([facility] 11/15/2019    Aortic valve stenosis 2017    Thoracic ascending aortic aneurysm ([facility] 2017    Vitamin D deficiency 2013    Seborrheic keratosis 10/09/2012    Anxiety state 2008    Esophageal reflux 2008    Hyperlipidemia 2003    Essential hypertension, benign 2002        Past Medical History:   Diagnosis Date    Aortic stenosis     Chronic kidney disease     COPD (chronic obstructive pulmonary disease) (HCC)     Heart failure (HCC)     High cholesterol     HTN (hypertension)     Hypothyroid     New onset a-fib ([address]      Past Surgical History:   Procedure Laterality Date    PATELLA FRACTURE SURGERY Right 2019    RIGHT PATELLA OPEN REDUCTION INTERNAL FIXATION  ROOM 180 performed by Luli Merida MD at [facility] N/A 2019    EGD performed by Curry Velazquez MD at [facility]         Restrictions  Restrictions/Precautions: Weight Bearing  Lower Extremity Weight Bearing Restrictions  Right Lower Extremity Weight Bearing: Weight Bearing As Tolerated  Partial Weight Bearing Percentage Or Pounds: \"weightbearing as tolerated in the knee immobilizer\" per ortho    SUBJECTIVE   Subjective  Subjective: I'm not going to stand. I can't. General Comment  Comments: Pt is agreeable to tx, but required Max encouragement to participation. Pre-Session Pain Report  Pre Treatment Pain Screening  Pain at present: 0  Intervention List: Patient able to continue with treatment          Post-Session Pain Report  Pain Assessment  Pain Level: 5  Pain Type: Acute pain  Pain Location: Back  Pain Descriptors: Aching         OBJECTIVE   Orientation  Overall Orientation Status: Within Functional Limits    Bed mobility  Rolling to Left: Maximum assistance  Rolling to Right: Maximum assistance  Supine to Sit: Maximum assistance;2 Person assistance  Sit to Supine: 2 Person assistance;Maximum assistance  Comment: hand over hand assist for all mobility; Pt gives minimal effort to participate. Requires constant cueing for effort. Transfers  Sit to Stand: Dependent/Total;2 Person Assistance  Stand to sit: Dependent/Total;2 Person Assistance  Comment: pt unable to perform sit to stand with +2. Pt with poor effort, B feet blocked, pt does not weight shift or attempt to complete stand stating \"Can I lay down now. I'm cold. \"                    Neuromuscular Education  Neuromuscular Comments: seated balance activity - lateral trunk bending, fwd rocking - minimal effort and ROM                          Activity Tolerance  Activity Tolerance: Patient limited by fatigue;Patient limited by endurance  Activity Tolerance: Pt is self limiting          ASSESSMENT   Assessment: pt with poor participation, poor effort, max cues given for participation with poor carryover. Educated on the importance of getting up and trying to participate with treatment. Pt stated she understood.       Discharge Recommendations:  Continue to assess pending progress, Patient would benefit from continued therapy after discharge    Goals  Long term goals  Long term goal 1: bed mobility with CGA  Long term goal 2: functional transfers with CGA  Long term goal 3: amb 10ft with LRAD and Min A  Long term goal 4: indep with HEP to improve LE strength  Long term goal 5: standing with 2ww and CGA x 60 seconds     PLAN    Plan  Plan Comment: Cont. POC  Safety Devices  Type of devices: All fall risk precautions in place; Bed alarm in place;Call light within reach; Left in bed     [facility] (6 CLICK) Amy Parrish 28 Inpatient Mobility Raw Score : 7      Therapy Time   Individual   Time In 1403   Time Out 1428   Minutes 25      bm/Trsf - 15 mins  NMR - 10 mins       Rika Harris PTA, 01/15/20 at 2:38 PM

## 2020-01-15 NOTE — CARE COORDINATION
Phone conversation with son Lee. He is aware that Acute Rehab is not appropriate for pt. He acknowledged that pt may be in need of LTC. He would like to look at DC to [facility] since it is closer to home. If they cannot accept then return to [facility]. Initial referral made to [facility]. Per [facility], pt is no longer considered a Medicaid bed hold, but they do have beds available if she wishes to return. Reviewed IMM.

## 2020-01-15 NOTE — PROGRESS NOTES
Hospitalist Progress Note      PCP: Laura Sethi MD    Date of Admission: 1/8/2020    Chief Complaint:      Subjective: :Seen and examined at bedside. Neuro recs then [facility] later today         Medications:  Reviewed    Infusion Medications    sodium chloride 75 mL/hr at 01/14/20 1322     Scheduled Medications    mirtazapine  15 mg Oral Nightly    amLODIPine  5 mg Oral Daily    sodium chloride flush  10 mL Intravenous 2 times per day    levothyroxine  125 mcg Oral Daily    nitrofurantoin (macrocrystal-monohydrate)  100 mg Oral 2 times per day    amiodarone  200 mg Oral Daily    aspirin  81 mg Oral Daily    carBAMazepine  200 mg Oral BID    folic acid  1 mg Oral Daily    metoprolol tartrate  12.5 mg Oral BID    pantoprazole  40 mg Oral BID AC    vitamin B-12  500 mcg Oral Daily     PRN Meds: ipratropium-albuterol, sodium chloride flush, magnesium hydroxide, ondansetron, acetaminophen, acetaminophen      Intake/Output Summary (Last 24 hours) at 1/15/2020 1017  Last data filed at 1/15/2020 0645  Gross per 24 hour   Intake 1606 ml   Output 2050 ml   Net -444 ml       Exam:    BP (!) 146/95   Pulse 69   Temp 97.3 °F (36.3 °C) (Oral)   Resp 16   Ht 5' (1.524 m)   Wt 151 lb 0.2 oz (68.5 kg)   LMP  (LMP Unknown)   SpO2 98%   BMI 29.49 kg/m²     General appearance: No apparent distress, appears stated age and cooperative. HEENT: Pupils equal, round, and reactive to light. Conjunctivae/corneas clear. Neck: Supple, with full range of motion. No jugular venous distention. Trachea midline. Respiratory:  Normal respiratory effort. Clear to auscultation, bilaterally without Rales/Wheezes/Rhonchi. Cardiovascular: Regular rate and rhythm with normal S1/S2 without murmurs, rubs or gallops. Abdomen: Soft, non-tender, non-distended with normal bowel sounds. Musculoskeletal: No clubbing, cyanosis or edema bilaterally. Full range of motion without deformity.   Skin: Skin color, texture, turgor normal.  No rashes or lesions. Neuro: Non Focal. Symetrical motor and tone. Nl Comprehension, Alert,awake and oriented. NL CN. Symetrical tone and reflexes. Psychiatric: Flat affect         Labs:   Recent Labs     01/13/20  0652 01/15/20  0637   WBC 7.6 9.5   HGB 10.7* 13.0   HCT 31.8* 38.9    436*     Recent Labs     01/13/20  0652 01/14/20  0647 01/15/20  0637   * 130* 134*   K 3.8 4.0 3.4    100 101   CO2 19* 14* 17*   BUN 6* 6* 6*   CREATININE 0.59 0.55 0.56   CALCIUM 7.5* 7.9* 8.1*     No results for input(s): AST, ALT, BILIDIR, BILITOT, ALKPHOS in the last 72 hours. No results for input(s): INR in the last 72 hours. Recent Labs     01/13/20  0652 01/14/20  0647 01/15/20  0637   CKTOTAL 2,774* 3,162* 3,410*       Urinalysis:      Lab Results   Component Value Date    NITRU Negative 01/08/2020    WBCUA 0-2 01/08/2020    BACTERIA RARE 01/08/2020    RBCUA 0-2 01/08/2020    BLOODU LARGE 01/08/2020    SPECGRAV 1.019 01/08/2020    GLUCOSEU Negative 01/08/2020       Radiology:  XR KNEE RIGHT (1-2 VIEWS)   Final Result   1. Postoperative changes as detailed above, see above for details. No hardware failure or new fracture. XR CHEST PORTABLE   Final Result   AREA OF ATELECTASIS, PATCHY TO COALESCENT INFILTRATE LEFT LOWER LOBE WITH TRACE LEFT PLEURAL EFFUSION SUPERIMPOSED UPON COPD. XR CHEST PORTABLE   Final Result   Impression:     1. Stable, enlarged cardiomediastinal silhouette with thoracic aortic vascular calcifications. 2. No acute cardiopulmonary disease identified. Assessment/Plan:    Active Hospital Problems    Diagnosis Date Noted    Rhabdomyolysis [M62.82] 11/20/2019      1) Rhabdomyolysis--?myositis   2) Severe dehydration   3) UTI   4) Hypothyroidism   5) Elevated troponin: Likely non cardiac   6) Chronic diastolic HF: Not in exacerbation   7) LASHAE ruled out   8) Right knee pain   9)? depression    Plan:  - Continue to monitor CK  - Continue IVF rehydration  - Encourage OOB, PT/OT  - Monitor lytes  - Neurology consulted      Additional work up or/and treatment plan may be added today or then after based on clinical progression. I am managing a portion of pt care. Some medical issues are handled by other specialists. Additional work up and treatment should be done in out pt setting by pt PCP and other out pt providers. In addition to examining and evaluating pt, I spent additional time explaining care, normal and abnormal findings, and treatment plan. All of pt questions were answered. Counseling, diet and education were  provided. Case will be discussed with nursing staff when appropriate. Family will be updated if and when appropriate.       Diet: DIET GENERAL;  Dietary Nutrition Supplements: Standard High Calorie Oral Supplement    Code Status: Full Code    PT/OT Eval           Electronically signed by Raisa Castillo DO on 1/15/2020 at 10:17 AM

## 2020-01-15 NOTE — CONSULTS
Subjective:      Patient ID: Yann Dewey is a 67 y.o. female who presents today for:  Chief Complaint   Patient presents with    Fatigue       HPI 71-year-old right-handed female is admitted still generalized weakness opiate patient is been here for a few days. Patient reports that the weakness started about 4 weeks ago after a knee surgery and she never recovered. She had a few falls at home. She reports no difficulty swallowing she denies any double vision. She denies any numbness or ting to 4 extremities. He just is generalized weak. As a note from a level test she has hypothyroidism though her T4 levels are normal.  She is myxedematous. Peak levels continue to be in the high ranges and has not decreased even at bedrest.  There is no history of muscle disease in the past she is on amiodarone but the dose is small. She does not have any session of back pain or neck pain. She denies any double vision or blurry vision. Review of Systems   Constitutional: Negative for fever. HENT: Negative for ear pain, tinnitus and trouble swallowing. Eyes: Negative for photophobia and visual disturbance. Respiratory: Negative for choking and shortness of breath. Cardiovascular: Negative for chest pain and palpitations. Gastrointestinal: Negative for nausea and vomiting. Musculoskeletal: Negative for back pain, gait problem, joint swelling, myalgias, neck pain and neck stiffness. Neurological: Negative for dizziness, tremors, seizures, syncope, facial asymmetry, speech difficulty, weakness, light-headedness, numbness and headaches. Psychiatric/Behavioral: Negative for behavioral problems, confusion, hallucinations and sleep disturbance.        Past Medical History:   Diagnosis Date    Aortic stenosis     Chronic kidney disease     COPD (chronic obstructive pulmonary disease) (HCC)     Heart failure (HCC)     High cholesterol     HTN (hypertension)     Hypothyroid     New onset a-fib ([facility] [address] flight to second floor)    Home Access: Stairs to enter without rails    Entrance Stairs - Number of Steps: 2    Bathroom Shower/Tub: Tub/Shower unit    Bathroom Equipment: (None)    Home Equipment: (N/A)    ADL Assistance: Independent    Homemaking Assistance: Independent    Homemaking Responsibilities: Yes    Meal Prep Responsibility: Primary    Laundry Responsibility: Primary    Cleaning Responsibility: Primary    Ambulation Assistance: Independent(No AD)    Transfer Assistance: Independent    Active : Yes    Additional Comments: States that she was down on ground X 72 hours following fall     History reviewed. No pertinent family history.   Allergies   Allergen Reactions    Amoxil [Amoxicillin]     Cefdinir Hives    Sulfa Antibiotics      Current Facility-Administered Medications   Medication Dose Route Frequency Provider Last Rate Last Dose    mirtazapine (REMERON) tablet 15 mg  15 mg Oral Nightly Ki Burk MD   15 mg at 01/14/20 2240    amLODIPine (NORVASC) tablet 5 mg  5 mg Oral Daily Alix Escalante MD   5 mg at 01/15/20 0901    ipratropium-albuterol (DUONEB) nebulizer solution 1 ampule  1 ampule Inhalation Q4H PRN Bryson Kendricka, DO        sodium chloride flush 0.9 % injection 10 mL  10 mL Intravenous 2 times per day Areta Beecham, APRN - CNP   10 mL at 01/14/20 0950    sodium chloride flush 0.9 % injection 10 mL  10 mL Intravenous PRN Areta Beecham, APRN - CNP        magnesium hydroxide (MILK OF MAGNESIA) 400 MG/5ML suspension 30 mL  30 mL Oral Daily PRN Areta Beecham, APRN - CNP        ondansetron (ZOFRAN) injection 4 mg  4 mg Intravenous Q6H PRN Areta Beecham, APRN - CNP        acetaminophen (TYLENOL) tablet 650 mg  650 mg Oral Q4H PRN Areta Beecham, APRN - CNP        0.9 % sodium chloride infusion   Intravenous Continuous Signa Stack, DO 75 mL/hr at 01/14/20 1322      levothyroxine (SYNTHROID) tablet 125 mcg  125 mcg Oral Daily José Luis Durán, APRN - CNP   125 mcg at symmetric. Babinski sign absent on the right side. Babinski sign absent on the left side. Patient has proximal weakness bilaterally in the legs is 3/5 in the upper extremities is 4/5. She though still retains her reflexes up to the level of her knees there is no sensory levels. Myxedematous features are notable in the lower extremity swelling. Not able to walk her. Xr Knee Right (1-2 Views)    Result Date: 2020  Patient MRN: [id number] : 1940 Age:  67 years Gender: Female Order Date: 2020 1:30 PM. Exam: XR KNEE RIGHT (1-2 VIEWS) Number of Views: 2 Indication:  History of right open reduction and internal fixation patella repair Comparison: 11/15/2019 Findings: Multifocal areas of hardware traversing a comminuted patellar fracture seen on previous exam. No hardware failure. Vascular calcifications. No new fracture. 1. Postoperative changes as detailed above, see above for details. No hardware failure or new fracture. Xr Chest Portable    Result Date: 2020  EXAMINATION: CHEST AP PORTABLE  CLINICAL HISTORY: Wheezing COMPARISONS: 2020  FINDINGS: Single view of the chest are submitted. The cardiac silhouette is enlarged. Pulmonary vascular is attenuated, lungs are hyperinflated. Interstitium. Right sided trachea. Area of atelectasis, patchy to coalescent infiltrate left lower lobe with trace left pleural effusion. Pneumothoraces. AREA OF ATELECTASIS, PATCHY TO COALESCENT INFILTRATE LEFT LOWER LOBE WITH TRACE LEFT PLEURAL EFFUSION SUPERIMPOSED UPON COPD.     Xr Chest Portable    Result Date: 2020  Patient MRN: [id number] : 1940 Age:  67 years Gender: Female Order Date: 2020 11:00 AM. Exam: XR CHEST PORTABLE Number of Views: 1 Indication:  Cardiopulmonary evaluation, change in mental status and fatigue Comparison: 2019 Findings: Stable, enlarged cardiomediastinal silhouette weeks to normalize. Patient is on amiodarone although high CPK levels are not seen with amiodarone per se but does cause a myopathy. We will arrange for a muscle biopsy as this is only way to rule out an underlying pathological myositis. Once she has the biopsy we can then treat her with very low-dose of prednisone. There is no session of myelopathic signs to suggest any exertional CPK from only gait ataxia.   Plan:

## 2020-01-15 NOTE — CONSULTS
Pt Name: Marc Berg  MRN: [id number]  YOB: 1940  Date of evaluation: 1/15/2020  Primary Care Physician: Alana Bañuelos MD  Patient evaluated at the request of  Dr. Jhonathan Croft  Reason for evaluation: muscle biopsy    IMPRESSIONS:  1. rhabdomyositis       PLANS  1. Muscle biopsy(right quadriceps) tomorrow morning    SUBJECTIVE:  History of Chief Complaint:    Asiya Restrepo is a 67 y. o.female who presents with chief complaint of generalized weakness. She was seen by Dr Jhonathan Croft and he felt she has myositis likely related to underlying hypothyroidism with myxedematous features. He has requested a muscle biopsy. Past Medical History   has a past medical history of Aortic stenosis, Chronic kidney disease, COPD (chronic obstructive pulmonary disease) ([facility] [address] Heart failure ([facility] [address] High cholesterol, HTN (hypertension), Hypothyroid, and New onset a-fib ([facility] [address] Past Surgical History   has a past surgical history that includes Patella fracture surgery (Right, 11/18/2019) and Upper gastrointestinal endoscopy (N/A, 12/17/2019). Medications  Prior to Admission medications    Medication Sig Start Date End Date Taking?  Authorizing Provider   bisacodyl (DULCOLAX) 10 MG suppository Place 10 mg rectally daily as needed for Constipation   Yes Historical Provider, MD   calcium-vitamin D (OSCAL-500) 500-200 MG-UNIT per tablet Take 1 tablet by mouth 2 times daily   Yes Historical Provider, MD   mirtazapine (REMERON) 15 MG tablet Take 7.5 mg by mouth nightly   Yes Historical Provider, MD   guaiFENesin-dextromethorphan (ROBITUSSIN DM) 100-10 MG/5ML syrup Take 5 mLs by mouth every 6 hours as needed for Cough   Yes Historical Provider, MD   sennosides-docusate sodium (SENOKOT-S) 8.6-50 MG tablet Take 2 tablets by mouth nightly HOLD FOR LOOSE STOOL   Yes Historical Provider, MD   acetaminophen (TYLENOL) 325 MG tablet Take 650 mg by mouth every 4 hours as needed for Pain   Yes Historical Provider, MD   pantoprazole (PROTONIX) 40 MG tablet Take 1 Regular rate and rhythm without murmur, gallop or rub. Normal S1 and S2. Carotid and femoral pulses 2+/4 and equal bilaterally. ABDOMEN: Normal shape. .  Normal bowel sounds. No bruits. Soft, nondistended, no masses or organomegaly. no evidence of hernia. Tenderness: absent. RECTAL: deferred, not clinically indicated  NEUROLOGIC: There are no focalizing motor or sensory deficits. CN II-XII are grossly intact. Raheem Car EXTREMITIES: no cyanosis, no clubbing and no edema. PYSCH:  Mood, affect and judgement are normal, alert and oriented x 3  LABS:  Recent Labs     01/13/20  0652 01/14/20  0647 01/15/20  0637   WBC 7.6  --  9.5   HGB 10.7*  --  13.0   HCT 31.8*  --  38.9     --  436*   * 130* 134*   K 3.8 4.0 3.4    100 101   CO2 19* 14* 17*   BUN 6* 6* 6*   CREATININE 0.59 0.55 0.56   MG 2.1  --  1.6*   CALCIUM 7.5* 7.9* 8.1*       Thank you for the interesting evaluation. Further recommendations to follow.     Electronically signed by Dung Zelaya MD on 1/15/20 at 1:35 PM

## 2020-01-15 NOTE — PROGRESS NOTES
Assessment completed this shift. Alert and oriented x4. Assist x2 to turn and reposition. Poor appetite. +1  Edema BLE. . Tylenol given for left leg pain 3/10. Will continue to monitor.   Electronically signed by Karen Ly RN on 1/15/2020 at 3:09 PM

## 2020-01-15 NOTE — PROGRESS NOTES
kg)(stated)  · Usual Body Wt: 154 lb (69.9 kg)(12/16/19, 143 lb (11/15/19))  · % Weight Change:  ,  no significant wt loss  · Ideal Body Wt: 100 lb (45.4 kg), % Ideal Body > 100%  · BMI Classification: BMI 25.0 - 29.9 Overweight    Nutrition Interventions:   Modify current diet, Start ONS(Discontinue Cardiac restriction, start high calorie ONS TID)  Continued Inpatient Monitoring, Education Not Indicated    Nutrition Evaluation:   · Evaluation: Goals set   · Goals: po intake > 75% of meals and supplements, stable weight ~ 150 lb    · Monitoring: Meal Intake, Supplement Intake, Weight, Pertinent Labs, Mental Status/Confusion      Electronically signed by Marlyn Menchaca RD, LD on 1/15/20 at 1:44 PM

## 2020-01-15 NOTE — PROGRESS NOTES
normal.  No rashes or lesions. Neuro: Non Focal. Symetrical motor and tone. Nl Comprehension, Alert,awake and oriented. NL CN. Symetrical tone and reflexes. Psychiatric: Flat affect         Labs:   Recent Labs     01/13/20  0652 01/15/20  0637   WBC 7.6 9.5   HGB 10.7* 13.0   HCT 31.8* 38.9    436*     Recent Labs     01/13/20  0652 01/14/20  0647 01/15/20  0637   * 130* 134*   K 3.8 4.0 3.4    100 101   CO2 19* 14* 17*   BUN 6* 6* 6*   CREATININE 0.59 0.55 0.56   CALCIUM 7.5* 7.9* 8.1*     No results for input(s): AST, ALT, BILIDIR, BILITOT, ALKPHOS in the last 72 hours. No results for input(s): INR in the last 72 hours. Recent Labs     01/13/20  0652 01/14/20  0647 01/15/20  0637   CKTOTAL 2,774* 3,162* 3,410*       Urinalysis:      Lab Results   Component Value Date    NITRU Negative 01/08/2020    WBCUA 0-2 01/08/2020    BACTERIA RARE 01/08/2020    RBCUA 0-2 01/08/2020    BLOODU LARGE 01/08/2020    SPECGRAV 1.019 01/08/2020    GLUCOSEU Negative 01/08/2020       Radiology:  XR KNEE RIGHT (1-2 VIEWS)   Final Result   1. Postoperative changes as detailed above, see above for details. No hardware failure or new fracture. XR CHEST PORTABLE   Final Result   AREA OF ATELECTASIS, PATCHY TO COALESCENT INFILTRATE LEFT LOWER LOBE WITH TRACE LEFT PLEURAL EFFUSION SUPERIMPOSED UPON COPD. XR CHEST PORTABLE   Final Result   Impression:     1. Stable, enlarged cardiomediastinal silhouette with thoracic aortic vascular calcifications. 2. No acute cardiopulmonary disease identified. Assessment/Plan:    Active Hospital Problems    Diagnosis Date Noted    Rhabdomyolysis [M62.82] 11/20/2019      1) Rhabdomyolysis--?myositis   2) Severe dehydration   3) UTI   4) Hypothyroidism   5) Elevated troponin: Likely non cardiac   6) Chronic diastolic HF: Not in exacerbation   7) LASHAE ruled out   8) Right knee pain   9)? depression    Plan:  - Continue to monitor CK  - Muscle Bx in am  -

## 2020-01-16 ENCOUNTER — ANESTHESIA EVENT (OUTPATIENT)
Dept: OPERATING ROOM | Age: 80
DRG: 501 | End: 2020-01-16
Payer: MEDICARE

## 2020-01-16 ENCOUNTER — ANESTHESIA (OUTPATIENT)
Dept: OPERATING ROOM | Age: 80
DRG: 501 | End: 2020-01-16
Payer: MEDICARE

## 2020-01-16 VITALS
TEMPERATURE: 97.5 F | SYSTOLIC BLOOD PRESSURE: 138 MMHG | RESPIRATION RATE: 18 BRPM | DIASTOLIC BLOOD PRESSURE: 66 MMHG | WEIGHT: 151.01 LBS | OXYGEN SATURATION: 92 % | BODY MASS INDEX: 29.65 KG/M2 | HEART RATE: 68 BPM | HEIGHT: 60 IN

## 2020-01-16 VITALS — SYSTOLIC BLOOD PRESSURE: 114 MMHG | DIASTOLIC BLOOD PRESSURE: 65 MMHG | TEMPERATURE: 97 F | OXYGEN SATURATION: 99 %

## 2020-01-16 LAB
ANION GAP SERPL CALCULATED.3IONS-SCNC: 16 MEQ/L (ref 9–15)
BASOPHILS ABSOLUTE: 0.1 K/UL (ref 0–0.2)
BASOPHILS RELATIVE PERCENT: 1 %
BUN BLDV-MCNC: 8 MG/DL (ref 8–23)
CALCIUM SERPL-MCNC: 8.3 MG/DL (ref 8.5–9.9)
CHLORIDE BLD-SCNC: 96 MEQ/L (ref 95–107)
CK MB: 37 NG/ML (ref 0–3.8)
CO2: 18 MEQ/L (ref 20–31)
CREAT SERPL-MCNC: 0.6 MG/DL (ref 0.5–0.9)
CREATINE KINASE-MB INDEX: 1 % (ref 0–3.5)
EOSINOPHILS ABSOLUTE: 0.2 K/UL (ref 0–0.7)
EOSINOPHILS RELATIVE PERCENT: 2.5 %
GFR AFRICAN AMERICAN: >60
GFR NON-AFRICAN AMERICAN: >60
GLUCOSE BLD-MCNC: 111 MG/DL (ref 60–115)
GLUCOSE BLD-MCNC: 91 MG/DL (ref 70–99)
HCT VFR BLD CALC: 39 % (ref 37–47)
HEMOGLOBIN: 12.7 G/DL (ref 12–16)
LYMPHOCYTES ABSOLUTE: 1.6 K/UL (ref 1–4.8)
LYMPHOCYTES RELATIVE PERCENT: 17 %
MAGNESIUM: 1.7 MG/DL (ref 1.7–2.4)
MCH RBC QN AUTO: 31.8 PG (ref 27–31.3)
MCHC RBC AUTO-ENTMCNC: 32.5 % (ref 33–37)
MCV RBC AUTO: 97.8 FL (ref 82–100)
MONOCYTES ABSOLUTE: 1 K/UL (ref 0.2–0.8)
MONOCYTES RELATIVE PERCENT: 10.2 %
NEUTROPHILS ABSOLUTE: 6.7 K/UL (ref 1.4–6.5)
NEUTROPHILS RELATIVE PERCENT: 69.3 %
PDW BLD-RTO: 15.3 % (ref 11.5–14.5)
PERFORMED ON: NORMAL
PLATELET # BLD: 392 K/UL (ref 130–400)
POTASSIUM REFLEX MAGNESIUM: 3.3 MEQ/L (ref 3.4–4.9)
RBC # BLD: 3.99 M/UL (ref 4.2–5.4)
SODIUM BLD-SCNC: 130 MEQ/L (ref 135–144)
TOTAL CK: 3696 U/L (ref 0–170)
WBC # BLD: 9.6 K/UL (ref 4.8–10.8)

## 2020-01-16 PROCEDURE — 94150 VITAL CAPACITY TEST: CPT

## 2020-01-16 PROCEDURE — 6370000000 HC RX 637 (ALT 250 FOR IP): Performed by: SURGERY

## 2020-01-16 PROCEDURE — 7100000001 HC PACU RECOVERY - ADDTL 15 MIN: Performed by: SURGERY

## 2020-01-16 PROCEDURE — 2500000003 HC RX 250 WO HCPCS: Performed by: NURSE ANESTHETIST, CERTIFIED REGISTERED

## 2020-01-16 PROCEDURE — 0KBS0ZX EXCISION OF RIGHT LOWER LEG MUSCLE, OPEN APPROACH, DIAGNOSTIC: ICD-10-PCS | Performed by: SURGERY

## 2020-01-16 PROCEDURE — 7100000000 HC PACU RECOVERY - FIRST 15 MIN: Performed by: SURGERY

## 2020-01-16 PROCEDURE — 99233 SBSQ HOSP IP/OBS HIGH 50: CPT | Performed by: PSYCHIATRY & NEUROLOGY

## 2020-01-16 PROCEDURE — 36415 COLL VENOUS BLD VENIPUNCTURE: CPT

## 2020-01-16 PROCEDURE — 2580000003 HC RX 258: Performed by: SURGERY

## 2020-01-16 PROCEDURE — 20205 DEEP MUSCLE BIOPSY: CPT | Performed by: SURGERY

## 2020-01-16 PROCEDURE — 3700000001 HC ADD 15 MINUTES (ANESTHESIA): Performed by: SURGERY

## 2020-01-16 PROCEDURE — 6370000000 HC RX 637 (ALT 250 FOR IP): Performed by: INTERNAL MEDICINE

## 2020-01-16 PROCEDURE — 2580000003 HC RX 258: Performed by: INTERNAL MEDICINE

## 2020-01-16 PROCEDURE — 80048 BASIC METABOLIC PNL TOTAL CA: CPT

## 2020-01-16 PROCEDURE — 88300 SURGICAL PATH GROSS: CPT

## 2020-01-16 PROCEDURE — 82553 CREATINE MB FRACTION: CPT

## 2020-01-16 PROCEDURE — 2500000003 HC RX 250 WO HCPCS: Performed by: SURGERY

## 2020-01-16 PROCEDURE — 6360000002 HC RX W HCPCS: Performed by: NURSE ANESTHETIST, CERTIFIED REGISTERED

## 2020-01-16 PROCEDURE — 82550 ASSAY OF CK (CPK): CPT

## 2020-01-16 PROCEDURE — 6360000002 HC RX W HCPCS: Performed by: SURGERY

## 2020-01-16 PROCEDURE — 2709999900 HC NON-CHARGEABLE SUPPLY: Performed by: SURGERY

## 2020-01-16 PROCEDURE — 3600000003 HC SURGERY LEVEL 3 BASE: Performed by: SURGERY

## 2020-01-16 PROCEDURE — 85025 COMPLETE CBC W/AUTO DIFF WBC: CPT

## 2020-01-16 PROCEDURE — 3600000013 HC SURGERY LEVEL 3 ADDTL 15MIN: Performed by: SURGERY

## 2020-01-16 PROCEDURE — 83735 ASSAY OF MAGNESIUM: CPT

## 2020-01-16 PROCEDURE — 3700000000 HC ANESTHESIA ATTENDED CARE: Performed by: SURGERY

## 2020-01-16 RX ORDER — ONDANSETRON 2 MG/ML
4 INJECTION INTRAMUSCULAR; INTRAVENOUS
Status: DISCONTINUED | OUTPATIENT
Start: 2020-01-16 | End: 2020-01-16 | Stop reason: HOSPADM

## 2020-01-16 RX ORDER — NITROFURANTOIN 25; 75 MG/1; MG/1
100 CAPSULE ORAL EVERY 12 HOURS SCHEDULED
Qty: 20 CAPSULE | Refills: 0 | Status: SHIPPED | OUTPATIENT
Start: 2020-01-16 | End: 2020-01-26

## 2020-01-16 RX ORDER — FENTANYL CITRATE 50 UG/ML
INJECTION, SOLUTION INTRAMUSCULAR; INTRAVENOUS PRN
Status: DISCONTINUED | OUTPATIENT
Start: 2020-01-16 | End: 2020-01-16 | Stop reason: SDUPTHER

## 2020-01-16 RX ORDER — MAGNESIUM HYDROXIDE 1200 MG/15ML
LIQUID ORAL CONTINUOUS PRN
Status: COMPLETED | OUTPATIENT
Start: 2020-01-16 | End: 2020-01-16

## 2020-01-16 RX ORDER — BUPIVACAINE HYDROCHLORIDE AND EPINEPHRINE 2.5; 5 MG/ML; UG/ML
INJECTION, SOLUTION EPIDURAL; INFILTRATION; INTRACAUDAL; PERINEURAL PRN
Status: DISCONTINUED | OUTPATIENT
Start: 2020-01-16 | End: 2020-01-16 | Stop reason: ALTCHOICE

## 2020-01-16 RX ORDER — PREDNISONE 20 MG/1
40 TABLET ORAL DAILY
Status: DISCONTINUED | OUTPATIENT
Start: 2020-01-16 | End: 2020-01-16 | Stop reason: HOSPADM

## 2020-01-16 RX ORDER — DIPHENHYDRAMINE HYDROCHLORIDE 50 MG/ML
12.5 INJECTION INTRAMUSCULAR; INTRAVENOUS
Status: DISCONTINUED | OUTPATIENT
Start: 2020-01-16 | End: 2020-01-16 | Stop reason: HOSPADM

## 2020-01-16 RX ORDER — PREDNISONE 10 MG/1
10 TABLET ORAL DAILY
Qty: 10 TABLET | Refills: 0 | Status: SHIPPED | OUTPATIENT
Start: 2020-02-06 | End: 2020-02-16

## 2020-01-16 RX ORDER — HYDROCODONE BITARTRATE AND ACETAMINOPHEN 5; 325 MG/1; MG/1
2 TABLET ORAL PRN
Status: DISCONTINUED | OUTPATIENT
Start: 2020-01-16 | End: 2020-01-16 | Stop reason: HOSPADM

## 2020-01-16 RX ORDER — LIDOCAINE HYDROCHLORIDE 20 MG/ML
INJECTION, SOLUTION INFILTRATION; PERINEURAL PRN
Status: DISCONTINUED | OUTPATIENT
Start: 2020-01-16 | End: 2020-01-16 | Stop reason: SDUPTHER

## 2020-01-16 RX ORDER — PREDNISONE 10 MG/1
30 TABLET ORAL DAILY
Qty: 30 TABLET | Refills: 0 | Status: SHIPPED | OUTPATIENT
Start: 2020-01-23 | End: 2020-02-02

## 2020-01-16 RX ORDER — TRAMADOL HYDROCHLORIDE 50 MG/1
50 TABLET ORAL EVERY 6 HOURS PRN
Status: DISCONTINUED | OUTPATIENT
Start: 2020-01-16 | End: 2020-01-16 | Stop reason: HOSPADM

## 2020-01-16 RX ORDER — MEPERIDINE HYDROCHLORIDE 25 MG/ML
12.5 INJECTION INTRAMUSCULAR; INTRAVENOUS; SUBCUTANEOUS EVERY 5 MIN PRN
Status: DISCONTINUED | OUTPATIENT
Start: 2020-01-16 | End: 2020-01-16 | Stop reason: HOSPADM

## 2020-01-16 RX ORDER — HYDROCODONE BITARTRATE AND ACETAMINOPHEN 5; 325 MG/1; MG/1
1 TABLET ORAL PRN
Status: DISCONTINUED | OUTPATIENT
Start: 2020-01-16 | End: 2020-01-16 | Stop reason: HOSPADM

## 2020-01-16 RX ORDER — PREDNISONE 10 MG/1
10 TABLET ORAL DAILY
Status: DISCONTINUED | OUTPATIENT
Start: 2020-02-06 | End: 2020-01-16 | Stop reason: HOSPADM

## 2020-01-16 RX ORDER — FENTANYL CITRATE 50 UG/ML
25 INJECTION, SOLUTION INTRAMUSCULAR; INTRAVENOUS EVERY 10 MIN PRN
Status: DISCONTINUED | OUTPATIENT
Start: 2020-01-16 | End: 2020-01-16 | Stop reason: HOSPADM

## 2020-01-16 RX ORDER — ONDANSETRON 2 MG/ML
INJECTION INTRAMUSCULAR; INTRAVENOUS PRN
Status: DISCONTINUED | OUTPATIENT
Start: 2020-01-16 | End: 2020-01-16 | Stop reason: SDUPTHER

## 2020-01-16 RX ORDER — PREDNISONE 20 MG/1
20 TABLET ORAL DAILY
Status: DISCONTINUED | OUTPATIENT
Start: 2020-01-30 | End: 2020-01-16 | Stop reason: HOSPADM

## 2020-01-16 RX ORDER — METOCLOPRAMIDE HYDROCHLORIDE 5 MG/ML
10 INJECTION INTRAMUSCULAR; INTRAVENOUS
Status: DISCONTINUED | OUTPATIENT
Start: 2020-01-16 | End: 2020-01-16 | Stop reason: HOSPADM

## 2020-01-16 RX ORDER — ETOMIDATE 2 MG/ML
INJECTION INTRAVENOUS PRN
Status: DISCONTINUED | OUTPATIENT
Start: 2020-01-16 | End: 2020-01-16 | Stop reason: SDUPTHER

## 2020-01-16 RX ORDER — PREDNISONE 20 MG/1
20 TABLET ORAL DAILY
Qty: 10 TABLET | Refills: 0 | Status: SHIPPED | OUTPATIENT
Start: 2020-01-30 | End: 2020-02-09

## 2020-01-16 RX ORDER — PREDNISONE 10 MG/1
10 TABLET ORAL DAILY
Status: DISCONTINUED | OUTPATIENT
Start: 2020-01-16 | End: 2020-01-16

## 2020-01-16 RX ORDER — PREDNISONE 20 MG/1
40 TABLET ORAL DAILY
Qty: 20 TABLET | Refills: 0 | Status: SHIPPED | OUTPATIENT
Start: 2020-01-16 | End: 2020-01-26

## 2020-01-16 RX ORDER — AMLODIPINE BESYLATE 5 MG/1
5 TABLET ORAL DAILY
Qty: 30 TABLET | Refills: 0 | Status: SHIPPED | OUTPATIENT
Start: 2020-01-17

## 2020-01-16 RX ORDER — SODIUM CHLORIDE, SODIUM LACTATE, POTASSIUM CHLORIDE, CALCIUM CHLORIDE 600; 310; 30; 20 MG/100ML; MG/100ML; MG/100ML; MG/100ML
INJECTION, SOLUTION INTRAVENOUS CONTINUOUS
Status: DISCONTINUED | OUTPATIENT
Start: 2020-01-16 | End: 2020-01-16

## 2020-01-16 RX ORDER — MORPHINE SULFATE 2 MG/ML
1 INJECTION, SOLUTION INTRAMUSCULAR; INTRAVENOUS
Status: DISCONTINUED | OUTPATIENT
Start: 2020-01-16 | End: 2020-01-16 | Stop reason: HOSPADM

## 2020-01-16 RX ADMIN — ENOXAPARIN SODIUM 40 MG: 40 INJECTION SUBCUTANEOUS at 11:12

## 2020-01-16 RX ADMIN — AMLODIPINE BESYLATE 5 MG: 5 TABLET ORAL at 11:12

## 2020-01-16 RX ADMIN — LIDOCAINE HYDROCHLORIDE 60 MG: 20 INJECTION, SOLUTION INFILTRATION; PERINEURAL at 07:43

## 2020-01-16 RX ADMIN — ONDANSETRON 4 MG: 2 INJECTION INTRAMUSCULAR; INTRAVENOUS at 07:59

## 2020-01-16 RX ADMIN — LEVOTHYROXINE SODIUM 125 MCG: 125 TABLET ORAL at 06:08

## 2020-01-16 RX ADMIN — PHENYLEPHRINE HYDROCHLORIDE 100 MCG: 10 INJECTION INTRAVENOUS at 08:22

## 2020-01-16 RX ADMIN — PANTOPRAZOLE SODIUM 40 MG: 40 TABLET, DELAYED RELEASE ORAL at 16:54

## 2020-01-16 RX ADMIN — TRAMADOL HYDROCHLORIDE 50 MG: 50 TABLET, FILM COATED ORAL at 12:41

## 2020-01-16 RX ADMIN — AMIODARONE HYDROCHLORIDE 200 MG: 200 TABLET ORAL at 11:11

## 2020-01-16 RX ADMIN — NITROFURANTOIN (MONOHYDRATE/MACROCRYSTALS) 100 MG: 75; 25 CAPSULE ORAL at 11:11

## 2020-01-16 RX ADMIN — FENTANYL CITRATE 25 MCG: 50 INJECTION, SOLUTION INTRAMUSCULAR; INTRAVENOUS at 08:09

## 2020-01-16 RX ADMIN — SODIUM CHLORIDE: 9 INJECTION, SOLUTION INTRAVENOUS at 04:56

## 2020-01-16 RX ADMIN — CARBAMAZEPINE 200 MG: 200 TABLET ORAL at 11:10

## 2020-01-16 RX ADMIN — PREDNISONE 40 MG: 20 TABLET ORAL at 16:54

## 2020-01-16 RX ADMIN — METOPROLOL TARTRATE 12.5 MG: 25 TABLET, FILM COATED ORAL at 11:24

## 2020-01-16 RX ADMIN — ETOMIDATE 14 MG: 2 INJECTION, SOLUTION INTRAVENOUS at 07:43

## 2020-01-16 ASSESSMENT — PAIN DESCRIPTION - PAIN TYPE: TYPE: ACUTE PAIN

## 2020-01-16 ASSESSMENT — PULMONARY FUNCTION TESTS
PIF_VALUE: 2
PIF_VALUE: 15
PIF_VALUE: 5
PIF_VALUE: 1
PIF_VALUE: 8
PIF_VALUE: 2
PIF_VALUE: 1
PIF_VALUE: 7
PIF_VALUE: 10
PIF_VALUE: 2
PIF_VALUE: 5
PIF_VALUE: 2
PIF_VALUE: 15
PIF_VALUE: 2
PIF_VALUE: 1
PIF_VALUE: 1
PIF_VALUE: 2
PIF_VALUE: 5
PIF_VALUE: 2
PIF_VALUE: 0
PIF_VALUE: 2
PIF_VALUE: 2
PIF_VALUE: 5
PIF_VALUE: 0
PIF_VALUE: 15
PIF_VALUE: 2
PIF_VALUE: 2
PIF_VALUE: 15
PIF_VALUE: 2
PIF_VALUE: 10
PIF_VALUE: 6
PIF_VALUE: 5
PIF_VALUE: 2
PIF_VALUE: 0
PIF_VALUE: 2
PIF_VALUE: 10
PIF_VALUE: 2
PIF_VALUE: 2
PIF_VALUE: 27
PIF_VALUE: 15
PIF_VALUE: 0
PIF_VALUE: 15
PIF_VALUE: 0
PIF_VALUE: 2
PIF_VALUE: 2
PIF_VALUE: 5
PIF_VALUE: 2
PIF_VALUE: 2

## 2020-01-16 ASSESSMENT — PAIN DESCRIPTION - ORIENTATION: ORIENTATION: RIGHT

## 2020-01-16 ASSESSMENT — ENCOUNTER SYMPTOMS
TROUBLE SWALLOWING: 0
VOMITING: 0
CHEST TIGHTNESS: 0
COLOR CHANGE: 0
SHORTNESS OF BREATH: 0
COUGH: 0
NAUSEA: 0
WHEEZING: 0
ABDOMINAL PAIN: 0

## 2020-01-16 ASSESSMENT — PAIN SCALES - GENERAL
PAINLEVEL_OUTOF10: 0
PAINLEVEL_OUTOF10: 5
PAINLEVEL_OUTOF10: 0
PAINLEVEL_OUTOF10: 0
PAINLEVEL_OUTOF10: 5

## 2020-01-16 ASSESSMENT — PAIN DESCRIPTION - LOCATION: LOCATION: LEG

## 2020-01-16 NOTE — ANESTHESIA POSTPROCEDURE EVALUATION
Department of Anesthesiology  Postprocedure Note    Patient: Lady Man  MRN: [id number]  YOB: 1940  Date of evaluation: 1/16/2020  Time:  8:56 AM     Procedure Summary     Date:  01/16/20 Room / Location:  [address]    Anesthesia Start:  1005 Anesthesia Stop:  9555    Procedure:  RIGHT QUADRICEPS MUSCLE BIOPSY (Right ) Diagnosis:  (myositis)    Surgeon:  Maria Luz Diaz MD Responsible Provider:  Gifty Chacon MD    Anesthesia Type:  general ASA Status:  3          Anesthesia Type: general    North Phase I:      North Phase II:      Last vitals: Reviewed and per EMR flowsheets.        Anesthesia Post Evaluation    Patient location during evaluation: PACU  Patient participation: complete - patient participated  Level of consciousness: awake and alert  Pain score: 0  Airway patency: patent  Nausea & Vomiting: no nausea and no vomiting  Complications: no  Cardiovascular status: blood pressure returned to baseline and hemodynamically stable  Respiratory status: acceptable  Hydration status: euvolemic

## 2020-01-16 NOTE — CARE COORDINATION
Per son request, [facility] can accept pt. Left message for son Conception Redder. Left message for son regarding pt transferring to [facility].

## 2020-01-16 NOTE — DISCHARGE SUMMARY
Physician Discharge Summary     Patient ID:  Mariangel Mix  [id number]  67 y.o.  1940    Admit date: 1/8/2020    Discharge date : 01/16/20     Admitting Physician: Diego Pierce DO     Discharge Physician: Dom Allan DO     Admission Diagnoses: Rhabdomyolysis [M62.82]    Discharge Diagnoses: Elevated CK, possible myositis    Admission Condition: stable    Discharged Condition: stable    Hospital Course: This is a 81-year-old female who presented from the ED for weakness. Patient has had elevated CK. Initially, she was thought to be in rhabdomyolysis and was given aggressive IV fluid rehydration. This did not affect the CK level. She was evaluated by physical therapy and had a significant amount of difficulty with ambulation. This represents a somewhat new change or at least a progressive change for her. She was evaluated by neurology, who had concern for hypothyroidism related myositis versus other etiology. She has had a muscle biopsy on 1/16/2020. Results of the muscle biopsy are currently pending. She will need to follow-up as an outpatient with neurology, Dr. Benard Schilder. He would like her to be on a prednisone taper for 4 weeks for presumed myositis. Prescription has been written for this. Unfortunately, she is too weak to be able to participate well in rehab therapy. She will be discharged to nursing home. She will need to follow-up with her PCP within the next week.   Consults: Neurology, general surgery, psychiatry    Significant Diagnostic Studies: See below    Discharge Exam:  General appearance: alert, appears stated age and cooperative  Head: Normocephalic, without obvious abnormality, atraumatic  Lungs: clear to auscultation bilaterally  Heart: regular rate and rhythm, S1, S2 normal, no murmur, click, rub or gallop  Abdomen: soft, non-tender; bowel sounds normal; no masses,  no organomegaly  Extremities: extremities normal, atraumatic, no cyanosis or edema  Pulses: 2+ and symmetric  Skin: Skin color, texture, turgor normal. No rashes or lesions  Neurologic: Grossly normal    Labs:   Recent Labs     01/15/20  0637 01/16/20  1004   WBC 9.5 9.6   HGB 13.0 12.7   HCT 38.9 39.0   * 392     Recent Labs     01/14/20  0647 01/15/20  0637 01/16/20  1004   * 134* 130*   K 4.0 3.4 3.3*    101 96   CO2 14* 17* 18*   BUN 6* 6* 8   CREATININE 0.55 0.56 0.60   CALCIUM 7.9* 8.1* 8.3*     No results for input(s): AST, ALT, BILIDIR, BILITOT, ALKPHOS in the last 72 hours. No results for input(s): INR in the last 72 hours. Recent Labs     01/14/20  0647 01/15/20  0637 01/16/20  1004   CKTOTAL 3,162* 3,410* 3,696*       Urinalysis:   Lab Results   Component Value Date    NITRU Negative 01/08/2020    WBCUA 0-2 01/08/2020    BACTERIA RARE 01/08/2020    RBCUA 0-2 01/08/2020    BLOODU LARGE 01/08/2020    SPECGRAV 1.019 01/08/2020    GLUCOSEU Negative 01/08/2020       Radiology:   Most recent    Chest CT      WITH CONTRAST:No results found for this or any previous visit. WITHOUT CONTRAST:   Results for orders placed during the hospital encounter of 11/15/19   CT CHEST WO CONTRAST    Narrative EXAMINATION:  CT SCAN CHEST    CLINICAL HISTORY:  Generalized pain. History of fall yesterday. Remaining  On ground for 24 hours    COMPARISON:  None. TECHNIQUE:  Multiple serial axial images from the base neck through the upper abdomen with both sagittal coronal reconstruction was performed without intravenous or oral administration of contrast.    FINDINGS:  There are areas of dependent atelectasis and scarring noted. No other focal parenchymal abnormalities. No pleural effusions. No pneumothoraces    There is aneurysmal dilatation of the ascending arch the aorta which measures 4.6 cm. No significant mediastinal adenopathy. The heart and great vessels appear grossly intact although black of IV contrast limits full evaluation.     Visualized osseous structures are intact    Examination: CT CHEST WO CONTRAST, CT ABDOMEN PELVIS WO CONTRAST    Indication:  History of fall yesterday. Generalized pain. Technique: Multiple serial axial images was performed through the abdomen and pelvis without intravenous or oral administration of contrast Images were reconstructed in the axial and coronal and sagittal planes. Comparison:  No comparison is available. Findings: The liver,  spleen, pancreas, adrenals, are unremarkable. The gallbladder is absent. Correlate with surgical history. Both kidneys are atrophic. Correlate with underlying medical renal function. The bladder is markedly distended. Correlate clinically. Consider decompression. Large and small bowel show no sign of obstruction. The appendix is visualized. No periappendiceal stranding. No diverticulitis. No free air. No free fluid. The visualized abdominal aorta is of normal size and caliber. No significant retroperitoneal adenopathy. Visualized osseous structures show probable hemangioma in the L4 vertebra. Impression 1. BOTH KIDNEYS APPEAR ATROPHIC. CORRELATE WITH UNDERLYING MEDICAL RENAL FUNCTION. 2. THE BLADDER IS MARKEDLY DISTENDED. CORRELATE CLINICALLY. CONSIDER DECOMPRESSION. OTHER FINDINGS DETAILED ABOVE      All CT scans at this facility use dose modulation, iterative reconstruction, and/or weight based dosing when appropriate to reduce radiation dose to as low as reasonably achievable. All CT scans at this facility use dose modulation, iterative reconstruction, and/or weight based dosing when appropriate to reduce radiation dose to as low as reasonably achievable. CXR      2-view:   Results for orders placed during the hospital encounter of 11/15/19   XR CHEST STANDARD (2 VW)    Narrative EXAMINATION: XR CHEST (2 VW)     CLINICAL HISTORY: Weakness    COMPARISONS: None     FINDINGS:    Two views of the chest are submitted. The cardiac silhouette is enlarged. .  Pulmonary vascular days  Qty: 20 capsule, Refills: 0       !! - Potential duplicate medications found. Please discuss with provider.       CONTINUE these medications which have NOT CHANGED    Details   bisacodyl (DULCOLAX) 10 MG suppository Place 10 mg rectally daily as needed for Constipation      calcium-vitamin D (OSCAL-500) 500-200 MG-UNIT per tablet Take 1 tablet by mouth 2 times daily      mirtazapine (REMERON) 15 MG tablet Take 7.5 mg by mouth nightly      guaiFENesin-dextromethorphan (ROBITUSSIN DM) 100-10 MG/5ML syrup Take 5 mLs by mouth every 6 hours as needed for Cough      sennosides-docusate sodium (SENOKOT-S) 8.6-50 MG tablet Take 2 tablets by mouth nightly HOLD FOR LOOSE STOOL      acetaminophen (TYLENOL) 325 MG tablet Take 650 mg by mouth every 4 hours as needed for Pain      pantoprazole (PROTONIX) 40 MG tablet Take 1 tablet by mouth 2 times daily (before meals)  Qty: 60 tablet, Refills: 0      vitamin B-12 (CYANOCOBALAMIN) 500 MCG tablet Take 500 mcg by mouth daily      folic acid (FOLVITE) 1 MG tablet Take 1 mg by mouth daily      aspirin 81 MG chewable tablet Take 1 tablet by mouth daily  Qty: 30 tablet, Refills: 3      amiodarone (CORDARONE) 200 MG tablet Take 1 tablet by mouth daily  Qty: 30 tablet, Refills: 3      ipratropium-albuterol (DUONEB) 0.5-2.5 (3) MG/3ML SOLN nebulizer solution Inhale 3 mLs into the lungs every 4 hours as needed for Shortness of Breath  Qty: 360 mL, Refills: 0      carBAMazepine (TEGRETOL) 200 MG tablet Take 1 tablet by mouth 2 times daily  Qty: 90 tablet, Refills: 3      coenzyme Q10 100 MG CAPS capsule Take 1 capsule by mouth 2 times daily (before meals)  Qty: 60 capsule, Refills: 0      levothyroxine (SYNTHROID) 50 MCG tablet Take 1 tablet by mouth Daily  Qty: 30 tablet, Refills: 3      vitamin D (ERGOCALCIFEROL) 1.25 MG (26634 UT) CAPS capsule Take 1 capsule by mouth once a week  Qty: 5 capsule, Refills: 0      metoprolol tartrate (LOPRESSOR) 25 MG tablet Take 0.5 tablets by mouth 2

## 2020-01-16 NOTE — ANESTHESIA PRE PROCEDURE
(MACROBID) capsule 100 mg  100 mg Oral 2 times per day EDDIE Mckeon - CNP   100 mg at 01/15/20 2116    amiodarone (CORDARONE) tablet 200 mg  200 mg Oral Daily [facility], DO   200 mg at 01/15/20 0901    acetaminophen (TYLENOL) tablet 650 mg  650 mg Oral Q4H PRN [facility], DO   650 mg at 01/15/20 1324    aspirin chewable tablet 81 mg  81 mg [facility]   81 mg at 01/15/20 0901    carBAMazepine (TEGRETOL) tablet 200 mg  200 mg Oral BID [facility], DO   200 mg at 23/85/38 4199    folic acid (FOLVITE) tablet 1 mg  1 mg Oral Daily [facility], DO   1 mg at 01/15/20 0901    metoprolol tartrate (LOPRESSOR) tablet 12.5 mg  12.5 mg Oral BID [facility], DO   12.5 mg at 01/15/20 2117    pantoprazole (PROTONIX) tablet 40 mg  40 mg Oral BID AC Yazid R Jose, DO   40 mg at 01/15/20 1552    vitamin B-12 (CYANOCOBALAMIN) tablet 500 mcg  500 mcg Oral Daily [facility], DO   500 mcg at 01/15/20 0901       Allergies: Allergies   Allergen Reactions    Amoxil [Amoxicillin]     Cefdinir Hives    Sulfa Antibiotics        Problem List:    Patient Active Problem List   Diagnosis Code    Acute kidney injury (LASHAE) with acute tubular necrosis (ATN) ([facility]) N17.0    Anxiety state F41.1    Aortic valve stenosis I35.0    Esophageal reflux K21.9    Essential hypertension, benign I10    Hyperlipidemia E78.5    Seborrheic keratosis L82.1    Thoracic ascending aortic aneurysm (HCC) I71.2    Tobacco abuse Z72.0    Vitamin D deficiency E55.9    Closed fracture of right patella S82.001A    Abnormality of gait and mobility due to Impaired Mobility and ADL's due to Rhabdomyolysis, Right Patellar Fx. [facility] admit 11/20/19.  R26.9    Paroxysmal atrial fibrillation (HCC) I48.0    HTN (hypertension) I10    Stage 3 chronic kidney disease (HCC) N18.3    Rhabdomyolysis M62.82    Hyponatremia E87.1    Hypothyroid E03.9    BMI 25.0-25.9,adult Z68.25    Acute blood loss discussed with patient. Plan discussed with CRNA.     Attending anesthesiologist reviewed and agrees with Pre Eval content              Jim Arroyo MD   1/16/2020

## 2020-01-16 NOTE — PLAN OF CARE
Problem: Falls - Risk of:  Goal: Will remain free from falls  Description  Will remain free from falls  1/16/2020 0102 by Carlos Lott RN  Outcome: Ongoing  1/15/2020 1504 by Madelyn Huang RN  Outcome: Ongoing  Goal: Absence of physical injury  Description  Absence of physical injury  1/16/2020 0102 by Carlos Lott RN  Outcome: Ongoing  1/15/2020 1504 by Madelyn Huang RN  Outcome: Ongoing     Problem: Risk for Impaired Skin Integrity  Goal: Tissue integrity - skin and mucous membranes  Description  Structural intactness and normal physiological function of skin and  mucous membranes.   1/16/2020 0102 by Carlos Lott RN  Outcome: Ongoing  1/15/2020 1504 by Madelyn Huang RN  Outcome: Ongoing     Problem: IP BALANCE  Goal: LTG - Patient will maintain balance to allow for safe/functional mobility  1/16/2020 0102 by Carlos Lott RN  Outcome: Ongoing  1/15/2020 1504 by Madelyn Huang RN  Outcome: Ongoing     Problem: IP BALANCE  Goal: LTG - patient will maintain standing balance to allow for completion of daily activities  1/16/2020 0102 by Carlos Lott RN  Outcome: Ongoing  1/15/2020 1504 by Madelyn Huang RN  Outcome: Ongoing     Problem: Nutrition  Goal: Optimal nutrition therapy  1/16/2020 0102 by Carlos Lott RN  Outcome: Ongoing  1/15/2020 1504 by Madelyn Huang RN  Outcome: Ongoing  1/15/2020 1344 by Merline Frizzle, RD, LD  Outcome: Ongoing

## 2020-01-16 NOTE — PROGRESS NOTES
Neurology Daily Progress Note  Name: Jenae Retana  Age: 67 y.o. Gender: female  CodeStatus: Full Code  Allergies: Amoxil [Amoxicillin]  Cefdinir  Sulfa Antibiotics    Chief Complaint:Fatigue    Primary Care Provider: Jonathan Laureano MD  InpatientTreatment Team: Treatment Team: Attending Provider: Haris Montez DO; Consulting Physician: Farideh Paniagua DO; Utilization Reviewer: Governor Kwadwo RN; Consulting Physician: Jumana Dewey MD; Consulting Physician: Haris Montez DO; Consulting Physician: Nelida Mae MD; Consulting Physician: Nora Shearer MD; Surgeon: Nora Shearer MD; Registered Nurse: Edgardo Matias, RN; : Dimas Escalante, MEETA; Patient Care Tech: Ashley Flaquita; : Alexia Bhatt, [facility]; Consulting Physician: Ruddy Haynes MD; Consulting Physician: Ron Jama MD  Admission Date: 1/8/2020      HPI   Pt seen and examined for neuro follow up for BLE pain/weakness. A&O x3, NAD, cooperative. Pt with c/o weakness BLE R>L and pain to entire right leg described as aching. Having difficulty with plantar flexion bilat feet R>L. Dorsiflexion intact. Pt with weakness of right leg flexion of knee. NO Headache, double vision, blurry vision, difficulty with speech, difficulty with swallowing, weakness, numbness, pain, nausea, vomiting, choking, neck pain, dizziness  Vitals:    01/16/20 1236   BP: 126/71   Pulse: 60   Resp: 20   Temp: 97.7 °F (36.5 °C)   SpO2: 96%      Review of Systems   Constitutional: Positive for appetite change and fatigue. Negative for chills and fever. HENT: Negative for hearing loss and trouble swallowing. Eyes: Negative for visual disturbance. Respiratory: Negative for cough, chest tightness, shortness of breath and wheezing. Cardiovascular: Positive for leg swelling. Negative for chest pain and palpitations. Gastrointestinal: Negative for abdominal pain, nausea and vomiting.    Genitourinary: Negative for difficulty day    amiodarone  200 mg Oral Daily    aspirin  81 mg Oral Daily    carBAMazepine  200 mg Oral BID    folic acid  1 mg Oral Daily    metoprolol tartrate  12.5 mg Oral BID    pantoprazole  40 mg Oral BID AC    vitamin B-12  500 mcg Oral Daily     PRN Meds: morphine, traMADol, ipratropium-albuterol, sodium chloride flush, magnesium hydroxide, ondansetron, acetaminophen, acetaminophen    Labs:   Recent Labs     01/15/20  0637 01/16/20  1004   WBC 9.5 9.6   HGB 13.0 12.7   HCT 38.9 39.0   * 392     Recent Labs     01/14/20  0647 01/15/20  0637 01/16/20  1004   * 134* 130*   K 4.0 3.4 3.3*    101 96   CO2 14* 17* 18*   BUN 6* 6* 8   CREATININE 0.55 0.56 0.60   CALCIUM 7.9* 8.1* 8.3*     No results for input(s): AST, ALT, BILIDIR, BILITOT, ALKPHOS in the last 72 hours. No results for input(s): INR in the last 72 hours. Recent Labs     01/14/20  0647 01/15/20  0637 01/16/20  1004   CKTOTAL 3,162* 3,410* 3,696*       Urinalysis:   Lab Results   Component Value Date    NITRU Negative 01/08/2020    WBCUA 0-2 01/08/2020    BACTERIA RARE 01/08/2020    RBCUA 0-2 01/08/2020    BLOODU LARGE 01/08/2020    SPECGRAV 1.019 01/08/2020    GLUCOSEU Negative 01/08/2020       Radiology:   Most recent    EEG No procedure found. MRI of Brain No results found for this or any previous visit. Results for orders placed during the hospital encounter of 11/20/19   MRI BRAIN WO CONTRAST    Narrative EXAMINATION:  MRI BRAIN WO CONTRAST    HISTORY:   lethargy, recent AF with RVR     TECHNIQUE:  Routine noncontrast MRI protocol including diffusion and gradient echo images. COMPARISON: CT head 11/22/2019. MRI brain 11/16/2019      RESULT:    Acute Change:   Punctate focus of restricted diffusion in the left corona radiata (series 4 image 18), new from MRI 11/16/2019. Hemorrhage:    No evidence of prior parenchymal hemorrhage.     Mass Lesion/ Mass Effect:    No evidence of an intracranial mass or extra-axial fluid collection. No significant mass effect. Chronic Change:  Scattered patchy areas of increased T2 and FLAIR signal  are present in the supratentorial white matter which is a nonspecific finding but likely represents mild to moderate chronic microvascular ischemia. Parenchyma: There is mild generalized parenchymal volume loss. The brain parenchyma is otherwise within normal limits of signal intensity and morphology. Ventricles:     Ventriculomegaly corresponds to the degree of parenchymal volume loss. Skull Base:    Hypothalamic and pituitary region are grossly normal.   Craniocervical junction is normal. No significant marrow replacement process. Vasculature:    Major intracranial arterial structures, and dural venous sinuses show typical flow void, suggesting patency by spin echo criteria. Other:  The visualized paranasal sinuses and mastoid air cells are clear. The orbits and extracranial soft tissues are unremarkable. Impression Punctate focus of restricted diffusion in the left corona radiata, new from MRI 11/16/2019. Otherwise chronic changes, similar to prior MRI. MRA of the Head and Neck: No results found for this or any previous visit. No results found for this or any previous visit. No results found for this or any previous visit. CT of the Head:   Results for orders placed during the hospital encounter of 11/20/19   CT Head WO Contrast    Narrative EXAMINATION: CT HEAD WO CONTRAST    CLINICAL HISTORY: decline in cognition     COMPARISON:  NOVEMBER 15, 2019     An unenhanced scan is performed. FINDINGS:   There is no bleed, mass effect, or space occupying lesion. No extra-axial mass or fluid collections. Hypoattenuated White matter changes and generalized atrophy is consistent with senescent changes of aging. No change from previous CT. Impression NO ACUTE PROCESS IN THE BRAIN.        All CT scans at

## 2020-01-16 NOTE — OP NOTE
[address]                      [address], [address] [facility]                                OPERATIVE REPORT    PATIENT NAME: Sherice Chiang                       :        1940  MED REC NO:   [id number]                            ROOM:       E029  ACCOUNT NO:   [de-identified]                           ADMIT DATE: 2020  PROVIDER:     Eladio Sloan MD    DATE OF PROCEDURE:  2020    PREOPERATIVE DIAGNOSIS:  Myositis. POSTOPERATIVE DIAGNOSIS:  Myositis. PROCEDURE PERFORMED:  Right quadriceps muscle biopsy. SURGEON:  Eladio lSoan MD    ANESTHESIA:  General.    ESTIMATED BLOOD LOSS:  Less than 50 mL. COMPLICATIONS:  None. HISTORY:  The patient is a 76-year-old female who is here now in the  hospital, and Neurology has seen her and wishes to have a muscle biopsy  done to rule out myositis. The procedure as well as the risks and  complications were discussed with her including infection, blood loss,  damage to surrounding structures and even the possibility of needing  further surgery if it were to occur were all discussed. She understood  and was agreeable to the procedure. OPERATIVE PROCEDURE:  The patient was brought in the operative suite,  placed in the supine position where anesthesia was induced and she was  intubated. Her right leg brace was taken off and there was an open  wound present in the right knee area. This knee was sterilely draped  off. Afterwards, the right anterior thigh was prepped and draped in a  sterile fashion with an [place] drape over. Time-out called. The patient  and procedure were properly identified. The skin was then infiltrated  with 0.25% Marcaine with epinephrine, and a longitudinal incision was  made in the mid thigh area and carried down through the subcutaneous  tissue to the sheath of the quadriceps muscles. The sheath was opened,  the muscle was identified.   There was noted to be a lot of edema in the  soft tissue. I then placed a #10 muscle clamp onto the muscle and  proceeded to transect it proximally and distally to the muscle clamp. It was sent for specimen pathology. Hemostasis was then maintained with  electrocautery as well as several 2-0 Vicryl ligatures. After the  hemostasis of the muscle was good, area was irrigated with saline  solution and the fascia was closed with 2-0 Vicryl; 2-0 Vicryl and 3-0  Vicryl for the subcutaneous tissue and 4-0 Monocryl with skin glue on  the skin edges were used. An Aquacel dressing was placed over the  wound. She tolerated procedure well, went to Recovery in stable  condition. We will have Orthopedics see her for her open knee wound.         Nate [address] MD    D: 01/16/2020 13:21:15       T: 01/16/2020 13:29:01     [email or web address]  Job#: [id number]     Doc#: [id number]    CC:

## 2020-01-16 NOTE — PROGRESS NOTES
Pt is in bed resting. Assessment documented. Denies pain, n/v. Withdrawn affect. BLE warm +2 pulse. Will continue to monitor.  Electronically signed by Manuela Sepulveda RN on 1/16/2020 at 1:02 AM

## 2020-01-16 NOTE — PROGRESS NOTES
Report called to Lyndsey Silva. [facility] called to say they would p/u early. P/u time 1800.   Electronically signed by Jennifer Fairchild RN on 1/16/2020 at 5:58 PM

## 2020-01-19 NOTE — PROGRESS NOTES
ORTHOPAEDIC PROGRESS NOTE      Subjective: Patient known to service. Just returned from quadriceps biopsy. Exam:    NAD, breathing comfortably. Right knee: Two small wounds over anterior aspect of the knee, medial and lateral to the midline. Not overlying the hardware, possible deep suture knots underneath. No active drainage. No significant erythema, no warmth or swelling. Assessment/Plan: 67 y.o. female s/p right patella ORIF in November 2018 with anterior knee wounds  1. Wounds appear to be due to mechanical irritation, likely from prolonged bracing. Patient never followed up in the office postoperatively. Knee immobilizer is not required from Orthopaedic Surgery standpoint as fractures appear healed. 2. WBAT, PT for knee ROM  3. Recommend wound care consult for wound management. Follow-up in the orthopaedic clinic in 2-3 weeks, hardware removal may be required at some point. Labs reviewed:  CBC: No results for input(s): WBC, HGB, PLT in the last 72 hours. BMP:  No results for input(s): NA, K, CL, CO2, BUN, CREATININE, GLUCOSE in the last 72 hours. I&O  No intake/output data recorded.     Mario Barraza MD

## 2020-01-20 ENCOUNTER — OFFICE VISIT (OUTPATIENT)
Dept: GERIATRIC MEDICINE | Age: 80
End: 2020-01-20
Payer: MEDICARE

## 2020-01-20 PROCEDURE — 1123F ACP DISCUSS/DSCN MKR DOCD: CPT | Performed by: FAMILY MEDICINE

## 2020-01-20 PROCEDURE — G8484 FLU IMMUNIZE NO ADMIN: HCPCS | Performed by: FAMILY MEDICINE

## 2020-01-20 PROCEDURE — 99306 1ST NF CARE HIGH MDM 50: CPT | Performed by: FAMILY MEDICINE

## 2020-01-21 ENCOUNTER — OFFICE VISIT (OUTPATIENT)
Dept: GERIATRIC MEDICINE | Age: 80
End: 2020-01-21
Payer: MEDICARE

## 2020-01-21 PROCEDURE — 99309 SBSQ NF CARE MODERATE MDM 30: CPT | Performed by: PHYSICIAN ASSISTANT

## 2020-01-21 PROCEDURE — 1123F ACP DISCUSS/DSCN MKR DOCD: CPT | Performed by: PHYSICIAN ASSISTANT

## 2020-01-21 PROCEDURE — G8484 FLU IMMUNIZE NO ADMIN: HCPCS | Performed by: PHYSICIAN ASSISTANT

## 2020-01-23 LAB — SURGICAL PATHOLOGY REPORT: NORMAL

## 2020-01-28 ENCOUNTER — OFFICE VISIT (OUTPATIENT)
Dept: GERIATRIC MEDICINE | Age: 80
End: 2020-01-28
Payer: MEDICARE

## 2020-01-28 PROCEDURE — G8484 FLU IMMUNIZE NO ADMIN: HCPCS | Performed by: PHYSICIAN ASSISTANT

## 2020-01-28 PROCEDURE — 99309 SBSQ NF CARE MODERATE MDM 30: CPT | Performed by: PHYSICIAN ASSISTANT

## 2020-01-28 PROCEDURE — 1123F ACP DISCUSS/DSCN MKR DOCD: CPT | Performed by: PHYSICIAN ASSISTANT

## 2020-01-29 LAB
BUN BLDV-MCNC: 16 MG/DL
CALCIUM SERPL-MCNC: 7.7 MG/DL
CHLORIDE BLD-SCNC: 103 MMOL/L
CO2: 27 MMOL/L
CREAT SERPL-MCNC: 0.8 MG/DL
GFR CALCULATED: 69
GLUCOSE BLD-MCNC: 88 MG/DL
POTASSIUM SERPL-SCNC: 3.1 MMOL/L
SODIUM BLD-SCNC: 140 MMOL/L

## 2020-01-30 ENCOUNTER — OFFICE VISIT (OUTPATIENT)
Dept: GERIATRIC MEDICINE | Age: 80
End: 2020-01-30
Payer: MEDICARE

## 2020-01-30 PROCEDURE — 99309 SBSQ NF CARE MODERATE MDM 30: CPT | Performed by: PHYSICIAN ASSISTANT

## 2020-01-30 PROCEDURE — G8484 FLU IMMUNIZE NO ADMIN: HCPCS | Performed by: PHYSICIAN ASSISTANT

## 2020-01-30 PROCEDURE — 1123F ACP DISCUSS/DSCN MKR DOCD: CPT | Performed by: PHYSICIAN ASSISTANT

## 2020-02-04 ENCOUNTER — OFFICE VISIT (OUTPATIENT)
Dept: NEUROLOGY | Age: 80
End: 2020-02-04
Payer: MEDICARE

## 2020-02-04 VITALS — HEART RATE: 67 BPM | SYSTOLIC BLOOD PRESSURE: 110 MMHG | DIASTOLIC BLOOD PRESSURE: 70 MMHG

## 2020-02-04 DIAGNOSIS — E03.9 HYPOTHYROIDISM, UNSPECIFIED TYPE: ICD-10-CM

## 2020-02-04 DIAGNOSIS — M62.82 NON-TRAUMATIC RHABDOMYOLYSIS: ICD-10-CM

## 2020-02-04 LAB
T4 FREE: 0.96 NG/DL (ref 0.84–1.68)
TOTAL CK: 84 U/L (ref 0–170)
TSH REFLEX: 43.29 UIU/ML (ref 0.44–3.86)

## 2020-02-04 PROCEDURE — 1123F ACP DISCUSS/DSCN MKR DOCD: CPT | Performed by: NURSE PRACTITIONER

## 2020-02-04 PROCEDURE — G8417 CALC BMI ABV UP PARAM F/U: HCPCS | Performed by: NURSE PRACTITIONER

## 2020-02-04 PROCEDURE — 4040F PNEUMOC VAC/ADMIN/RCVD: CPT | Performed by: NURSE PRACTITIONER

## 2020-02-04 PROCEDURE — G8484 FLU IMMUNIZE NO ADMIN: HCPCS | Performed by: NURSE PRACTITIONER

## 2020-02-04 PROCEDURE — 1090F PRES/ABSN URINE INCON ASSESS: CPT | Performed by: NURSE PRACTITIONER

## 2020-02-04 PROCEDURE — G8400 PT W/DXA NO RESULTS DOC: HCPCS | Performed by: NURSE PRACTITIONER

## 2020-02-04 PROCEDURE — 4004F PT TOBACCO SCREEN RCVD TLK: CPT | Performed by: NURSE PRACTITIONER

## 2020-02-04 PROCEDURE — 1111F DSCHRG MED/CURRENT MED MERGE: CPT | Performed by: NURSE PRACTITIONER

## 2020-02-04 PROCEDURE — 99214 OFFICE O/P EST MOD 30 MIN: CPT | Performed by: NURSE PRACTITIONER

## 2020-02-04 PROCEDURE — G8427 DOCREV CUR MEDS BY ELIG CLIN: HCPCS | Performed by: NURSE PRACTITIONER

## 2020-02-04 RX ORDER — ALENDRONATE SODIUM 70 MG/1
70 TABLET ORAL
COMMUNITY
Start: 2018-07-06

## 2020-02-04 ASSESSMENT — ENCOUNTER SYMPTOMS
VOMITING: 0
CONSTIPATION: 0
DIARRHEA: 0
COLOR CHANGE: 0
TROUBLE SWALLOWING: 0
CHEST TIGHTNESS: 0
ABDOMINAL PAIN: 0
WHEEZING: 0
NAUSEA: 0
ABDOMINAL DISTENTION: 0
SHORTNESS OF BREATH: 0
COUGH: 0

## 2020-02-04 NOTE — PROGRESS NOTES
Currently A&O x3, NAD, cooperative. No focal deficits noted. Absent reflexes BLE. Past Medical History:   Diagnosis Date    Aortic stenosis     Chronic kidney disease     COPD (chronic obstructive pulmonary disease) (HCC)     Heart failure (HCC)     High cholesterol     HTN (hypertension)     Hypothyroid     New onset a-fib [facility])      Past Surgical History:   Procedure Laterality Date    MUSCLE BIOPSY Right 1/16/2020    RIGHT QUADRICEPS MUSCLE BIOPSY performed by Antonino Guerrero MD at [place] [address] Right 11/18/2019    RIGHT PATELLA OPEN REDUCTION INTERNAL FIXATION  ROOM 180 performed by Catrina Baneulos MD at [facility] ENDOSCOPY N/A 12/17/2019    EGD performed by Юлия Ortiz MD at [address] [place] [address] History     Socioeconomic History    Marital status:      Spouse name: Not on file    Number of children: 2    Years of education: 15    Highest education level: 12th grade   Occupational History    Occupation:      Comment: [employer]   Social Needs    Financial resource strain: Not hard at all   Sonda[employer] insecurity:     Worry: Never true     Inability: Never true   [employer] needs:     Medical: No     Non-medical: No   Tobacco Use    Smoking status: Current Every Day Smoker     Packs/day: 0.50     Years: 25.00     Pack years: 12.50    Smokeless tobacco: Never Used   Substance and Sexual Activity    Alcohol use: Never     Frequency: Never    Drug use: Never    Sexual activity: Not on file   Lifestyle    Physical activity:     Days per week: 0 days     Minutes per session: Not on file    Stress:  Only a little   Relationships    Social connections:     Talks on phone: More than three times a week     Gets together: Once a week     Attends [employer]oration[employer] service: More than 4 times per year     Active member of club or organization: Yes     Attends meetings of clubs or organizations: 1 to 4 times per year     Relationship Negative for abdominal distention, abdominal pain, constipation, diarrhea, nausea and vomiting. Genitourinary: Negative for difficulty urinating. Musculoskeletal: Positive for gait problem. Skin: Negative for color change and rash. Neurological: Positive for weakness (generalized). Negative for dizziness, tremors, seizures, syncope, facial asymmetry, speech difficulty, light-headedness, numbness and headaches. Psychiatric/Behavioral: Negative for agitation, confusion and hallucinations. The patient is not nervous/anxious. Objective:   /70 (Site: Left Upper Arm, Position: Sitting, Cuff Size: Medium Adult)   Pulse 67   LMP  (LMP Unknown)     Physical Exam  Vitals signs reviewed. Constitutional:       General: She is not in acute distress. Appearance: She is not diaphoretic. HENT:      Head: Normocephalic and atraumatic. Eyes:      Extraocular Movements: Extraocular movements intact. Pupils: Pupils are equal, round, and reactive to light. Cardiovascular:      Rate and Rhythm: Normal rate and regular rhythm. Pulmonary:      Effort: Pulmonary effort is normal. No respiratory distress. Breath sounds: Normal breath sounds. Abdominal:      General: Bowel sounds are normal. There is no distension. Palpations: Abdomen is soft. Tenderness: There is no abdominal tenderness. Skin:     General: Skin is warm and dry. Neurological:      General: No focal deficit present. Mental Status: She is alert and oriented to person, place, and time. Cranial Nerves: No cranial nerve deficit. Motor: Weakness (BLE) present. No tremor or seizure activity. Gait: Gait abnormal.      Deep Tendon Reflexes: Reflexes abnormal.      Reflex Scores:       Patellar reflexes are 0 on the right side and 0 on the left side.         Xr Knee Right (1-2 Views)    Result Date: 2020  Patient MRN: [id number] : 1940 Age:  67 years Gender: Female Order Date: 2020 1:30 Results   Component Value Date     01/29/2020    K 3.1 01/29/2020    K 3.3 01/16/2020     01/29/2020    CO2 27 01/29/2020    BUN 16 01/29/2020    CREATININE 0.8 01/29/2020    GFRAA >60.0 01/16/2020    LABGLOM 69 01/29/2020    LABGLOM >60.0 01/16/2020    GLUCOSE 88 01/29/2020    PROT 7.4 01/08/2020    LABALBU 3.0 01/08/2020    CALCIUM 7.7 01/29/2020    BILITOT 0.3 01/08/2020    ALKPHOS 88 01/08/2020     01/08/2020    ALT 56 01/08/2020     Lab Results   Component Value Date    PROTIME 13.8 01/08/2020    INR 1.0 01/08/2020     Lab Results   Component Value Date    TSH 36.810 01/06/2020    FERRITIN 55.0 12/16/2019    IRON 26 12/16/2019    TIBC 239 12/16/2019     Lab Results   Component Value Date    TRIG 142 11/24/2019    HDL 45 11/24/2019    LDLCALC 58 11/24/2019     No results found for: Neeraj Wells, LABBENZ, CANNAB, COCAINESCRN, LABMETH, OPIATESCREENURINE, PHENCYCLIDINESCREENURINE, PPXUR, ETOH  No results found for: LITHIUM, DILFRTOT, VALPROATE    Assessment and Plan:      1. Hospital discharge follow-up    2. 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 Hypothyroidism, unspecified type  - TSH with Reflex; Future  - Will need to follow up with endocrine    4. Non-traumatic rhabdomyolysis  - CK; Future  - Possibly from hypothyroid. If CK remains elevated With normal TSH may need to consider DC of amiodarone    5. Debility  - PT/OT per SNF  - Reviewed safety and comfort measures  - Maintain use of assistive devices PRN    Return in about 3 months (around 5/4/2020), or if symptoms worsen or fail to improve.     Jake Mcardle, APRN - CNP

## 2020-02-06 ENCOUNTER — TELEPHONE (OUTPATIENT)
Dept: NEUROLOGY | Age: 80
End: 2020-02-06

## 2020-02-06 ENCOUNTER — OFFICE VISIT (OUTPATIENT)
Dept: GERIATRIC MEDICINE | Age: 80
End: 2020-02-06
Payer: MEDICARE

## 2020-02-06 PROCEDURE — G8484 FLU IMMUNIZE NO ADMIN: HCPCS | Performed by: PHYSICIAN ASSISTANT

## 2020-02-06 PROCEDURE — 1123F ACP DISCUSS/DSCN MKR DOCD: CPT | Performed by: PHYSICIAN ASSISTANT

## 2020-02-06 PROCEDURE — 99309 SBSQ NF CARE MODERATE MDM 30: CPT | Performed by: PHYSICIAN ASSISTANT

## 2020-02-06 NOTE — TELEPHONE ENCOUNTER
Called and spoke with nurse at [facility], [place] and informed her of continued elevated TSH and need to schedule pt appt with Dr Ahmad Cooks for follow up

## 2020-02-07 ENCOUNTER — OFFICE VISIT (OUTPATIENT)
Dept: SURGERY | Age: 80
End: 2020-02-07
Payer: MEDICARE

## 2020-02-07 VITALS
TEMPERATURE: 97.9 F | SYSTOLIC BLOOD PRESSURE: 112 MMHG | BODY MASS INDEX: 26.7 KG/M2 | HEIGHT: 60 IN | WEIGHT: 136 LBS | DIASTOLIC BLOOD PRESSURE: 72 MMHG

## 2020-02-07 PROCEDURE — 4040F PNEUMOC VAC/ADMIN/RCVD: CPT | Performed by: SURGERY

## 2020-02-07 PROCEDURE — G8484 FLU IMMUNIZE NO ADMIN: HCPCS | Performed by: SURGERY

## 2020-02-07 PROCEDURE — G8417 CALC BMI ABV UP PARAM F/U: HCPCS | Performed by: SURGERY

## 2020-02-07 PROCEDURE — G8400 PT W/DXA NO RESULTS DOC: HCPCS | Performed by: SURGERY

## 2020-02-07 PROCEDURE — 1111F DSCHRG MED/CURRENT MED MERGE: CPT | Performed by: SURGERY

## 2020-02-07 PROCEDURE — 1090F PRES/ABSN URINE INCON ASSESS: CPT | Performed by: SURGERY

## 2020-02-07 PROCEDURE — G8427 DOCREV CUR MEDS BY ELIG CLIN: HCPCS | Performed by: SURGERY

## 2020-02-07 PROCEDURE — 1123F ACP DISCUSS/DSCN MKR DOCD: CPT | Performed by: SURGERY

## 2020-02-07 PROCEDURE — 1036F TOBACCO NON-USER: CPT | Performed by: SURGERY

## 2020-02-07 PROCEDURE — 99212 OFFICE O/P EST SF 10 MIN: CPT | Performed by: SURGERY

## 2020-02-09 NOTE — PROGRESS NOTES
Subjective:      Patient ID: Marc Berg is a 67 y.o. female. HPI   The patient is here for follow up of a right quadriceps muscle biopsy on January, 16, 2020 to evaluate myositis. Pain is a 1. Pathology report is pending. Review of Systems     All 14 systems reviewed and are negative other than the [place]  Objective:   Physical Exam  Constitutional:       General: She is not in acute distress. Appearance: Normal appearance. HENT:      Mouth/Throat:      Mouth: Mucous membranes are moist.      Pharynx: Oropharynx is clear. Eyes:      Pupils: Pupils are equal, round, and reactive to light. Neck:      Comments: Neck is supple with out masses, no thyromegaly, trachea midline  Musculoskeletal:        Legs:       Comments: In a wheelchair   Skin:     Findings: No bruising, lesion or rash. Neurological:      Mental Status: She is alert and oriented to person, place, and time. Psychiatric:         Mood and Affect: Mood normal.         Judgment: Judgment normal.         Assessment:      Satisfactory course      Plan:      Removed Aquacel  The patient is instructed to return to see me in 1 month.          Luz Soares MD

## 2020-02-13 NOTE — PROGRESS NOTES
PATIENT: CINDY DELGADO : 1940 DOS: 2020     [facility] COMMU    HISTORY OF PRESENT ILLNESS: The patient being seen today as a [facility] patient for acute visit regarding ongoing hypokalemia and recent history of rhabdomyolysis. The patient did miss her KCl dose last night per nursing staff. The patient is now on regular 10 mEq of KCl daily. Did receive 1 L normal saline plus another 500 mL. Urine color did improve to moderately dark, straw-colored yellow which is an improvement per nursing staff. Questioning whether to DC IV. The patient does have poor overall fluid intake. The last BMP was within normal limits. We will monitor creatine kinase level and do a follow up BMP p.r.n. to reassess. We will continue potassium as ordered. The patient's overall fluid intake is still poor. We will monitor weights routinely q. weeks. The patient is being followed by Nephrology at this time. We will send lab results to nephrology as well to evaluate. The patient offers no further complaints at this time. Overall examination, review of systems is fairy unchanged compared to last visit. We will continue monitoring following up with the patient routinely. MEDICATIONS: Reviewed. ALLERGIES: Reviewed. REVIEW OF SYSTEMS:  Constitutional: No new fever, chills, nausea, vomiting, increased weakness, mild fatigue improved since last visit. The patient has generalized weakness at baseline. HEENT: Denies any headache, confusion, lightheadedness. Cardiac: No new chest pain, palpitations, orthopnea, VARELA. No significant lower leg edema. The patient's legs are propped in bed today. Pulmonary:  No new cough, SOB, POI, or wheezing. GI: Denies any acute complaints. Poor appetite overall. Denies dysphagia or indigestion, diarrhea or constipation. : The patient has improved urinary output; lighter color. Denies any dysuria, urgency, incontinence, or hematuria. Psychiatric: The patient's mood is overall stable. The patient is pleasantly confused at baseline. Mild memory deficits. Remaining 14-point ROS unremarkable. PHYSICAL EXAMINATION:  Vital signs today within normal limits. See [facility]. Weight today is 135.6 on January 28, 2020. No new significant wt. changes today. General: Good hygiene overall, severe defect. No acute distress noted today. Overall cooperative. Lying in bed with covers drawn. HEENT: No evidence of new trauma to head or neck. Pupils are equal, round, reactive to light. Cardiac: Regular rate. No JVD noted today. No significant lower leg edema. Pedal pulse intact, +1 bilaterally. Pulmonary: Lungs are clear, unlabored respirations. Poor command following. No wheezes, rales, or rhonchi is noted. Abdomen: Normoactive bowel sounds. Soft, nontender abdomen. No fluid wave noted. No guarding or rigidity or tenderness to palpation in all 4 quadrants. Skin: No evident issue from muscle biopsy site of right ant. upper quadriceps. Skin turgor is decreased, slightly dry skin on limited skin exam. Mental Status: The patient is awake, alert, oriented 2/3. ASSESSMENT AND PLAN: History of rhabdomyolysis / history of dark urine / hypokalemia - will continue to monitor urine output and coloration. We will monitor BMP p.r.n., do creatine kinase level as well to assess follow up from hospital and last few CK levels. The patient seems to be stable at this time. Vital signs stable. We will continue to monitor closely.          Electronically Signed By: Migue Dixon PA-C on 02/13/2020 11:04:15  ______________________________  Migue Dixon PA-C  [id number]  D: 02/09/2020 19:17:39  T: 02/09/2020 20:07:17    cc: - [id number] [facility]

## 2020-02-18 ENCOUNTER — OFFICE VISIT (OUTPATIENT)
Dept: ENDOCRINOLOGY | Age: 80
End: 2020-02-18
Payer: MEDICARE

## 2020-02-18 VITALS
BODY MASS INDEX: 26.56 KG/M2 | DIASTOLIC BLOOD PRESSURE: 75 MMHG | SYSTOLIC BLOOD PRESSURE: 95 MMHG | HEART RATE: 75 BPM | HEIGHT: 60 IN

## 2020-02-18 PROCEDURE — 1123F ACP DISCUSS/DSCN MKR DOCD: CPT | Performed by: INTERNAL MEDICINE

## 2020-02-18 PROCEDURE — 1036F TOBACCO NON-USER: CPT | Performed by: INTERNAL MEDICINE

## 2020-02-18 PROCEDURE — 1090F PRES/ABSN URINE INCON ASSESS: CPT | Performed by: INTERNAL MEDICINE

## 2020-02-18 PROCEDURE — 99213 OFFICE O/P EST LOW 20 MIN: CPT | Performed by: INTERNAL MEDICINE

## 2020-02-18 PROCEDURE — G8417 CALC BMI ABV UP PARAM F/U: HCPCS | Performed by: INTERNAL MEDICINE

## 2020-02-18 PROCEDURE — G8428 CUR MEDS NOT DOCUMENT: HCPCS | Performed by: INTERNAL MEDICINE

## 2020-02-18 PROCEDURE — G8400 PT W/DXA NO RESULTS DOC: HCPCS | Performed by: INTERNAL MEDICINE

## 2020-02-18 PROCEDURE — G8484 FLU IMMUNIZE NO ADMIN: HCPCS | Performed by: INTERNAL MEDICINE

## 2020-02-18 PROCEDURE — 4040F PNEUMOC VAC/ADMIN/RCVD: CPT | Performed by: INTERNAL MEDICINE

## 2020-02-18 RX ORDER — LEVOTHYROXINE SODIUM 0.12 MG/1
125 TABLET ORAL DAILY
Qty: 30 TABLET | Refills: 3
Start: 2020-02-18

## 2020-02-18 NOTE — PROGRESS NOTES
Subjective:      Patient ID: Tisha Aviles is a 67 y.o. female. 4-week follow-up on hypothyroidism patient was seen at [facility] for management of hypothyroidism she apparently has been on Synthroid 125 mcg daily currently only on 75 mcg patient also on amiodarone which could be affecting her thyroid status last TSH was 43 reviewed labs from before  Other   This is a chronic (Hypothyroidism) problem. The current episode started more than 1 year ago. The problem has been waxing and waning. Associated symptoms include fatigue and myalgias. Treatments tried: Synthroid. Results for Scotty Marquez (MRN [id number]) as of 2/18/2020 09:46   Ref. Range 1/6/2020 12:20 1/8/2020 11:00 2/4/2020 09:34   TSH Latest Ref Range: 0.440 - 3.860 uIU/mL 36.810 (H) 32.770 (H) 43.290 (H)     Results for Scotty Marquez (MRN [id number]) as of 2/18/2020 09:46   Ref. Range 1/6/2020 12:20 1/8/2020 11:00 2/4/2020 09:34   T4 Free Latest Ref Range: 0.84 - 1.68 ng/dL 1.13 1.15 0.96     Patient Active Problem List   Diagnosis    Acute kidney injury (LASHAE) with acute tubular necrosis (ATN) (HCC)    Anxiety state    Aortic valve stenosis    Esophageal reflux    Essential hypertension, benign    Hyperlipidemia    Seborrheic keratosis    Thoracic ascending aortic aneurysm (HCC)    Tobacco abuse    Vitamin D deficiency    Closed fracture of right patella    Abnormality of gait and mobility due to Impaired Mobility and ADL's due to Rhabdomyolysis, Right Patellar Fx. [facility] admit 11/20/19.     Paroxysmal atrial fibrillation (HCC)    HTN (hypertension)    Stage 3 chronic kidney disease (HCC)    Rhabdomyolysis    Hyponatremia    Hypothyroid    BMI 25.0-25.9,adult    Acute blood loss anemia    Myositis of lower leg     Allergies   Allergen Reactions    Amoxicillin      rash and GI upset    Cefdinir Hives    Sulfa Antibiotics        Current Outpatient Medications:     levothyroxine (SYNTHROID) 125 MCG tablet, Take 1 tablet by mouth 5 capsule, Rfl: 0    metoprolol tartrate (LOPRESSOR) 25 MG tablet, Take 0.5 tablets by mouth 2 times daily, Disp: 60 tablet, Rfl: 3      Review of Systems   Constitutional: Positive for fatigue. Musculoskeletal: Positive for myalgias. Vitals:    02/18/20 0915   BP: 95/75   Pulse: 75   Height: 5' (1.524 m)       Objective:   Physical Exam  Constitutional:       Appearance: Normal appearance. HENT:      Head: Normocephalic and atraumatic. Neck:      Musculoskeletal: Normal range of motion and neck supple. Cardiovascular:      Rate and Rhythm: Normal rate. Heart sounds: Normal heart sounds. Musculoskeletal: Normal range of motion. Neurological:      Mental Status: She is alert. Psychiatric:         Mood and Affect: Mood normal.         Assessment:       Diagnosis Orders   1.  Hypothyroidism, unspecified type  TSH with Reflex    T4, Free           Plan:      Orders Placed This Encounter   Procedures    TSH with Reflex     Standing Status:   Future     Standing Expiration Date:   2/18/2021    T4, Free     Standing Status:   Future     Standing Expiration Date:   2/18/2021     Orders Placed This Encounter   Medications    levothyroxine (SYNTHROID) 125 MCG tablet     Sig: Take 1 tablet by mouth Daily     Dispense:  30 tablet     Refill:  03     Increase Synthroid to 125 mcg daily repeat labs in 2 months time        Liz Segoiva MD

## 2020-02-19 ENCOUNTER — OFFICE VISIT (OUTPATIENT)
Dept: GERIATRIC MEDICINE | Age: 80
End: 2020-02-19
Payer: MEDICARE

## 2020-02-19 PROCEDURE — G8484 FLU IMMUNIZE NO ADMIN: HCPCS | Performed by: INTERNAL MEDICINE

## 2020-02-19 PROCEDURE — 99308 SBSQ NF CARE LOW MDM 20: CPT | Performed by: INTERNAL MEDICINE

## 2020-02-19 PROCEDURE — 1123F ACP DISCUSS/DSCN MKR DOCD: CPT | Performed by: INTERNAL MEDICINE

## 2020-02-27 ENCOUNTER — OFFICE VISIT (OUTPATIENT)
Dept: GERIATRIC MEDICINE | Age: 80
End: 2020-02-27
Payer: MEDICARE

## 2020-02-27 PROCEDURE — G8484 FLU IMMUNIZE NO ADMIN: HCPCS | Performed by: PHYSICIAN ASSISTANT

## 2020-02-27 PROCEDURE — 99309 SBSQ NF CARE MODERATE MDM 30: CPT | Performed by: PHYSICIAN ASSISTANT

## 2020-02-27 PROCEDURE — 1123F ACP DISCUSS/DSCN MKR DOCD: CPT | Performed by: PHYSICIAN ASSISTANT

## 2020-02-29 VITALS
RESPIRATION RATE: 18 BRPM | SYSTOLIC BLOOD PRESSURE: 119 MMHG | TEMPERATURE: 97.6 F | OXYGEN SATURATION: 92 % | HEART RATE: 80 BPM | DIASTOLIC BLOOD PRESSURE: 78 MMHG

## 2020-03-01 VITALS
WEIGHT: 135.6 LBS | DIASTOLIC BLOOD PRESSURE: 50 MMHG | TEMPERATURE: 98.5 F | SYSTOLIC BLOOD PRESSURE: 92 MMHG | RESPIRATION RATE: 18 BRPM | HEART RATE: 70 BPM | OXYGEN SATURATION: 91 % | BODY MASS INDEX: 26.48 KG/M2

## 2020-03-01 NOTE — PROGRESS NOTES
on 02/21/2020 00:03:44  ______________________________  RADHA Brar/[id number]  D: 02/20/2020 21:24:41  T: 02/20/2020 22:46:35    cc: - [id number] [facility]

## 2020-03-03 ENCOUNTER — OFFICE VISIT (OUTPATIENT)
Dept: ENDOCRINOLOGY | Age: 80
End: 2020-03-03
Payer: MEDICARE

## 2020-03-03 VITALS
HEART RATE: 81 BPM | BODY MASS INDEX: 26.7 KG/M2 | WEIGHT: 136 LBS | SYSTOLIC BLOOD PRESSURE: 98 MMHG | DIASTOLIC BLOOD PRESSURE: 75 MMHG | HEIGHT: 60 IN

## 2020-03-03 PROCEDURE — 1123F ACP DISCUSS/DSCN MKR DOCD: CPT | Performed by: INTERNAL MEDICINE

## 2020-03-03 PROCEDURE — 1036F TOBACCO NON-USER: CPT | Performed by: INTERNAL MEDICINE

## 2020-03-03 PROCEDURE — G8400 PT W/DXA NO RESULTS DOC: HCPCS | Performed by: INTERNAL MEDICINE

## 2020-03-03 PROCEDURE — 4040F PNEUMOC VAC/ADMIN/RCVD: CPT | Performed by: INTERNAL MEDICINE

## 2020-03-03 PROCEDURE — G8428 CUR MEDS NOT DOCUMENT: HCPCS | Performed by: INTERNAL MEDICINE

## 2020-03-03 PROCEDURE — 99213 OFFICE O/P EST LOW 20 MIN: CPT | Performed by: INTERNAL MEDICINE

## 2020-03-03 PROCEDURE — 1090F PRES/ABSN URINE INCON ASSESS: CPT | Performed by: INTERNAL MEDICINE

## 2020-03-03 PROCEDURE — G8484 FLU IMMUNIZE NO ADMIN: HCPCS | Performed by: INTERNAL MEDICINE

## 2020-03-03 PROCEDURE — G8417 CALC BMI ABV UP PARAM F/U: HCPCS | Performed by: INTERNAL MEDICINE

## 2020-03-04 ENCOUNTER — OFFICE VISIT (OUTPATIENT)
Dept: GERIATRIC MEDICINE | Age: 80
End: 2020-03-04
Payer: MEDICARE

## 2020-03-04 PROCEDURE — 1123F ACP DISCUSS/DSCN MKR DOCD: CPT | Performed by: PHYSICIAN ASSISTANT

## 2020-03-04 PROCEDURE — G8484 FLU IMMUNIZE NO ADMIN: HCPCS | Performed by: PHYSICIAN ASSISTANT

## 2020-03-04 PROCEDURE — 99309 SBSQ NF CARE MODERATE MDM 30: CPT | Performed by: PHYSICIAN ASSISTANT

## 2020-03-04 NOTE — PROGRESS NOTES
PATIENT: Dory Bellamy : 1940 DOS: 2020     [facility] COMMU    HISTORY OF PRESENT ILLNESS: The patient is being seen today for acute visit regarding increased dark urine status post rhabdomyolysis. The patient's creatine kinase is going down, has been re-established in normal levels, still the patient is having some darker urine. Medications do not seem to be an issue at this time to cause dark urine. The patient is a poor ingest for oral fluids. We are encouraging fluids at this time. We will decide whether or not IV fluids are needed based on lab results. The patient's TSH is also 43 today. We will increase the patient's levothyroxine, otherwise the patient is improving in her intake of oral fluids. We will repeat labs and discuss possible IV fluid rehydration if necessary. No further complaints. MEDICATIONS: Reviewed. ALLERGIES: Reviewed. REVIEW OF SYSTEMS: Constitutional: The patient is a fairly poor historian; however, denies any increased fatigue, weakness, NVD. No fever, chills. No acute weight loss, weight has been stable. Cardiac: Denies any CP, orthopnea, VARELA. No significant lower leg edema. Denies palpitations or lower leg claudication or myalgias. Pulmonary: No new cough, SOB, POI, or wheezing. GI: Poor oral intake of fluids. Moderate intake of food. Denies abdominal pain, diarrhea, or constipation. : The patient has some dark brown urine in Sweet bag. Denies any urinary tract symptom pain. Denies suprapubic tenderness. We will do UA to assess. MSK: Denies myalgias or muscle aches. No new swelling, redness, or pain in joints. Psychiatric: The patient's mood is overall stable. He is very quiet and diminutive. PHYSICAL EXAMINATION: Vital signs reviewed on site today. General: The patient displays good hygiene. Subdued affect. Lying in her bed with her covers half drawn, watching television disinterestedly. No distress noted. HEENT: Semi-dry mucous membranes.  No erythema, exudate on oropharyngeal exam. Cardiac: Regular rate. Negative for JVD. No new murmurs, rubs, or gallops noted. Pedal pulse intact +1 bilaterally. No significant lower leg edema. Negative Homans sign. Pulmonary: Lung sounds overall clear with some minor expiratory wheezing noted in mid upper lung fields bilaterally. No bibasilar crackles noted. Abdominal: Normoactive bowel sounds, soft, nontender abdomen. No suprapubic tenderness. Skin: Limited skin exam showed generalized global intact, fair skin turgor, mildly dried out in appearance. Mental Status: The patient is awake, alert, oriented 2/3. ASSESSMENT AND PLAN:   1.  ?Dehydration -patient is showing some possible signs of dehydration. Per nursing staff, fair oral fluid intake. We will assess renal labs and treat with IV fluids if needed. We will encourage oral fluids for the time being. 2.  Dark urine -will do UA, C&S as needed. No new altered mental status. Monitor for any changes. Urine is improving compared to last visit. 3.  Hypothyroidism -patient's TSH is 43. We will increase levothyroxine and reassess TSH within the next 4 weeks. See chart for details.          Electronically Signed By: Martin Kanner, PA-C on 03/04/2020 11:10:01  ______________________________  Martin Kanner, PA-C  [id number]  D: 03/03/2020 15:55:11  T: 03/04/2020 06:27:11    cc: - [phone] [facility]

## 2020-03-06 ENCOUNTER — OFFICE VISIT (OUTPATIENT)
Dept: SURGERY | Age: 80
End: 2020-03-06
Payer: MEDICARE

## 2020-03-06 VITALS
TEMPERATURE: 97.2 F | BODY MASS INDEX: 26.7 KG/M2 | HEIGHT: 60 IN | WEIGHT: 136 LBS | DIASTOLIC BLOOD PRESSURE: 72 MMHG | SYSTOLIC BLOOD PRESSURE: 112 MMHG

## 2020-03-06 PROCEDURE — 99213 OFFICE O/P EST LOW 20 MIN: CPT | Performed by: SURGERY

## 2020-03-06 PROCEDURE — 4040F PNEUMOC VAC/ADMIN/RCVD: CPT | Performed by: SURGERY

## 2020-03-06 PROCEDURE — G8484 FLU IMMUNIZE NO ADMIN: HCPCS | Performed by: SURGERY

## 2020-03-06 PROCEDURE — G8427 DOCREV CUR MEDS BY ELIG CLIN: HCPCS | Performed by: SURGERY

## 2020-03-06 PROCEDURE — 1123F ACP DISCUSS/DSCN MKR DOCD: CPT | Performed by: SURGERY

## 2020-03-06 PROCEDURE — G8400 PT W/DXA NO RESULTS DOC: HCPCS | Performed by: SURGERY

## 2020-03-06 PROCEDURE — 1090F PRES/ABSN URINE INCON ASSESS: CPT | Performed by: SURGERY

## 2020-03-06 PROCEDURE — 1036F TOBACCO NON-USER: CPT | Performed by: SURGERY

## 2020-03-06 PROCEDURE — G8417 CALC BMI ABV UP PARAM F/U: HCPCS | Performed by: SURGERY

## 2020-03-07 PROBLEM — M62.551 ATROPHY OF MUSCLE OF RIGHT THIGH: Status: ACTIVE | Noted: 2020-03-07

## 2020-03-08 NOTE — PROGRESS NOTES
Subjective:      Patient ID: Jessica Alexander is a 67 y.o. female. 3-4 weeks f/u on hypothyroidism   Other   This is a chronic (hypothyroidism ) problem. The current episode started more than 1 month ago. The problem has been gradually improving. Associated symptoms include fatigue. Exacerbated by: amiodarone  Treatments tried: synthyroid        Patient Active Problem List   Diagnosis    Acute kidney injury (LASHAE) with acute tubular necrosis (ATN) ([facility] [address]    Anxiety state    Aortic valve stenosis    Esophageal reflux    Essential hypertension, benign    Hyperlipidemia    Seborrheic keratosis    Thoracic ascending aortic aneurysm (HCC)    Tobacco abuse    Vitamin D deficiency    Closed fracture of right patella    Abnormality of gait and mobility due to Impaired Mobility and ADL's due to Rhabdomyolysis, Right Patellar Fx. [facility] Re[facility] admit 11/20/19.     Paroxysmal atrial fibrillation (HCC)    HTN (hypertension)    Stage 3 chronic kidney disease (HCC)    Rhabdomyolysis    Hyponatremia    Hypothyroid    BMI 25.0-25.9,adult    Acute blood loss anemia    Myositis of lower leg    Atrophy of muscle of right thigh     Allergies   Allergen Reactions    Amoxicillin      rash and GI upset    Cefdinir Hives    Sulfa Antibiotics        Current Outpatient Medications:     levothyroxine (SYNTHROID) 125 MCG tablet, Take 1 tablet by mouth Daily, Disp: 30 tablet, Rfl: 03    alendronate (FOSAMAX) 70 MG tablet, Take 70 mg by mouth, Disp: , Rfl:     amLODIPine (NORVASC) 5 MG tablet, Take 1 tablet by mouth daily, Disp: 30 tablet, Rfl: 0    bisacodyl (DULCOLAX) 10 MG suppository, Place 10 mg rectally daily as needed for Constipation, Disp: , Rfl:     calcium-vitamin D (OSCAL-500) 500-200 MG-UNIT per tablet, Take 1 tablet by mouth 2 times daily, Disp: , Rfl:     mirtazapine (REMERON) 15 MG tablet, Take 7.5 mg by mouth nightly, Disp: , Rfl:     guaiFENesin-dextromethorphan (ROBITUSSIN DM) 100-10 MG/5ML syrup, Take Normal range of motion and neck supple. Cardiovascular:      Rate and Rhythm: Normal rate. Neurological:      Mental Status: She is alert. Psychiatric:         Mood and Affect: Mood normal.         Assessment:       Diagnosis Orders   1.  Hypothyroidism, unspecified type  T4, Free    TSH with Reflex           Plan:      Orders Placed This Encounter   Procedures    T4, Free     Standing Status:   Future     Standing Expiration Date:   3/3/2021    TSH with Reflex     Standing Status:   Future     Standing Expiration Date:   3/3/2021     Continue synthroid 125 mcg daily   Rpt labs in 4 weeks         Liz Segovia MD

## 2020-03-10 ENCOUNTER — OFFICE VISIT (OUTPATIENT)
Dept: GERIATRIC MEDICINE | Age: 80
End: 2020-03-10
Payer: MEDICARE

## 2020-03-10 PROCEDURE — 1123F ACP DISCUSS/DSCN MKR DOCD: CPT | Performed by: PHYSICIAN ASSISTANT

## 2020-03-10 PROCEDURE — G8484 FLU IMMUNIZE NO ADMIN: HCPCS | Performed by: PHYSICIAN ASSISTANT

## 2020-03-10 PROCEDURE — 99308 SBSQ NF CARE LOW MDM 20: CPT | Performed by: PHYSICIAN ASSISTANT

## 2020-03-11 LAB
BASOPHILS ABSOLUTE: 0.1 /ΜL
BASOPHILS RELATIVE PERCENT: 1 %
C-REACTIVE PROTEIN: 55.3
EOSINOPHILS ABSOLUTE: 0.3 /ΜL
EOSINOPHILS RELATIVE PERCENT: 3.3 %
HCT VFR BLD CALC: 24.4 % (ref 36–46)
HEMOGLOBIN: 7.9 G/DL (ref 12–16)
LYMPHOCYTES ABSOLUTE: 3.1 /ΜL
LYMPHOCYTES RELATIVE PERCENT: 39.8 %
MCH RBC QN AUTO: 31.2 PG
MCHC RBC AUTO-ENTMCNC: 32.6 G/DL
MCV RBC AUTO: 95.7 FL
MONOCYTES ABSOLUTE: 0.8 /ΜL
MONOCYTES RELATIVE PERCENT: 10.2 %
NEUTROPHILS ABSOLUTE: 3.6 /ΜL
NEUTROPHILS RELATIVE PERCENT: 45.7 %
PLATELET # BLD: 334 K/ΜL
PMV BLD AUTO: 9.8 FL
RBC # BLD: 2.54 10^6/ΜL
SEDIMENTATION RATE, ERYTHROCYTE: 86
WBC # BLD: 7.9 10^3/ML

## 2020-03-12 ENCOUNTER — OFFICE VISIT (OUTPATIENT)
Dept: GERIATRIC MEDICINE | Age: 80
End: 2020-03-12
Payer: MEDICARE

## 2020-03-12 PROCEDURE — 1123F ACP DISCUSS/DSCN MKR DOCD: CPT | Performed by: PHYSICIAN ASSISTANT

## 2020-03-12 PROCEDURE — G8484 FLU IMMUNIZE NO ADMIN: HCPCS | Performed by: PHYSICIAN ASSISTANT

## 2020-03-12 PROCEDURE — 99309 SBSQ NF CARE MODERATE MDM 30: CPT | Performed by: PHYSICIAN ASSISTANT

## 2020-03-13 LAB
BASOPHILS ABSOLUTE: ABNORMAL
BASOPHILS RELATIVE PERCENT: 0.5 %
C-REACTIVE PROTEIN: 44
EOSINOPHILS ABSOLUTE: ABNORMAL
EOSINOPHILS RELATIVE PERCENT: 3.1 %
HCT VFR BLD CALC: 25 % (ref 36–46)
HEMOGLOBIN: 8.2 G/DL (ref 12–16)
LYMPHOCYTES ABSOLUTE: 2.9 /ΜL
LYMPHOCYTES RELATIVE PERCENT: 36 %
MCH RBC QN AUTO: 30.7 PG
MCHC RBC AUTO-ENTMCNC: 32.9 G/DL
MCV RBC AUTO: 93.3 FL
MONOCYTES ABSOLUTE: ABNORMAL
MONOCYTES RELATIVE PERCENT: 10 %
NEUTROPHILS ABSOLUTE: 4 /ΜL
NEUTROPHILS RELATIVE PERCENT: 50.4 %
PLATELET # BLD: 326 K/ΜL
PMV BLD AUTO: 9.4 FL
RBC # BLD: 2.68 10^6/ΜL
WBC # BLD: 8 10^3/ML

## 2020-03-17 ENCOUNTER — OFFICE VISIT (OUTPATIENT)
Dept: GERIATRIC MEDICINE | Age: 80
End: 2020-03-17
Payer: MEDICARE

## 2020-03-17 PROCEDURE — G8484 FLU IMMUNIZE NO ADMIN: HCPCS | Performed by: PHYSICIAN ASSISTANT

## 2020-03-17 PROCEDURE — 1123F ACP DISCUSS/DSCN MKR DOCD: CPT | Performed by: PHYSICIAN ASSISTANT

## 2020-03-17 PROCEDURE — 99309 SBSQ NF CARE MODERATE MDM 30: CPT | Performed by: PHYSICIAN ASSISTANT

## 2020-03-18 NOTE — PROGRESS NOTES
[facility]    Seen for routine monthly visit for hypertension, aortic stenosis, atrial fibrillation. MEDICATIONS: Reviewed. SUBJECTIVE: This 80-year-old woman has been evaluated in her room. The patient has been clinically stable. No evidence of new RVR. The patient's weight remains at target. No recent chest pain, palpitation. No evidence of new acute URIs. OBJECTIVE: She appeared clinically stable. Pupils are small, but they are reactive. Extraocular movements are intact. Oral mucosa is moist. Chest showed no crackles, no wheezing. Cardiovascular exam showed a regular rate with 2/6 systolic ejection murmur. Abdomen was soft, nontender. Extremities showed trace dorsal pedal pulse. ASSESSMENT AND PLAN:  1. Hypertension: Blood pressure is stable. No orthostasis. 2. Degenerative joint disease: No new pain crisis. 3. Constipation: No new impaction. We will monitor closely.

## 2020-03-20 LAB
HCT VFR BLD CALC: 7.7 % (ref 36–46)
HEMOGLOBIN: 23.2 G/DL (ref 12–16)
IRON: 50
VANCOMYCIN TROUGH: 5.7

## 2020-03-23 NOTE — PROGRESS NOTES
Denies any decreased sensation in lower limbs. No evident neuropathy noted. CARDIAC: Denies any chest pain, shortness of breath, orthopnea, VARELA or PND at this time. PULMONARY: No new SOB, POI, wheezing. No new cough. GI: Poor appetite overall. No dysphagia, GERD or bowel movement complaints at this time. : Urine is within normal limits. No new issues with catheter. Urine has cleared up with straw-colored appearance as opposed to lighter red appearance on prior visits. PSYCHIATRIC: The patient's mood is overall stable. Denies inability to care for finances, however, has poor grasp in overall financial picture and defers to son for financial decision making and management. Denies any SI, depression or anxiety. Remaining 14-point ROS unremarkable at this time. PHYSICAL EXAMINATION: GENERAL: The patient displays good hygiene overall. Subdued/flat affect. No acute distress noted. CARDIAC: Regular rate and rhythm. No JVD noted today. Some minor lower extremity edema. Pedal pulses intact bilaterally, trace. ABDOMEN: Normoactive bowel sounds, nontender abdomen. SKIN: Right knee shows some mild warmth and redness. Redness has improved compared to last visit. Wound shows two 3 x 2 x 2 cm indurations with moderate swelling throughout knee. No oozing pus, foul smell noted today. There is tenderness to palpation around the knee. The patient is awake, alert, oriented 3/3. ASSESSMENT AND PLAN:   1. Financial maintenance - patient deferring financial management to her son Kenneth Khalil. Likely poor ability to self manage all of her funds and stay planning. We will follow up if any changes occur and we will document as needed. 2.  ? septic R. Knee - will start vancomycin 15 mg/kg q.12 hours. BMP as well every fourth trough which is q. 4 days. 3.  ? right lower leg DVT - DC Lovenox. Monitor for further signs and symptoms of DVT. Ultrasound in 1 week to reassess.          Electronically Signed By: Jairo Combs PA-C on

## 2020-03-25 ENCOUNTER — TELEPHONE (OUTPATIENT)
Dept: UROLOGY | Age: 80
End: 2020-03-25

## 2020-03-26 LAB — VANCOMYCIN TROUGH: 14

## 2020-03-27 ENCOUNTER — VIRTUAL VISIT (OUTPATIENT)
Dept: GERIATRIC MEDICINE | Age: 80
End: 2020-03-27
Payer: MEDICARE

## 2020-03-27 PROCEDURE — 99308 SBSQ NF CARE LOW MDM 20: CPT | Performed by: PHYSICIAN ASSISTANT

## 2020-03-27 PROCEDURE — 1123F ACP DISCUSS/DSCN MKR DOCD: CPT | Performed by: PHYSICIAN ASSISTANT

## 2020-03-30 ENCOUNTER — VIRTUAL VISIT (OUTPATIENT)
Dept: GERIATRIC MEDICINE | Age: 80
End: 2020-03-30
Payer: MEDICARE

## 2020-03-30 PROCEDURE — 99442 PR PHYS/QHP TELEPHONE EVALUATION 11-20 MIN: CPT | Performed by: PHYSICIAN ASSISTANT

## 2020-03-31 ENCOUNTER — VIRTUAL VISIT (OUTPATIENT)
Dept: GERIATRIC MEDICINE | Age: 80
End: 2020-03-31
Payer: MEDICARE

## 2020-03-31 LAB
BUN BLDV-MCNC: 10 MG/DL
CALCIUM SERPL-MCNC: 7.6 MG/DL
CHLORIDE BLD-SCNC: 104 MMOL/L
CO2: 27 MMOL/L
CREAT SERPL-MCNC: 0.9 MG/DL
GFR CALCULATED: 61
GLUCOSE BLD-MCNC: 83 MG/DL
HCT VFR BLD CALC: 24.7 % (ref 36–46)
HEMOGLOBIN: 8 G/DL (ref 12–16)
POTASSIUM SERPL-SCNC: 4.1 MMOL/L
SODIUM BLD-SCNC: 138 MMOL/L
VANCOMYCIN TROUGH: 21.2

## 2020-03-31 PROCEDURE — 99442 PR PHYS/QHP TELEPHONE EVALUATION 11-20 MIN: CPT | Performed by: PHYSICIAN ASSISTANT

## 2020-03-31 NOTE — PROGRESS NOTES
Ran Hampton is a 67 y.o. female evaluated via telephone on 3/30/2020. Consent:  She and/or health care decision maker is aware that that she may receive a bill for this telephone service, depending on her insurance coverage, and has provided verbal consent to proceed: Yes      Documentation:  I communicated with the patient and/or health care decision maker about patient is refusing prostate at this time. Prealbumin is low approximately 12. Patient does prefer med Pass. We will continue 3 times daily med Pass. Patient's appetite is fair intermittently she does have episodes of poor intake. Patient is stable and improving. Wound continues to improve on vancomycin. We will follow-up with visit tomorrow for visit visualization. No further issues at this time. Details of this discussion including any medical advice provided: DC pro-stat due to nonuse, continue med Pass 4 ounces p.o. 3 times daily between meals. Will repeat prealbumin in the next week. I affirm this is a Patient Initiated Episode with an Established Patient who has not had a related appointment within my department in the past 7 days or scheduled within the next 24 hours.     Total Time: minutes: 11-20 minutes    Note: not billable if this call serves to triage the patient into an appointment for the relevant concern      Alexa Oshea

## 2020-04-02 LAB
BILIRUBIN, URINE: NEGATIVE
BLOOD, URINE: POSITIVE
CLARITY: ABNORMAL
COLOR: YELLOW
GLUCOSE URINE: ABNORMAL
KETONES, URINE: NEGATIVE
LEUKOCYTE ESTERASE, URINE: POSITIVE
NITRITE, URINE: NEGATIVE
PH UA: 6 (ref 4.5–8)
PROTEIN UA: POSITIVE
SPECIFIC GRAVITY, URINE: 1.02
UROBILINOGEN, URINE: NORMAL

## 2020-04-02 NOTE — PROGRESS NOTES
Kiesha Winter is a 67 y.o. female evaluated via telephone on 3/31/2020. Consent:  She and/or health care decision maker is aware that that she may receive a bill for this telephone service, depending on her insurance coverage, and has provided verbal consent to proceed: Yes      Documentation:  I communicated with the patient and/or health care decision maker about patient prealbumin approximately 12 today. Patient continues to refuse Protostat. She is tolerating med Pass however. We will continue med Pass 4 ounces p.o. daily 3 times daily. We will recheck prealbumin later date to reassess. Patient diet is fair today. Patient has intermittent intake of food protein and fluids. Urine is clear and improved compared to several weeks ago. Inconsistent oral fluid intake. We will continue monitoring BMPs as well. No further significant complaints today. Wound is overall healing well when discussing right knee. Patient still on vancomycin at this time with pharmacy to dose. Routine bank troughs q. 4 days/doses. Details of this discussion including any medical advice provided: DC pro-stat, continue with med Pass 4 ounces 3 times daily between meals. Repeat pre-albumin PRN. I affirm this is a Patient Initiated Episode with an Established Patient who has not had a related appointment within my department in the past 7 days or scheduled within the next 24 hours.     Total Time: minutes: 11-20 minutes    Note: not billable if this call serves to triage the patient into an appointment for the relevant concern      Murtaza Oneill

## 2020-04-03 ENCOUNTER — VIRTUAL VISIT (OUTPATIENT)
Dept: GERIATRIC MEDICINE | Age: 80
End: 2020-04-03
Payer: MEDICARE

## 2020-04-03 PROCEDURE — 1123F ACP DISCUSS/DSCN MKR DOCD: CPT | Performed by: PHYSICIAN ASSISTANT

## 2020-04-03 PROCEDURE — 99308 SBSQ NF CARE LOW MDM 20: CPT | Performed by: PHYSICIAN ASSISTANT

## 2020-04-14 ENCOUNTER — ANESTHESIA (OUTPATIENT)
Dept: OPERATING ROOM | Age: 80
End: 2020-04-14
Payer: MEDICARE

## 2020-04-14 ENCOUNTER — ANESTHESIA EVENT (OUTPATIENT)
Dept: OPERATING ROOM | Age: 80
End: 2020-04-14
Payer: MEDICARE

## 2020-04-14 ENCOUNTER — APPOINTMENT (OUTPATIENT)
Dept: GENERAL RADIOLOGY | Age: 80
End: 2020-04-14
Attending: ORTHOPAEDIC SURGERY
Payer: MEDICARE

## 2020-04-14 ENCOUNTER — HOSPITAL ENCOUNTER (OUTPATIENT)
Age: 80
Setting detail: OUTPATIENT SURGERY
Discharge: HOME | End: 2020-04-14
Attending: ORTHOPAEDIC SURGERY | Admitting: ORTHOPAEDIC SURGERY
Payer: MEDICARE

## 2020-04-14 ENCOUNTER — VIRTUAL VISIT (OUTPATIENT)
Dept: GERIATRIC MEDICINE | Age: 80
End: 2020-04-14
Payer: MEDICARE

## 2020-04-14 VITALS
DIASTOLIC BLOOD PRESSURE: 61 MMHG | RESPIRATION RATE: 16 BRPM | SYSTOLIC BLOOD PRESSURE: 131 MMHG | WEIGHT: 132 LBS | TEMPERATURE: 97.8 F | HEIGHT: 60 IN | HEART RATE: 66 BPM | BODY MASS INDEX: 25.91 KG/M2 | OXYGEN SATURATION: 97 %

## 2020-04-14 VITALS — SYSTOLIC BLOOD PRESSURE: 171 MMHG | OXYGEN SATURATION: 100 % | TEMPERATURE: 96.6 F | DIASTOLIC BLOOD PRESSURE: 72 MMHG

## 2020-04-14 DIAGNOSIS — G89.18 POSTOPERATIVE PAIN OF RIGHT KNEE: Primary | ICD-10-CM

## 2020-04-14 DIAGNOSIS — M25.561 POSTOPERATIVE PAIN OF RIGHT KNEE: Primary | ICD-10-CM

## 2020-04-14 PROBLEM — T14.8XXA NONHEALING NONSURGICAL WOUND: Status: ACTIVE | Noted: 2020-04-14

## 2020-04-14 PROBLEM — Z96.9 RETAINED ORTHOPEDIC HARDWARE: Status: ACTIVE | Noted: 2020-04-14

## 2020-04-14 LAB
ABO/RH: NORMAL
ANION GAP SERPL CALCULATED.3IONS-SCNC: 12 MEQ/L (ref 9–15)
ANTIBODY SCREEN: NORMAL
BASOPHILS ABSOLUTE: 0.1 K/UL (ref 0–0.2)
BASOPHILS ABSOLUTE: ABNORMAL
BASOPHILS RELATIVE PERCENT: 1.4 %
BASOPHILS RELATIVE PERCENT: ABNORMAL
BUN BLDV-MCNC: 10 MG/DL (ref 8–23)
BUN BLDV-MCNC: 12 MG/DL
CALCIUM SERPL-MCNC: 7.9 MG/DL
CALCIUM SERPL-MCNC: 8.5 MG/DL (ref 8.5–9.9)
CHLORIDE BLD-SCNC: 103 MMOL/L
CHLORIDE BLD-SCNC: 99 MEQ/L (ref 95–107)
CO2: 27 MEQ/L (ref 20–31)
CO2: 27 MMOL/L
CREAT SERPL-MCNC: 0.84 MG/DL (ref 0.5–0.9)
CREAT SERPL-MCNC: 0.9 MG/DL
EOSINOPHILS ABSOLUTE: 0 K/UL (ref 0–0.7)
EOSINOPHILS ABSOLUTE: ABNORMAL
EOSINOPHILS RELATIVE PERCENT: 0.2 %
EOSINOPHILS RELATIVE PERCENT: ABNORMAL
GFR AFRICAN AMERICAN: >60
GFR CALCULATED: NORMAL
GFR NON-AFRICAN AMERICAN: >60
GLUCOSE BLD-MCNC: 91 MG/DL
GLUCOSE BLD-MCNC: 99 MG/DL (ref 70–99)
HCT VFR BLD CALC: 27 % (ref 36–46)
HCT VFR BLD CALC: 27.9 % (ref 37–47)
HEMOGLOBIN: 8.9 G/DL (ref 12–16)
HEMOGLOBIN: 9.5 G/DL (ref 12–16)
LYMPHOCYTES ABSOLUTE: 3.4 K/UL (ref 1–4.8)
LYMPHOCYTES ABSOLUTE: ABNORMAL
LYMPHOCYTES RELATIVE PERCENT: 43 %
LYMPHOCYTES RELATIVE PERCENT: ABNORMAL
MCH RBC QN AUTO: 31.2 PG
MCH RBC QN AUTO: 31.3 PG (ref 27–31.3)
MCHC RBC AUTO-ENTMCNC: 32.8 G/DL
MCHC RBC AUTO-ENTMCNC: 33.9 % (ref 33–37)
MCV RBC AUTO: 92.3 FL (ref 82–100)
MCV RBC AUTO: 95 FL
MONOCYTES ABSOLUTE: 0.7 K/UL (ref 0.2–0.8)
MONOCYTES ABSOLUTE: ABNORMAL
MONOCYTES RELATIVE PERCENT: 9.2 %
MONOCYTES RELATIVE PERCENT: ABNORMAL
NEUTROPHILS ABSOLUTE: 3.6 K/UL (ref 1.4–6.5)
NEUTROPHILS ABSOLUTE: ABNORMAL
NEUTROPHILS RELATIVE PERCENT: 46.2 %
NEUTROPHILS RELATIVE PERCENT: ABNORMAL
PDW BLD-RTO: 12.5 % (ref 11.5–14.5)
PLATELET # BLD: 280 K/ΜL
PLATELET # BLD: 324 K/UL (ref 130–400)
PMV BLD AUTO: 10 FL
POTASSIUM SERPL-SCNC: 4.2 MMOL/L
POTASSIUM SERPL-SCNC: 4.4 MEQ/L (ref 3.4–4.9)
RBC # BLD: 2.85 10^6/ΜL
RBC # BLD: 3.02 M/UL (ref 4.2–5.4)
SODIUM BLD-SCNC: 138 MEQ/L (ref 135–144)
SODIUM BLD-SCNC: 140 MMOL/L
WBC # BLD: 7.5 10^3/ML
WBC # BLD: 7.8 K/UL (ref 4.8–10.8)

## 2020-04-14 PROCEDURE — 7100000000 HC PACU RECOVERY - FIRST 15 MIN: Performed by: ORTHOPAEDIC SURGERY

## 2020-04-14 PROCEDURE — 2580000003 HC RX 258: Performed by: ANESTHESIOLOGY

## 2020-04-14 PROCEDURE — 3600000014 HC SURGERY LEVEL 4 ADDTL 15MIN: Performed by: ORTHOPAEDIC SURGERY

## 2020-04-14 PROCEDURE — 86900 BLOOD TYPING SEROLOGIC ABO: CPT

## 2020-04-14 PROCEDURE — 2709999900 HC NON-CHARGEABLE SUPPLY: Performed by: ORTHOPAEDIC SURGERY

## 2020-04-14 PROCEDURE — 80048 BASIC METABOLIC PNL TOTAL CA: CPT

## 2020-04-14 PROCEDURE — 6370000000 HC RX 637 (ALT 250 FOR IP): Performed by: ANESTHESIOLOGY

## 2020-04-14 PROCEDURE — 7100000010 HC PHASE II RECOVERY - FIRST 15 MIN: Performed by: ORTHOPAEDIC SURGERY

## 2020-04-14 PROCEDURE — 3209999900 FLUORO FOR SURGICAL PROCEDURES

## 2020-04-14 PROCEDURE — 86850 RBC ANTIBODY SCREEN: CPT

## 2020-04-14 PROCEDURE — 6360000002 HC RX W HCPCS: Performed by: ORTHOPAEDIC SURGERY

## 2020-04-14 PROCEDURE — 3700000001 HC ADD 15 MINUTES (ANESTHESIA): Performed by: ORTHOPAEDIC SURGERY

## 2020-04-14 PROCEDURE — 99441 PR PHYS/QHP TELEPHONE EVALUATION 5-10 MIN: CPT | Performed by: PHYSICIAN ASSISTANT

## 2020-04-14 PROCEDURE — 7100000011 HC PHASE II RECOVERY - ADDTL 15 MIN: Performed by: ORTHOPAEDIC SURGERY

## 2020-04-14 PROCEDURE — 86901 BLOOD TYPING SEROLOGIC RH(D): CPT

## 2020-04-14 PROCEDURE — 7100000001 HC PACU RECOVERY - ADDTL 15 MIN: Performed by: ORTHOPAEDIC SURGERY

## 2020-04-14 PROCEDURE — 3700000000 HC ANESTHESIA ATTENDED CARE: Performed by: ORTHOPAEDIC SURGERY

## 2020-04-14 PROCEDURE — 3600000004 HC SURGERY LEVEL 4 BASE: Performed by: ORTHOPAEDIC SURGERY

## 2020-04-14 PROCEDURE — 6360000002 HC RX W HCPCS: Performed by: NURSE ANESTHETIST, CERTIFIED REGISTERED

## 2020-04-14 PROCEDURE — 2500000003 HC RX 250 WO HCPCS: Performed by: NURSE ANESTHETIST, CERTIFIED REGISTERED

## 2020-04-14 PROCEDURE — 85025 COMPLETE CBC W/AUTO DIFF WBC: CPT

## 2020-04-14 PROCEDURE — 2580000003 HC RX 258: Performed by: ORTHOPAEDIC SURGERY

## 2020-04-14 RX ORDER — MEPERIDINE HYDROCHLORIDE 25 MG/ML
12.5 INJECTION INTRAMUSCULAR; INTRAVENOUS; SUBCUTANEOUS EVERY 5 MIN PRN
Status: DISCONTINUED | OUTPATIENT
Start: 2020-04-14 | End: 2020-04-14 | Stop reason: HOSPADM

## 2020-04-14 RX ORDER — FENTANYL CITRATE 50 UG/ML
INJECTION, SOLUTION INTRAMUSCULAR; INTRAVENOUS PRN
Status: DISCONTINUED | OUTPATIENT
Start: 2020-04-14 | End: 2020-04-14 | Stop reason: SDUPTHER

## 2020-04-14 RX ORDER — HYDROCODONE BITARTRATE AND ACETAMINOPHEN 5; 325 MG/1; MG/1
1 TABLET ORAL PRN
Status: COMPLETED | OUTPATIENT
Start: 2020-04-14 | End: 2020-04-14

## 2020-04-14 RX ORDER — METOCLOPRAMIDE HYDROCHLORIDE 5 MG/ML
10 INJECTION INTRAMUSCULAR; INTRAVENOUS
Status: DISCONTINUED | OUTPATIENT
Start: 2020-04-14 | End: 2020-04-14 | Stop reason: HOSPADM

## 2020-04-14 RX ORDER — ACETAMINOPHEN AND CODEINE PHOSPHATE 300; 30 MG/1; MG/1
1-2 TABLET ORAL EVERY 4 HOURS PRN
Qty: 40 TABLET | Refills: 0 | Status: SHIPPED | OUTPATIENT
Start: 2020-04-14 | End: 2020-04-21

## 2020-04-14 RX ORDER — ONDANSETRON 2 MG/ML
4 INJECTION INTRAMUSCULAR; INTRAVENOUS
Status: DISCONTINUED | OUTPATIENT
Start: 2020-04-14 | End: 2020-04-14 | Stop reason: HOSPADM

## 2020-04-14 RX ORDER — DEXAMETHASONE SODIUM PHOSPHATE 10 MG/ML
INJECTION INTRAMUSCULAR; INTRAVENOUS PRN
Status: DISCONTINUED | OUTPATIENT
Start: 2020-04-14 | End: 2020-04-14 | Stop reason: SDUPTHER

## 2020-04-14 RX ORDER — MAGNESIUM HYDROXIDE 1200 MG/15ML
LIQUID ORAL CONTINUOUS PRN
Status: COMPLETED | OUTPATIENT
Start: 2020-04-14 | End: 2020-04-14

## 2020-04-14 RX ORDER — EPHEDRINE SULFATE/0.9% NACL/PF 50 MG/5 ML
SYRINGE (ML) INTRAVENOUS PRN
Status: DISCONTINUED | OUTPATIENT
Start: 2020-04-14 | End: 2020-04-14 | Stop reason: SDUPTHER

## 2020-04-14 RX ORDER — FENTANYL CITRATE 50 UG/ML
50 INJECTION, SOLUTION INTRAMUSCULAR; INTRAVENOUS EVERY 10 MIN PRN
Status: DISCONTINUED | OUTPATIENT
Start: 2020-04-14 | End: 2020-04-14 | Stop reason: HOSPADM

## 2020-04-14 RX ORDER — HYDROCODONE BITARTRATE AND ACETAMINOPHEN 5; 325 MG/1; MG/1
2 TABLET ORAL PRN
Status: COMPLETED | OUTPATIENT
Start: 2020-04-14 | End: 2020-04-14

## 2020-04-14 RX ORDER — ONDANSETRON 2 MG/ML
INJECTION INTRAMUSCULAR; INTRAVENOUS PRN
Status: DISCONTINUED | OUTPATIENT
Start: 2020-04-14 | End: 2020-04-14 | Stop reason: SDUPTHER

## 2020-04-14 RX ORDER — ETOMIDATE 2 MG/ML
INJECTION INTRAVENOUS PRN
Status: DISCONTINUED | OUTPATIENT
Start: 2020-04-14 | End: 2020-04-14 | Stop reason: SDUPTHER

## 2020-04-14 RX ORDER — SODIUM CHLORIDE, SODIUM LACTATE, POTASSIUM CHLORIDE, CALCIUM CHLORIDE 600; 310; 30; 20 MG/100ML; MG/100ML; MG/100ML; MG/100ML
INJECTION, SOLUTION INTRAVENOUS CONTINUOUS
Status: DISCONTINUED | OUTPATIENT
Start: 2020-04-14 | End: 2020-04-14 | Stop reason: HOSPADM

## 2020-04-14 RX ORDER — ROCURONIUM BROMIDE 10 MG/ML
INJECTION, SOLUTION INTRAVENOUS PRN
Status: DISCONTINUED | OUTPATIENT
Start: 2020-04-14 | End: 2020-04-14 | Stop reason: SDUPTHER

## 2020-04-14 RX ORDER — LIDOCAINE HYDROCHLORIDE 20 MG/ML
INJECTION, SOLUTION INTRAVENOUS PRN
Status: DISCONTINUED | OUTPATIENT
Start: 2020-04-14 | End: 2020-04-14 | Stop reason: SDUPTHER

## 2020-04-14 RX ORDER — IBUPROFEN 400 MG/1
400 TABLET ORAL EVERY 6 HOURS PRN
Qty: 30 TABLET | Refills: 0 | Status: SHIPPED | OUTPATIENT
Start: 2020-04-14

## 2020-04-14 RX ORDER — DIPHENHYDRAMINE HYDROCHLORIDE 50 MG/ML
12.5 INJECTION INTRAMUSCULAR; INTRAVENOUS
Status: DISCONTINUED | OUTPATIENT
Start: 2020-04-14 | End: 2020-04-14 | Stop reason: HOSPADM

## 2020-04-14 RX ADMIN — HYDROCODONE BITARTRATE AND ACETAMINOPHEN 1 TABLET: 5; 325 TABLET ORAL at 09:46

## 2020-04-14 RX ADMIN — SODIUM CHLORIDE, POTASSIUM CHLORIDE, SODIUM LACTATE AND CALCIUM CHLORIDE: 600; 310; 30; 20 INJECTION, SOLUTION INTRAVENOUS at 07:30

## 2020-04-14 RX ADMIN — Medication 5 MG: at 08:14

## 2020-04-14 RX ADMIN — SUGAMMADEX 200 MG: 100 INJECTION, SOLUTION INTRAVENOUS at 08:23

## 2020-04-14 RX ADMIN — FENTANYL CITRATE 25 MCG: 50 INJECTION, SOLUTION INTRAMUSCULAR; INTRAVENOUS at 07:37

## 2020-04-14 RX ADMIN — LIDOCAINE HYDROCHLORIDE 40 MG: 20 INJECTION, SOLUTION INTRAVENOUS at 07:37

## 2020-04-14 RX ADMIN — ROCURONIUM BROMIDE 40 MG: 10 INJECTION INTRAVENOUS at 07:37

## 2020-04-14 RX ADMIN — SODIUM CHLORIDE, POTASSIUM CHLORIDE, SODIUM LACTATE AND CALCIUM CHLORIDE: 600; 310; 30; 20 INJECTION, SOLUTION INTRAVENOUS at 06:54

## 2020-04-14 RX ADMIN — VANCOMYCIN HYDROCHLORIDE 1000 MG: 1 INJECTION, POWDER, LYOPHILIZED, FOR SOLUTION INTRAVENOUS at 07:02

## 2020-04-14 RX ADMIN — ONDANSETRON 4 MG: 2 INJECTION INTRAMUSCULAR; INTRAVENOUS at 08:07

## 2020-04-14 RX ADMIN — ROCURONIUM BROMIDE 10 MG: 10 INJECTION INTRAVENOUS at 07:57

## 2020-04-14 RX ADMIN — FENTANYL CITRATE 25 MCG: 50 INJECTION, SOLUTION INTRAMUSCULAR; INTRAVENOUS at 07:45

## 2020-04-14 RX ADMIN — Medication 5 MG: at 08:08

## 2020-04-14 RX ADMIN — DEXAMETHASONE SODIUM PHOSPHATE 4 MG: 10 INJECTION INTRAMUSCULAR; INTRAVENOUS at 08:03

## 2020-04-14 RX ADMIN — Medication 5 MG: at 08:04

## 2020-04-14 RX ADMIN — FENTANYL CITRATE 25 MCG: 50 INJECTION, SOLUTION INTRAMUSCULAR; INTRAVENOUS at 07:54

## 2020-04-14 RX ADMIN — ETOMIDATE 12 MG: 2 INJECTION, SOLUTION INTRAVENOUS at 07:37

## 2020-04-14 RX ADMIN — FENTANYL CITRATE 25 MCG: 50 INJECTION, SOLUTION INTRAMUSCULAR; INTRAVENOUS at 07:59

## 2020-04-14 ASSESSMENT — PULMONARY FUNCTION TESTS
PIF_VALUE: 15
PIF_VALUE: 1
PIF_VALUE: 0
PIF_VALUE: 20
PIF_VALUE: 18
PIF_VALUE: 23
PIF_VALUE: 22
PIF_VALUE: 20
PIF_VALUE: 6
PIF_VALUE: 24
PIF_VALUE: 20
PIF_VALUE: 22
PIF_VALUE: 23
PIF_VALUE: 20
PIF_VALUE: 20
PIF_VALUE: 15
PIF_VALUE: 12
PIF_VALUE: 15
PIF_VALUE: 20
PIF_VALUE: 22
PIF_VALUE: 10
PIF_VALUE: 20
PIF_VALUE: 17
PIF_VALUE: 8
PIF_VALUE: 21
PIF_VALUE: 23
PIF_VALUE: 19
PIF_VALUE: 1
PIF_VALUE: 21
PIF_VALUE: 24
PIF_VALUE: 22
PIF_VALUE: 23
PIF_VALUE: 15
PIF_VALUE: 20
PIF_VALUE: 3
PIF_VALUE: 0
PIF_VALUE: 1
PIF_VALUE: 22
PIF_VALUE: 21
PIF_VALUE: 22
PIF_VALUE: 0
PIF_VALUE: 0
PIF_VALUE: 22
PIF_VALUE: 20
PIF_VALUE: 0
PIF_VALUE: 22
PIF_VALUE: 15
PIF_VALUE: 21
PIF_VALUE: 15
PIF_VALUE: 2
PIF_VALUE: 23
PIF_VALUE: 23
PIF_VALUE: 24
PIF_VALUE: 19
PIF_VALUE: 22

## 2020-04-14 ASSESSMENT — PAIN DESCRIPTION - LOCATION
LOCATION: KNEE
LOCATION: KNEE

## 2020-04-14 ASSESSMENT — PAIN SCALES - GENERAL
PAINLEVEL_OUTOF10: 4
PAINLEVEL_OUTOF10: 4
PAINLEVEL_OUTOF10: 0
PAINLEVEL_OUTOF10: 4
PAINLEVEL_OUTOF10: 4
PAINLEVEL_OUTOF10: 2

## 2020-04-14 ASSESSMENT — PAIN DESCRIPTION - PAIN TYPE
TYPE: SURGICAL PAIN
TYPE: SURGICAL PAIN

## 2020-04-14 ASSESSMENT — PAIN - FUNCTIONAL ASSESSMENT: PAIN_FUNCTIONAL_ASSESSMENT: 0-10

## 2020-04-14 NOTE — ANESTHESIA POSTPROCEDURE EVALUATION
Department of Anesthesiology  Postprocedure Note    Patient: Cynthia Soelr  MRN: [id number]  YOB: 1940  Date of evaluation: 4/14/2020  Time:  8:34 AM     Procedure Summary     Date:  04/14/20 Room / Location:  [address]    Anesthesia Start:  0730 Anesthesia Stop:  6014    Procedure:  RIGHT KNEE REMOVAL OF HARDWARE (Right Knee) Diagnosis:  (RIGHT KNEE WOUND RETAINED HARDWARE)    Surgeon:  Ruel Turcios MD Responsible Provider:  EDDIE Gonzalez CRNA    Anesthesia Type:  general ASA Status:  3          Anesthesia Type: general    North Phase I:      North Phase II:      Last vitals: Reviewed and per EMR flowsheets.        Anesthesia Post Evaluation    Patient location during evaluation: PACU  Patient participation: complete - patient participated  Level of consciousness: awake  Pain score: 0  Airway patency: patent  Nausea & Vomiting: no nausea and no vomiting  Complications: no  Cardiovascular status: hemodynamically stable  Respiratory status: acceptable  Hydration status: euvolemic

## 2020-04-16 ENCOUNTER — VIRTUAL VISIT (OUTPATIENT)
Dept: GERIATRIC MEDICINE | Age: 80
End: 2020-04-16
Payer: MEDICARE

## 2020-04-16 PROCEDURE — 99443 PR PHYS/QHP TELEPHONE EVALUATION 21-30 MIN: CPT | Performed by: PHYSICIAN ASSISTANT

## 2020-04-16 NOTE — PROGRESS NOTES
Thuy Clemente is a 67 y.o. female evaluated via telephone on 4/16/2020. Consent:  She and/or health care decision maker is aware that that she may receive a bill for this telephone service, depending on her insurance coverage, and has provided verbal consent to proceed: Yes      Documentation:  I communicated with the patient and/or health care decision maker about patient being evaluated today for bleeding with urination and chronic Sweet catheter with recurrent UTI. Patient has a Proteus Mirabella's count of greater than 100 K. Patient has sensitivity back today. Patient was on a recent course of vancomycin for right knee infection and Macrobid that was canceled and replaced with Levaquin subsequently. Patient went 3-day course of Levaquin with for UTI without resolution. Still experiencing symptoms. Her culture today urine bacteria are intermediately sensitive to Levaquin and resistant to Macrobid. Supervising physician suggested avoiding further antibiotic use and attempting to utilize Diflucan for possible ? Urinary yeast infection. Will have nursing staff report any new changes in symptoms over the next few days. Patient's pain is well controlled currently on Tylenol with codeine per last set of orders. No further complaints today. Details of this discussion including any medical advice provided: see above. I affirm this is a Patient Initiated Episode with an Established Patient who has not had a related appointment within my department in the past 7 days or scheduled within the next 24 hours.     Total Time: minutes: 21-30 minutes    Note: not billable if this call serves to triage the patient into an appointment for the relevant concern      Edmond Hoff

## 2020-04-17 NOTE — PROGRESS NOTES
4/3/2020    TELEHEALTH EVALUATION -- Audio/Visual (During [id number] public health emergency)    Due to COVID 19 outbreak, patient's office visit was converted to a virtual visit. Patient was contacted and agreed to proceed with a virtual visit via Doxy. me  The risks and benefits of converting to a virtual visit were discussed in light of the current infectious disease epidemic. Patient also understood that insurance coverage and co-pays are up to their individual insurance plans. HPI:    Bustersaturnino Graf (:  1940) has requested an audio/video evaluation for the following concern(s):    Patient being evaluated for today for right knee wound recovery. Patient seen by wound care nurse and has recently been seen by surgeon for follow-up to infected right knee. Patient to be evaluated by orthopedic surgeon to assess right knee and whether or not further surgical treatment is indicated at this point. .  Vancomycin treatment did help to some degree however patient still continues to have sequelae from wound and for the development of pain and knee as well as redness and erythema. We will follow-up with patient post hardware removal.    Review of Systems   Constitutional: Positive for appetite change (fair to poor; intermittent). Negative for activity change, chills, diaphoresis and fatigue (mild; no new). Prior to Visit Medications    Medication Sig Taking? Authorizing Provider   ibuprofen (ADVIL;MOTRIN) 400 MG tablet Take 1 tablet by mouth every 6 hours as needed for Pain  Carol West MD   acetaminophen-codeine (TYLENOL/CODEINE #3) 300-30 MG per tablet Take 1-2 tablets by mouth every 4 hours as needed for Pain for up to 7 days. Intended supply: 7 days.  Take lowest dose possible to manage pain  MELVINA Muir   levothyroxine (SYNTHROID) 125 MCG tablet Take 1 tablet by mouth Daily  Sathish Blanco MD   alendronate (FOSAMAX) 70 MG tablet Take 70 mg by mouth  Historical Provider, MD   amLODIPine (NORVASC) 5 MG tablet Take 1 tablet by mouth daily  Little Sanchez DO   bisacodyl (DULCOLAX) 10 MG suppository Place 10 mg rectally daily as needed for Constipation  Historical Provider, MD   calcium-vitamin D (OSCAL-500) 500-200 MG-UNIT per tablet Take 1 tablet by mouth 2 times daily  Historical Provider, MD   mirtazapine (REMERON) 15 MG tablet Take 7.5 mg by mouth nightly  Historical Provider, MD   guaiFENesin-dextromethorphan (ROBITUSSIN DM) 100-10 MG/5ML syrup Take 5 mLs by mouth every 6 hours as needed for Cough  Historical Provider, MD   sennosides-docusate sodium (SENOKOT-S) 8.6-50 MG tablet Take 2 tablets by mouth nightly 600 [address]  Historical Provider, MD   acetaminophen (TYLENOL) 325 MG tablet Take 650 mg by mouth every 4 hours as needed for Pain  Historical Provider, MD   pantoprazole (PROTONIX) 40 MG tablet Take 1 tablet by mouth 2 times daily (before meals)  Maurice,    vitamin B-12 (CYANOCOBALAMIN) 500 MCG tablet Take 500 mcg by mouth daily  Historical Provider, MD   folic acid (FOLVITE) 1 MG tablet Take 1 mg by mouth daily  Historical Provider, MD   aspirin 81 MG chewable tablet Take 1 tablet by mouth daily  Dacia Lion DO   amiodarone (CORDARONE) 200 MG tablet Take 1 tablet by mouth daily  Dacia Lion DO   ipratropium-albuterol (DUONEB) 0.5-2.5 (3) MG/3ML SOLN nebulizer solution Inhale 3 mLs into the lungs every 4 hours as needed for Shortness of Breath  Dacia Lion DO   carBAMazepine (TEGRETOL) 200 MG tablet Take 1 tablet by mouth 2 times daily  Dacia Lion DO   coenzyme Q10 100 MG CAPS capsule Take 1 capsule by mouth 2 times daily (before meals)  Dacia Lion DO   vitamin D (ERGOCALCIFEROL) 1.25 MG (27808 UT) CAPS capsule Take 1 capsule by mouth once a week  Patient taking differently: Take 50,000 Units by mouth once a week mondays  Dacia Lion DO   metoprolol tartrate (LOPRESSOR) 25 MG tablet Take 0.5 tablets by mouth 2 times daily  Little Sanchez DO       Social Vitals/Constitutional/EENT/Resp/CV/GI//MS/Neuro/Skin/Heme-Lymph-Imm. ASSESSMENT/PLAN:  1. Infection associated with internal right knee prosthesis, subsequent encounter  currently irrigating with normal saline, drying and applying Dakin soaked gauze to wound bed and cover with absorbent dressing. Continue vancomycin. Troughs to be done every 4 days next dose. Pharmacy to dose renal function. No follow-ups on file. An  electronic signature was used to authenticate this note. --MELVINA Young on 4/20/2020 at 9:19 PM        Pursuant to the emergency declaration under the [facility], [facility] waiver authority and the [employer] Act, this Virtual  Visit was conducted, with patient's consent, to reduce the patient's risk of exposure to COVID-19 and provide continuity of care for an established patient. Services were provided through a video synchronous discussion virtually to substitute for in-person clinic visit.

## 2020-04-22 ENCOUNTER — VIRTUAL VISIT (OUTPATIENT)
Dept: ENDOCRINOLOGY | Age: 80
End: 2020-04-22
Payer: MEDICARE

## 2020-04-22 PROCEDURE — 99442 PR PHYS/QHP TELEPHONE EVALUATION 11-20 MIN: CPT | Performed by: INTERNAL MEDICINE

## 2020-04-22 NOTE — PROGRESS NOTES
MG tablet Take 1 tablet by mouth every 6 hours as needed for Pain  Niki Barrios MD   levothyroxine (SYNTHROID) 125 MCG tablet Take 1 tablet by mouth Daily  Aster Porras MD   alendronate (FOSAMAX) 70 MG tablet Take 70 mg by mouth  Historical Provider, MD   amLODIPine (NORVASC) 5 MG tablet Take 1 tablet by mouth daily  Little Sanchez,    bisacodyl (DULCOLAX) 10 MG suppository Place 10 mg rectally daily as needed for Constipation  Historical Provider, MD   calcium-vitamin D (OSCAL-500) 500-200 MG-UNIT per tablet Take 1 tablet by mouth 2 times daily  Historical Provider, MD   mirtazapine (REMERON) 15 MG tablet Take 7.5 mg by mouth nightly  Historical Provider, MD   guaiFENesin-dextromethorphan (ROBITUSSIN DM) 100-10 MG/5ML syrup Take 5 mLs by mouth every 6 hours as needed for Cough  Historical Provider, MD   sennosides-docusate sodium (SENOKOT-S) 8.6-50 MG tablet Take 2 tablets by mouth nightly [address]  Historical Provider, MD   acetaminophen (TYLENOL) 325 MG tablet Take 650 mg by mouth every 4 hours as needed for Pain  Historical Provider, MD   pantoprazole (PROTONIX) 40 MG tablet Take 1 tablet by mouth 2 times daily (before meals)  Maryuri Chua,    vitamin B-12 (CYANOCOBALAMIN) 500 MCG tablet Take 500 mcg by mouth daily  Historical Provider, MD   folic acid (FOLVITE) 1 MG tablet Take 1 mg by mouth daily  Historical Provider, MD   aspirin 81 MG chewable tablet Take 1 tablet by mouth daily  Dacia Lion,    amiodarone (CORDARONE) 200 MG tablet Take 1 tablet by mouth daily  Dacia Lion DO   ipratropium-albuterol (DUONEB) 0.5-2.5 (3) MG/3ML SOLN nebulizer solution Inhale 3 mLs into the lungs every 4 hours as needed for Shortness of Breath  Dacia Lion,    carBAMazepine (TEGRETOL) 200 MG tablet Take 1 tablet by mouth 2 times daily  Dacia Lion, DO   coenzyme Q10 100 MG CAPS capsule Take 1 capsule by mouth 2 times daily (before meals)  Dacia Lion DO   vitamin D (ERGOCALCIFEROL) 1.25 MG (03590 UT) CAPS capsule Take 1 capsule by mouth once a week  Patient taking differently: Take 50,000 Units by mouth once a week mondays  Dacia Lion DO   metoprolol tartrate (LOPRESSOR) 25 MG tablet Take 0.5 tablets by mouth 2 times daily  Renzo Diaz DO       Social History     Tobacco Use    Smoking status: Former Smoker     Packs/day: 0.50     Years: 25.00     Pack years: 12.50    Smokeless tobacco: Never Used   Substance Use Topics    Alcohol use: Never     Frequency: Never    Drug use: Never            PHYSICAL EXAMINATION:  [ INSTRUCTIONS:  \"[x]\" Indicates a positive item  \"[]\" Indicates a negative item  -- DELETE ALL ITEMS NOT EXAMINED]  [] Alert  [] Oriented to person/place/time    [] No apparent distress  [] Toxic appearing    [] Face flushed appearing [] Sclera clear  [] Lips are cyanotic      [] Breathing appears normal  [] Appears tachypneic      [] Rash on visible skin    [] Cranial Nerves II-XII grossly intact    [] Motor grossly intact in visible upper extremities    [] Motor grossly intact in visible lower extremities    [] Normal Mood  [] Anxious appearing    [] Depressed appearing  [] Confused appearing      [] Poor short term memory  [] Poor long term memory    [] OTHER:      Due to this being a TeleHealth encounter, evaluation of the following organ systems is limited: Vitals/Constitutional/EENT/Resp/CV/GI//MS/Neuro/Skin/Heme-Lymph-Imm. ASSESSMENT/PLAN:     Diagnosis Orders   1. Hypothyroidism, unspecified type  T4, Free    TSH without Reflex     Orders Placed This Encounter   Procedures    T4, Free     Standing Status:   Future     Standing Expiration Date:   4/22/2021    TSH without Reflex     Standing Status:   Future     Standing Expiration Date:   4/22/2021     Continue synthroid 125 mcg daily rpt labs in 4-8 weeks     Time spend was 15 min       An  electronic signature was used to authenticate this note.     --Luis Sams MD on 4/22/2020 at 9:40 AM        Pursuant to the emergency declaration under the [facility] waiver authority and the [employer] Act, this Virtual  Visit was conducted, with patient's consent, to reduce the patient's risk of exposure to COVID-19 and provide continuity of care for an established patient. Services were provided through a video synchronous discussion virtually to substitute for in-person clinic visit.

## 2020-04-24 NOTE — PROGRESS NOTES
PATIENT: Wade Condon : 1940 DOS: 03/10/2020     [facility]    HISTORY OF PRESENT ILLNESS: Vital signs reviewed, see [address]. The patient is being seen today for acute visit regarding a warm, red, moderately swollen, reddened right knee. The patient does have artificial knee in place with probable infection at this time. We will do a CBC with diff and CRP to assess. The patient is to follow up with orthopedic surgeon. Await follow up from orthopedic surgeon once visited. We will begin treating with IV antibiotics as needed for any acute infection that is present. Otherwise, the patient is tolerating diet fairly well and adequate oral fluid intake. Urine has become more standardized with straw color, it is slightly darkened. No new evidence of blood/hematuria. Maintains chronic Sweet catheter. MEDICATIONS: Reviewed. ALLERGIES: Reviewed. REVIEW OF SYSTEMS: CONSTITUTIONAL: Afebrile. Denies any NVD. The patient is generally lethargic and has poor activity levels at baseline,lying in bed today similar to most. The patient denies any headache, lightheadedness. Continues to be confusion at times. . However, patient is lucid and A&O 3/3. Denies any decreased sensation in lower limbs. No evident neuropathy noted. CARDIAC: Denies any chest pain, shortness of breath, orthopnea, VARELA or PND at this time. PULMONARY: No new SOB, POI, wheezing. No new cough. GI:Continues with poor appetite, but does drink supplements. No dysphagia, GERD or bowel movement complaints at this time. : Urine is within normal limits. No new issues with catheter. Urine has cleared up with straw-colored appearance. PSYCHIATRIC: The patient's mood is overall stable. Denies inability to care for finances, however, has poor grasp in overall financial picture and defers to son for financial decision making and management. Denies any SI, depression or anxiety. Remaining 14-point ROS unremarkable at this time.       PHYSICAL

## 2020-05-01 ENCOUNTER — VIRTUAL VISIT (OUTPATIENT)
Dept: GERIATRIC MEDICINE | Age: 80
End: 2020-05-01
Payer: MEDICARE

## 2020-05-01 PROCEDURE — 99441 PR PHYS/QHP TELEPHONE EVALUATION 5-10 MIN: CPT | Performed by: PHYSICIAN ASSISTANT

## 2020-05-05 NOTE — PROGRESS NOTES
PATIENT: Jens Sanchez : 1940 DOS: 2020     [facility]    HPI: Vital signs reviewed on site today, they are within normal limits. The patient is being seen today for acute visit regarding ? right knee hardware infection. The patient currently has wound VAC in place per orders from orthopedic surgeon. The right knee looks swollen greater than left with some increased redness/erythema around the area, became increasingly warm per wound care nurse, slightly warmer today. On 2020, white blood cell count was 7.9, hemoglobin 7.9 as well respectively. The patient has been on p.o. Cipro 500 mg p.o. q.d. that is finishing today. I would like the patient to follow up with orthopedic surgeon when possible to assess for possible removal and replacement of hardware of knee and possible debridement of knee as well. We will follow up today with some lab work and revisit on Friday and maintain wound VAC unless surgeon would like to change orders. MEDICATIONS: Reviewed. ALLERGIES: Reviewed. REVIEW OF SYSTEMS: Constitutional: No new fever, chills, nausea, vomiting. The patient is afebrile. The patient is generally fatigue and weak at baseline; however, no new changes or increase in symptoms. Weight has been stable. Overall fair intake of food and fluids. HEENT: Denies any headache, confusion, lightheadedness, or LOC. Cardiopulmonary: No new chest pain, shortness of breath, VARELA, PND, lower leg claudication. Complaints of minor lower extremity edema with right slightly greater than left. Pulmonary: No new cough, SOB, POI, or wheezing. No GI or  complaints. MSK: The patient does have some right knee pain and global arthritic pain. Psychiatric: The patient's mood is overall stable, no acute distress. Remaining 14-point ROS unremarkable. PHYSICAL EXAMINATION: General: Good hygiene, flat affect. No acute distress noted today.  HEENT: Pupils are equal, round, and reactive to light

## 2020-05-05 NOTE — ED NOTES
FUV 6/23/20  Last seen: 4/28/20  Last refilled: 4/2/20 #60 RF:0  Medication: propranolol (INDERAL) 10 MG tablet  TAKE 1 TABLET BY MOUTH TWICE DAILY AS NEEDED FOR ANXIETY  Last note reviewed: continue current medication regimen    Okay to send?     Urine obtained using sterile technique  Patient tolerated well  Urine sent to lab      Robby Childers RN  01/08/20 [id number]

## 2020-05-13 ASSESSMENT — ENCOUNTER SYMPTOMS
ABDOMINAL DISTENTION: 0
DIARRHEA: 0
NAUSEA: 0
COLOR CHANGE: 1
CONSTIPATION: 0
VOMITING: 0
RESPIRATORY NEGATIVE: 1
ABDOMINAL PAIN: 0

## 2020-05-13 NOTE — PROGRESS NOTES
DO Ayda   metoprolol tartrate (LOPRESSOR) 25 MG tablet Take 0.5 tablets by mouth 2 times daily  Daiana Gerber DO       Social History     Tobacco Use    Smoking status: Former Smoker     Packs/day: 0.50     Years: 25.00     Pack years: 12.50    Smokeless tobacco: Never Used   Substance Use Topics    Alcohol use: Never     Frequency: Never    Drug use: Never        Allergies   Allergen Reactions    Amoxicillin      rash and GI upset    Cefdinir Hives    Sulfa Antibiotics    ,   Past Medical History:   Diagnosis Date    Aortic stenosis     Chronic kidney disease     COPD (chronic obstructive pulmonary disease) (HCC)     Heart failure (HCC)     High cholesterol     HTN (hypertension)     Hypothyroid     New onset a-fib ([facility] [address]    ,   Past Surgical History:   Procedure Laterality Date    KNEE SURGERY Right 4/14/2020    RIGHT KNEE REMOVAL OF HARDWARE performed by Sanjuanita Khoury MD at [facility] 112 Right 1/16/2020    RIGHT QUADRICEPS MUSCLE BIOPSY performed by Irvin Rodriguez MD at [facility] 37 Right 11/18/2019    RIGHT PATELLA OPEN REDUCTION INTERNAL FIXATION  ROOM 180 performed by Sanjuanita Khoury MD at [address] [place] [address] ENDOSCOPY N/A 12/17/2019    EGD performed by Cynthia Dye MD at [facility]   ,   Social History     Tobacco Use    Smoking status: Former Smoker     Packs/day: 0.50     Years: 25.00     Pack years: 12.50    Smokeless tobacco: Never Used   Substance Use Topics    Alcohol use: Never     Frequency: Never    Drug use: Never   , No family history on file.,   There is no immunization history on file for this patient.,   Health Maintenance   Topic Date Due    DEXA (modify frequency per FRAX score)  12/21/1996    Shingles Vaccine (2 of 3) 01/01/2016    DTaP/Tdap/Td vaccine (2 - Td) 10/09/2019    Annual Wellness Visit (AWV)  11/29/2019    Flu vaccine (Season Ended) 09/01/2020    Potassium monitoring  04/14/2021    Creatinine

## 2020-05-21 LAB
ALBUMIN SERPL-MCNC: 2.7 G/DL
ALP BLD-CCNC: 61 U/L
ALT SERPL-CCNC: 5 U/L
ANION GAP SERPL CALCULATED.3IONS-SCNC: 0.9 MMOL/L
AST SERPL-CCNC: 14 U/L
BASOPHILS ABSOLUTE: ABNORMAL
BASOPHILS RELATIVE PERCENT: ABNORMAL
BILIRUB SERPL-MCNC: 0.2 MG/DL (ref 0.1–1.4)
BUN BLDV-MCNC: 17 MG/DL
CALCIUM SERPL-MCNC: 8.4 MG/DL
CHLORIDE BLD-SCNC: 102 MMOL/L
CO2: 25 MMOL/L
CREAT SERPL-MCNC: 1.1 MG/DL
EOSINOPHILS ABSOLUTE: ABNORMAL
EOSINOPHILS RELATIVE PERCENT: ABNORMAL
GFR CALCULATED: 48
GLUCOSE BLD-MCNC: 83 MG/DL
HCT VFR BLD CALC: 26.5 % (ref 36–46)
HEMOGLOBIN: 8.8 G/DL (ref 12–16)
LYMPHOCYTES ABSOLUTE: ABNORMAL
LYMPHOCYTES RELATIVE PERCENT: ABNORMAL
MCH RBC QN AUTO: 30.7 PG
MCHC RBC AUTO-ENTMCNC: 33.1 G/DL
MCV RBC AUTO: 92.7 FL
MONOCYTES ABSOLUTE: ABNORMAL
MONOCYTES RELATIVE PERCENT: ABNORMAL
NEUTROPHILS ABSOLUTE: ABNORMAL
NEUTROPHILS RELATIVE PERCENT: ABNORMAL
PLATELET # BLD: 247 K/ΜL
PMV BLD AUTO: 10.2 FL
POTASSIUM SERPL-SCNC: 4.7 MMOL/L
RBC # BLD: 2.86 10^6/ΜL
SODIUM BLD-SCNC: 137 MMOL/L
TOTAL PROTEIN: 5.7
WBC # BLD: 6 10^3/ML

## 2020-05-26 LAB
ALBUMIN SERPL-MCNC: 2.6 G/DL
ALP BLD-CCNC: 68 U/L
ALT SERPL-CCNC: 6 U/L
ANION GAP SERPL CALCULATED.3IONS-SCNC: NORMAL MMOL/L
AST SERPL-CCNC: 14 U/L
BASOPHILS ABSOLUTE: ABNORMAL
BASOPHILS RELATIVE PERCENT: ABNORMAL
BILIRUB SERPL-MCNC: 0.2 MG/DL (ref 0.1–1.4)
BUN BLDV-MCNC: 24 MG/DL
CALCIUM SERPL-MCNC: 8.7 MG/DL
CHLORIDE BLD-SCNC: 98 MMOL/L
CO2: 26 MMOL/L
CREAT SERPL-MCNC: 1.1 MG/DL
EOSINOPHILS ABSOLUTE: ABNORMAL
EOSINOPHILS RELATIVE PERCENT: ABNORMAL
GFR CALCULATED: NORMAL
GLUCOSE BLD-MCNC: 105 MG/DL
HCT VFR BLD CALC: 26.5 % (ref 36–46)
HEMOGLOBIN: 8.6 G/DL (ref 12–16)
LYMPHOCYTES ABSOLUTE: ABNORMAL
LYMPHOCYTES RELATIVE PERCENT: ABNORMAL
MCH RBC QN AUTO: 29.2 PG
MCHC RBC AUTO-ENTMCNC: 32.3 G/DL
MCV RBC AUTO: 90.4 FL
MONOCYTES ABSOLUTE: ABNORMAL
MONOCYTES RELATIVE PERCENT: ABNORMAL
NEUTROPHILS ABSOLUTE: ABNORMAL
NEUTROPHILS RELATIVE PERCENT: ABNORMAL
PLATELET # BLD: 254 K/ΜL
PMV BLD AUTO: 9.7 FL
POTASSIUM SERPL-SCNC: 4.5 MMOL/L
RBC # BLD: 2.94 10^6/ΜL
SODIUM BLD-SCNC: 133 MMOL/L
TOTAL PROTEIN: 5.5
WBC # BLD: 6.6 10^3/ML

## 2020-05-27 LAB
BILIRUBIN, URINE: NEGATIVE
BLOOD, URINE: NEGATIVE
CLARITY: CLEAR
COLOR: YELLOW
GLUCOSE URINE: ABNORMAL
KETONES, URINE: NEGATIVE
LEUKOCYTE ESTERASE, URINE: POSITIVE
NITRITE, URINE: NEGATIVE
PH UA: 7 (ref 4.5–8)
PROTEIN UA: POSITIVE
SPECIFIC GRAVITY, URINE: 1.01
UROBILINOGEN, URINE: NORMAL

## 2020-06-05 ENCOUNTER — VIRTUAL VISIT (OUTPATIENT)
Dept: GASTROENTEROLOGY | Age: 80
End: 2020-06-05
Payer: MEDICARE

## 2020-06-05 VITALS
WEIGHT: 126.4 LBS | RESPIRATION RATE: 18 BRPM | TEMPERATURE: 98 F | DIASTOLIC BLOOD PRESSURE: 68 MMHG | SYSTOLIC BLOOD PRESSURE: 112 MMHG | HEIGHT: 60 IN | HEART RATE: 67 BPM | BODY MASS INDEX: 24.81 KG/M2

## 2020-06-05 PROCEDURE — 99443 PR PHYS/QHP TELEPHONE EVALUATION 21-30 MIN: CPT | Performed by: INTERNAL MEDICINE

## 2020-06-05 RX ORDER — FERROUS SULFATE 325(65) MG
325 TABLET ORAL 2 TIMES DAILY
Qty: 60 TABLET | Refills: 5 | Status: SHIPPED | OUTPATIENT
Start: 2020-06-05

## 2020-06-05 RX ORDER — SODIUM PHOSPHATE, DIBASIC AND SODIUM PHOSPHATE, MONOBASIC 7; 19 G/133ML; G/133ML
1 ENEMA RECTAL
COMMUNITY

## 2020-06-05 RX ORDER — POTASSIUM CHLORIDE 20 MEQ/1
40 TABLET, EXTENDED RELEASE ORAL
COMMUNITY
Start: 2019-11-21

## 2020-06-05 RX ORDER — ACETAMINOPHEN 650 MG/1
650 SUPPOSITORY RECTAL EVERY 4 HOURS PRN
COMMUNITY

## 2020-06-05 NOTE — PROGRESS NOTES
2020    TELEHEALTH EVALUATION -- Audio/Visual (During XYHVO-46 public health emergency)    Due to Panfilo 19 outbreak, patient's office visit was converted to a virtual visit. Patient was contacted and agreed to proceed with a virtual visit via Telephone Visit  The risks and benefits of converting to a virtual visit were discussed in light of the current infectious disease epidemic. Patient also understood that insurance coverage and co-pays are up to their individual insurance plans. Chief Complaint   Patient presents with   Jordan Consultation     Phone visit, pt at [address] [facility] [address] [place] [facility] [place], [place], Call [phone]    GI Bleeding     HPI:  Patient Location: [facility]  Provider location: Office    Sherrie Cha (:  1940) has requested an audio/video evaluation for the following concern(s):  Ongoing anemia, history of hospitalization for significant severe anemia in 2019. Patient was admitted with hemoglobin in the range of 4-5 in the setting of being on Eliquis which was started in 2019 for atrial fibrillation. At admission she received a number of blood transfusions, underwent upper endoscopy which was noted to be normal and was advised to have a colonoscopy followed by small bowel capsule endoscopy. Patient refused to have a colonoscopy and thereby did not also undergo the small bowel capsule endoscopy. Interval change: History was taken from nurse and patient's helpers Avel Mason and Mary Martin over the phone including talking to Anand. She has been at the nursing home since her discharge in December. She denies any overt GI blood loss, not taking Eliquis currently, not on iron therapy, on aspirin 81 mg daily. Normal to soft bowel movements daily, denies melena. Hemoglobin noted to be 8.5 when checked 2 weeks ago, it appears to have dropped from 9-10 range earlier in the year. Patient continues to refuse undergoing colonoscopy.     Previous GI work up/Endoscopic investigations:   EGD: concerns please feel free to contact me to clarify.

## 2020-06-09 LAB
BASOPHILS ABSOLUTE: ABNORMAL
BASOPHILS RELATIVE PERCENT: 1.1 %
EOSINOPHILS ABSOLUTE: 0.1 /ΜL
EOSINOPHILS RELATIVE PERCENT: 0 %
HCT VFR BLD CALC: 26.2 % (ref 36–46)
HEMOGLOBIN: 8.6 G/DL (ref 12–16)
LYMPHOCYTES ABSOLUTE: 2.7 /ΜL
LYMPHOCYTES RELATIVE PERCENT: 40.2 %
MCH RBC QN AUTO: 30.4 PG
MCHC RBC AUTO-ENTMCNC: 33 G/DL
MCV RBC AUTO: 92.1 FL
MONOCYTES ABSOLUTE: 0.7 /ΜL
MONOCYTES RELATIVE PERCENT: 9.9 %
NEUTROPHILS ABSOLUTE: 3.2 /ΜL
NEUTROPHILS RELATIVE PERCENT: 48.8 %
PLATELET # BLD: 323 K/ΜL
PMV BLD AUTO: 9.9 FL
RBC # BLD: 2.85 10^6/ΜL
WBC # BLD: 6.6 10^3/ML

## 2020-06-10 ENCOUNTER — VIRTUAL VISIT (OUTPATIENT)
Dept: NEUROLOGY | Age: 80
End: 2020-06-10
Payer: MEDICARE

## 2020-06-10 PROBLEM — T84.60XA: Status: ACTIVE | Noted: 2020-06-10

## 2020-06-10 PROBLEM — L89.610 PRESSURE INJURY OF RIGHT HEEL, UNSTAGEABLE (HCC): Status: ACTIVE | Noted: 2019-12-11

## 2020-06-10 PROBLEM — S82.009A FRACTURE OF PATELLA: Status: ACTIVE | Noted: 2020-06-10

## 2020-06-10 PROBLEM — S52.509A CLOSED FRACTURE OF DISTAL END OF RADIUS: Status: ACTIVE | Noted: 2020-06-10

## 2020-06-10 PROBLEM — Z87.898 HISTORY OF SEIZURE: Status: ACTIVE | Noted: 2020-06-10

## 2020-06-10 PROBLEM — N28.9 ACUTE RENAL IMPAIRMENT: Status: ACTIVE | Noted: 2019-11-15

## 2020-06-10 PROCEDURE — 99214 OFFICE O/P EST MOD 30 MIN: CPT | Performed by: PSYCHIATRY & NEUROLOGY

## 2020-06-10 ASSESSMENT — ENCOUNTER SYMPTOMS
NAUSEA: 0
VOMITING: 0
TROUBLE SWALLOWING: 0
CHOKING: 0
PHOTOPHOBIA: 0
BACK PAIN: 0
COLOR CHANGE: 0
SHORTNESS OF BREATH: 0

## 2020-06-10 NOTE — PROGRESS NOTES
Right 11/18/2019    RIGHT PATELLA OPEN REDUCTION INTERNAL FIXATION  ROOM 180 performed by Ruel Turcios MD at [facility] 26 N/A 12/17/2019    EGD performed by Makayla Lai MD at [address] [place] [address] History     Socioeconomic History    Marital status:      Spouse name: Not on file    Number of children: 2    Years of education: 15    Highest education level: 12th grade   Occupational History    Occupation:      Comment: [employer]   Social Needs    Financial resource strain: Not hard at all   Tai-Marquita insecurity     Worry: Never true     Inability: Never true   Intrinsic-ID needs     Medical: No     Non-medical: No   Tobacco Use    Smoking status: Former Smoker     Packs/day: 0.50     Years: 25.00     Pack years: 12.50    Smokeless tobacco: Never Used   Substance and Sexual Activity    Alcohol use: Never     Frequency: Never    Drug use: Never    Sexual activity: Not on file   Lifestyle    Physical activity     Days per week: 0 days     Minutes per session: Not on file    Stress: Only a little   Relationships    Social connections     Talks on phone: More than three times a week     Gets together: Once a week     Attends [employer] service: More than 4 times per year     Active member of club or organization: Yes     Attends meetings of clubs or organizations: 1 to 4 times per year     Relationship status:      Intimate partner violence     Fear of current or ex partner: No     Emotionally abused: No     Physically abused: No     Forced sexual activity: No   Other Topics Concern    Not on file   Social History Narrative         Lives With: Florence Tao lives in [place] fulltime t the [address] [place] [address] office    Type of Home: Apartment(1st floor) in [facility]: Two level, Able to Live on Main level with bedroom/bathroom(Full flight to second floor)    Home Access: Stairs to enter without rails    Entrance Stairs - Number of Steps: 2    Bathroom Shower/Tub: Tub/Shower unit    Bathroom Equipment: (None)    Home Equipment: (N/A)    ADL Assistance: Independent    Homemaking Assistance: Independent    Homemaking Responsibilities: Yes    Meal Prep Responsibility: Primary    Laundry Responsibility: Primary    Cleaning Responsibility: Primary    Ambulation Assistance: Independent(No AD)    Transfer Assistance: Independent    Active : Yes    Additional Comments: States that she was down on ground X 72 hours following fall     No family history on file.   Allergies   Allergen Reactions    Amoxicillin      rash and GI upset    Cefdinir Hives    Sulfa Antibiotics        Current Outpatient Medications   Medication Sig Dispense Refill    ferrous sulfate (IRON 325) 325 (65 Fe) MG tablet Take 1 tablet by mouth 2 times daily 60 tablet 5    magnesium hydroxide (MILK OF MAGNESIA) 400 MG/5ML suspension Take 30 mLs by mouth      potassium chloride (KLOR-CON M) 20 MEQ extended release tablet Take 40 mEq by mouth      acetaminophen (TYLENOL) 650 MG suppository Place 650 mg rectally every 4 hours as needed for Fever      Aluminum & Magnesium Hydroxide (MAGNESIUM-ALUMINUM PO) Take by mouth 225-200 MG/5ML      albuterol-ipratropium (COMBIVENT RESPIMAT)  MCG/ACT AERS inhaler Inhale 1 puff into the lungs every 6 hours      Sodium Phosphates (FLEET) 7-19 GM/118ML Place 1 enema rectally once as needed      glucagon 1 MG injection Inject 1 kit into the muscle once      Glucose-Cholecalciferol (TRUEPLUS GLUCOSE) GEL Take by mouth      GUAIFENESIN PO Take 10 mLs by mouth every 4 hours as needed      ibuprofen (ADVIL;MOTRIN) 400 MG tablet Take 1 tablet by mouth every 6 hours as needed for Pain 30 tablet 0    levothyroxine (SYNTHROID) 125 MCG tablet Take 1 tablet by mouth Daily 30 tablet 03    alendronate (FOSAMAX) 70 MG tablet Take 70 mg by mouth      amLODIPine (NORVASC) 5 MG tablet Take 1 tablet by mouth daily 30 tablet 0    bisacodyl (DULCOLAX) 10 MG suppository Place 10 mg rectally daily as needed for Constipation      calcium-vitamin D (OSCAL-500) 500-200 MG-UNIT per tablet Take 1 tablet by mouth 2 times daily      mirtazapine (REMERON) 15 MG tablet Take 7.5 mg by mouth nightly      guaiFENesin-dextromethorphan (ROBITUSSIN DM) 100-10 MG/5ML syrup Take 5 mLs by mouth every 6 hours as needed for Cough      sennosides-docusate sodium (SENOKOT-S) 8.6-50 MG tablet Take 2 tablets by mouth nightly HOLD FOR LOOSE STOOL      acetaminophen (TYLENOL) 325 MG tablet Take 650 mg by mouth every 4 hours as needed for Pain      pantoprazole (PROTONIX) 40 MG tablet Take 1 tablet by mouth 2 times daily (before meals) 60 tablet 0    vitamin B-12 (CYANOCOBALAMIN) 500 MCG tablet Take 500 mcg by mouth daily      folic acid (FOLVITE) 1 MG tablet Take 1 mg by mouth daily      aspirin 81 MG chewable tablet Take 1 tablet by mouth daily 30 tablet 3    amiodarone (CORDARONE) 200 MG tablet Take 1 tablet by mouth daily 30 tablet 3    ipratropium-albuterol (DUONEB) 0.5-2.5 (3) MG/3ML SOLN nebulizer solution Inhale 3 mLs into the lungs every 4 hours as needed for Shortness of Breath 360 mL 0    carBAMazepine (TEGRETOL) 200 MG tablet Take 1 tablet by mouth 2 times daily 90 tablet 3    coenzyme Q10 100 MG CAPS capsule Take 1 capsule by mouth 2 times daily (before meals) 60 capsule 0    vitamin D (ERGOCALCIFEROL) 1.25 MG (47671 UT) CAPS capsule Take 1 capsule by mouth once a week (Patient taking differently: Take 50,000 Units by mouth once a week mondays) 5 capsule 0    metoprolol tartrate (LOPRESSOR) 25 MG tablet Take 0.5 tablets by mouth 2 times daily 60 tablet 3     No current facility-administered medications for this visit. Review of Systems   Constitutional: Negative for fever. HENT: Negative for ear pain, tinnitus and trouble swallowing. Eyes: Negative for photophobia and visual disturbance.    Respiratory: Negative for choking and shortness of

## 2020-06-15 ENCOUNTER — VIRTUAL VISIT (OUTPATIENT)
Dept: GERIATRIC MEDICINE | Age: 80
End: 2020-06-15
Payer: MEDICARE

## 2020-06-15 PROCEDURE — 99442 PR PHYS/QHP TELEPHONE EVALUATION 11-20 MIN: CPT | Performed by: PHYSICIAN ASSISTANT

## 2020-06-16 ENCOUNTER — OFFICE VISIT (OUTPATIENT)
Dept: GERIATRIC MEDICINE | Age: 80
End: 2020-06-16
Payer: MEDICARE

## 2020-06-16 PROCEDURE — 99309 SBSQ NF CARE MODERATE MDM 30: CPT | Performed by: PHYSICIAN ASSISTANT

## 2020-06-16 PROCEDURE — 1123F ACP DISCUSS/DSCN MKR DOCD: CPT | Performed by: PHYSICIAN ASSISTANT

## 2020-06-18 ENCOUNTER — HOSPITAL ENCOUNTER (OUTPATIENT)
Dept: INTERVENTIONAL RADIOLOGY/VASCULAR | Age: 80
Discharge: HOME | End: 2020-06-20
Payer: MEDICARE

## 2020-06-18 PROCEDURE — 2500000003 HC RX 250 WO HCPCS: Performed by: RADIOLOGY

## 2020-06-18 PROCEDURE — 36573 INSJ PICC RS&I 5 YR+: CPT

## 2020-06-18 PROCEDURE — 2580000003 HC RX 258: Performed by: RADIOLOGY

## 2020-06-18 PROCEDURE — 2709999900 IR PICC WO SQ PORT/PUMP > 5 YEARS

## 2020-06-18 RX ORDER — SODIUM CHLORIDE 0.9 % (FLUSH) 0.9 %
10 SYRINGE (ML) INJECTION PRN
Status: DISCONTINUED | OUTPATIENT
Start: 2020-06-18 | End: 2020-06-21 | Stop reason: HOSPADM

## 2020-06-18 RX ORDER — SODIUM CHLORIDE 9 MG/ML
250 INJECTION, SOLUTION INTRAVENOUS ONCE
Status: COMPLETED | OUTPATIENT
Start: 2020-06-18 | End: 2020-06-18

## 2020-06-18 RX ORDER — SODIUM CHLORIDE 0.9 % (FLUSH) 0.9 %
10 SYRINGE (ML) INJECTION EVERY 12 HOURS SCHEDULED
Status: DISCONTINUED | OUTPATIENT
Start: 2020-06-18 | End: 2020-06-21 | Stop reason: HOSPADM

## 2020-06-18 RX ORDER — LIDOCAINE HYDROCHLORIDE 20 MG/ML
5 INJECTION, SOLUTION INFILTRATION; PERINEURAL ONCE
Status: COMPLETED | OUTPATIENT
Start: 2020-06-18 | End: 2020-06-18

## 2020-06-18 RX ADMIN — SODIUM CHLORIDE 250 ML: 9 INJECTION, SOLUTION INTRAVENOUS at 10:36

## 2020-06-18 RX ADMIN — LIDOCAINE HYDROCHLORIDE 5 ML: 20 INJECTION, SOLUTION INFILTRATION; PERINEURAL at 10:35

## 2020-06-18 ASSESSMENT — PAIN SCALES - GENERAL: PAINLEVEL_OUTOF10: 0

## 2020-06-18 NOTE — PROGRESS NOTES
Patient in CT holding room awaiting ride back to nursing home post PICC line placement. Patient given crackers and ice water currently. Eating and drinking without difficulty. 9584 Patient picked up by Transport from nursing [place] [facility] transport service.

## 2020-06-23 ENCOUNTER — OFFICE VISIT (OUTPATIENT)
Dept: GERIATRIC MEDICINE | Age: 80
End: 2020-06-23
Payer: MEDICARE

## 2020-06-23 LAB
BASOPHILS ABSOLUTE: 0.2 /ΜL
BASOPHILS RELATIVE PERCENT: 1.4 %
EOSINOPHILS ABSOLUTE: 0.1 /ΜL
EOSINOPHILS RELATIVE PERCENT: 0 %
HCT VFR BLD CALC: 26.3 % (ref 36–46)
HEMOGLOBIN: 8.7 G/DL (ref 12–16)
LYMPHOCYTES ABSOLUTE: 2.8 /ΜL
LYMPHOCYTES RELATIVE PERCENT: 42.5 %
MCH RBC QN AUTO: 29.4 PG
MCHC RBC AUTO-ENTMCNC: 32.9 G/DL
MCV RBC AUTO: 89.2 FL
MONOCYTES ABSOLUTE: 0.6 /ΜL
MONOCYTES RELATIVE PERCENT: 8.9 %
NEUTROPHILS ABSOLUTE: 3.1 /ΜL
NEUTROPHILS RELATIVE PERCENT: 47.2 %
PLATELET # BLD: 272 K/ΜL
PMV BLD AUTO: 9.9 FL
RBC # BLD: 2.95 10^6/ΜL
VANCOMYCIN TROUGH: 14.1
WBC # BLD: 6.5 10^3/ML

## 2020-06-23 PROCEDURE — 99308 SBSQ NF CARE LOW MDM 20: CPT | Performed by: PHYSICIAN ASSISTANT

## 2020-06-23 PROCEDURE — 1123F ACP DISCUSS/DSCN MKR DOCD: CPT | Performed by: PHYSICIAN ASSISTANT

## 2020-06-30 ENCOUNTER — OFFICE VISIT (OUTPATIENT)
Dept: GERIATRIC MEDICINE | Age: 80
End: 2020-06-30
Payer: MEDICARE

## 2020-06-30 LAB
BASOPHILS ABSOLUTE: ABNORMAL
BASOPHILS RELATIVE PERCENT: 1.4 %
EOSINOPHILS ABSOLUTE: ABNORMAL
EOSINOPHILS RELATIVE PERCENT: 0 %
HCT VFR BLD CALC: 31.2 % (ref 36–46)
HEMOGLOBIN: 10.1 G/DL (ref 12–16)
LYMPHOCYTES ABSOLUTE: 3.6 /ΜL
LYMPHOCYTES RELATIVE PERCENT: 46.3 %
MCH RBC QN AUTO: 30 PG
MCHC RBC AUTO-ENTMCNC: 32.4 G/DL
MCV RBC AUTO: 92.7 FL
MONOCYTES ABSOLUTE: ABNORMAL
MONOCYTES RELATIVE PERCENT: 8.4 %
NEUTROPHILS ABSOLUTE: 3.5 /ΜL
NEUTROPHILS RELATIVE PERCENT: 43.9 %
PLATELET # BLD: 296 K/ΜL
PMV BLD AUTO: 9.7 FL
RBC # BLD: 3.36 10^6/ΜL
WBC # BLD: 7.9 10^3/ML

## 2020-06-30 PROCEDURE — 99308 SBSQ NF CARE LOW MDM 20: CPT | Performed by: PHYSICIAN ASSISTANT

## 2020-06-30 PROCEDURE — 1123F ACP DISCUSS/DSCN MKR DOCD: CPT | Performed by: PHYSICIAN ASSISTANT

## 2020-07-02 NOTE — PROGRESS NOTES
[address] [facility]  [address], [address]  Phone: [phone]  Fax: [phone]  PATIENT: Rylee Vasquez : 1940 DOS:   A [facility] healthcare ministry serving [place] and [address] [place] [address]  HPI: The patient is being seen today for right knee infection status post hardware revision  several weeks ago. The patient has a 1 x 1 cm close wound with some redness in front area. Redness surrounding the wound is approximately 8 x 8 cm. The patient is currently on  doxycycline to cover for MRSA that was cultured several days prior. The patient does have  follow up with wound MD and plastic surgeon sometime in the next month to discuss debriding  and cleaning out. We are awaiting follow up with radiology for PICC line placement to change  doxycycline to vancomycin in the interim. Currently, the patient's white blood cell count was  within normal limits, approximately 7.0. We will repeat CBC without diff in one week to  reassess. The patient has some minor discomfort and is able to move active and passive range of  motion. For the while, she is standing on it with assistance and physical therapy and doing well  therein. The patient has no further complaints noted today. Overall oral intake is good. The  patient's attitude and psychiatric status seems to be well within normal limits. The patient was  seen up in chair today watching television, saw with wound care nurse today present. MEDICATIONS: Reviewed. ALLERGIES: Reviewed. REVIEW OF SYSTEMS: Constitutional: No new fever, chills, nausea, vomiting, increased  weakness, fatigue beyond her baseline. Weight has been stable. Cardiopulmonary: No new CP,  orthopnea, VARELA, or PND. Denies any lower leg claudication bilaterally. No significant lower leg  edema at this time. No cough, SOB, POI, or wheezing. GI: No new complaints. : No new  complaints. MSK: Normal range of motion.  No complaints of instability, swelling. There is a  complaint of redness at swelling site with some tenderness to palpation. Psychiatric: The  patient's mood is overall stable. No new changes. Remaining 14-point ROS unremarkable. PHYSICAL EXAMINATION: General: Overall good hygiene. Bright affect. No acute distress. Cardiac: Regular rate and rhythm. No JVD noted today. Some mild lower extremity edema  approaching +1. Pedal pulses are intact bilaterally. Popliteal pulses bilaterally intact. Pulmonary:  Lung sounds are clear to auscultation. No rhonchi, rales, or wheezing noted. Skin: Redness  surrounding right knee was a small minor indurated area 1 x 1 cm. Skin does ami at that area. Mental Status: The patient is awake, alert, oriented 2/3. ASSESSMENT AND PLAN:  1. Right knee infection status post revision - Continue doxycycline, do CBC without diff in one  week. The patient to have follow up with plastic surgeon and wound care physician over the next  2 to 3 weeks. 2. Poor appetite - patient's intake is well. She is taking her supplements regularly. [facility]  Phone: [phone]  Fax: [phone]  PATIENT: Ruffus Nyhan : 1940 DOS:   A [facility] healthcare ministry serving [place] and [place]  changes. We will monitor weights.   Electronically Signed By: Priscilla Peña PA-C on 2020 23:18:32  ______________________________  Priscilla Peña PA-C  ID/[id number]  D: 2020 14:18:28  T: 2020 11:42:06  cc: - [phone] [facility]

## 2020-07-09 NOTE — PROGRESS NOTES
PATIENT: CINDY DELGADO : 1940 DOS: 2020     [facility]    HPI: The patient is being seen today for a follow up for right knee superficial skin infection with hx of Right TKR. The patient currently does not complain of any new pain or sequela. Denies any discharge or pus/drainage. The patient is maintained at this time on vancomycin and has PICC line in place. No new infections with PICC line or evidence of redness or erythema to the upper arm. We will continue same dosage at this time per pharmacy. We will monitor creatinine as well. The patient's pain is well controlled at this time. Does not need any new interventions. The patient has follow up appointment with orthopedics and wound care and plastic surgery in early July, will see Dr. Ken Gonsales and Dr. Judge Hammer on 2020, respectively. We are considering skin graft at this time due to ongoing infection. We will vancomycin until July first and then discontinue monitoring CBC with diff post antibiotics. The patient offers no further complaints noted today. She is eating and drinking well. She is in good spirits. She is upright in her bed right now watching television, eating her dinner. We will follow up with patient to our facility next week and speak with wound care nurse for any updates. MEDICATIONS:  Reviewed. ALLERGIES:  Reviewed. REVIEW OF SYSTEMS:  Constitutional:  No new fever, chills, nausea, vomiting, weakness or fatigue beyond her baseline. Cardiopulmonary:  Denies any shortness of breath, CP, palpitations, or increased lower leg edema. Denies any cough or dyspnea. GI:  No new complaints. :  No new complaints at this time. MSK:  Denies any increased right knee pain. Range of motion is fair overall. No new pain with active or passive range of motion she states. Psychiatric:  The patient's mood is overall stable. No new changes in attitude or demeanor.   No depression or anxiety symptoms reported. Remaining 14-point ROS unremarkable. PHYSICAL EXAMINATION:VS: Reviewed. See [facility] for VS. WNL at this time. General:  The patient appears to be good spirits, sitting upright in bed, eating her dinner, alert and oriented 3/3. HEENT:  Normocephalic, atraumatic. EOMs within normal limits. MMM:  No erythema or exudate. Cardiopulmonary:  RRR, no significant leg edema. Pedal pulses are intact +1 bilaterally. Pulmonary:  Respirations are within normal limits. LSCTA. No accessory muscle usage. GI:  Bowel sounds are normal.  Soft, nontender, abdomen, all four quadrants. Psychiatric:  The patient is alert and oriented 2 to 3. Denies depression, anxiety. PHQ-2 is negative. ASSESSMENT AND PLAN:  Right knee superficial infection - continue vancomycin. Monitor levels as ordered. Pharmacy dose.         Electronically Signed By: Magdaleno Booth PA-C on 07/01/2020 12:13:42  ______________________________  Magdaleno Booth PA-C  AT/[id number]  D: 06/24/2020 12:09:06  T: 06/25/2020 08:14:01    cc: - [id number]

## 2020-07-14 ENCOUNTER — OFFICE VISIT (OUTPATIENT)
Dept: GERIATRIC MEDICINE | Age: 80
End: 2020-07-14
Payer: MEDICARE

## 2020-07-14 PROCEDURE — 1123F ACP DISCUSS/DSCN MKR DOCD: CPT | Performed by: PHYSICIAN ASSISTANT

## 2020-07-14 PROCEDURE — 99308 SBSQ NF CARE LOW MDM 20: CPT | Performed by: PHYSICIAN ASSISTANT

## 2020-07-17 ENCOUNTER — OFFICE VISIT (OUTPATIENT)
Dept: GERIATRIC MEDICINE | Age: 80
End: 2020-07-17
Payer: MEDICARE

## 2020-07-17 PROCEDURE — 1123F ACP DISCUSS/DSCN MKR DOCD: CPT | Performed by: INTERNAL MEDICINE

## 2020-07-17 PROCEDURE — 99309 SBSQ NF CARE MODERATE MDM 30: CPT | Performed by: INTERNAL MEDICINE

## 2020-07-23 ENCOUNTER — NURSE ONLY (OUTPATIENT)
Dept: PRIMARY CARE CLINIC | Age: 80
End: 2020-07-23

## 2020-07-23 ENCOUNTER — HOSPITAL ENCOUNTER (OUTPATIENT)
Age: 80
Setting detail: SPECIMEN
Discharge: HOME | End: 2020-07-23
Payer: MEDICARE

## 2020-07-23 PROCEDURE — U0003 INFECTIOUS AGENT DETECTION BY NUCLEIC ACID (DNA OR RNA); SEVERE ACUTE RESPIRATORY SYNDROME CORONAVIRUS 2 (SARS-COV-2) (CORONAVIRUS DISEASE [COVID-19]), AMPLIFIED PROBE TECHNIQUE, MAKING USE OF HIGH THROUGHPUT TECHNOLOGIES AS DESCRIBED BY CMS-2020-01-R: HCPCS

## 2020-07-26 LAB
SARS-COV-2: NOT DETECTED
SOURCE: NORMAL

## 2020-08-05 ENCOUNTER — OFFICE VISIT (OUTPATIENT)
Dept: GASTROENTEROLOGY | Age: 80
End: 2020-08-05
Payer: MEDICARE

## 2020-08-05 PROCEDURE — 1123F ACP DISCUSS/DSCN MKR DOCD: CPT | Performed by: NURSE PRACTITIONER

## 2020-08-05 PROCEDURE — G8400 PT W/DXA NO RESULTS DOC: HCPCS | Performed by: NURSE PRACTITIONER

## 2020-08-05 PROCEDURE — G8427 DOCREV CUR MEDS BY ELIG CLIN: HCPCS | Performed by: NURSE PRACTITIONER

## 2020-08-05 PROCEDURE — 99213 OFFICE O/P EST LOW 20 MIN: CPT | Performed by: NURSE PRACTITIONER

## 2020-08-05 PROCEDURE — 1036F TOBACCO NON-USER: CPT | Performed by: NURSE PRACTITIONER

## 2020-08-05 PROCEDURE — G8420 CALC BMI NORM PARAMETERS: HCPCS | Performed by: NURSE PRACTITIONER

## 2020-08-05 PROCEDURE — 1090F PRES/ABSN URINE INCON ASSESS: CPT | Performed by: NURSE PRACTITIONER

## 2020-08-05 PROCEDURE — 4040F PNEUMOC VAC/ADMIN/RCVD: CPT | Performed by: NURSE PRACTITIONER

## 2020-08-05 NOTE — PROGRESS NOTES
Gastroenterology Clinic Follow up Visit    Moreno Velez  [id number]  Chief Complaint   Patient presents with    Follow-up     F/u for anemia. Pt does not report any blood in her stool or dark stools. HPI: Last seen in GI clinic on 6/5/20 with history of significant anemia resulting in hospitalization in December, 2019. Patient underwent upper endoscopy, but refusing further GI w/u. -Iron therapy and check Hb monthly. Interval change: Patient arrives in a wheelchair from a nursing home. Sweet catheter in place with clear yellow urine. Patient not keen on proceeding with any further endoscopic investigations. Denies melena, hematochezia, or hematemesis. Denies GERD, dysphagia, weight loss or loss of appetite. Patient denies constipation or diarrhea. No abdominal pain nausea or vomiting. Patient denies chest pain, shortness of breath, or palpitations. Previous GI work up/Endoscopic investigations:   EGD: 12/17/2019: Normal endoscopy      Review of Systems   All other systems reviewed and are negative. Past medical history, past surgical history, medication list, social and familyhistory reviewed    There were no vitals taken for this visit. Physical Exam  Vitals signs reviewed. Constitutional:       General: She is not in acute distress. Appearance: She is well-developed. She is not diaphoretic. HENT:      Head: Normocephalic and atraumatic. Eyes:      General: No scleral icterus. Conjunctiva/sclera: Conjunctivae normal.      Pupils: Pupils are equal, round, and reactive to light. Neck:      Musculoskeletal: Normal range of motion and neck supple. Cardiovascular:      Rate and Rhythm: Normal rate and regular rhythm. Heart sounds: Normal heart sounds. No murmur. Pulmonary:      Effort: Pulmonary effort is normal. No respiratory distress. Breath sounds: Normal breath sounds. No wheezing or rales. Chest:      Chest wall: No tenderness.    Abdominal: well as words andphrases that may seem inappropriate. If there are questions or concerns please feel free to contact me to clarify.

## 2020-08-05 NOTE — PROGRESS NOTES
[facility]    Vital signs reviewed on site today, within normal limits. The patient is being seen today for history of right knee wound and right heel pressure injury. .  The patient currently shows wound is closing and improving. No current antibiotics at the moment. The patient is still participating in PT. She is sitting upright and improving, but not doing any standing or walking at the moment. Fair passive range of motion. No acute pain at the moment. The patient's pain is well controlled on current regimen. No fever, afebrile. Right heel is heeling well; continuing evaluating and watching for breakdown. We will continue following up with labs as needed and addressing any acute concerns. MEDICATIONS:  Reviewed. ALLERGIES:  Reviewed. REVIEW OF SYSTEMS:  Constitutional:  Denies any fever, nausea, vomiting, weakness, fatigue beyond her baseline. The patient is still fairly immobile and sitting in wheelchair most of the day. HEENT:  Denies any headache, confusion, lightheadedness. Cardiopulmonary:  Denies any CP, lower leg claudication, leg edema, or palpitations. Denies any wheezing, POI, shortness of breath. GI:  Denies any change in status. :  No change in status. Psychiatric:  The patient's mood is overall stable, fairly quiet, but cooperative and pleasant. PHQ2 is negative. Remaining 14-point ROS unremarkable. PHYSICAL EXAMINATION:  General:  Good hygiene, bright affect. No acute distress noted today. HEENT:  Pupils equal, round, reactive to light overall. Cardiac:  Regular rate and rhythm. No JVD noted today. No significant lower leg edema. Pedal pulses are intact bilaterally. Pulmonary:  Lung sounds are clear to auscultation throughout bilaterally. Abdomen:  Normal bowel sounds, soft, nontender abdomen. Mental Status: The patient is awake, alert, oriented 3/3. Fair memory overall, pleasant and cooperative.   Skin:  The patient's right knee

## 2020-08-07 ENCOUNTER — OFFICE VISIT (OUTPATIENT)
Dept: GERIATRIC MEDICINE | Age: 80
End: 2020-08-07
Payer: MEDICARE

## 2020-08-07 PROCEDURE — 1123F ACP DISCUSS/DSCN MKR DOCD: CPT | Performed by: PHYSICIAN ASSISTANT

## 2020-08-07 PROCEDURE — 99308 SBSQ NF CARE LOW MDM 20: CPT | Performed by: PHYSICIAN ASSISTANT

## 2020-08-11 ENCOUNTER — OFFICE VISIT (OUTPATIENT)
Dept: GERIATRIC MEDICINE | Age: 80
End: 2020-08-11
Payer: MEDICARE

## 2020-08-11 PROCEDURE — 99308 SBSQ NF CARE LOW MDM 20: CPT | Performed by: PHYSICIAN ASSISTANT

## 2020-08-11 PROCEDURE — 1123F ACP DISCUSS/DSCN MKR DOCD: CPT | Performed by: PHYSICIAN ASSISTANT

## 2020-08-14 NOTE — PROGRESS NOTES
PATIENT: William Ramirez : 1940 DOS: 2020     [facility]    HPI: The patient is being seen today for a follow up on osteomyelitis of right knee. The patient has had a chronic wound healing intermittently since November, when she had surgery done. Currently working to configure with wound care nurse for long-term care. The patient likely needs a psych evaluation for competency for medical decision making. The patient will often answer in the affirmative for numerous things without seemingly thinking about it as reflex. The patient is a full code. We will have wound nurse contact wound care center and/or orthopedic office to schedule for followup and possible debridement. We will continue monitoring labs for any signs of acute infection or sepsis. The patient is currently clinically stable at this time. No acute issues. Afebrile. Vital signs are stable. Offers no additional complaints today. MEDICATIONS:  Reviewed. ALLERGIES:  Reviewed. REVIEW OF SYSTEMS:  Constitutional:  No new fever or chills. No NVD. Acute weight changes. Cardiac:  Denies CP, orthopnea, VARELA. Pulmonary:  Denies any POI or wheezing or SOB. GI:  Appetite is fair. No new abdominal pain, constipation or diarrhea. :  No new acute issues. History of UTI while at facility. MSK:  The patient does have some mild right knee pain and global osteoarthritic pain. Full range of motion in right knee. The patient is not walking on right knee at this time. Psychiatric:  The patient's mood is overall stable. Denies any acute stress, anxiety or depression symptoms. Remaining 14-point ROS unremarkable. PHYSICAL Vital signs reviewed on site today.  :  General:  Overall fair hygiene, bright affect, sleepy today in room, watching television. No acute distress. Cardiac:  Regular rate. No JVD noted. No significant lower leg edema. Pulmonary:  LSCTA. Abdomen:  Normal bowel sounds.   nontender to palpation in abdomen. Skin:  Right knee shows a small indurated area over the patella with some slough and minor granulation tissue underneath. Mental Status: The patient is awake, alert, oriented, 2/3. ASSESSMENT AND PLAN:  History of right knee infection, status post hardware removal -  will discern next steps with Ortho and wound care. We will assess patient's tolerance for decision-making with psych evaluation and continue to monitor labs.         Electronically Signed By: Deepa Chatterjee PA-C on 08/13/2020 12:51:58  ______________________________  RADHA Pineda/[id number]  D: 08/11/2020 19:22:57  T: 08/12/2020 15:25:40    cc: - [phone]

## 2020-08-16 NOTE — PROGRESS NOTES
capsule Take 1 capsule by mouth 2 times daily (before meals) 60 capsule 0    vitamin D (ERGOCALCIFEROL) 1.25 MG (40786 UT) CAPS capsule Take 1 capsule by mouth once a week (Patient taking differently: Take 50,000 Units by mouth once a week mondays) 5 capsule 0    metoprolol tartrate (LOPRESSOR) 25 MG tablet Take 0.5 tablets by mouth 2 times daily 60 tablet 3     No current facility-administered medications on file prior to visit. Allergies   Allergen Reactions    Amoxicillin      rash and GI upset    Cefdinir Hives    Sulfa Antibiotics          No family history on file. Physical Exam:      Physical Exam    There were no vitals taken for this visit.       .   Lab Results   Component Value Date    WBC 7.9 06/30/2020    HGB 10.1 (A) 06/30/2020    HCT 31.2 (A) 06/30/2020    MCV 92.7 06/30/2020     06/30/2020     Lab Results   Component Value Date     05/26/2020    K 4.5 05/26/2020    K 3.3 01/16/2020    CL 98 05/26/2020    CO2 26 05/26/2020    BUN 24 05/26/2020    CREATININE 1.1 05/26/2020    GLUCOSE 105 05/26/2020    CALCIUM 8.7 05/26/2020                ASSESSMENT:  Patient Active Problem List   Diagnosis    Acute renal impairment    Anxiety state    Aortic valve stenosis    Gastroesophageal reflux disease    Essential hypertension, benign    Hyperlipidemia    Senile hyperkeratosis    Aneurysm of ascending aorta (HCC)    Tobacco user    Vitamin D deficiency    Closed fracture of right patella    Abnormal gait    Paroxysmal atrial fibrillation (HCC)    Hypertensive disorder    Chronic renal insufficiency, stage III (moderate) ([facility])    Rhabdomyolysis    Hyponatremia    Hypothyroidism    Adult body mass index 25-29    Acute posthemorrhagic anemia    Myositis    Muscle wasting and atrophy, not elsewhere classified, right thigh    Retained orthopedic hardware    Open wound with complication    Bone fixation device infection ([facility]    Closed fracture of distal end of radius    Fracture of patella    History of seizure    Pressure injury of right heel, unstageable ([address]         PLAN:

## 2020-08-21 ENCOUNTER — OFFICE VISIT (OUTPATIENT)
Dept: GERIATRIC MEDICINE | Age: 80
End: 2020-08-21
Payer: MEDICARE

## 2020-08-21 PROCEDURE — 99308 SBSQ NF CARE LOW MDM 20: CPT | Performed by: PHYSICIAN ASSISTANT

## 2020-08-22 NOTE — PROGRESS NOTES
PATIENT: Cullen Gallego : 1940 DOS: 2020     [facility]    HPI: Vital signs reviewed on site today. Patient being seen today for ongoing evaluation of right knee with history of infection. Patient currently stable. No new acute complaints. Patient states that she has very mild knee pain, does not have actively move it through full range of motion. She lies in bed most of the day. Does not like to get up in her chair. Past range of motion is approximately 70 to 80% today. Some minor pain. No evidence of acute infection, pus, or discharge, no drainage from anterior knee cap. Patient otherwise stating no new issues globally. Appetite is the same. Patient is in the room upright watching television. Denies any fatigue, weakness outside of normal limits. Weight has been fairly stable and noted headache, confusion. Patient has no new chest pain, palpitations, or orthopnea. No new POI or wheezing. Denies any indigestion, dysphagia, abdominal pain, or change in bowel habits. Denies any significant  signs. Unchanged overall. Mood is overall stable. Pleasant, fairly cooperative throughout the visit. We will follow up with any new information from wound nurse/wound MD as she is followed by their service. MEDICATIONS:  Reviewed. ALLERGIES:  Reviewed. REVIEW OF SYSTEMS:  See HPI. PHYSICAL EXAMINATION:  General:  Overall good hygiene. Flat affect. No acute distress. Pleasant, overall, cooperative. Cardiopulmonary: RRR. LS CTA. Respiratory effort normal.  No adventitious breath sounds. MSK: Patient has fair passive range of motion, minor pain with range of motion. Strength is a proximately 3 out of 5 in all peripheral extremities. Sedentary at baseline. Psych: Alert and oriented 2/3. Mood is at baseline. No acute agitation or anxiety. ASSESSMENT AND PLAN:   1. Right knee wound - will continue to monitor labs for any evidence of infection.   No

## 2020-08-31 NOTE — BRIEF OP NOTE
Brief Postoperative Note  ______________________________________________________________    Patient: Vishal Chester  YOB: 1940  MRN: [id number]  Date of Procedure: 1/16/2020    Pre-Op Diagnosis: myositis    Post-Op Diagnosis: Same       Procedure(s):  RIGHT QUADRICEPS MUSCLE BIOPSY    Anesthesia: General    Surgeon(s):  Charles Meyers MD    Assistant:     Estimated Blood Loss (mL): less than 50     Complications: None    Specimens:   ID Type Source Tests Collected by Time Destination   A : right quadriceps muscle biopsy  Tissue Muscle SURGICAL PATHOLOGY Charles Meyers MD 1/16/2020 0805        Implants:  * No implants in log *      Drains: * No LDAs found *    Findings: edema in the tissue    Charles Meyers MD  Date: 1/16/2020  Time: 8:51 AM
no

## 2020-09-02 ENCOUNTER — OFFICE VISIT (OUTPATIENT)
Dept: GERIATRIC MEDICINE | Age: 80
End: 2020-09-02
Payer: COMMERCIAL

## 2020-09-02 PROCEDURE — 99308 SBSQ NF CARE LOW MDM 20: CPT | Performed by: PHYSICIAN ASSISTANT

## 2020-09-02 PROCEDURE — 1123F ACP DISCUSS/DSCN MKR DOCD: CPT | Performed by: PHYSICIAN ASSISTANT

## 2020-09-04 ENCOUNTER — OFFICE VISIT (OUTPATIENT)
Dept: GERIATRIC MEDICINE | Age: 80
End: 2020-09-04
Payer: COMMERCIAL

## 2020-09-04 LAB
ALBUMIN SERPL-MCNC: 3.5 G/DL (ref 3.5–4.6)
ALP BLD-CCNC: 78 U/L (ref 40–130)
ALT SERPL-CCNC: 7 U/L (ref 0–33)
ANION GAP SERPL CALCULATED.3IONS-SCNC: 13 MEQ/L (ref 9–15)
AST SERPL-CCNC: 17 U/L (ref 0–35)
BASOPHILS ABSOLUTE: 0.1 K/UL (ref 0–0.2)
BASOPHILS RELATIVE PERCENT: 1.1 %
BILIRUB SERPL-MCNC: 0.3 MG/DL (ref 0.2–0.7)
BUN BLDV-MCNC: 14 MG/DL (ref 8–23)
CALCIUM SERPL-MCNC: 8.7 MG/DL (ref 8.5–9.9)
CHLORIDE BLD-SCNC: 90 MEQ/L (ref 95–107)
CO2: 24 MEQ/L (ref 20–31)
CREAT SERPL-MCNC: 0.83 MG/DL (ref 0.5–0.9)
EOSINOPHILS ABSOLUTE: 0 K/UL (ref 0–0.7)
EOSINOPHILS RELATIVE PERCENT: 0 %
GFR AFRICAN AMERICAN: >60
GFR NON-AFRICAN AMERICAN: >60
GLOBULIN: 3.4 G/DL (ref 2.3–3.5)
GLUCOSE BLD-MCNC: 83 MG/DL (ref 70–99)
HCT VFR BLD CALC: 31.5 % (ref 37–47)
HEMOGLOBIN: 10.8 G/DL (ref 12–16)
LYMPHOCYTES ABSOLUTE: 2.5 K/UL (ref 1–4.8)
LYMPHOCYTES RELATIVE PERCENT: 24.1 %
MCH RBC QN AUTO: 31.1 PG (ref 27–31.3)
MCHC RBC AUTO-ENTMCNC: 34.2 % (ref 33–37)
MCV RBC AUTO: 91.1 FL (ref 82–100)
MONOCYTES ABSOLUTE: 1 K/UL (ref 0.2–0.8)
MONOCYTES RELATIVE PERCENT: 9.8 %
NEUTROPHILS ABSOLUTE: 6.8 K/UL (ref 1.4–6.5)
NEUTROPHILS RELATIVE PERCENT: 65 %
PDW BLD-RTO: 13.2 % (ref 11.5–14.5)
PLATELET # BLD: 337 K/UL (ref 130–400)
POTASSIUM SERPL-SCNC: 4.2 MEQ/L (ref 3.4–4.9)
RBC # BLD: 3.46 M/UL (ref 4.2–5.4)
SODIUM BLD-SCNC: 127 MEQ/L (ref 135–144)
TOTAL PROTEIN: 6.9 G/DL (ref 6.3–8)
WBC # BLD: 10.5 K/UL (ref 4.8–10.8)

## 2020-09-04 PROCEDURE — 99308 SBSQ NF CARE LOW MDM 20: CPT | Performed by: INTERNAL MEDICINE

## 2020-09-04 PROCEDURE — 1123F ACP DISCUSS/DSCN MKR DOCD: CPT | Performed by: INTERNAL MEDICINE

## 2020-09-04 NOTE — PROGRESS NOTES
[facility]    HPI: Vital signs reviewed on site today. The patient is being seen today for monthly visit for history of CKD, global weakness and history of right knee infection the patient's right knee currently is status quo without any acute changes. The patient remains at baseline, no new acute pain or tenderness, no new erythema or drainage from the area. The patient has fair passive range of motion, is very inactive and states has minor pain with active ROM, is globally weak, is very sedentary. When questioned, she states that she does not know why, but she \"does not like getting in the chair or out of bed. \"  The patient's renal panels have been at baseline. No significant changes in urine output. The patient does have Sweet catheter in place. No new evidence of infection. Denies any suprapubic tenderness or generalized abdominal pain. Bowel movements have been within normal limits. Denies any chest pain, shortness of breath, palpitations, claudication, or orthopnea. Patient is lying flat today in bed with about 10-degree incline. Overall mood is stable, denies any acute change in mood. Remaining 14-point ROS is unremarkable at this time. Overall, no new acute complaints. We will continue to monitor and follow up monthly or for any acute issues as they arise. MEDICATIONS:  Reviewed. ALLERGIES:  Reviewed. REVIEW OF SYSTEMS:  See HPI. PHYSICAL EXAMINATION:  General:  Overall good hygiene. Flat affect. No acute distress. Pleasant, overall, cooperative. Cardiopulmonary: RRR. LS CTA. Respiratory effort normal. No respiratory distress. No wheezes, rales, or rhonci. No adventitious breath sounds. MSK: Patient has fair passive range of motion, no increased pain with range normal of motion. Sedentary at baseline. Psych: Alert and oriented 2/3. Mood is at baseline. No acute agitation or anxiety. ASSESSMENT AND PLAN:  1. CKD - will continue to monitor BMP.   No new changes at this time. 2.   Global weakness - encourage the patient to get up from the chair and to participate in ADLs more frequently. Encourage this behavior day-to-day. 3.   History of right knee infection - no new changes at this time. Continue monitoring. We will monitor CBC for any elevated white count p.r.n. Monitor for signs of infection.           ______________________________  RADHA Wells/[id number]  D: 09/03/2020 16:30:56  T: 09/03/2020 17:40:57    cc: - [phone]

## 2020-09-08 LAB
ALBUMIN SERPL-MCNC: 2.9 G/DL
ALP BLD-CCNC: 61 U/L
ALT SERPL-CCNC: 9 U/L
ANION GAP SERPL CALCULATED.3IONS-SCNC: NORMAL MMOL/L
AST SERPL-CCNC: 18 U/L
BASOPHILS ABSOLUTE: 0.2 /ΜL
BASOPHILS RELATIVE PERCENT: 3.6 %
BILIRUB SERPL-MCNC: 0.2 MG/DL (ref 0.1–1.4)
BUN BLDV-MCNC: 24 MG/DL
CALCIUM SERPL-MCNC: 8.3 MG/DL
CHLORIDE BLD-SCNC: 101 MMOL/L
CO2: 24 MMOL/L
CREAT SERPL-MCNC: 1.1 MG/DL
EOSINOPHILS ABSOLUTE: ABNORMAL
EOSINOPHILS RELATIVE PERCENT: 0.2 %
GFR CALCULATED: NORMAL
GLUCOSE BLD-MCNC: 84 MG/DL
HCT VFR BLD CALC: 26.5 % (ref 36–46)
HCT VFR BLD CALC: 27.6 % (ref 36–46)
HEMOGLOBIN: 8.8 G/DL (ref 12–16)
HEMOGLOBIN: 9.3 G/DL (ref 12–16)
LYMPHOCYTES ABSOLUTE: 3 /ΜL
LYMPHOCYTES RELATIVE PERCENT: 45.8 %
MCH RBC QN AUTO: 31.2 PG
MCHC RBC AUTO-ENTMCNC: 33.6 G/DL
MCV RBC AUTO: 92.8 FL
MONOCYTES ABSOLUTE: 0.7 /ΜL
MONOCYTES RELATIVE PERCENT: 11.3 %
NEUTROPHILS ABSOLUTE: 2.6 /ΜL
NEUTROPHILS RELATIVE PERCENT: 39.1 %
PLATELET # BLD: 254 K/ΜL
PMV BLD AUTO: ABNORMAL FL
POTASSIUM SERPL-SCNC: 4.3 MMOL/L
RBC # BLD: 2.97 10^6/ΜL
SODIUM BLD-SCNC: 135 MMOL/L
TOTAL PROTEIN: 5.9
WBC # BLD: 6.6 10^3/ML

## 2020-09-15 ENCOUNTER — OFFICE VISIT (OUTPATIENT)
Dept: GERIATRIC MEDICINE | Age: 80
End: 2020-09-15
Payer: COMMERCIAL

## 2020-09-15 PROCEDURE — 1123F ACP DISCUSS/DSCN MKR DOCD: CPT | Performed by: PHYSICIAN ASSISTANT

## 2020-09-15 PROCEDURE — 99308 SBSQ NF CARE LOW MDM 20: CPT | Performed by: PHYSICIAN ASSISTANT

## 2020-09-22 ENCOUNTER — OFFICE VISIT (OUTPATIENT)
Dept: GERIATRIC MEDICINE | Age: 80
End: 2020-09-22
Payer: COMMERCIAL

## 2020-09-22 PROCEDURE — 1123F ACP DISCUSS/DSCN MKR DOCD: CPT | Performed by: PHYSICIAN ASSISTANT

## 2020-09-22 PROCEDURE — 99308 SBSQ NF CARE LOW MDM 20: CPT | Performed by: PHYSICIAN ASSISTANT

## 2020-09-30 ASSESSMENT — ENCOUNTER SYMPTOMS
BACK PAIN: 0
SHORTNESS OF BREATH: 0
CONSTIPATION: 0

## 2020-09-30 NOTE — PROGRESS NOTES
Patient Name: Suki Moscoso  YOB: 1940  Medical Record Number: [id number]        History of Present Illness: This 78-year-old woman was seen in her room today patient seen for follow-up visit for her atrial relation seizure disorder hypertension. Patient has been clinically stable no evidence of new RVR with notes no lightheadedness patient has not had evidence of acute seizure activity. Patient is status post prior evaluation for skin breakdown. Patient has been followed cardiology for aortic stenosis. Patient has not had any chest palpitation clinically stable at this time. Review of Systems   Constitutional: Negative for appetite change and fever. HENT: Negative for congestion. Respiratory: Negative for shortness of breath. Cardiovascular: Negative for chest pain and palpitations. Gastrointestinal: Negative for constipation. Genitourinary: Negative for dysuria. Musculoskeletal: Negative for back pain. Neurological: Positive for weakness. Psychiatric/Behavioral: Negative for behavioral problems.        Review of Systems: All 14 review of systems negative other than as stated above    Social History     Tobacco Use    Smoking status: Former Smoker     Packs/day: 0.50     Years: 25.00     Pack years: 12.50    Smokeless tobacco: Never Used   Substance Use Topics    Alcohol use: Never     Frequency: Never    Drug use: Never         Past Medical History:   Diagnosis Date    Aortic stenosis     Chronic kidney disease     COPD (chronic obstructive pulmonary disease) (HCC)     Heart failure (HCC)     High cholesterol     HTN (hypertension)     Hypothyroid     New onset a-fib [facility])            Past Surgical History:   Procedure Laterality Date    KNEE SURGERY Right 4/14/2020    RIGHT KNEE REMOVAL OF HARDWARE performed by Chris Deutsch MD at [place] [address] Right 1/16/2020    RIGHT QUADRICEPS MUSCLE BIOPSY performed by Libby Wellington MD at [address] [place] [address] PATELLA FRACTURE SURGERY Right 11/18/2019    RIGHT PATELLA OPEN REDUCTION INTERNAL FIXATION  ROOM 180 performed by Jossie Flores MD at [facility] N/A 12/17/2019    EGD performed by Sandra Hannah MD at [facility]         Current Outpatient Medications on File Prior to Visit   Medication Sig Dispense Refill    ferrous sulfate (IRON 325) 325 (65 Fe) MG tablet Take 1 tablet by mouth 2 times daily 60 tablet 5    magnesium hydroxide (MILK OF MAGNESIA) 400 MG/5ML suspension Take 30 mLs by mouth      potassium chloride (KLOR-CON M) 20 MEQ extended release tablet Take 40 mEq by mouth      acetaminophen (TYLENOL) 650 MG suppository Place 650 mg rectally every 4 hours as needed for Fever      Aluminum & Magnesium Hydroxide (MAGNESIUM-ALUMINUM PO) Take by mouth 225-200 MG/5ML      albuterol-ipratropium (COMBIVENT RESPIMAT)  MCG/ACT AERS inhaler Inhale 1 puff into the lungs every 6 hours      Sodium Phosphates (FLEET) 7-19 GM/118ML Place 1 enema rectally once as needed      glucagon 1 MG injection Inject 1 kit into the muscle once      Glucose-Cholecalciferol (TRUEPLUS GLUCOSE) GEL Take by mouth      GUAIFENESIN PO Take 10 mLs by mouth every 4 hours as needed      ibuprofen (ADVIL;MOTRIN) 400 MG tablet Take 1 tablet by mouth every 6 hours as needed for Pain 30 tablet 0    levothyroxine (SYNTHROID) 125 MCG tablet Take 1 tablet by mouth Daily 30 tablet 03    alendronate (FOSAMAX) 70 MG tablet Take 70 mg by mouth      amLODIPine (NORVASC) 5 MG tablet Take 1 tablet by mouth daily 30 tablet 0    bisacodyl (DULCOLAX) 10 MG suppository Place 10 mg rectally daily as needed for Constipation      calcium-vitamin D (OSCAL-500) 500-200 MG-UNIT per tablet Take 1 tablet by mouth 2 times daily      mirtazapine (REMERON) 15 MG tablet Take 7.5 mg by mouth nightly      guaiFENesin-dextromethorphan (ROBITUSSIN DM) 100-10 MG/5ML syrup Take 5 mLs by mouth every 6 hours as needed for Cough      sennosides-docusate sodium (SENOKOT-S) 8.6-50 MG tablet Take 2 tablets by mouth nightly HOLD FOR LOOSE STOOL      acetaminophen (TYLENOL) 325 MG tablet Take 650 mg by mouth every 4 hours as needed for Pain      pantoprazole (PROTONIX) 40 MG tablet Take 1 tablet by mouth 2 times daily (before meals) 60 tablet 0    vitamin B-12 (CYANOCOBALAMIN) 500 MCG tablet Take 500 mcg by mouth daily      folic acid (FOLVITE) 1 MG tablet Take 1 mg by mouth daily      aspirin 81 MG chewable tablet Take 1 tablet by mouth daily 30 tablet 3    amiodarone (CORDARONE) 200 MG tablet Take 1 tablet by mouth daily 30 tablet 3    ipratropium-albuterol (DUONEB) 0.5-2.5 (3) MG/3ML SOLN nebulizer solution Inhale 3 mLs into the lungs every 4 hours as needed for Shortness of Breath 360 mL 0    carBAMazepine (TEGRETOL) 200 MG tablet Take 1 tablet by mouth 2 times daily 90 tablet 3    coenzyme Q10 100 MG CAPS capsule Take 1 capsule by mouth 2 times daily (before meals) 60 capsule 0    vitamin D (ERGOCALCIFEROL) 1.25 MG (71331 UT) CAPS capsule Take 1 capsule by mouth once a week (Patient taking differently: Take 50,000 Units by mouth once a week mondays) 5 capsule 0    metoprolol tartrate (LOPRESSOR) 25 MG tablet Take 0.5 tablets by mouth 2 times daily 60 tablet 3     No current facility-administered medications on file prior to visit. Allergies   Allergen Reactions    Amoxicillin      rash and GI upset    Cefdinir Hives    Sulfa Antibiotics          No family history on file. Physical Exam:   Respirations 14 pulse 68 blood pressure 113/72   Physical Exam   Constitutional: She appears well-nourished. No distress. HENT:   Head: Normocephalic and atraumatic. Eyes: EOM are normal. No scleral icterus. Neck: Neck supple. No JVD present. Cardiovascular: Normal rate and regular rhythm. 2 of 6 talk 6 murmur. Pulmonary/Chest: Breath sounds normal. She has no wheezes. Abdominal: Soft.  Bowel sounds are normal.

## 2020-10-01 ENCOUNTER — OFFICE VISIT (OUTPATIENT)
Dept: GERIATRIC MEDICINE | Age: 80
End: 2020-10-01
Payer: COMMERCIAL

## 2020-10-01 PROCEDURE — G8484 FLU IMMUNIZE NO ADMIN: HCPCS | Performed by: PHYSICIAN ASSISTANT

## 2020-10-01 PROCEDURE — 1123F ACP DISCUSS/DSCN MKR DOCD: CPT | Performed by: PHYSICIAN ASSISTANT

## 2020-10-01 PROCEDURE — 99307 SBSQ NF CARE SF MDM 10: CPT | Performed by: PHYSICIAN ASSISTANT

## 2020-10-01 NOTE — PROGRESS NOTES
ABDOMINAL:  Normal bowel sounds, soft, nontender abdomen throughout. No distention, guarding, or rigidity. MENTAL STATUS:  The patient is awake, alert, oriented x2. Poor memory overall. [address] command following. No evidence of hallucinations or delirium. ASSESSMENT AND PLAN:  ? Dementia / poor medical compliance - will recommend psych eval for diagnosis/clarification on issues related to the patient's medical decision making. We will recommend new POA if deemed incompetent to make medical decisions.            Electronically Signed By: Alanna Ackerman PA-C on 09/22/2020 11:23:20  ______________________________  Alanna Ackerman PA-C  [id number]/[id number]  D: 09/19/2020 16:53:49  T: 09/20/2020 02:35:23     cc: - [phone] [facility]

## 2020-10-10 NOTE — PROGRESS NOTES
overall. Fair respiratory effort. No wheezes, rales, or rhonchi noted. Abdominal:  Normal bowel sounds. Soft, nontender abdomen all throughout the abdomen. No tenderness, guarding, rigidity. SKIN:  Over right knee, there is no new scabbing, redness, or erythema. MENTAL STATUS:  The patient is awake, alert, oriented 2/3. Presumably poor responsiveness to questions today. ASSESSMENT AND PLAN:  1. Right knee chronic infection - no new changes at this time. We will follow up with orthopedic surgeon, if the family agrees. Possible MRI. 2.   Generalized weakness/instability-  encouraged patient to get up in chair routinely for a minimum of 3 hours a day. 3.   History of poor oral intake/protein-calorie malnutrition - patient is eating moderately well, finishing greater than 50% of meals.            Electronically Signed By: Tashia Angulo PA-C on 10/10/2020 14:03:05  ______________________________  RADHA Ibarra/[id number]  D: 10/08/2020 16:15:33  T: 10/09/2020 05:25:59     cc: - [phone]

## 2020-10-13 ENCOUNTER — OFFICE VISIT (OUTPATIENT)
Dept: GERIATRIC MEDICINE | Age: 80
End: 2020-10-13
Payer: COMMERCIAL

## 2020-10-13 PROCEDURE — G8484 FLU IMMUNIZE NO ADMIN: HCPCS | Performed by: PHYSICIAN ASSISTANT

## 2020-10-13 PROCEDURE — 1123F ACP DISCUSS/DSCN MKR DOCD: CPT | Performed by: PHYSICIAN ASSISTANT

## 2020-10-13 PROCEDURE — 99307 SBSQ NF CARE SF MDM 10: CPT | Performed by: PHYSICIAN ASSISTANT

## 2020-11-19 ENCOUNTER — OFFICE VISIT (OUTPATIENT)
Dept: GERIATRIC MEDICINE | Age: 80
End: 2020-11-19
Payer: COMMERCIAL

## 2020-11-19 DIAGNOSIS — Z86.19 HISTORY OF REMOVAL OF JOINT PROSTHESIS OF RIGHT KNEE DUE TO INFECTION: Primary | ICD-10-CM

## 2020-11-19 DIAGNOSIS — Z98.890 HISTORY OF REMOVAL OF JOINT PROSTHESIS OF RIGHT KNEE DUE TO INFECTION: Primary | ICD-10-CM

## 2020-11-19 PROCEDURE — G8484 FLU IMMUNIZE NO ADMIN: HCPCS | Performed by: PHYSICIAN ASSISTANT

## 2020-11-19 PROCEDURE — 1123F ACP DISCUSS/DSCN MKR DOCD: CPT | Performed by: PHYSICIAN ASSISTANT

## 2020-11-19 PROCEDURE — 99308 SBSQ NF CARE LOW MDM 20: CPT | Performed by: PHYSICIAN ASSISTANT

## 2020-11-20 VITALS
RESPIRATION RATE: 20 BRPM | SYSTOLIC BLOOD PRESSURE: 103 MMHG | OXYGEN SATURATION: 97 % | TEMPERATURE: 97.2 F | DIASTOLIC BLOOD PRESSURE: 53 MMHG | HEART RATE: 58 BPM

## 2020-11-20 NOTE — PROGRESS NOTES
PATIENT: Quin Carvajal : 1940 DOS: 10/01/2020     [facility]    HPI: Vital signs reviewed, see [facility]. The patient is being seen today for followup on hypertension. The patient is currently on 12.5 mg metoprolol tartrate b.i.d. and 5 mg p.o. daily of amlodipine. Her blood pressure seems to be trending while between 110 and 140 more routinely unless BP taken prior to medication administration. The patient denies any chest pain, shortness of breath, POI, wheezing. No new headache. The patient is very sedentary at baseline. She is staying in her room today, seen lying on bed with television on. As typical, does not establish good dialogue, answers yes or no questions primarily. Does not elaborate; however, she seems comfortable. She is smiling and cooperative, in no acute distress. We will continue monitoring blood pressure routinely and address as needed. Otherwise, the patient is doing well at baseline without any acute issue presently. Nursing staff reports no new concerns. MEDICATIONS:  Reviewed. ALLERGIES:  Reviewed. REVIEW OF SYSTEMS:  See HPI. PHYSICAL EXAMINATION:  General:  Overall good hygiene, bright effect, is in no apparent distress, lying in bed today with television on, covers drawn. Her pupils are equal, round, reactive to light. EOMs within normal limits. Cardiopulmonary:  Displays regular rate and rhythm on auscultation with normal entry and effort and lungs sound clear on auscultation. No new wheezes, rales, rhonchi, or adventitious breath sounds. Abdominal exam shows normal bowel sounds, soft, nontender abdomen. Mental Status: The patient is awake, alert, oriented 2-3/3 with prompting. ASSESSMENT AND PLAN:  Essential hypertension - Continue current medications. Monitor blood pressure routinely. No new changes at this time.          Electronically Signed By: Mak Bai PA-C on 2020 09:20:55  ______________________________  Angelina Blanchard Reza [place]  CATRINA/[id number]  D: 11/16/2020 10:14:20  T: 11/16/2020 17:56:26    cc: - [phone]

## 2020-11-20 NOTE — PROGRESS NOTES
with movement of right knee. However, it does not seem to bother her she states. Passive ROM. Does not bother patient's pain level. Psychiatric:  The patient's mood is overall stable. She is moderately emotionally detached, watches TV most of the day. She does have increasing memory deficit and some decrease in cognition when it comes to discussion of code status and education provided. Remaining 14-point ROS unremarkable. PHYSICAL EXAMINATION: Vital signs, 103/53, 20 RR, 97.2, 58 bpm, 97% SpO2, weight 131.8. General:  Fair hygiene overall, subdued affect, brightens with further discussion. No acute distress. Cardiopulmonary:  Regular rate and rhythm. No JVD. No significant lower leg edema. Pedal pulses are intact, +1 bilaterally. Pulmonary:  LSCTA. Fair effort and entry. No wheezes, rales/rhonchi. Skin /MSK:  Normal passive and active range of motion. The patient's strength is 4/5 bilaterally lower periphery. No new exudate/erythema at right knee. No discharge or warmth noted. Mental Status: The patient is awake, alert, oriented approximately 2-3/3. ASSESSMENT AND PLAN:  1. Chronic right knee wound - continue monitoring labs as needed. No new sequelae. I will refer to Ortho for further workup if acute treatment needed. 2.   Code status discussion - patient elected to continue full code status. We will continue discussing with the patient intermittently. Discussed with POA, who is in agreement with the plan.          Electronically Signed By: Nan Bui PA-C on 11/13/2020 12:22:51  ______________________________  RADHA Akers/[id number]  D: 11/10/2020 10:37:27  T: 11/11/2020 06:53:03    [phone]

## 2020-11-29 NOTE — PROGRESS NOTES
Gely Delgado  :1940  DOS: 9/15/2020      [facility] COMMU     HPI:  The patient is being seen today for followup and continuing monitoring of the right knee wound as well as hypotension. The patient has had low blood pressure today of 84/52. The patient is on metoprolol, we will likely change dosing. The patient denies any acute knee pain, is still able to move and function consistent with her baseline. No new evidence of exudate, warmth, redness to the area. We will continue monitoring CBC labs as needed. The patient offered no new desire to change code status. She is still full code. This is despite her lack of desire and willingness to participate in most therapy or treatment. Does seem to have poor understanding of condition despite multiple attempts to confer. I have spoken with son in the past about this, otherwise the patient is stable, no current complaints. Nursing staff report no further complaints at the moment. MEDICATIONS:  Reviewed. ALLERGIES:  Reviewed. REVIEW OF SYSTEMS:  The patient denies any new NVD, fatigue, weakness. She is very sedentary at baseline. No new weight changes. No headache, confusion, lightheadedness reported. No new chest pain, palpitations, orthopnea, VARELA. Denies any SOB, POI, or wheezing. GI:  Denies any indigestion, dysphagia, abdominal pain. No new urgency, dysuria, polyuria, or hematuria reported. She does have history of recurrent UTI. Denies any stress, anxiety, depression at this time. PHYSICAL EXAMINATION: Vital Signs:  84/52, 97.2 F, 56 bpm, 16 RR. General:  Overall good hygiene, flat affect until engaged. Pupils were equal, round, reactive to light equally. Moist mucous membranes on oropharyngeal exam.  No exudate or erythema. Regular rate and rhythm on auscultation of chest wall.  lung sounds are clear bilaterally. No increased wheezes, rales, or rhonchi noted.   Abdomen:  Normal bowel sounds, soft, nontender abdomen. Mental Status: The patient is awake, alert, oriented 2/3. ASSESSMENT AND PLAN:  1. Right knee wound s/p hardware removal - Continue current management. We will do CBCs as needed to monitor for any acute progressive infection. 2.   Hypotension - will change metoprolol to 12.5 mg p.o. b.i.d. We will monitor BPs routinely.             Electronically Signed By: Connor Lindsey PA-C on 11/10/2020 08:33:05  ______________________________  Connor Lindsey PA-C  /[id number]  D: 11/08/2020 10:03:33  T: 11/09/2020 01:50:25     [phone]

## 2020-12-04 NOTE — PROGRESS NOTES
PATIENT: Marilia Higgins : 1940 DOS: 10/13/2020    [facility]  Vital signs have been reviewed on the [facility]. See it for details. HPI: Patient being seen today for local intake with muscle wasting and possible atrophy. Patient  has had low overall intake; however, her weight is stable at this time. She is very sedentary at  baseline, has had frequent patient visits to encourage promoting increased movement and getting  up in the chair and changing position. Patient is unrelentingly sedentary. She does not want to  get up in chair. She will do it from time to time. However, only for 1 to 2 hours at a time and  then demand to be back in her bed. She is alert and oriented today 2/3. She states her appetite is  fair. We will review intakes on [facility] today. She does work with PT and we will encourage the  continuation of this as long as covered. This is some of the only physical activities she gets  throughout the day. Overall, patient's appears to be about the same, but in regards to her muscle  density and strength. However, patient had poor response today for patient compliance. Did not  seem to give a lot of effort with movement. Patient's right knee continues to be stable. No  evidence of new exudate, warmth, redness, or spread of infection. Otherwise, no significant  changes. We will continue to monitor weights closely. We will do prealbumin and further lab  workup as needed to assess intake. MEDICATIONS: Reviewed. ALLERGIES: Reviewed. REVIEW OF SYSTEMS: Constitutional: Negative for any new fever, chills, NVD. Patient is  generally weak at baseline. No new increase in fatigue. She is sedentary 90% of her day. HEENT: Denies any headache, LOC, or confusion. Some mild confusion is present, however,  at baseline. Cardiopulmonary: Denies any orthopnea, PND, VARELA, or CPE. No palpitations, no  significant lower leg edema reported. No new SOB, cough, or wheezing. GI: Denies any  indigestion, dysphagia. States appetite is fair. We will [facility]. Bowel, no new BM changes. : Patient does have a history of recurrent UTIs and Sweet  catheter presence. No new hematuria or darkened urine. MSK: Patient denies any new joint  pain, or swelling, or redness. Psychiatric: Patient's mood is stable. She is quiet, not very  talkative. Denies any anxiety, depression. Remaining 14-point ROS unremarkable. PHYSICAL EXAMINATION: General: Displays overall good hygiene. Flat affect. No acute  distress noted. HEENT: PERRL. EOM is normal. MMM: No erythema, exudate, or thrush  present. Cardiopulmonary: Shows regular rate and rhythm. No significant lower leg edema. Intact pedal pulses bilaterally. LSCTA. Abdominal: Normoactive bowel sounds, soft,  nontender abdomen. Skin: Right knee dressed at this time. No redness/erythema present. No  swelling. Knees look equal other than dressing. Mental Status: Patient is awake, alert, oriented  [facility] [address]  Phone: [phone]  Fax: [phone]  PATIENT: Heidy Posadas : 1940 DOS: 5256  [facility] [place] and [place]  2/3. ASSESSMENT AND PLAN:  1. Poor oral intake/muscle wasting and atrophy-we will continue monitoring patient's  weights. Encouraging protein rich and fortified foods. We will test prealbumin p.r.n. to  assess. We will encourage patient to get up in chair. We will continue PT/OT  participation as long as coverage allows.   Electronically Signed By: Sol Wilcox PA-C on 2020 13:33:28  ______________________________  Sol Wilcox PA-C  /[id number]  D: 2020 11:16:47  T: 2020 07:24:11  [phone]

## 2020-12-15 ENCOUNTER — OFFICE VISIT (OUTPATIENT)
Dept: GERIATRIC MEDICINE | Age: 80
End: 2020-12-15
Payer: COMMERCIAL

## 2020-12-15 DIAGNOSIS — D50.9 IRON DEFICIENCY ANEMIA, UNSPECIFIED IRON DEFICIENCY ANEMIA TYPE: Primary | ICD-10-CM

## 2020-12-15 DIAGNOSIS — F34.1 DYSTHYMIA: ICD-10-CM

## 2020-12-15 DIAGNOSIS — I10 ESSENTIAL HYPERTENSION: ICD-10-CM

## 2020-12-15 LAB
HCT VFR BLD CALC: 27.4 % (ref 36–46)
HEMOGLOBIN: 9 G/DL (ref 12–16)

## 2020-12-15 PROCEDURE — 99308 SBSQ NF CARE LOW MDM 20: CPT | Performed by: PHYSICIAN ASSISTANT

## 2020-12-15 PROCEDURE — G8484 FLU IMMUNIZE NO ADMIN: HCPCS | Performed by: PHYSICIAN ASSISTANT

## 2020-12-15 PROCEDURE — 1123F ACP DISCUSS/DSCN MKR DOCD: CPT | Performed by: PHYSICIAN ASSISTANT

## 2020-12-16 LAB
FERRITIN: 138 NG/ML (ref 9–150)
IRON: 77
IRON: NORMAL
TOTAL IRON BINDING CAPACITY: NORMAL
VITAMIN B-12: 752

## 2020-12-22 LAB
BUN BLDV-MCNC: 22 MG/DL
CALCIUM SERPL-MCNC: 8.6 MG/DL
CHLORIDE BLD-SCNC: 100 MMOL/L
CO2: 25 MMOL/L
CREAT SERPL-MCNC: 1.1 MG/DL
GFR CALCULATED: NORMAL
GLUCOSE BLD-MCNC: 88 MG/DL
POTASSIUM SERPL-SCNC: 4.4 MMOL/L
SODIUM BLD-SCNC: 135 MMOL/L

## 2020-12-28 ENCOUNTER — OFFICE VISIT (OUTPATIENT)
Dept: GERIATRIC MEDICINE | Age: 80
End: 2020-12-28
Payer: COMMERCIAL

## 2020-12-28 DIAGNOSIS — R00.1 BRADYCARDIA: ICD-10-CM

## 2020-12-28 DIAGNOSIS — Z87.448 HX OF CHRONIC KIDNEY DISEASE: Primary | ICD-10-CM

## 2020-12-28 DIAGNOSIS — I95.9 HYPOTENSION, UNSPECIFIED HYPOTENSION TYPE: ICD-10-CM

## 2020-12-28 PROCEDURE — G8484 FLU IMMUNIZE NO ADMIN: HCPCS | Performed by: PHYSICIAN ASSISTANT

## 2020-12-28 PROCEDURE — 1123F ACP DISCUSS/DSCN MKR DOCD: CPT | Performed by: PHYSICIAN ASSISTANT

## 2020-12-28 PROCEDURE — 99309 SBSQ NF CARE MODERATE MDM 30: CPT | Performed by: PHYSICIAN ASSISTANT

## 2020-12-29 LAB
BUN BLDV-MCNC: 28 MG/DL
CALCIUM SERPL-MCNC: 9.1 MG/DL
CHLORIDE BLD-SCNC: 103 MMOL/L
CO2: 27 MMOL/L
CREAT SERPL-MCNC: 1.1 MG/DL
GFR CALCULATED: NORMAL
GLUCOSE BLD-MCNC: 84 MG/DL
POTASSIUM SERPL-SCNC: 4.4 MMOL/L
SODIUM BLD-SCNC: 138 MMOL/L

## 2021-01-05 ENCOUNTER — OFFICE VISIT (OUTPATIENT)
Dept: GERIATRIC MEDICINE | Age: 81
End: 2021-01-05
Payer: COMMERCIAL

## 2021-01-05 DIAGNOSIS — R63.6 LOW BODY WEIGHT DUE TO INADEQUATE CALORIC INTAKE: ICD-10-CM

## 2021-01-05 DIAGNOSIS — N39.0 RECURRENT UTI: Primary | ICD-10-CM

## 2021-01-05 PROCEDURE — 1123F ACP DISCUSS/DSCN MKR DOCD: CPT | Performed by: PHYSICIAN ASSISTANT

## 2021-01-05 PROCEDURE — G8484 FLU IMMUNIZE NO ADMIN: HCPCS | Performed by: PHYSICIAN ASSISTANT

## 2021-01-05 PROCEDURE — 99308 SBSQ NF CARE LOW MDM 20: CPT | Performed by: PHYSICIAN ASSISTANT

## 2021-01-07 LAB
BILIRUBIN, URINE: NEGATIVE
BLOOD, URINE: NEGATIVE
CLARITY: CLEAR
COLOR: YELLOW
GLUCOSE URINE: NORMAL
KETONES, URINE: NEGATIVE
LEUKOCYTE ESTERASE, URINE: NORMAL
NITRITE, URINE: NEGATIVE
PH UA: 5 (ref 4.5–8)
PROTEIN UA: NEGATIVE
SPECIFIC GRAVITY, URINE: 1.01
UROBILINOGEN, URINE: NORMAL

## 2021-01-12 LAB
BASOPHILS ABSOLUTE: NORMAL
BASOPHILS RELATIVE PERCENT: NORMAL
BUN BLDV-MCNC: NORMAL MG/DL
CALCIUM SERPL-MCNC: NORMAL MG/DL
CHLORIDE BLD-SCNC: NORMAL MMOL/L
CO2: NORMAL
CREAT SERPL-MCNC: NORMAL MG/DL
EOSINOPHILS ABSOLUTE: NORMAL
EOSINOPHILS RELATIVE PERCENT: NORMAL
GFR CALCULATED: NORMAL
GLUCOSE BLD-MCNC: NORMAL MG/DL
HCT VFR BLD CALC: 9 % (ref 36–46)
HCT VFR BLD CALC: NORMAL %
HEMOGLOBIN: 26.5 G/DL (ref 12–16)
HEMOGLOBIN: NORMAL
LYMPHOCYTES ABSOLUTE: NORMAL
LYMPHOCYTES RELATIVE PERCENT: NORMAL
MCH RBC QN AUTO: NORMAL PG
MCHC RBC AUTO-ENTMCNC: NORMAL G/DL
MCV RBC AUTO: NORMAL FL
MONOCYTES ABSOLUTE: NORMAL
MONOCYTES RELATIVE PERCENT: NORMAL
NEUTROPHILS ABSOLUTE: NORMAL
NEUTROPHILS RELATIVE PERCENT: NORMAL
PLATELET # BLD: NORMAL 10*3/UL
PMV BLD AUTO: NORMAL FL
POTASSIUM SERPL-SCNC: NORMAL MMOL/L
RBC # BLD: NORMAL 10*6/UL
SODIUM BLD-SCNC: NORMAL MMOL/L
WBC # BLD: NORMAL 10*3/UL

## 2021-01-13 NOTE — PROGRESS NOTES
[facility]     Vital signs reviewed, see [facility], within normal limits. HISTORY OF PRESENT ILLNESS:  The patient is being seen today for followup of right knee infection. No new sequelae noted. No new warmth or redness. The patient is overall stable at this time. Denies any acute pain, continues to have normal range of motion, both passive and active, although generalized weakness is clear, 3/5 strength in bilateral lower extremities with flexion and extension on limited examination. See PT/OT note for further details. The patient generally has no new complaints. Weight is stable. Appetite is overall fair. P.o. intake of food and fluids is reported fair. Encouraging fluids. The patient is in agreement that she could increase fluids. She is fairly quiet. No new increasing lethargy. The patient is very sedentary at baseline. She is A and O 2/3. Nursing staff report no further complaints. Wound nurse is monitoring along with wound care MD.      MEDICATIONS:  Reviewed. ALLERGIES:  Reviewed. REVIEW OF SYSTEMS:  Constitutional:  No new fever, chills, nausea, vomiting, weakness, fatigue beyond baseline. The patient is sedentary at baseline. No new acute weight changes. Cardiopulmonary:  Denies any cough, SOB, POI, or wheezing. No new CP, orthopnea, PND, or VARELA. The patient is lying flat today with legs slightly propped, some minor lower extremity edema reported. GI:  No new complaints. :  No new complaints. Psychiatric:  The patient's mood is overall stable. The patient's mood is fairly docile, low emotional arousability. Remaining 14-point ROS unremarkable. PHYSICAL EXAMINATION:  General:  Fair hygiene overall, flat affect, no acute distress. Affect brightens with further conversation. HEENT:  PERRL, minimally reactive to light. MMM. No erythema or exudate on oropharyngeal exam.  Trachea midline. Cardiopulmonary:  RRR. No significant lower leg edema. Pedal pulse intact +1. Pulmonary:  Lung sounds are clear to auscultation bilaterally. No wheezes, rales, rhonchi noted. No crackles noted. Abdominal:  Normal bowel sounds, soft, nontender. Mental Status: The patient is awake, alert, oriented 2/3. Skin:  Right patellar area shows some small, minorly indurated area. No new redness or erythema noted at site. Fair range of motion, 3/5 strength in bilateral lower extremities. ASSESSMENT AND PLAN:  History of right knee infection:  No new sequelae. The patient to be followed up with orthopedic surgery p.r.n.  and/or wound care MD.  Currently stable. We will monitor CBC with diff as needed.             Electronically Signed By: Jayson Witt PA-C on 01/07/2021 22:34:06  ______________________________  Jayson Witt PA-C  [id number]  D: 01/07/2021 01:47:28  T: 01/07/2021 12:37:10     cc:  [phone] [place] [id number]

## 2021-01-14 ENCOUNTER — OFFICE VISIT (OUTPATIENT)
Dept: GERIATRIC MEDICINE | Age: 81
End: 2021-01-14
Payer: COMMERCIAL

## 2021-01-14 DIAGNOSIS — N39.0 RECURRENT UTI: Primary | ICD-10-CM

## 2021-01-14 PROCEDURE — 99308 SBSQ NF CARE LOW MDM 20: CPT | Performed by: PHYSICIAN ASSISTANT

## 2021-01-14 PROCEDURE — G8484 FLU IMMUNIZE NO ADMIN: HCPCS | Performed by: PHYSICIAN ASSISTANT

## 2021-01-14 PROCEDURE — 1123F ACP DISCUSS/DSCN MKR DOCD: CPT | Performed by: PHYSICIAN ASSISTANT

## 2021-02-09 LAB
HCT VFR BLD CALC: 28.8 % (ref 36–46)
HEMOGLOBIN: 9.2 G/DL (ref 12–16)

## 2021-02-10 VITALS
RESPIRATION RATE: 16 BRPM | TEMPERATURE: 97.2 F | BODY MASS INDEX: 25.7 KG/M2 | SYSTOLIC BLOOD PRESSURE: 90 MMHG | WEIGHT: 131.6 LBS | HEART RATE: 61 BPM | DIASTOLIC BLOOD PRESSURE: 58 MMHG | OXYGEN SATURATION: 96 %

## 2021-02-10 NOTE — PROGRESS NOTES
PATIENT: Susu Lorenzana : 1940 DOS: 12/15/2020     [facility]    HPI:  The patient being seen today for anemia and dysthymia as well as acute hypotension. The patient's blood pressure today 90/58, currently on amlodipine 5 mg p.o. daily as well as metoprolol tartrate 12.5 mg p.o. b.i.d. This is after recent decrease. The patient agreeable to discontinuing metoprolol today and monitoring BP for any rebound hypertension. The patient did have poor mood. Upon last visit, was stating that she wanted to visit with her son in person. Today, states that she is missing him and has had some encounters via virtual visits; however, looking to have one-to-one encounter, but due to [place], will have to speak with activity instructor and  to see where facility currently stands on this. We will have Sandra Chavez speak with patient about possible arrangements. Patient's H and H most recently was 9.0. On , last checked, was 9.4. Denies any acute bleeding or amenorrhea or hemoptysis. No change in bowel habits per patient or nursing staff. We will continue to monitor and we will recheck hemoglobin as well. May consider possible consult with Hematology and/or trial of EPO stimulating agent in the near future. We will check BMP, ferritin, U98, folic acid and iron panel over the next week to assess. The patient states that she is doing well today. Denies any acute complaints. States her mood is improved slightly since last discussion, although, she does want to see her son. Denies any new fatigue, NVD, weakness, chills, or weight changes. Denies any CP, orthopnea, PND, or VARELA. No new cough, SOB, POI, wheezing. Nursing staff reports no additional concerns as well. MEDICATIONS:  Reviewed. ALLERGIES:  Reviewed. REVIEW OF SYSTEMS:  See HPI. PHYSICAL EXAMINATION: Vital signs; 90/58, 61 bpm, 16 RR, 96% SPO2, 97.2 degrees F. Weight = 131.6 pounds.   General:  Good hygiene overall. Flat affect, brightens with further discussion. Patient is overall pleasant and cooperative today, in no acute distress. PERRLA. Normal hearing. MMM. No erythema, exudate. Trachea is midline.  RRR. No JVD. Some minor lower extremity edema bilaterally. Capillary refill between 2 and 3 seconds. Lung sounds are overall clear bilaterally throughout. No new wheezes, rales, or rhonchi. Abdominal sounds are within normal limits. Soft, nontender abdomen in all 4 quadrants. No significant distention or fluid wave noted. No obvious organomegaly. Mental Status: The patient is awake, alert, oriented 2-3 out of 3. Fair memory and good command following. ASSESSMENT AND PLAN:  1. Anemia - we will recheck H and H periodically. We will do a BMP, ferritin, vitamin L19, folic acid and iron panel as well to reassess. 2.   Dysthymia. The patient will talk to the  about possible son visitation; otherwise, we will continue encouraging virtual visits and phone calls throughout the holidays. 3.   Hypertension - We will DC metoprolol tartrate. We will monitor BP routinely.         Electronically Signed By: Liu Schaefer PA-C on 02/10/2021 10:32:35  ______________________________  Liu Schaefer PA-C  [id number]  D: 02/09/2021 10:34:10  T: 02/10/2021 06:46:18    cc: - [phone]

## 2021-02-25 ENCOUNTER — OFFICE VISIT (OUTPATIENT)
Dept: GERIATRIC MEDICINE | Age: 81
End: 2021-02-25
Payer: COMMERCIAL

## 2021-02-25 DIAGNOSIS — I35.0 AORTIC VALVE STENOSIS, ETIOLOGY OF CARDIAC VALVE DISEASE UNSPECIFIED: ICD-10-CM

## 2021-02-25 DIAGNOSIS — Z13.31 SCREENING FOR DEPRESSION: Primary | ICD-10-CM

## 2021-02-25 DIAGNOSIS — Z86.19 HISTORY OF REMOVAL OF JOINT PROSTHESIS OF RIGHT KNEE DUE TO INFECTION: ICD-10-CM

## 2021-02-25 DIAGNOSIS — I48.0 PAROXYSMAL ATRIAL FIBRILLATION (HCC): ICD-10-CM

## 2021-02-25 DIAGNOSIS — Z98.890 HISTORY OF REMOVAL OF JOINT PROSTHESIS OF RIGHT KNEE DUE TO INFECTION: ICD-10-CM

## 2021-02-25 PROCEDURE — 1123F ACP DISCUSS/DSCN MKR DOCD: CPT | Performed by: PHYSICIAN ASSISTANT

## 2021-02-25 PROCEDURE — 99308 SBSQ NF CARE LOW MDM 20: CPT | Performed by: PHYSICIAN ASSISTANT

## 2021-02-25 PROCEDURE — G8484 FLU IMMUNIZE NO ADMIN: HCPCS | Performed by: PHYSICIAN ASSISTANT

## 2021-03-03 VITALS
RESPIRATION RATE: 16 BRPM | BODY MASS INDEX: 26.4 KG/M2 | DIASTOLIC BLOOD PRESSURE: 62 MMHG | TEMPERATURE: 97.5 F | WEIGHT: 135.2 LBS | OXYGEN SATURATION: 97 % | HEART RATE: 66 BPM | SYSTOLIC BLOOD PRESSURE: 98 MMHG

## 2021-03-03 VITALS
SYSTOLIC BLOOD PRESSURE: 88 MMHG | RESPIRATION RATE: 18 BRPM | TEMPERATURE: 98 F | DIASTOLIC BLOOD PRESSURE: 59 MMHG | OXYGEN SATURATION: 97 %

## 2021-03-03 NOTE — PROGRESS NOTES
PATIENT: Mark Ravi : 1940 DOS: 2021     [facility] COMMU    Vital signs as of :  BP equals 98/62, 66 bpm today, 16 RR, 97% SpO2, 135.2 pounds, 97.5 degrees F today. HISTORY OF PRESENT ILLNESS:  The patient is seen today for evidence of foul urine on discovery by nurse over the weekend after Sweet change. The patient recently had Sweet changed and after a few days she had increased cloudiness and clumps in urine, was changed for second time. Urine is clear now with some cloudiness noted in bag. We will perform UA, C&S. The patient has a history of recurrent UTIs. Denies any suprapubic tenderness or abdominal pain. The patient is afebrile. No new evidence of septicemia. Vitals are overall stable at present. We will accomplish UA, C&S and follow up. Discussed the patient's supplementation as she has low body weight and utilize the supplementation frequently. Nursing staff report that she is frequently refusing or at least not drinking. We will change order to have her given 4 ounces house supplement with meds only especially when she enjoys having it, otherwise no further complaints per the patient and nursing staff. We will follow up on Thursday. MEDICATIONS:  Reviewed. ALLERGIES:  Reviewed. REVIEW OF SYSTEMS:  See HPI. PHYSICAL EXAMINATION:  General:  Good hygiene, bright affect, no acute distress, pleasant and cooperative, very friendly and warm, welcomes today, enjoys talking. HEENT:  PERRLA. No icterus. No injection. MMM. No erythema or exudate. Cardiopulmonary:  RRR. Some minor lower extremity edema. Pedal pulses are intact bilaterally trace. Lung sounds are overall clear on limited exam.  Abdominal:  Normal bowel sounds. No suprapubic tenderness noted on palpation today. Abdomen is soft and nontender with mild distention. Mental Status: The patient is awake, alert, oriented 2/3. ASSESSMENT AND PLAN:  1.    Recurrent UTI - will do UA, C&S. We will treat with antibiotics empirically as needed. 2.   Low body weight/low intake - will change house supplement to 4 ounces with meds only as the patient has been showing non-use on other schedule.          Electronically Signed By: Josh Andrade PA-C on 01/06/2021 14:10:32  ______________________________  Josh Andrade PA-C  [id number]  D: 01/05/2021 18:16:00  T: 01/06/2021 02:46:39    cc: - [phone] [facility]

## 2021-03-03 NOTE — PROGRESS NOTES
PATIENT: Erna Salinas : 1940 DOS: 2020     [facility] COMMU     Vital Signs:  88/59, 18 RR, 97 SpO2 on room air, 98.0 degrees F.      HISTORY OF PRESENT ILLNESS:  The patient is being seen today for history of CKD and heart failure. The patient continues to have lower blood pressure and lower heart rate over the past several days/weeks. Recently have discontinued metoprolol tartrate. The patient is a very poorly hydrated. She does not like to be compliant with oral fluids. BMPs have been fairly within normal limits; however, we will repeat one on Tuesday to reassess. This patient has had significant changes from week to week. Additionally, we will purposefully encouraging p.o. fluids over the next several days. The patient denies any dizziness, shortness of breath, wheezing, POI, hemoptysis. No new evidence of GI or  bleeding. Lung sounds are clear and respirations are within normal limits. She denies any acute issues; however, vital signs indicate need for increased monitoring and possible intervention. The patient is eating fairly well. We will monitor for further BP changes. We will adjust BP meds if overall fluid intervention is adequate. MEDICATIONS:  Reviewed. ALLERGIES:  Reviewed. REVIEW OF SYSTEMS:  See HPI. PHYSICAL EXAMINATION:  General:  Good hygiene, bright affect, no acute distress. The patient is overall sedentary, does not get up in chair throughout the day, lies in bed primarily 95% of the time. HEENT:  Pupils equal, reactive to light. MMM. No erythema or exudate on oropharyngeal exam.  Cardiopulmonary:  RRR. No JVD noted. No significant lower leg edema. Lung sounds are clear to auscultation bilaterally. No evidence of pulmonary edema, rales or rhonchi. Mental Status: The patient is awake, alert, oriented 3/3. ASSESSMENT AND PLAN:  1. History of CKD We will do BMP on Tuesday to reassess.     2.   Hypotension/minor bradycardia We will continue DC of metoprolol tartrate as previously ordered. We will increase encouraging fluids p.o. 125 mL q.i.d. x5 days. We will do b.i.d. BP checks as well x5 days. We will do further pharmaceutical intervention with midodrine as needed.          Electronically Signed By: Lara Gar PA-C on 02/15/2021 14:01:58  ______________________________  Lara Gar PA-C  /[id number]  D: 02/13/2021 15:40:02  T: 02/14/2021 08:56:58    cc: - [phone]

## 2021-03-03 NOTE — PROGRESS NOTES
[facility]    Name: Medardo Boothe  : 1940  DOS: 2021     Vital signs are within normal limits today, see [facility] for details. HISTORY OF PRESENT ILLNESS:  The patient is being seen today for followup on recent UTI as of 2021. The patient is started on Cipro 25 mg p.o. b.i.d. x5 days along with acidophilus. Currently, urine has cleared up substantially. Urine is clear and straw colored. The patient denies any suprapubic tenderness or abdominal pain. No new altered mental status change. The patient is alert and oriented 3/3. Overall appears her UTI has resolved. The patient complains of no further complaints today. Expressed some interest in the patient's mental health. States that she has been talking to family and friends. Denies any acute suicidal ideation, depression, anxiety or new onset stress. We will follow up with the patient at next available time at facility if called upon. MEDICATIONS:  Reviewed. ALLERGIES:  Reviewed. REVIEW OF SYSTEMS:  Constitutional:  No new fever, chills, NVD, weakness, fatigue beyond baseline. The patient is very sedentary at baseline. No new weight changes. Cardiopulmonary:  Denies any CP, orthopnea, PND, or VARELA. The patient is very sedentary at baseline. No new cough, SOB, POI, or wheezing. GI:  Negative. :  No new urinary complaints. The patient does have a Sweet catheter in place. Psychiatric:  No new stress, anxiety reported. Overall remaining 14-point ROS unremarkable. PHYSICAL EXAMINATION:  General:  Good hygiene, bright affect, no acute distress. HEENT:  Pupils are equal, round, reactive to light equally. Normal EOMs. Cardiopulmonary:  RRR. No significant lower leg edema. No JVD noted. LSCTA. No new wheezes, rales, or rhonchi. Normal bilateral entry and effort. Negative bowel tenderness throughout all four quadrants on light palpation. Denies suprapubic tenderness on palpation.   Mental Status: The patient is awake, alert, oriented x3. ASSESSMENT AND PLAN:  Recurrent UTI The patient has finished her course of antibiotics. We will continue acidophilus for 4 more days. We will monitor the patient's urine for any further changes and/or for any mental status changes.            Electronically Signed By: Jeanne Brunner, PA-C on 01/15/2021 10:19:15  ______________________________  Jeanne Brunner, PA-C [id number]  D: 01/14/2021 18:38:05  T: 01/15/2021 03:14:26     cc: - [phone]

## 2021-03-10 ENCOUNTER — OFFICE VISIT (OUTPATIENT)
Dept: GERIATRIC MEDICINE | Age: 81
End: 2021-03-10
Payer: COMMERCIAL

## 2021-03-10 DIAGNOSIS — I48.0 PAROXYSMAL ATRIAL FIBRILLATION (HCC): Primary | ICD-10-CM

## 2021-03-10 DIAGNOSIS — N18.30 CHRONIC RENAL IMPAIRMENT, STAGE 3 (MODERATE), UNSPECIFIED WHETHER STAGE 3A OR 3B CKD (HCC): ICD-10-CM

## 2021-03-10 DIAGNOSIS — E78.5 HYPERLIPIDEMIA, UNSPECIFIED HYPERLIPIDEMIA TYPE: ICD-10-CM

## 2021-03-10 PROCEDURE — 99309 SBSQ NF CARE MODERATE MDM 30: CPT | Performed by: INTERNAL MEDICINE

## 2021-03-10 PROCEDURE — 1123F ACP DISCUSS/DSCN MKR DOCD: CPT | Performed by: INTERNAL MEDICINE

## 2021-03-10 PROCEDURE — G8484 FLU IMMUNIZE NO ADMIN: HCPCS | Performed by: INTERNAL MEDICINE

## 2021-03-15 ASSESSMENT — PATIENT HEALTH QUESTIONNAIRE - PHQ9
SUM OF ALL RESPONSES TO PHQ QUESTIONS 1-9: 1
SUM OF ALL RESPONSES TO PHQ9 QUESTIONS 1 & 2: 1

## 2021-03-15 NOTE — PROGRESS NOTES
Acute posthemorrhagic anemia    Myositis    Muscle wasting and atrophy, not elsewhere classified, right thigh    Retained orthopedic hardware    Open wound with complication    Bone fixation device infection (HCC)    Closed fracture of distal end of radius    Fracture of patella    History of seizure    Pressure injury of right heel, unstageable (HCC)     Past Medical History:   Diagnosis Date    Aortic stenosis     Chronic kidney disease     COPD (chronic obstructive pulmonary disease) (HCC)     Heart failure (HCC)     High cholesterol     HTN (hypertension)     Hypothyroid     New onset a-fib [facility])      Past Surgical History:   Procedure Laterality Date    KNEE SURGERY Right 4/14/2020    RIGHT KNEE REMOVAL OF HARDWARE [facility] Right 1/16/2020    RIGHT QUADRICEPS MUSCLE BIOPSY performed by Marlena Rosales MD at [place] [address] Right 11/18/2019    RIGHT PATELLA OPEN REDUCTION INTERNAL FIXATION  ROOM 180 performed by Neto Levine MD at [address] [place] [address] N/A 12/17/2019    EGD performed by Margie Andrade MD at [address] [place] [address] History     Socioeconomic History    Marital status:       Spouse name: Not on file    Number of children: 2    Years of education: 15    Highest education level: 12th grade   Occupational History    Occupation:      Comment: [employer]   Social Needs    Financial resource strain: Not hard at all   [employer] insecurity     Worry: Never true     Inability: Never true   [employer] needs     Medical: No     Non-medical: No   Tobacco Use    Smoking status: Former Smoker     Packs/day: 0.50     Years: 25.00     Pack years: 12.50    Smokeless tobacco: Never Used   Substance and Sexual Activity    Alcohol use: Never     Frequency: Never    Drug use: Never    Sexual activity: Not on file   Lifestyle    Physical activity     Days per week: 0 days Minutes per session: Not on file    Stress: Only a little   Relationships    Social connections     Talks on phone: More than three times a week     Gets together: Once a week     Attends Yarsani service: More than 4 times per year     Active member of club or organization: Yes     Attends meetings of clubs or organizations: 1 to 4 times per year     Relationship status:     Intimate partner violence     Fear of current or ex partner: No     Emotionally abused: No     Physically abused: No     Forced sexual activity: No   Other Topics Concern    Not on file   Social History Narrative         Lives With: Florence Tao lives in [place] fulltime t the [address] [place] [address] office    Type of Home: Apartment(1st floor) in [facility] 6: Two level, Able to Live on Main level with bedroom/bathroom(Full flight to second floor)    Home Access: Stairs to enter without rails    Entrance Stairs - Number of Steps: 2    Bathroom Shower/Tub: Tub/Shower unit    Bathroom Equipment: (None)    Home Equipment: (N/A)    ADL Assistance: Independent    Homemaking Assistance: Independent    Homemaking Responsibilities: Yes    Meal Prep Responsibility: Primary    Laundry Responsibility: Primary    Cleaning Responsibility: Primary    Ambulation Assistance: Independent(No AD)    Transfer Assistance: Independent    Active : Yes    Additional Comments: States that she was down on ground X 72 hours following fall     No family history on file. Allergies   Allergen Reactions    Amoxicillin      rash and GI upset    Cefdinir Hives    Sulfa Antibiotics          Review of Systems  Constitutional:  No new fever, chills, NVD, weakness, fatigue beyond baseline. The patient is very sedentary at baseline. No new weight changes. Cardiopulmonary:  Denies any CP, orthopnea, PND, or VARELA. The patient is very sedentary at baseline. No new cough, SOB, POI, or wheezing. GI:  Negative. :  No new urinary complaints.   The

## 2021-03-23 ENCOUNTER — OFFICE VISIT (OUTPATIENT)
Dept: GERIATRIC MEDICINE | Age: 81
End: 2021-03-23
Payer: COMMERCIAL

## 2021-03-23 DIAGNOSIS — I95.9 HYPOTENSION, UNSPECIFIED HYPOTENSION TYPE: ICD-10-CM

## 2021-03-23 DIAGNOSIS — Z97.8 CHRONIC INDWELLING FOLEY CATHETER: Primary | ICD-10-CM

## 2021-03-23 PROCEDURE — 1123F ACP DISCUSS/DSCN MKR DOCD: CPT | Performed by: PHYSICIAN ASSISTANT

## 2021-03-23 PROCEDURE — 99309 SBSQ NF CARE MODERATE MDM 30: CPT | Performed by: PHYSICIAN ASSISTANT

## 2021-03-23 PROCEDURE — G8484 FLU IMMUNIZE NO ADMIN: HCPCS | Performed by: PHYSICIAN ASSISTANT

## 2021-03-25 LAB
BASOPHILS ABSOLUTE: ABNORMAL
BASOPHILS RELATIVE PERCENT: ABNORMAL
BUN BLDV-MCNC: 22 MG/DL
CALCIUM SERPL-MCNC: 8.9 MG/DL
CHLORIDE BLD-SCNC: 102 MMOL/L
CO2: 24 MMOL/L
CREAT SERPL-MCNC: 1 MG/DL
EOSINOPHILS ABSOLUTE: ABNORMAL
EOSINOPHILS RELATIVE PERCENT: ABNORMAL
GFR CALCULATED: NORMAL
GLUCOSE BLD-MCNC: 89 MG/DL
HCT VFR BLD CALC: 28.7 % (ref 36–46)
HEMOGLOBIN: 9.3 G/DL (ref 12–16)
LYMPHOCYTES ABSOLUTE: ABNORMAL
LYMPHOCYTES RELATIVE PERCENT: ABNORMAL
MCH RBC QN AUTO: 30.6 PG
MCHC RBC AUTO-ENTMCNC: 32.3 G/DL
MCV RBC AUTO: 94.8 FL
MONOCYTES ABSOLUTE: ABNORMAL
MONOCYTES RELATIVE PERCENT: ABNORMAL
NEUTROPHILS ABSOLUTE: ABNORMAL
NEUTROPHILS RELATIVE PERCENT: ABNORMAL
PLATELET # BLD: 248 K/ΜL
PMV BLD AUTO: 10.3 FL
POTASSIUM SERPL-SCNC: 4.3 MMOL/L
RBC # BLD: 3.03 10^6/ΜL
SODIUM BLD-SCNC: 136 MMOL/L
WBC # BLD: 9.3 10^3/ML

## 2021-03-31 NOTE — PROGRESS NOTES
Patient Name: Juan Luis Montelongo  Date: 3/30/2021  YOB: 1940  Medical Record Number: [id number]              History of Present Illness:      Review of Systems    Review of Systems: All 14 review of systems negative other than as stated above    Social History     Tobacco Use    Smoking status: Former Smoker     Packs/day: 0.50     Years: 25.00     Pack years: 12.50    Smokeless tobacco: Never Used   Substance Use Topics    Alcohol use: Never     Frequency: Never    Drug use: Never         Past Medical History:   Diagnosis Date    Aortic stenosis     Chronic kidney disease     [facility]) [facility]     Heart failure ([facility]     High cholesterol     HTN (hypertension)     Hypothyroid     New onset a-fib ([facility]            Past Surgical History:   Procedure Laterality Date    KNEE SURGERY Right 4/14/2020    RIGHT KNEE REMOVAL OF HARDWARE performed by Isa Gonzalez MD at [facility] 112 Right 1/16/2020    RIGHT QUADRICEPS MUSCLE BIOPSY performed by Phyllis Rudd MD at [place] [address] Right 11/18/2019    RIGHT PATELLA OPEN REDUCTION INTERNAL FIXATION  ROOM 180 performed by Isa Gonzalez MD at [address] [place] [address] Rd 12/17/2019    EGD performed by Junnie Ormond, MD at [facility]         Current Outpatient Medications on File Prior to Visit   Medication Sig Dispense Refill    ferrous sulfate (IRON 325) 325 (65 Fe) MG tablet Take 1 tablet by mouth 2 times daily 60 tablet 5    magnesium hydroxide (MILK OF MAGNESIA) 400 MG/5ML suspension Take 30 mLs by mouth      potassium chloride (KLOR-CON M) 20 MEQ extended release tablet Take 40 mEq by mouth      acetaminophen (TYLENOL) 650 MG suppository Place 650 mg rectally every 4 hours as needed for Fever      Aluminum & Magnesium Hydroxide (MAGNESIUM-ALUMINUM PO) Take by mouth 225-200 MG/5ML      albuterol-ipratropium (COMBIVENT RESPIMAT)  MCG/ACT AERS inhaler Inhale 1 puff into the lungs every 6 hours      Sodium Phosphates (FLEET) 7-19 GM/118ML Place 1 enema rectally once as needed      glucagon 1 MG injection Inject 1 kit into the muscle once      Glucose-Cholecalciferol (TRUEPLUS GLUCOSE) GEL Take by mouth      GUAIFENESIN PO Take 10 mLs by mouth every 4 hours as needed      ibuprofen (ADVIL;MOTRIN) 400 MG tablet Take 1 tablet by mouth every 6 hours as needed for Pain 30 tablet 0    levothyroxine (SYNTHROID) 125 MCG tablet Take 1 tablet by mouth Daily 30 tablet 03    alendronate (FOSAMAX) 70 MG tablet Take 70 mg by mouth      amLODIPine (NORVASC) 5 MG tablet Take 1 tablet by mouth daily 30 tablet 0    bisacodyl (DULCOLAX) 10 MG suppository Place 10 mg rectally daily as needed for Constipation      calcium-vitamin D (OSCAL-500) 500-200 MG-UNIT per tablet Take 1 tablet by mouth 2 times daily      mirtazapine (REMERON) 15 MG tablet Take 7.5 mg by mouth nightly      guaiFENesin-dextromethorphan (ROBITUSSIN DM) 100-10 MG/5ML syrup Take 5 mLs by mouth every 6 hours as needed for Cough      sennosides-docusate sodium (SENOKOT-S) 8.6-50 MG tablet Take 2 tablets by mouth nightly HOLD FOR LOOSE STOOL      acetaminophen (TYLENOL) 325 MG tablet Take 650 mg by mouth every 4 hours as needed for Pain      pantoprazole (PROTONIX) 40 MG tablet Take 1 tablet by mouth 2 times daily (before meals) 60 tablet 0    vitamin B-12 (CYANOCOBALAMIN) 500 MCG tablet Take 500 mcg by mouth daily      folic acid (FOLVITE) 1 MG tablet Take 1 mg by mouth daily      aspirin 81 MG chewable tablet Take 1 tablet by mouth daily 30 tablet 3    amiodarone (CORDARONE) 200 MG tablet Take 1 tablet by mouth daily 30 tablet 3    ipratropium-albuterol (DUONEB) 0.5-2.5 (3) MG/3ML SOLN nebulizer solution Inhale 3 mLs into the lungs every 4 hours as needed for Shortness of Breath 360 mL 0    carBAMazepine (TEGRETOL) 200 MG tablet Take 1 tablet by mouth 2 times daily 90 tablet 3    coenzyme Q10 100 MG CAPS capsule Take 1 capsule by mouth 2 times daily (before meals) 60 capsule 0    vitamin D (ERGOCALCIFEROL) 1.25 MG (67948 UT) CAPS capsule Take 1 capsule by mouth once a week (Patient taking differently: Take 50,000 Units by mouth once a week mondays) 5 capsule 0    metoprolol tartrate (LOPRESSOR) 25 MG tablet Take 0.5 tablets by mouth 2 times daily 60 tablet 3     No current facility-administered medications on file prior to visit. Allergies   Allergen Reactions    Amoxicillin      rash and GI upset    Cefdinir Hives    Sulfa Antibiotics          No family history on file. Physical Exam:      Physical Exam    There were no vitals taken for this visit.       .   Lab Results   Component Value Date    WBC 6.6 09/08/2020    HGB 9.2 (A) 02/09/2021    HCT 28.8 (A) 02/09/2021    MCV 92.8 09/08/2020     09/08/2020     Lab Results   Component Value Date     12/29/2020    K 4.4 12/29/2020    K 3.3 01/16/2020     12/29/2020    CO2 27 12/29/2020    BUN 28 12/29/2020    CREATININE 1.1 12/29/2020    GLUCOSE 84 12/29/2020    CALCIUM 9.1 12/29/2020                ASSESSMENT:  Patient Active Problem List   Diagnosis    Acute renal impairment    Anxiety state    Aortic valve stenosis    Gastroesophageal reflux disease    Hyperlipidemia    Senile hyperkeratosis    Aneurysm of ascending aorta (HCC)    Tobacco user    Vitamin D deficiency    Closed fracture of right patella    Abnormal gait    Paroxysmal atrial fibrillation (HCC)    Hypertensive disorder    Chronic renal insufficiency, stage III (moderate)    Rhabdomyolysis    Hyponatremia    Hypothyroidism    Adult body mass index 25-29    Acute posthemorrhagic anemia    Myositis    Muscle wasting and atrophy, not elsewhere classified, right thigh    Retained orthopedic hardware    Open wound with complication    Bone fixation device infection ([facility]    Closed fracture of distal end of radius    Fracture of patella    History

## 2021-04-01 ENCOUNTER — OFFICE VISIT (OUTPATIENT)
Dept: GERIATRIC MEDICINE | Age: 81
End: 2021-04-01
Payer: COMMERCIAL

## 2021-04-01 DIAGNOSIS — T83.9XXS COMPLICATION OF FOLEY CATHETER, SEQUELA: Primary | ICD-10-CM

## 2021-04-01 DIAGNOSIS — R22.32 LOCALIZED SWELLING ON LEFT HAND: ICD-10-CM

## 2021-04-01 PROCEDURE — 1123F ACP DISCUSS/DSCN MKR DOCD: CPT | Performed by: PHYSICIAN ASSISTANT

## 2021-04-01 PROCEDURE — 99308 SBSQ NF CARE LOW MDM 20: CPT | Performed by: PHYSICIAN ASSISTANT

## 2021-04-06 LAB
HCT VFR BLD CALC: 9.8 % (ref 36–46)
HEMOGLOBIN: 29.1 G/DL (ref 12–16)

## 2021-04-08 ENCOUNTER — OFFICE VISIT (OUTPATIENT)
Dept: GERIATRIC MEDICINE | Age: 81
End: 2021-04-08
Payer: COMMERCIAL

## 2021-04-08 DIAGNOSIS — I50.9 CHRONIC HEART FAILURE, UNSPECIFIED HEART FAILURE TYPE (HCC): Primary | ICD-10-CM

## 2021-04-08 DIAGNOSIS — N39.0 RECURRENT UTI: ICD-10-CM

## 2021-04-08 DIAGNOSIS — Z97.8 CHRONIC INDWELLING FOLEY CATHETER: ICD-10-CM

## 2021-04-08 DIAGNOSIS — R13.10 DYSPHAGIA, UNSPECIFIED TYPE: ICD-10-CM

## 2021-04-08 PROCEDURE — 99308 SBSQ NF CARE LOW MDM 20: CPT | Performed by: PHYSICIAN ASSISTANT

## 2021-04-08 PROCEDURE — 1123F ACP DISCUSS/DSCN MKR DOCD: CPT | Performed by: PHYSICIAN ASSISTANT

## 2021-05-04 LAB
HCT VFR BLD CALC: 26.3 % (ref 36–46)
HEMOGLOBIN: 8.9 G/DL (ref 12–16)

## 2021-05-11 LAB
HCT VFR BLD CALC: 29.2 % (ref 36–46)
HEMOGLOBIN: 9.6 G/DL (ref 12–16)

## 2021-05-12 LAB
FERRITIN: 181 NG/ML (ref 9–150)
IRON: 58
RETICULOCYTE ABSOLUTE COUNT: NORMAL
RETICULOCYTE COUNT PCT: 2.2
TOTAL IRON BINDING CAPACITY: 196
VITAMIN B-12: 927

## 2021-05-21 NOTE — PROGRESS NOTES
PT NAME: Tana Sauceda  : 1940  DOS: 3/23/2021      PATIENT: Augusta Anguiano : 1940 DOS:   A [facility] serving [facility]  HPI: Patient was seen today for some hypotension, as well as Sweet catheter dysfunction. Nurse stated today the patient deferred  examination. Patient has a _____ gauge catheter at this  , which we are planing now on increasing in size to avoid leakage. The patient denies an dysuria or discomfort. She does notice some increased dampness to her brief intermittently for the past few days. Followup after Sweet gauge change. The patient is also hypotensive today, 84/51. Will complete a BMP to assess volume status,; check BUN to assess for dehydration. We  will DC amlodipine on Thursday after titration downward. Proceeded to encourage patient to be up in her chair for greater than 1 hour twice a day. Otherwise, patient does not have any new issues. MEDICATIONS: Reviewed. ALLERGIES: Reviewed. REVIEW OF SYSTEMS: Constitutional: No new fever, chills, nausea, vomiting, weakness,  fatigue. No significant weight changes. HEENT: Denies any new lightheadedness, confusion, headache, or LOC. Neurologic: No new syncope, slurring of speech or focal weakness reported. Cardiopulmonary: Denies any CP, orthopnea, PND. : Sweet catheter present and flushing properly per nurse. Psychiatric: Mood is overall stable. No significant change in mental status. PHYSICAL EXAMINATION: GENERAL: Fair hygiene, flat affect. No distress. HEENT:  EOMs WNL. PERRLA. No significant lower leg edema throughout. Pedal pulse intact., +1 bilaterally. PULMONARY: Lung sounds clear to auscultation bilaterally. ABDOMEN:  Normal bowel sounds, soft, nontender abdomen. MENTAL STATUS: Patient is awake, alert, oriented 3/3. SKIN: Turgor is slightly decreased. ASSESSMENT AND PLAN:  1.  Sweet catheter malfunction We will increase gauge of Sweet catheter to hopefully prevent further leakage. We will monitor briefs on a daily basis to assess. 2. Hypotension we will DC amlodipine after titration downward; DC on Thursday. We will do CBC and BMP to assess labs and hydration status.

## 2021-05-25 ASSESSMENT — ENCOUNTER SYMPTOMS
BACK PAIN: 0
SHORTNESS OF BREATH: 0
CONSTIPATION: 0

## 2021-05-25 NOTE — PROGRESS NOTES
Subjective:      Patient ID: Humberto Hernandez is a pleasant 79 y.o. female who presents today for:  No chief complaint on file. Patient is being seen today for follow-up for increased urine sediment and leakage of Sweet catheter. Additionally patient had some reported left hand swelling. On inspection patient's hand does not appear to be overly swollen,? Minor edema to left hand that is barely noticeable. Patient states that her new Sweet catheter gauge is appropriate and she is not experiencing any new leakage or dampness in her brief at this time. States no additional complaints in this regard over the past week. Overall effective and will continue this gauge for the time being. Otherwise patient stating no new additional concerns. Nursing staff report no additional issues. We will follow-up with patient for next month visit, or for any acute issues as they arise.       Patient Active Problem List   Diagnosis    Acute renal impairment    Anxiety state    Aortic valve stenosis    Gastroesophageal reflux disease    Hyperlipidemia    Senile hyperkeratosis    Aneurysm of ascending aorta ([facility])    Tobacco user    Vitamin D deficiency    Closed fracture of right patella    Abnormal gait    Paroxysmal atrial fibrillation ([facility])    Hypertensive disorder    Chronic renal insufficiency, stage III (moderate)    Rhabdomyolysis    Hyponatremia    Hypothyroidism    Adult body mass index 25-29    Acute posthemorrhagic anemia    Myositis    Muscle wasting and atrophy, not elsewhere classified, right thigh    Retained orthopedic hardware    Open wound with complication    Bone fixation device infection ([address]    Closed fracture of distal end of radius    Fracture of patella    History of seizure    Pressure injury of right heel, unstageable ([facility])     Past Medical History:   Diagnosis Date    Aortic stenosis     Chronic kidney disease     COPD (chronic obstructive pulmonary disease) ([address]     Heart failure ([address]     High cholesterol     HTN (hypertension)     Hypothyroid     New onset a-fib [facility])      Past Surgical History:   Procedure Laterality Date    KNEE SURGERY Right 4/14/2020    RIGHT KNEE REMOVAL OF HARDWARE performed by Maksim Madden MD at [address] Right 1/16/2020    RIGHT QUADRICEPS MUSCLE BIOPSY performed by Marielos Lacy MD at [facility] Right 11/18/2019    RIGHT PATELLA OPEN REDUCTION INTERNAL FIXATION  RO[address] performed by Maksim Madden MD at [address] [facility] [address] [facility] [address] N/A 12/17/2019    EGD performed by Estrella Gibson MD at [address] at the [facility] History     Socioeconomic History    Marital status:       Spouse name: Not on file    Number of children: 2    Years of education: 15    Highest education level: 12th grade   Occupational History    Occupation:      Comment: [employer]   Tobacco Use    Smoking status: Former Smoker     Packs/day: 0.50     Years: 25.00     Pack years: 12.50    Smokeless tobacco: Never Used   Substance and Sexual Activity    Alcohol use: Never    Drug use: Never    Sexual activity: Not on file   Other Topics Concern    Not on file   Social History Narrative         Lives With: Florence Tao lives in [place] fulltime t the [address] [place] [address] office    Type of Home: Apartment(1st floor) in [facility] 6: Two level, Able to Live on Main level with bedroom/bathroom(Full flight to second floor)    Home Access: Stairs to enter without rails    Entrance Stairs - Number of Steps: 2    Bathroom Shower/Tub: Tub/Shower unit    Bathroom Equipment: (None)    Home Equipment: (N/A)    ADL Assistance: Independent    Homemaking Assistance: Independent    Homemaking Responsibilities: Yes    Meal Prep Responsibility: Primary    Laundry Responsibility: Primary    Cleaning Responsibility: Primary    Ambulation Assistance: Independent(No AD)    Transfer Assistance: Independent Active : Yes    Additional Comments: States that she was down on ground X 72 hours following fall     Social Determinants of Health     Financial Resource Strain:     Difficulty of Paying Living Expenses:    Food Insecurity:     Worried About Running Out of Food in the Last Year:     [address] in the Last Year:    Transportation Needs:     Lack of Transportation (Medical):  Lack of Transportation (Non-Medical):    Physical Activity:     Days of Exercise per Week:     Minutes of Exercise per Session:    Stress:     Feeling of Stress :    Social Connections:     Frequency of Communication with Friends and Family:     Frequency of Social Gatherings with Friends and Family:     Attends Spiritism Services:     Active Member of Clubs or Organizations:     Attends Club or Organization Meetings:     Marital Status:    Intimate Partner Violence:     Fear of Current or Ex-Partner:     Emotionally Abused:     Physically Abused:     Sexually Abused:      No family history on file. Allergies   Allergen Reactions    Amoxicillin      rash and GI upset    Cefdinir Hives    Sulfa Antibiotics          Review of Systems   Constitutional: Negative for activity change (sedentary at baseline; does not pursue activity), appetite change, chills and fever. Respiratory: Negative for shortness of breath. Cardiovascular: Negative for chest pain and palpitations. Gastrointestinal: Negative for constipation. Genitourinary: Negative for dysuria, flank pain, pelvic pain and vaginal pain. Chronic bhatt     Musculoskeletal: Negative for back pain. Neurological: Positive for weakness (generalized). Psychiatric/Behavioral: Negative for agitation, behavioral problems, confusion and decreased concentration. Objective:   LMP  (LMP Unknown)     Physical Exam  Constitutional:       General: She is not in acute distress. Appearance: Normal appearance. She is normal weight.  She is not

## 2021-05-28 ENCOUNTER — VIRTUAL VISIT (OUTPATIENT)
Dept: GERIATRIC MEDICINE | Age: 81
End: 2021-05-28
Payer: COMMERCIAL

## 2021-05-28 DIAGNOSIS — L60.0 INGROWN TOENAIL OF LEFT FOOT WITH INFECTION: Primary | ICD-10-CM

## 2021-05-28 PROCEDURE — 99442 PR PHYS/QHP TELEPHONE EVALUATION 11-20 MIN: CPT | Performed by: PHYSICIAN ASSISTANT

## 2021-05-28 PROCEDURE — 1123F ACP DISCUSS/DSCN MKR DOCD: CPT | Performed by: PHYSICIAN ASSISTANT

## 2021-06-01 ENCOUNTER — OFFICE VISIT (OUTPATIENT)
Dept: GERIATRIC MEDICINE | Age: 81
End: 2021-06-01
Payer: COMMERCIAL

## 2021-06-01 DIAGNOSIS — L60.0 INGROWN LEFT GREATER TOENAIL: Primary | ICD-10-CM

## 2021-06-01 LAB
HCT VFR BLD CALC: 28 % (ref 36–46)
HEMOGLOBIN: 9.5 G/DL (ref 12–16)

## 2021-06-01 PROCEDURE — 99307 SBSQ NF CARE SF MDM 10: CPT | Performed by: PHYSICIAN ASSISTANT

## 2021-06-01 PROCEDURE — 1123F ACP DISCUSS/DSCN MKR DOCD: CPT | Performed by: PHYSICIAN ASSISTANT

## 2021-06-03 ENCOUNTER — OFFICE VISIT (OUTPATIENT)
Dept: GERIATRIC MEDICINE | Age: 81
End: 2021-06-03
Payer: COMMERCIAL

## 2021-06-03 DIAGNOSIS — N39.0 RECURRENT UTI: ICD-10-CM

## 2021-06-03 DIAGNOSIS — Z97.8 CHRONIC INDWELLING FOLEY CATHETER: Primary | ICD-10-CM

## 2021-06-03 DIAGNOSIS — I71.21 ANEURYSM OF ASCENDING AORTA: ICD-10-CM

## 2021-06-03 DIAGNOSIS — I10 ESSENTIAL HYPERTENSION: ICD-10-CM

## 2021-06-03 PROCEDURE — 99308 SBSQ NF CARE LOW MDM 20: CPT | Performed by: PHYSICIAN ASSISTANT

## 2021-06-03 PROCEDURE — 1123F ACP DISCUSS/DSCN MKR DOCD: CPT | Performed by: PHYSICIAN ASSISTANT

## 2021-06-07 ENCOUNTER — OFFICE VISIT (OUTPATIENT)
Dept: GERIATRIC MEDICINE | Age: 81
End: 2021-06-07
Payer: COMMERCIAL

## 2021-06-07 DIAGNOSIS — I10 ESSENTIAL HYPERTENSION: ICD-10-CM

## 2021-06-07 DIAGNOSIS — E03.4 HYPOTHYROIDISM DUE TO ACQUIRED ATROPHY OF THYROID: Primary | ICD-10-CM

## 2021-06-07 DIAGNOSIS — G40.909 SEIZURE DISORDER (HCC): ICD-10-CM

## 2021-06-07 PROCEDURE — 99309 SBSQ NF CARE MODERATE MDM 30: CPT | Performed by: INTERNAL MEDICINE

## 2021-06-07 PROCEDURE — 1123F ACP DISCUSS/DSCN MKR DOCD: CPT | Performed by: INTERNAL MEDICINE

## 2021-06-07 NOTE — PROGRESS NOTES
[facility]    VITAL SIGNS:  Reviewed. See [facility], within normal limits. HPI:  The patient seen today for monthly followup visit for baseline conditions including heart failure, UTI recurrent, and dysphagia, pharyngeal phase. No known sequelae. The patient is tolerating her modified diet well without any issue. The patient is getting an adequate intake. Her weight is stable. The patient has a chronic Sweet placed. There is some leakage, replaced with higher gauge from 18 to 20. Moderate improvement in leakage overall. No new bedwetting at this time reported. Patient does have history of heart failure. No new evidence of respiratory distress, PND, dyspnea. On exam, no crackles throughout lungs. The patient is not on any diuretic therapy at this time. She has minimal lower leg/peripheral edema. Overall continue current diuretics at this time. She is stable. We will monitor. Nursing staff report no additional concerns, will continue on current care plan until next visit where we will follow up for chronic conditions. MEDICATIONS:  Reviewed. ALLERGIES:  Reviewed. REVIEW OF SYSTEMS:  Constitutional:  No fever, chills, nausea, vomiting, weakness, fatigue beyond baseline. HEENT:  Denies any headache, confusion, lightheadedness. Cardiopulmonary:  Denies any orthopnea, PND, VARELA. No chest pain, palpitations, lower leg claudication. Minimal lower leg edema bilaterally. Lungs: No SOB, POI, or wheezing. The patient is sedentary at baseline, does not exert herself often at all. No new increased sputum production. GI:  Negative. :  Chronic Sweet catheter. Denies any dysuria, blood in urine. The patient is incontinent of urine. Psychiatry:  Patient's mood is overall stable, no acute changes. A and O x3. Remaining 14-point ROS unremarkable. PHYSICAL EXAMINATION:  GENERAL:  Good hygiene overall. Flat affect but brightens with further discussion.   Friendly,

## 2021-06-11 ENCOUNTER — OFFICE VISIT (OUTPATIENT)
Dept: GERIATRIC MEDICINE | Age: 81
End: 2021-06-11
Payer: COMMERCIAL

## 2021-06-11 DIAGNOSIS — I48.0 PAROXYSMAL ATRIAL FIBRILLATION (HCC): Primary | ICD-10-CM

## 2021-06-11 DIAGNOSIS — E78.5 HYPERLIPIDEMIA, UNSPECIFIED HYPERLIPIDEMIA TYPE: ICD-10-CM

## 2021-06-11 DIAGNOSIS — R26.9 ABNORMAL GAIT: ICD-10-CM

## 2021-06-11 PROCEDURE — 1123F ACP DISCUSS/DSCN MKR DOCD: CPT | Performed by: INTERNAL MEDICINE

## 2021-06-11 PROCEDURE — 99309 SBSQ NF CARE MODERATE MDM 30: CPT | Performed by: INTERNAL MEDICINE

## 2021-06-11 NOTE — PROGRESS NOTES
Mayi Robles is a 79 y.o. female evaluated via telephone on 2021. Consent:  She and/or health care decision maker is aware that that she may receive a bill for this telephone service, depending on her insurance coverage, and has provided verbal consent to proceed: Yes      Documentation:  I communicated with the patient and/or health care decision maker about     PATIENT: Johnson Mares : 1940 DOS: 2021       Seen for televisit. Patient having increased pus and discharge noted from left great toe, lateral side of nail bed. Patient has extensively overgrown toenail on that side along with likely ingrown toenail. The patient is on list to see Podiatry to have nails trimmed and evaluated. In the meantime, due to increased pus and discharge, we will add Keflex 5 mg p.o. q.12 hours x5 days in addition to her routine wound treatment for affected toe. Continue draining pus on a daily basis as needed. May do Epsom salt foot baths to increase draw of pus and fluid from site. Monitor for increased pain or evidence of soft tissue involvement beyond the nail bed area. Electronically Signed By: Tommie Infante PA-C on 2021 11:48:20  ______________________________  RADHA Tyler/[id number]  D: 2021 11:09:03  T: 2021 17:14:04  . Details of this discussion including any medical advice provided: see above      I affirm this is a Patient Initiated Episode with a Patient who has not had a related appointment within my department in the past 7 days or scheduled within the next 24 hours. Patient identification was verified at the start of the visit: Yes    Total Time: minutes: 11-20 minutes    The visit was conducted pursuant to the emergency declaration under the [facility], [address] [place] [address] authority and the [employer] and [employer] Act.   Patient identification was verified, and a caregiver was present when appropriate. The patient was located in a state where the provider was credentialed to provide care.     Note: not billable if this call serves to triage the patient into an appointment for the relevant concern      Bg Johns

## 2021-06-29 LAB
HCT VFR BLD CALC: 28.5 % (ref 36–46)
HEMOGLOBIN: 9.3 G/DL (ref 12–16)

## 2021-06-29 ASSESSMENT — ENCOUNTER SYMPTOMS
SHORTNESS OF BREATH: 0
CONSTIPATION: 0
BACK PAIN: 0
COLOR CHANGE: 0

## 2021-06-29 NOTE — PROGRESS NOTES
SUBJECTIVE:  25-year-old woman seen for follow-up visit for follow-up visit for hypothyroidism seizure disorder hypertension. Patient has been clinically stable in no acute pain crisis. Patient has been clinically stable tolerating her oral agent in terms of her Synthroid. Patient has not recent emesis fevers chills no change in her bowel bladder habits oral intake has been good patient has been on oral agent no symptomatic hypotensive episodes. ROS: Denied chest palpitations nausea vomiting emesis denies fevers chills  The rest of the 14 point ROS negative    PHYSICAL EXAM: VSS per facility record  Pupils small nonreactive oral mucosa moist chest showed no crackles no wheezing cardiovascular showed a regular rate abdomen soft nontender abdomen soft and tender extremity with possible dorsal pulse    ASSESSMENT & PLAN:   Diagnosis Orders   1. Hypothyroidism due to acquired atrophy of thyroid     2. Essential hypertension     3. Seizure disorder ([address]         Repeat TSH pending clinically stable no acute symptomatic disease. Monitor systolic pressure. Monitoring seizure disorder. Continue with current antiepileptic agent. Monitor renal function and liver function.           Past Medical History:   Diagnosis Date    Aortic stenosis     Chronic kidney disease     COPD (chronic obstructive pulmonary disease) (HCC)     Heart failure (HCC)     High cholesterol     HTN (hypertension)     Hypothyroid     New onset a-fib [facility])          Past Surgical History:   Procedure Laterality Date    KNEE SURGERY Right 4/14/2020    RIGHT KNEE REMOVAL OF HARDWARE [facility] 1/16/2020    RIGHT QUADRICEPS MUSCLE BIOPSY performed by Long Aceves MD at [place] [address] Right 11/18/2019    RIGHT PATELLA OPEN REDUCTION INTERNAL FIXATION  ROOM 180 performed by Cielo Angeles MD at [facility] N/A 12/17/2019    EGD performed by Marino Aguiar MD at [facility]         Current Outpatient Medications on File Prior to Visit   Medication Sig Dispense Refill    ferrous sulfate (IRON 325) 325 (65 Fe) MG tablet Take 1 tablet by mouth 2 times daily 60 tablet 5    magnesium hydroxide (MILK OF MAGNESIA) 400 MG/5ML suspension Take 30 mLs by mouth      potassium chloride (KLOR-CON M) 20 MEQ extended release tablet Take 40 mEq by mouth      acetaminophen (TYLENOL) 650 MG suppository Place 650 mg rectally every 4 hours as needed for Fever      Aluminum & Magnesium Hydroxide (MAGNESIUM-ALUMINUM PO) Take by mouth 225-200 MG/5ML      albuterol-ipratropium (COMBIVENT RESPIMAT)  MCG/ACT AERS inhaler Inhale 1 puff into the lungs every 6 hours      Sodium Phosphates (FLEET) 7-19 GM/118ML Place 1 enema rectally once as needed      glucagon 1 MG injection Inject 1 kit into the muscle once      Glucose-Cholecalciferol (TRUEPLUS GLUCOSE) GEL Take by mouth      GUAIFENESIN PO Take 10 mLs by mouth every 4 hours as needed      ibuprofen (ADVIL;MOTRIN) 400 MG tablet Take 1 tablet by mouth every 6 hours as needed for Pain 30 tablet 0    levothyroxine (SYNTHROID) 125 MCG tablet Take 1 tablet by mouth Daily 30 tablet 03    alendronate (FOSAMAX) 70 MG tablet Take 70 mg by mouth      amLODIPine (NORVASC) 5 MG tablet Take 1 tablet by mouth daily 30 tablet 0    bisacodyl (DULCOLAX) 10 MG suppository Place 10 mg rectally daily as needed for Constipation      calcium-vitamin D (OSCAL-500) 500-200 MG-UNIT per tablet Take 1 tablet by mouth 2 times daily      mirtazapine (REMERON) 15 MG tablet Take 7.5 mg by mouth nightly      guaiFENesin-dextromethorphan (ROBITUSSIN DM) 100-10 MG/5ML syrup Take 5 mLs by mouth every 6 hours as needed for Cough      sennosides-docusate sodium (SENOKOT-S) 8.6-50 MG tablet Take 2 tablets by mouth nightly HOLD FOR LOOSE STOOL      acetaminophen (TYLENOL) 325 MG tablet Take 650 mg by mouth every 4 hours as needed for Pain      pantoprazole (PROTONIX) 40 MG tablet Take 1 tablet by mouth 2 times daily (before meals) 60 tablet 0    vitamin B-12 (CYANOCOBALAMIN) 500 MCG tablet Take 500 mcg by mouth daily      folic acid (FOLVITE) 1 MG tablet Take 1 mg by mouth daily      aspirin 81 MG chewable tablet Take 1 tablet by mouth daily 30 tablet 3    amiodarone (CORDARONE) 200 MG tablet Take 1 tablet by mouth daily 30 tablet 3    ipratropium-albuterol (DUONEB) 0.5-2.5 (3) MG/3ML SOLN nebulizer solution Inhale 3 mLs into the lungs every 4 hours as needed for Shortness of Breath 360 mL 0    carBAMazepine (TEGRETOL) 200 MG tablet Take 1 tablet by mouth 2 times daily 90 tablet 3    coenzyme Q10 100 MG CAPS capsule Take 1 capsule by mouth 2 times daily (before meals) 60 capsule 0    vitamin D (ERGOCALCIFEROL) 1.25 MG (67256 UT) CAPS capsule Take 1 capsule by mouth once a week (Patient taking differently: Take 50,000 Units by mouth once a week mondays) 5 capsule 0    metoprolol tartrate (LOPRESSOR) 25 MG tablet Take 0.5 tablets by mouth 2 times daily 60 tablet 3     No current facility-administered medications on file prior to visit. No family history on file. Social History     Socioeconomic History    Marital status:       Spouse name: Not on file    Number of children: 2    Years of education: 15    Highest education level: 12th grade   Occupational History    Occupation:      Comment: [employer]   Tobacco Use    Smoking status: Former Smoker     Packs/day: 0.50     Years: 25.00     Pack years: 12.50    Smokeless tobacco: Never Used   Substance and Sexual Activity    Alcohol use: Never    Drug use: Never    Sexual activity: Not on file   Other Topics Concern    Not on file   Social History Narrative         Lives With: Florence Tao lives in [place] fulltime t the [address] [place] [address] office    Type of Home: Apartment(1st floor) in [facility] 6: Two level, Able to Live on [facility] level with bedroom/bathroom(Full flight to second floor)    Home Access: Stairs to enter without rails    Entrance Stairs - Number of Steps: 2    Bathroom Shower/Tub: Tub/Shower unit    Bathroom Equipment: (None)    Home Equipment: (N/A)    ADL Assistance: Independent    Homemaking Assistance: Independent    Homemaking Responsibilities: Yes    Meal Prep Responsibility: Primary    Laundry Responsibility: Primary    Cleaning Responsibility: Primary    Ambulation Assistance: Independent(No AD)    Transfer Assistance: Independent    Active : Yes    Additional Comments: States that she was down on ground X 72 hours following fall     Social Determinants of Health     Financial Resource Strain:     Difficulty of Paying Living Expenses:    Food Insecurity:     Worried About Running Out of Food in the Last Year:     [address] in the Last Year:    Transportation Needs:     Lack of Transportation (Medical):      Lack of Transportation (Non-Medical):    Physical Activity:     Days of Exercise per Week:     Minutes of Exercise per Session:    Stress:     Feeling of Stress :    Social Connections:     Frequency of Communication with Friends and Family:     Frequency of Social Gatherings with Friends and Family:     Attends [employer] Services:     Active Member of Clubs or Organizations:     Attends Club or Organization Meetings:     Marital Status:    Intimate Partner Violence:     Fear of Current or Ex-Partner:     Emotionally Abused:     Physically Abused:     Sexually Abused:          No results found for: LABA1C  No results found for: EAG    Lab Results   Component Value Date     03/25/2021    K 4.3 03/25/2021    K 3.3 01/16/2020     03/25/2021    CO2 24 03/25/2021    BUN 22 03/25/2021    CREATININE 1.0 03/25/2021    GLUCOSE 89 03/25/2021    CALCIUM 8.9 03/25/2021        Lab Results   Component Value Date    CHOL 131 11/24/2019     Lab Results   Component Value Date    TRIG 142 11/24/2019     Lab Results   Component Value Date    HDL 45 11/24/2019     Lab Results   Component Value Date    LDLCALC 58 11/24/2019     No results found for: LABVLDL, VLDL  No results found for: [facility]    Lab Results   Component Value Date    TSH 36.810 (H) 01/06/2020       Lab Results   Component Value Date    WBC 9.3 03/25/2021    HGB 9.3 (A) 06/29/2021    HCT 28.5 (A) 06/29/2021    MCV 94.8 03/25/2021     03/25/2021       Please note orders entered on site at facility after discussion with appropriate facility nursing/therapy/ / nutritional staff. Current longstanding medical problems and acute medical issues addressed with staff. Available data and data elements in on site paper chart reviewed and analyzed. Current external consultant notes reviewed in on site chart. Ordered laboratory testing and imaging will be reviewed when available.

## 2021-06-30 LAB
VITAMIN D 25-HYDROXY: 31.44
VITAMIN D2, 25 HYDROXY: NORMAL
VITAMIN D3,25 HYDROXY: NORMAL

## 2021-06-30 NOTE — PROGRESS NOTES
Subjective:      Patient ID: Florinda Kruger is a pleasant 79 y.o. female who presents today for:  No chief complaint on file. Patient seen today to follow-up on left ingrown toenail wound. Patient's toenail appears to be well cleaned and healing without exudate or erythema worsening. Improved significant compared to last few visits. We will continue current parameters and maintain patient to have ongoing podiatry visits more routinely to avoid any additional issues on other toes. Overall condition concerns. Patient states pain is well controlled without any issue. We will follow-up as needed.       Patient Active Problem List   Diagnosis    Acute renal impairment    Anxiety state    Aortic valve stenosis    Gastroesophageal reflux disease    Hyperlipidemia    Senile hyperkeratosis    Aneurysm of ascending aorta ([facility])    Tobacco user    Vitamin D deficiency    Closed fracture of right patella    Abnormal gait    Paroxysmal atrial fibrillation ([facility])    Hypertensive disorder    Chronic renal insufficiency, stage III (moderate) ([facility])    Rhabdomyolysis    Hyponatremia    Hypothyroidism    Adult body mass index 25-29    Acute posthemorrhagic anemia    Myositis    Muscle wasting and atrophy, not elsewhere classified, right thigh    Retained orthopedic hardware    Open wound with complication    Bone fixation device infection ([facility])    Closed fracture of distal end of radius    Fracture of patella    History of seizure    Pressure injury of right heel, unstageable ([facility])     Past Medical History:   Diagnosis Date    Aortic stenosis     Chronic kidney disease     COPD (chronic obstructive pulmonary disease) ([facility])     Heart failure ([facility])     High cholesterol     HTN (hypertension)     Hypothyroid     New onset a-fib [facility])      Past Surgical History:   Procedure Laterality Date    KNEE SURGERY Right 4/14/2020    RIGHT KNEE REMOVAL OF HARDWARE [facility]helan [facility] at [facility]  MUSCLE BIOPSY Right 1/16/2020    RIGHT QUADRICEPS MUSCLE BIOPSY performed by Kasia Vega MD at [facility] [address] Right 11/18/2019    RIGHT PATELLA OPEN REDUCTION INTERNAL FIXATION  ROOM 180 performed by Cheri Carlos MD at [address] N/A 12/17/2019    EGD performed by Dee Caban MD at [address] [place] History     Socioeconomic History    Marital status:      Spouse name: Not on file    Number of children: 2    Years of education: 15    Highest education level: 12th grade   Occupational History    Occupation:      Comment: [employer]   Tobacco Use    Smoking status: Former Smoker     Packs/day: 0.50     Years: 25.00     Pack years: 12.50    Smokeless tobacco: Never Used   Substance and Sexual Activity    Alcohol use: Never    Drug use: Never    Sexual activity: Not on file   Other Topics Concern    Not on file   Social History Narrative         Lives With: Florence Tao lives in [place] fulltime t the [address] [place] [address] office    Type of Home: Apartment(1st floor) in [facility] 6: Two level, Able to Live on Main level with bedroom/bathroom(Full flight to second floor)    Home Access: Stairs to enter without rails    Entrance Stairs - Number of Steps: 2    Bathroom Shower/Tub: Tub/Shower unit    Bathroom Equipment: (None)    Home Equipment: (N/A)    ADL Assistance: Independent    Homemaking Assistance: Independent    Homemaking Responsibilities: Yes    Meal Prep Responsibility: Primary    Laundry Responsibility: Primary    Cleaning Responsibility: Primary    Ambulation Assistance: Independent(No AD)    Transfer Assistance: Independent    Active :  Yes    Additional Comments: States that she was down on ground X 72 hours following fall     Social Determinants of Health     Financial Resource Strain:     Difficulty of Paying Living Expenses:    Food Insecurity:     Worried About Running Out of Food in the Last Year:    [address] in the Last Year:    Transportation Needs:     Lack of Transportation (Medical):  Lack of Transportation (Non-Medical):    Physical Activity:     Days of Exercise per Week:     Minutes of Exercise per Session:    Stress:     Feeling of Stress :    Social Connections:     Frequency of Communication with Friends and Family:     Frequency of Social Gatherings with Friends and Family:     Attends Temple Services:     Active Member of Clubs or Organizations:     Attends Club or Organization Meetings:     Marital Status:    Intimate Partner Violence:     Fear of Current or Ex-Partner:     Emotionally Abused:     Physically Abused:     Sexually Abused:      No family history on file. Allergies   Allergen Reactions    Amoxicillin      rash and GI upset    Cefdinir Hives    Sulfa Antibiotics          Review of Systems   Constitutional: Negative for activity change (sedentary at baseline; does not pursue activity), appetite change, chills and fever. Respiratory: Negative for shortness of breath. Cardiovascular: Negative for chest pain and palpitations. Gastrointestinal: Negative for constipation. Genitourinary: Negative for difficulty urinating. Chronic bhatt; no issues reported per nurse     Musculoskeletal: Negative for back pain. Skin: Negative for color change (improved skin color of L. great toe; no new issues) and wound. Neurological: Positive for weakness (generalized). Psychiatric/Behavioral: Negative for agitation, behavioral problems, confusion and decreased concentration. All other systems reviewed and are negative. Objective:   LMP  (LMP Unknown)     Physical Exam  Vitals and nursing note (Wound care notes reviewed) reviewed. Constitutional:       General: She is not in acute distress. Appearance: Normal appearance. She is normal weight. She is not ill-appearing. HENT:      Head: Normocephalic and atraumatic. Mouth/Throat:      Mouth: Mucous membranes are moist.      Pharynx: Oropharynx is clear. Eyes:      Extraocular Movements: Extraocular movements intact. Conjunctiva/sclera: Conjunctivae normal.   Cardiovascular:      Pulses: Normal pulses. Skin:     General: Skin is warm and dry. Capillary Refill: Capillary refill takes 2 to 3 seconds. Left and right toes     Findings: Erythema (minor; improving on L.great toe) present. Neurological:      General: No focal deficit present. Mental Status: She is alert and oriented to person, place, and time. Motor: Weakness present. Psychiatric:         Mood and Affect: Mood normal.         Behavior: Behavior normal.         Assessment:       Diagnosis Orders   1. Ingrown left greater toenail           Plan:      No orders of the defined types were placed in this encounter. No orders of the defined types were placed in this encounter. Continue current wound care orders. Patient to see podiatry more often in the future to avoid similar circumstances. Will monitor patient's toes and toenails more often to assess due to recent history. No follow-ups on file. Side effects, adverse effects of the medication prescribed today, as well as treatment plan and result expectations have been discussed withthe patient who expresses understanding and desires to proceed.     Allison Polk, [address]

## 2021-06-30 NOTE — PROGRESS NOTES
SUBJECTIVE:  71-year-old woman seen for follow-up visit for her A. fib unsteadiness hyperlipidemia arterial disease. Patient has been clinically stable has a history of atrial or aortic stenosis without evidence of symptomatic hypoglycemia episodes or orthostasis secondary to this patient has not any acute pain crisis is likely not staffer care. Has received a COVID-19 vaccination. ROS: Denied chest palpitations nausea vomiting is intermittent short of breath    PHYSICAL EXAM: VSS per facility record  Frail-appearing pupils are small with are reactive oral mucosa moist no thrush neck was supple chest showed a 2 out of 6 systolic ejection murmur abdomen soft nontender extremity trace terrestrial pulse skin showed no rashes. ASSESSMENT & PLAN:   Diagnosis Orders   1. Paroxysmal atrial fibrillation (HCC)     2. Abnormal gait     3. Hyperlipidemia, unspecified hyperlipidemia type       Beta-blocker has been anticoagulated in the past.  Continue course of therapy as needed. Monitoring lipid panels on statin therapy. Repeat TSH pending the next 6 months will follow clinically.   No clinical goiter            Past Medical History:   Diagnosis Date    Aortic stenosis     Chronic kidney disease     COPD (chronic obstructive pulmonary disease) (HCC)     Heart failure (HCC)     High cholesterol     HTN (hypertension)     Hypothyroid     New onset a-fib [facility])          Past Surgical History:   Procedure Laterality Date    KNEE SURGERY Right 4/14/2020    RIGHT KNEE REMOVAL [facility] HARD[facility] [place] [facility] 1/16/2020    RIGHT QUADRICEPS MUSCLE BIOPSY performed by Leonard Boogie MD at [place] [address] Right 11/18/2019    RIGHT PATELLA OPEN REDUCTION INTERNAL FIXATION  ROOM 180 performed by Krista [place] MD at [address] [place] [address] 12/17/2019    EGD performed by Brent Conway MD at [address] [place] [address] Medications on File Prior to Visit   Medication Sig Dispense Refill    ferrous sulfate (IRON 325) 325 (65 Fe) MG tablet Take 1 tablet by mouth 2 times daily 60 tablet 5    magnesium hydroxide (MILK OF MAGNESIA) 400 MG/5ML suspension Take 30 mLs by mouth      potassium chloride (KLOR-CON M) 20 MEQ extended release tablet Take 40 mEq by mouth      acetaminophen (TYLENOL) 650 MG suppository Place 650 mg rectally every 4 hours as needed for Fever      Aluminum & Magnesium Hydroxide (MAGNESIUM-ALUMINUM PO) Take by mouth 225-200 MG/5ML      albuterol-ipratropium (COMBIVENT RESPIMAT)  MCG/ACT AERS inhaler Inhale 1 puff into the lungs every 6 hours      Sodium Phosphates (FLEET) 7-19 GM/118ML Place 1 enema rectally once as needed      glucagon 1 MG injection Inject 1 kit into the muscle once      Glucose-Cholecalciferol (TRUEPLUS GLUCOSE) GEL Take by mouth      GUAIFENESIN PO Take 10 mLs by mouth every 4 hours as needed      ibuprofen (ADVIL;MOTRIN) 400 MG tablet Take 1 tablet by mouth every 6 hours as needed for Pain 30 tablet 0    levothyroxine (SYNTHROID) 125 MCG tablet Take 1 tablet by mouth Daily 30 tablet 03    alendronate (FOSAMAX) 70 MG tablet Take 70 mg by mouth      amLODIPine (NORVASC) 5 MG tablet Take 1 tablet by mouth daily 30 tablet 0    bisacodyl (DULCOLAX) 10 MG suppository Place 10 mg rectally daily as needed for Constipation      calcium-vitamin D (OSCAL-500) 500-200 MG-UNIT per tablet Take 1 tablet by mouth 2 times daily      mirtazapine (REMERON) 15 MG tablet Take 7.5 mg by mouth nightly      guaiFENesin-dextromethorphan (ROBITUSSIN DM) 100-10 MG/5ML syrup Take 5 mLs by mouth every 6 hours as needed for Cough      sennosides-docusate sodium (SENOKOT-S) 8.6-50 MG tablet Take 2 tablets by mouth nightly HOLD FOR LOOSE STOOL      acetaminophen (TYLENOL) 325 MG tablet Take 650 mg by mouth every 4 hours as needed for Pain      pantoprazole (PROTONIX) 40 MG tablet Take 1 tablet by Lab Results   Component Value Date    LDLCALC 58 11/24/2019     No results found for: LABVLDL, VLDL  No results found for: [facility]    Lab Results   Component Value Date    TSH 36.810 (H) 01/06/2020       Lab Results   Component Value Date    WBC 9.3 03/25/2021    HGB 9.3 (A) 06/29/2021    HCT 28.5 (A) 06/29/2021    MCV 94.8 03/25/2021     03/25/2021       Please note orders entered on site at facility after discussion with appropriate facility nursing/therapy/ / nutritional staff. Current longstanding medical problems and acute medical issues addressed with staff. Available data and data elements in on site paper chart reviewed and analyzed. Current external consultant notes reviewed in on site chart. Ordered laboratory testing and imaging will be reviewed when available.

## 2021-07-01 ENCOUNTER — OFFICE VISIT (OUTPATIENT)
Dept: GERIATRIC MEDICINE | Age: 81
End: 2021-07-01
Payer: COMMERCIAL

## 2021-07-01 DIAGNOSIS — L60.0 INGROWN LEFT GREATER TOENAIL: Primary | ICD-10-CM

## 2021-07-01 DIAGNOSIS — Z97.8 CHRONIC INDWELLING FOLEY CATHETER: ICD-10-CM

## 2021-07-01 DIAGNOSIS — R00.1 BRADYCARDIA: ICD-10-CM

## 2021-07-01 PROCEDURE — 1123F ACP DISCUSS/DSCN MKR DOCD: CPT | Performed by: PHYSICIAN ASSISTANT

## 2021-07-01 PROCEDURE — 99308 SBSQ NF CARE LOW MDM 20: CPT | Performed by: PHYSICIAN ASSISTANT

## 2021-07-26 ENCOUNTER — OFFICE VISIT (OUTPATIENT)
Dept: GERIATRIC MEDICINE | Age: 81
End: 2021-07-26
Payer: COMMERCIAL

## 2021-07-26 DIAGNOSIS — D50.9 IRON DEFICIENCY ANEMIA, UNSPECIFIED IRON DEFICIENCY ANEMIA TYPE: Primary | ICD-10-CM

## 2021-07-26 DIAGNOSIS — D64.9 LOW HEMOGLOBIN AND LOW HEMATOCRIT: ICD-10-CM

## 2021-07-26 PROCEDURE — 99308 SBSQ NF CARE LOW MDM 20: CPT | Performed by: PHYSICIAN ASSISTANT

## 2021-07-26 PROCEDURE — 1123F ACP DISCUSS/DSCN MKR DOCD: CPT | Performed by: PHYSICIAN ASSISTANT

## 2021-07-26 ASSESSMENT — ENCOUNTER SYMPTOMS
BACK PAIN: 0
COLOR CHANGE: 0
SHORTNESS OF BREATH: 0
CONSTIPATION: 0

## 2021-07-26 NOTE — PROGRESS NOTES
Subjective:      Patient ID: Mercedes Lancaster is a pleasant 79 y.o. female who presents today for:  No chief complaint on file. A for monthly follow-up visit for chronic medical history of recurrent UTI, ascending aortic aneurysm, with hypertensive disorder. Patient's blood pressure has been well controlled over the past several weeks, no new sequelae noted. Patient denies any chest pain or abdominal pain. Can hear abdominal bruits on close examination with stethoscope, though faint and hyperactive bowel sounds. Patient is very sedentary at baseline she lies in her bed most of the day and is rarely up in the chair although she is encouraged. We will have patient follow-up with cardio to determine change in size and if any intervention needed. No new UTIs this time she does have a chronic Sweet catheter that is present. Her urine is clear and straw-colored without any significant change from baseline patient does have regular Sweet changes and intermittently she will have sediment noted. No blood noted. We will continue monitoring blood pressure will make any medication changes as needed to keep patient's blood pressure well controlled. Vital signs today 82/54, 97.2 °F, 58 bpm, patient's 18, 97% SPO2 on room air. Her blood pressure is actually on the low end and will consider dose adjustment to her medications to elevated slightly. Will monitor.       Patient Active Problem List   Diagnosis    Acute renal impairment    Anxiety state    Aortic valve stenosis    Gastroesophageal reflux disease    Hyperlipidemia    Senile hyperkeratosis    Aneurysm of ascending aorta (HCC)    Tobacco user    Vitamin D deficiency    Closed fracture of right patella    Abnormal gait    Paroxysmal atrial fibrillation (HCC)    Hypertensive disorder    Chronic renal insufficiency, stage III (moderate) (HCC)    Rhabdomyolysis    Hyponatremia    Hypothyroidism    Adult body mass index 25-29    Acute posthemorrhagic anemia    Myositis    Muscle wasting and atrophy, not elsewhere classified, right thigh    Retained orthopedic hardware    Open wound with complication    Bone fixation device infection (HCC)    Closed fracture of distal end of radius    Fracture of patella    History of seizure    Pressure injury of right heel, unstageable (HCC)     Past Medical History:   Diagnosis Date    Aortic stenosis     Chronic kidney disease     COPD (chronic obstructive pulmonary disease) (HCC)     Heart failure (HCC)     High cholesterol     HTN (hypertension)     Hypothyroid     New onset a-fib [facility])      Past Surgical History:   Procedure Laterality Date    KNEE SURGERY Right 4/14/2020    RIGHT KNEE REMOVAL OF HARDWARE performed by Kinga Florentino MD at [address] Right 1/16/2020    RIGHT QUADRICEPS MUSCLE BIOPSY performed by Stormy Avendano MD at [place] [id number] Right 11/18/2019    RIGHT PATELLA OPEN REDUCTION INTERNAL FIXATION  ROOM 180 performed by Kinga Florentino MD at [facility] N/A 12/17/2019    EGD performed by Cody Gaytan [facility] at [facility] History     Socioeconomic History    Marital status:       Spouse name: Not on file    Number of children: 2    Years of education: 15    Highest education level: 12th grade   Occupational History    Occupation:      Comment: [employer]   Tobacco Use    Smoking status: Former Smoker     Packs/day: 0.50     Years: 25.00     Pack years: 12.50    Smokeless tobacco: Never Used   Substance and Sexual Activity    Alcohol use: Never    Drug use: Never    Sexual activity: Not on file   Other Topics Concern    Not on file   Social History Narrative         Lives With: Florence Tao lives in [place] fulltime t the [address] [place] [address] office    Type of Home: Apartment(1st floor) in [facility] [address]: Two level, Able to Live on Main level with bedroom/bathroom(Full flight to second floor)    Home Access: Stairs to enter without rails    Entrance Stairs - Number of Steps: 2    Bathroom Shower/Tub: Tub/Shower unit    Bathroom Equipment: (None)    Home Equipment: (N/A)    ADL Assistance: Independent    Homemaking Assistance: Independent    Homemaking Responsibilities: Yes    Meal Prep Responsibility: Primary    Laundry Responsibility: Primary    Cleaning Responsibility: Primary    Ambulation Assistance: Independent(No AD)    Transfer Assistance: Independent    Active : Yes    Additional Comments: States that she was down on ground X 72 hours following fall     Social Determinants of Health     Financial Resource Strain:     Difficulty of Paying Living Expenses:    Food Insecurity:     Worried About Running Out of Food in the Last Year:     [address] in the Last Year:    Transportation Needs:     Lack of Transportation (Medical):  Lack of Transportation (Non-Medical):    Physical Activity:     Days of Exercise per Week:     Minutes of Exercise per Session:    Stress:     Feeling of Stress :    Social Connections:     Frequency of Communication with Friends and Family:     Frequency of Social Gatherings with Friends and Family:     Attends [employer] Services:     Active Member of Clubs or Organizations:     Attends Club or Organization Meetings:     Marital Status:    Intimate Partner Violence:     Fear of Current or Ex-Partner:     Emotionally Abused:     Physically Abused:     Sexually Abused:      No family history on file. Allergies   Allergen Reactions    Amoxicillin      rash and GI upset    Cefdinir Hives    Sulfa Antibiotics          Review of Systems   Constitutional: Negative for activity change (sedentary at baseline; does not pursue activity), appetite change, chills and fever. Respiratory: Negative for shortness of breath. Cardiovascular: Negative for chest pain and palpitations.    Gastrointestinal: Negative for constipation. Genitourinary: Negative for difficulty urinating. Chronic bhatt; no issues reported per nurse     Musculoskeletal: Negative for back pain. Skin: Negative for color change (improved skin color of L. great toe; no new issues) and wound. Neurological: Positive for weakness (generalized, chronic). Negative for dizziness, tremors, light-headedness and headaches. Few opportunities to assess balance and strength as patient is extremely sedentary   Psychiatric/Behavioral: Negative for agitation, behavioral problems, confusion and decreased concentration. All other systems reviewed and are negative. Objective:   LMP  (LMP Unknown)     Physical Exam  Vitals (See HPI) and nursing note (Left great ingrown toenail improving) reviewed. Constitutional:       General: She is not in acute distress. Appearance: Normal appearance. She is normal weight. She is not ill-appearing. HENT:      Head: Normocephalic and atraumatic. Mouth/Throat:      Mouth: Mucous membranes are moist.      Pharynx: Oropharynx is clear. Eyes:      Extraocular Movements: Extraocular movements intact. Conjunctiva/sclera: Conjunctivae normal.   Cardiovascular:      Pulses: Normal pulses. Skin:     General: Skin is warm and dry. Findings: No erythema. Neurological:      General: No focal deficit present. Mental Status: She is alert and oriented to person, place, and time. Motor: Weakness present. Psychiatric:         Mood and Affect: Mood normal.         Behavior: Behavior normal.           Assessment:       Diagnosis Orders   1. Chronic indwelling Bhatt catheter     2. Recurrent UTI     3. Aneurysm of ascending aorta (HCC)     4. Essential hypertension           Plan:      No orders of the defined types were placed in this encounter. No orders of the defined types were placed in this encounter. #1.  Continue Bhatt orders.   Monitor for sediment or obvious signs of infection or bleeding. Using larger diameter Sweet for retention. 2.  No new evidence of UTI. Continue monitoring. 3.  No obvious changes. Patient to follow-up with cardiology as needed for monitoring. 4.  Blood pressure is below normal.  Will hold at this time and recheck blood pressure. If continues to be low, will decrease patient hypertension meds. No new dizziness, patient is sedentary baseline which may be primary etiology. No follow-ups on file. Side effects, adverse effects of the medication prescribed today, as well as treatment plan and result expectations have beendiscussed with the patient who expresses understanding and desires to proceed.     Ash Curran, [address]

## 2021-07-28 VITALS
SYSTOLIC BLOOD PRESSURE: 97 MMHG | HEART RATE: 71 BPM | RESPIRATION RATE: 18 BRPM | TEMPERATURE: 98.2 F | OXYGEN SATURATION: 96 % | DIASTOLIC BLOOD PRESSURE: 62 MMHG

## 2021-07-28 NOTE — PROGRESS NOTES
leg edema. Pedal pulses intact bilaterally. PULMONARY:  Lung sounds are clear to auscultation bilaterally. No wheezes, rales, or rhonchi on limited examination. SKIN:  No new bruising, rashes, or masses. Recent right knee lesion. Wound care has been discontinued due to resolution and stability of area. MENTAL STATUS:  The patient is awake, alert and oriented, 2-3/3. ASSESSMENT AND PLAN:  Anemic/chronically low hemoglobin and hematocrit - we will change lab draws to q. 2 months for patient comfort. We will continue monitoring for any bleeding or acute change in H&H.         Electronically Signed By: Nan Bui PA-C on 07/27/2021 12:01:28  ______________________________  Nan Bui PA-C  /[id number]  D: 07/26/2021 19:12:00  T: 07/27/2021 00:48:30    cc: - [id number]

## 2021-08-03 LAB
HCT VFR BLD CALC: 9.6 % (ref 36–46)
HEMOGLOBIN: 28.5 G/DL (ref 12–16)

## 2021-08-30 ENCOUNTER — OFFICE VISIT (OUTPATIENT)
Dept: GERIATRIC MEDICINE | Age: 81
End: 2021-08-30
Payer: COMMERCIAL

## 2021-08-30 DIAGNOSIS — Z86.59 HISTORY OF DEPRESSION: ICD-10-CM

## 2021-08-30 DIAGNOSIS — E44.1 PROTEIN-CALORIE MALNUTRITION, MILD (HCC): ICD-10-CM

## 2021-08-30 DIAGNOSIS — R63.0 POOR APPETITE: Primary | ICD-10-CM

## 2021-08-30 PROCEDURE — 99308 SBSQ NF CARE LOW MDM 20: CPT | Performed by: PHYSICIAN ASSISTANT

## 2021-08-30 PROCEDURE — 1123F ACP DISCUSS/DSCN MKR DOCD: CPT | Performed by: PHYSICIAN ASSISTANT

## 2021-09-01 ENCOUNTER — OFFICE VISIT (OUTPATIENT)
Dept: GERIATRIC MEDICINE | Age: 81
End: 2021-09-01
Payer: COMMERCIAL

## 2021-09-01 DIAGNOSIS — G40.909 SEIZURE DISORDER (HCC): ICD-10-CM

## 2021-09-01 DIAGNOSIS — I50.32 CHRONIC DIASTOLIC HEART FAILURE (HCC): ICD-10-CM

## 2021-09-01 DIAGNOSIS — J44.9 CHRONIC OBSTRUCTIVE PULMONARY DISEASE, UNSPECIFIED COPD TYPE (HCC): ICD-10-CM

## 2021-09-01 PROCEDURE — 1123F ACP DISCUSS/DSCN MKR DOCD: CPT | Performed by: PHYSICIAN ASSISTANT

## 2021-09-01 PROCEDURE — 99308 SBSQ NF CARE LOW MDM 20: CPT | Performed by: PHYSICIAN ASSISTANT

## 2021-09-21 LAB
T4 FREE: 1.4
TSH SERPL DL<=0.05 MIU/L-ACNC: 2.07 UIU/ML

## 2021-09-27 ENCOUNTER — OFFICE VISIT (OUTPATIENT)
Dept: GERIATRIC MEDICINE | Age: 81
End: 2021-09-27
Payer: COMMERCIAL

## 2021-09-27 DIAGNOSIS — I95.9 HYPOTENSION, UNSPECIFIED HYPOTENSION TYPE: Primary | ICD-10-CM

## 2021-09-27 DIAGNOSIS — R00.1 BRADYCARDIA: ICD-10-CM

## 2021-09-27 PROCEDURE — 1123F ACP DISCUSS/DSCN MKR DOCD: CPT | Performed by: PHYSICIAN ASSISTANT

## 2021-09-27 PROCEDURE — 99308 SBSQ NF CARE LOW MDM 20: CPT | Performed by: PHYSICIAN ASSISTANT

## 2021-09-29 VITALS
DIASTOLIC BLOOD PRESSURE: 54 MMHG | SYSTOLIC BLOOD PRESSURE: 85 MMHG | OXYGEN SATURATION: 97 % | TEMPERATURE: 97.4 F | RESPIRATION RATE: 17 BRPM | HEART RATE: 56 BPM

## 2021-09-29 NOTE — PROGRESS NOTES
PATIENT:  Scot Braxton    :  1940    DOS:  2021    [facility]    Vital signs reviewed. See [facility]. HPI:  Seeing the patient today to review ongoing use of Remeron for depression and appetite stimulation. The patient's weight has been stable for the past several weeks/months, with slight fluctuations overall and general consistency with appetite. The patient states today the food is \"hit or miss. \"  States this is the main reason why her appetite waxes and wanes. When questioned whether or not she wants to continue utilizing medication, she appears to be oriented enough to have say in the matter. Would like to continue on current course without changes. We will oblige. No new concerns otherwise. Nursing staff report no new additional concerns. MEDICATIONS:  Reviewed. ALLERGIES:  Reviewed. REVIEW OF SYSTEMS:  Constitutional:  Denies any new fever, chills, nausea, vomiting, weakness or fatigue beyond baseline. The patient is very much sedentary and stays in bed predominantly throughout the day. Cardiopulmonary:  No chest pain, shortness of breath, POI or orthopnea. Denies any SOB, POI or wheezing. GI:  No new concerns. States adequate appetite. Denies abdominal pain. Denies bowel movement changes. Psychiatric:  The patient's mood is overall stable. Denies any depression, anxiety. PHQ-2 equals 0 today. Remaining 14-point ROS unremarkable. PHYSICAL EXAMINATION:  General:  Normal hygiene, flat affect, no acute distress. Weight is stable. PERRLA. MMM, no erythema or exudate on oropharyngeal examination. Negative thrush. Trachea midline. Negative lymphadenopathy on limited examination of cervical and anterior neck and clavicular lymph nodes. No evident goiter or thyroid masses on limited examination. Pulmonary:  Lung sounds are clear to auscultation. Abdominal:  Normoactive bowel sounds.   No tenderness, rigidity or guarding on abdominal examination. Mental Status: The patient is awake, alert, oriented, 2-3 with significant prompting. Normal repetition and good command following. ASSESSMENT AND PLAN:  1. History of poor appetite/at risk for protein-calorie malnutrition - continue Remeron for appetite stimulation per patient request.  2.   History of depression-continue Remeron as well for utilization in this regard. No new orders overall.         Electronically Signed By: Josh Collazo PA-C on 09/29/2021 10:53:11  ______________________________  RADHA Troncoso/[id number]  D: 09/26/2021 16:24:33  T: 09/27/2021 02:10:56    cc: - [phone] [facility]

## 2021-09-29 NOTE — PROGRESS NOTES
PATIENT:  Shae May    ;  1940    DOS:  2021    [facility]    Vital signs, 85/54, 97.4 F, 56 bpm, 17 RR, 97% SpO2 on room air. HPI:  Patient currently being seen today for hypotension and bradycardia. Patient is seen in her room today. Patient has a longstanding history of CKD and hypotension. Hypotension/bradycardia, however, is typically asymptomatic in nature. Patient stating she is having some minor symptoms today. She is very sedentary, although she is stating a little bit of a dizziness with any increased movement. She states that she is eating and drinking very well and record shows this to be intermittent. Tends to be hindering less with overall intake including fluids. She is not on any BP medications at this time due to her hypertension. She is not on any midodrine. We discussed with her adding midodrine as a precaution in case she gets up and is around. She is typically in bed most of the day otherwise. Patient was in agreement. We will give midodrine 2.5 mg p.o. b.i.d. p.r.n. for BP less than 90 SBP or heart rate less than 60 bpm.  She is denying any acute pain at this time. No new worsening dizziness while in her room today. Seems to be pretty stable and waning at this time. Does not appear to be problematic, the patient, per her statement. She also is mentioning today that her sister who is 6years older than her had passed away. I had a detailed conversation with her about any depression or anxiety. She denied any at all depression or anxiety. She states that she will not be going to the  due to her family concern of COVID. She is currently vaccinated with both series of Pfizer vaccines. We will follow up the patient if any clear hypotension or dizziness associated with it occurs. Otherwise, we will continue monitoring the patient. Patient does deny any interest in psychiatric or counseling services at this time.   We will follow up with us as well. MEDICATIONS:  Reviewed. ALLERGIES:  Reviewed. REVIEW OF SYSTEMS:  Constitutional:  Denies any fever, chills, nausea, vomiting. Denies any headache, confusion at this time. No lightheadedness. Cardiopulmonary:  Denies chest pain, orthopnea, PND, or VARELA. No new lower leg. Negative palpitations. Denies any POI, wheezing, SOB. Did have some minor dry cough today. Denies ongoing coughing or respiratory complaint. GI:  States fair intake. No new abdominal pain, diarrhea, or change in bowel habits per patient. :  Denies any new urgency. Does have ongoing chronic urinary incontinence. No new hematuria or polyuria. Fluid intake unchanged per patient. BMPs have been stable per last few lab panels. Psychiatric:  Mood is overall stable. Patient did have recent visit by family. We will monitor very closely for any acute anxiety or depression. We will revisit later this week. Remaining 14-point ROS unremarkable. PHYSICAL EXAMINATION:  General:  Good overall hygiene. Flat affect that brightens with further discussion. No acute distress. HEENT:  No new evident trauma to head and neck on gross examination. PERRLA. No injection or icterus noted. MMM, no erythema, or exudate on oropharyngeal exam.  Cardiopulmonary:  Regular rate. No JVD. No significant lower leg edema today. LSCTA anteriorly. No bibasilar crackles noted on auscultation. Abdominal:  Normal bowel sounds. Nontender throughout. Skin:  Skin turgor WNL. Mental Status:  Patient is awake, alert, oriented 2-3/3 with prompting. Patient has some limited memory. However, no acute changes. No new behaviors. ASSESSMENT AND PLAN:   Asymptomatic Hypotension/bradycardia - patient currently not taking any BP meds at the moment. We will add midodrine 2.5 mg p.o. b.i.d. p.r.n. for BP less than or equal to 90 mmHg SBP. Continue monitoring BP as ordered.          Electronically Signed By: Vinayak Sequeira PA-C on 09/29/2021 10:53:14  ______________________________  Nam Catherine PA-C [id number]  D: 09/27/2021 17:30:20  T: 09/28/2021 12:11:38    cc: - [id number] [facility]

## 2021-10-25 ENCOUNTER — OFFICE VISIT (OUTPATIENT)
Dept: GERIATRIC MEDICINE | Age: 81
End: 2021-10-25
Payer: COMMERCIAL

## 2021-10-25 DIAGNOSIS — J42 CHRONIC BRONCHITIS, UNSPECIFIED CHRONIC BRONCHITIS TYPE (HCC): ICD-10-CM

## 2021-10-25 DIAGNOSIS — G40.909 SEIZURE DISORDER (HCC): Primary | ICD-10-CM

## 2021-10-25 DIAGNOSIS — I48.0 PAROXYSMAL ATRIAL FIBRILLATION (HCC): ICD-10-CM

## 2021-10-25 PROCEDURE — G8484 FLU IMMUNIZE NO ADMIN: HCPCS | Performed by: INTERNAL MEDICINE

## 2021-10-25 PROCEDURE — 99309 SBSQ NF CARE MODERATE MDM 30: CPT | Performed by: INTERNAL MEDICINE

## 2021-10-25 PROCEDURE — 1123F ACP DISCUSS/DSCN MKR DOCD: CPT | Performed by: INTERNAL MEDICINE

## 2021-10-25 NOTE — PROGRESS NOTES
PATIENT:  Adriel Worthington    :  1940    DOS:  2021    [facility]    Vital signs reviewed. See [facility], within normal limits today. HPI:  The patient is seen today for monthly visit for history of COPD, seizure disorder as well as heart failure. The patient is currently having no new exacerbation of symptoms. Her vital signs are stable. No difficulty breathing, chest pain, shortness of breath, POI or wheezing reportedly today. The patient continues on carbamazepine 200 mg p.o. q.a.m. and q.h.s. with levels checked every six months. Levels have been within normal limits. See record for details. No new heart failure symptoms. The patient states her breathing is within normal limits. Does lay flat a lot throughout the day. Denies any orthopnea complaints at the moment. No new coughing on examination. No new pulmonary edema reportedly in bibasilar areas. Lung sounds are clear throughout, anterior and posterior lungs. The patient seems overall well controlled on current medical regimen. She denies any acute complaints or issues at this point in time. Her pain is well controlled. Overall uneventful visit today. We will follow up at next monthly visit if any new issues arise in the interim. MEDICATIONS:  Reviewed. ALLERGIES:  Reviewed. REVIEW OF SYSTEMS:  See HPI. PHYSICAL EXAMINATION:  In general, overall good hygiene, flat affect today, warms up and affect brightens throughout the visit, no acute distress. HEENT:  PERRLA. No icterus, no injection on oropharyngeal examination. Normal hearing bilaterally. Cardiopulmonary:  RRR. No new murmurs, rubs or gallops noted today. No JVD. No significant change in peripheral pulses. All intact and within normal limits. Lung sounds are clear. See HPI. Abdominal:  Normoactive bowel sounds throughout on limited examination. Mental Status:   The patient is awake, alert, oriented, 2-3/3, with minimal

## 2021-10-26 PROBLEM — I50.32 CHRONIC DIASTOLIC HEART FAILURE (HCC): Status: ACTIVE | Noted: 2021-10-26

## 2021-10-26 PROBLEM — G40.909 SEIZURE DISORDER (HCC): Status: ACTIVE | Noted: 2021-10-26

## 2021-10-26 PROBLEM — J44.9 COPD (CHRONIC OBSTRUCTIVE PULMONARY DISEASE) (HCC): Status: ACTIVE | Noted: 2021-10-26

## 2021-11-01 ENCOUNTER — OFFICE VISIT (OUTPATIENT)
Dept: GERIATRIC MEDICINE | Age: 81
End: 2021-11-01
Payer: COMMERCIAL

## 2021-11-01 DIAGNOSIS — L60.0 INGROWN LEFT GREATER TOENAIL: Primary | ICD-10-CM

## 2021-11-01 PROCEDURE — G8484 FLU IMMUNIZE NO ADMIN: HCPCS | Performed by: PHYSICIAN ASSISTANT

## 2021-11-01 PROCEDURE — 1123F ACP DISCUSS/DSCN MKR DOCD: CPT | Performed by: PHYSICIAN ASSISTANT

## 2021-11-01 PROCEDURE — 99307 SBSQ NF CARE SF MDM 10: CPT | Performed by: PHYSICIAN ASSISTANT

## 2021-11-09 ENCOUNTER — OFFICE VISIT (OUTPATIENT)
Dept: GERIATRIC MEDICINE | Age: 81
End: 2021-11-09
Payer: COMMERCIAL

## 2021-11-09 DIAGNOSIS — L60.0 INGROWN TOENAIL OF RIGHT FOOT: Primary | ICD-10-CM

## 2021-11-09 PROCEDURE — 99307 SBSQ NF CARE SF MDM 10: CPT | Performed by: PHYSICIAN ASSISTANT

## 2021-11-09 PROCEDURE — 1123F ACP DISCUSS/DSCN MKR DOCD: CPT | Performed by: PHYSICIAN ASSISTANT

## 2021-11-09 PROCEDURE — G8484 FLU IMMUNIZE NO ADMIN: HCPCS | Performed by: PHYSICIAN ASSISTANT

## 2021-11-12 ENCOUNTER — OFFICE VISIT (OUTPATIENT)
Dept: GERIATRIC MEDICINE | Age: 81
End: 2021-11-12
Payer: COMMERCIAL

## 2021-11-12 DIAGNOSIS — R05.9 COUGH: ICD-10-CM

## 2021-11-12 DIAGNOSIS — R09.89 PULMONARY CONGESTION: Primary | ICD-10-CM

## 2021-11-12 PROCEDURE — 1123F ACP DISCUSS/DSCN MKR DOCD: CPT | Performed by: PHYSICIAN ASSISTANT

## 2021-11-12 PROCEDURE — 99309 SBSQ NF CARE MODERATE MDM 30: CPT | Performed by: PHYSICIAN ASSISTANT

## 2021-11-12 PROCEDURE — G8484 FLU IMMUNIZE NO ADMIN: HCPCS | Performed by: PHYSICIAN ASSISTANT

## 2021-11-15 ENCOUNTER — OFFICE VISIT (OUTPATIENT)
Dept: GERIATRIC MEDICINE | Age: 81
End: 2021-11-15
Payer: COMMERCIAL

## 2021-11-15 DIAGNOSIS — E87.1 HYPONATREMIA: Primary | ICD-10-CM

## 2021-11-15 DIAGNOSIS — R06.89 ADVENTITIOUS BREATH SOUNDS: ICD-10-CM

## 2021-11-15 PROCEDURE — 1123F ACP DISCUSS/DSCN MKR DOCD: CPT | Performed by: PHYSICIAN ASSISTANT

## 2021-11-15 PROCEDURE — 99308 SBSQ NF CARE LOW MDM 20: CPT | Performed by: PHYSICIAN ASSISTANT

## 2021-11-15 PROCEDURE — G8484 FLU IMMUNIZE NO ADMIN: HCPCS | Performed by: PHYSICIAN ASSISTANT

## 2021-11-17 ENCOUNTER — OFFICE VISIT (OUTPATIENT)
Dept: GERIATRIC MEDICINE | Age: 81
End: 2021-11-17
Payer: COMMERCIAL

## 2021-11-17 DIAGNOSIS — J42 CHRONIC BRONCHITIS, UNSPECIFIED CHRONIC BRONCHITIS TYPE (HCC): ICD-10-CM

## 2021-11-17 DIAGNOSIS — R09.89 CHEST CONGESTION: ICD-10-CM

## 2021-11-17 DIAGNOSIS — I48.0 PAROXYSMAL ATRIAL FIBRILLATION (HCC): Primary | ICD-10-CM

## 2021-11-17 PROCEDURE — 1123F ACP DISCUSS/DSCN MKR DOCD: CPT | Performed by: PHYSICIAN ASSISTANT

## 2021-11-17 PROCEDURE — 99308 SBSQ NF CARE LOW MDM 20: CPT | Performed by: PHYSICIAN ASSISTANT

## 2021-11-17 PROCEDURE — G8484 FLU IMMUNIZE NO ADMIN: HCPCS | Performed by: PHYSICIAN ASSISTANT

## 2021-11-18 ENCOUNTER — OFFICE VISIT (OUTPATIENT)
Dept: GERIATRIC MEDICINE | Age: 81
End: 2021-11-18
Payer: COMMERCIAL

## 2021-11-18 DIAGNOSIS — I50.32 CHRONIC DIASTOLIC HEART FAILURE (HCC): ICD-10-CM

## 2021-11-18 DIAGNOSIS — G40.909 SEIZURE DISORDER (HCC): Primary | ICD-10-CM

## 2021-11-18 DIAGNOSIS — J42 CHRONIC BRONCHITIS, UNSPECIFIED CHRONIC BRONCHITIS TYPE (HCC): ICD-10-CM

## 2021-11-18 PROCEDURE — 1123F ACP DISCUSS/DSCN MKR DOCD: CPT | Performed by: INTERNAL MEDICINE

## 2021-11-18 PROCEDURE — G8484 FLU IMMUNIZE NO ADMIN: HCPCS | Performed by: INTERNAL MEDICINE

## 2021-11-18 PROCEDURE — 99309 SBSQ NF CARE MODERATE MDM 30: CPT | Performed by: INTERNAL MEDICINE

## 2021-11-22 ENCOUNTER — OFFICE VISIT (OUTPATIENT)
Dept: GERIATRIC MEDICINE | Age: 81
End: 2021-11-22
Payer: COMMERCIAL

## 2021-11-22 DIAGNOSIS — Z87.01 HX OF VIRAL PNEUMONIA: ICD-10-CM

## 2021-11-22 DIAGNOSIS — R09.89 CHEST CONGESTION: Primary | ICD-10-CM

## 2021-11-22 PROCEDURE — G8484 FLU IMMUNIZE NO ADMIN: HCPCS | Performed by: PHYSICIAN ASSISTANT

## 2021-11-22 PROCEDURE — 99308 SBSQ NF CARE LOW MDM 20: CPT | Performed by: PHYSICIAN ASSISTANT

## 2021-11-22 PROCEDURE — 1123F ACP DISCUSS/DSCN MKR DOCD: CPT | Performed by: PHYSICIAN ASSISTANT

## 2021-11-25 NOTE — PROGRESS NOTES
SUBJECTIVE:  This 69-year-old woman was seen in room for follow-up visit patient has been clinically stable no symptomatic hypoglycemia. Patient has been coughing at times has not been tested negative for COVID-19 patient is at her baseline notes no seizure activity has been on antiepileptic agents. Clinically stable. ROS: Chest no visions vomiting emesis fevers chills. The rest of the 14 point ROS negative    PHYSICAL EXAM: VSS per facility record  Normocephalic HPI pupils are small PERRL reactive oral mucosa moist chest showed no crackles no wheezing cardiovascular showed a regular rate abdomen soft nontender extremity trace terrestrial pulse    ASSESSMENT & PLAN:   Diagnosis Orders   1. Seizure disorder ([facility] [address]     2. Chronic bronchitis, unspecified chronic bronchitis type (HCC)     3. Paroxysmal atrial fibrillation (HCC)         Rate control is on beta-blocker has been Blocker Has Been on Anticoagulation the past.  Continue Pressure Treatments. Monitoring for Evidence Acute Decline.   Has Been on Afterload Agents Obviously Activity Will Follow Clinically          Past Medical History:   Diagnosis Date    Aortic stenosis     Chronic kidney disease     COPD (chronic obstructive pulmonary disease) (HCC)     Heart failure (HCC)     High cholesterol     HTN (hypertension)     Hypothyroid     New onset a-fib [facility])          Past Surgical History:   Procedure Laterality Date    KNEE SURGERY Right 4/14/2020    RIGHT KNEE REMOVAL OF HARDWARE performed by Kaylin Stevens MD at [facility] 112 Right 1/16/2020    RIGHT QUADRICEPS MUSCLE BIOPSY performed by Celestino Verduzco MD at [facility] 53 Right 11/18/2019    RIGHT PATELLA OPEN REDUCTION INTERNAL FIXATION  ROOM 180 performed by Kaylin Stevens MD at [facility] 26 N/A 12/17/2019    EGD performed by Rojas Burns MD at [facility]         Current Outpatient Medications on File Prior to Visit Medication Sig Dispense Refill    ferrous sulfate (IRON 325) 325 (65 Fe) MG tablet Take 1 tablet by mouth 2 times daily 60 tablet 5    magnesium hydroxide (MILK OF MAGNESIA) 400 MG/5ML suspension Take 30 mLs by mouth      potassium chloride (KLOR-CON M) 20 MEQ extended release tablet Take 40 mEq by mouth      acetaminophen (TYLENOL) 650 MG suppository Place 650 mg rectally every 4 hours as needed for Fever      Aluminum & Magnesium Hydroxide (MAGNESIUM-ALUMINUM PO) Take by mouth 225-200 MG/5ML      albuterol-ipratropium (COMBIVENT RESPIMAT)  MCG/ACT AERS inhaler Inhale 1 puff into the lungs every 6 hours      Sodium Phosphates (FLEET) 7-19 GM/118ML Place 1 enema rectally once as needed      glucagon 1 MG injection Inject 1 kit into the muscle once      Glucose-Cholecalciferol (TRUEPLUS GLUCOSE) GEL Take by mouth      GUAIFENESIN PO Take 10 mLs by mouth every 4 hours as needed      ibuprofen (ADVIL;MOTRIN) 400 MG tablet Take 1 tablet by mouth every 6 hours as needed for Pain 30 tablet 0    levothyroxine (SYNTHROID) 125 MCG tablet Take 1 tablet by mouth Daily 30 tablet 03    alendronate (FOSAMAX) 70 MG tablet Take 70 mg by mouth      amLODIPine (NORVASC) 5 MG tablet Take 1 tablet by mouth daily 30 tablet 0    bisacodyl (DULCOLAX) 10 MG suppository Place 10 mg rectally daily as needed for Constipation      calcium-vitamin D (OSCAL-500) 500-200 MG-UNIT per tablet Take 1 tablet by mouth 2 times daily      mirtazapine (REMERON) 15 MG tablet Take 7.5 mg by mouth nightly      guaiFENesin-dextromethorphan (ROBITUSSIN DM) 100-10 MG/5ML syrup Take 5 mLs by mouth every 6 hours as needed for Cough      sennosides-docusate sodium (SENOKOT-S) 8.6-50 MG tablet Take 2 tablets by mouth nightly HOLD FOR LOOSE STOOL      acetaminophen (TYLENOL) 325 MG tablet Take 650 mg by mouth every 4 hours as needed for Pain      pantoprazole (PROTONIX) 40 MG tablet Take 1 tablet by mouth 2 times daily (before meals) 60 tablet 0    vitamin B-12 (CYANOCOBALAMIN) 500 MCG tablet Take 500 mcg by mouth daily      folic acid (FOLVITE) 1 MG tablet Take 1 mg by mouth daily      aspirin 81 MG chewable tablet Take 1 tablet by mouth daily 30 tablet 3    amiodarone (CORDARONE) 200 MG tablet Take 1 tablet by mouth daily 30 tablet 3    ipratropium-albuterol (DUONEB) 0.5-2.5 (3) MG/3ML SOLN nebulizer solution Inhale 3 mLs into the lungs every 4 hours as needed for Shortness of Breath 360 mL 0    carBAMazepine (TEGRETOL) 200 MG tablet Take 1 tablet by mouth 2 times daily 90 tablet 3    coenzyme Q10 100 MG CAPS capsule Take 1 capsule by mouth 2 times daily (before meals) 60 capsule 0    vitamin D (ERGOCALCIFEROL) 1.25 MG (11909 UT) CAPS capsule Take 1 capsule by mouth once a week (Patient taking differently: Take 50,000 Units by mouth once a week mondays) 5 capsule 0    metoprolol tartrate (LOPRESSOR) 25 MG tablet Take 0.5 tablets by mouth 2 times daily 60 tablet 3     No current facility-administered medications on file prior to visit. No family history on file. Social History     Socioeconomic History    Marital status:       Spouse name: Not on file    Number of children: 2    Years of education: 15    Highest education level: 12th grade   Occupational History    Occupation:      Comment: [employer]   Tobacco Use    Smoking status: Former Smoker     Packs/day: 0.50     Years: 25.00     Pack years: 12.50    Smokeless tobacco: Never Used   Substance and Sexual Activity    Alcohol use: Never    Drug use: Never    Sexual activity: Not on file   Other Topics Concern    Not on file   Social History Narrative         Lives With: Florence Tao lives in [place] fulltime t the [address] [place] [address] office    Type of Home: Apartment(1st floor) in [facility]: Two level, Able to Live on Main level with bedroom/bathroom(Full flight to second floor)    Home Access: Stairs to enter without rails Entrance Stairs - Number of Steps: 2    Bathroom Shower/Tub: Tub/Shower unit    Bathroom Equipment: (None)    Home Equipment: (N/A)    ADL Assistance: Independent    Homemaking Assistance: Independent    Homemaking Responsibilities: Yes    Meal Prep Responsibility: Primary    Laundry Responsibility: Primary    Cleaning Responsibility: Primary    Ambulation Assistance: Independent(No AD)    Transfer Assistance: Independent    Active : Yes    Additional Comments: States that she was down on ground X 72 hours following fall     Social Determinants of Health     Financial Resource Strain:     Difficulty of Paying Living Expenses: Not on file   Food Insecurity:     Worried About Running Out of Food in the Last Year: Not on file    Wander of Food in the Last Year: Not on file   Transportation Needs:     Lack of Transportation (Medical): Not on file    Lack of Transportation (Non-Medical):  Not on file   Physical Activity:     Days of Exercise per Week: Not on file    Minutes of Exercise per Session: Not on file   Stress:     Feeling of Stress : Not on file   Social Connections:     Frequency of Communication with Friends and Family: Not on file    Frequency of Social Gatherings with Friends and Family: Not on file    Attends Sikhism Services: Not on file    Active Member of [address] [place] [address] [employer] or Organizations: Not on file    Attends Club or Organization Meetings: Not on file    Marital Status: Not on file   Intimate Partner Violence:     Fear of Current or Ex-Partner: Not on file    Emotionally Abused: Not on file    Physically Abused: Not on file    Sexually Abused: Not on file   Housing Stability:     Unable to Pay for Housing in the Last Year: Not on file    Number of [place] in the Last Year: Not on file    Unstable Housing in the Last Year: Not on file         No results found for: LABA1C  No results found for: EAG    Lab Results   Component Value Date     03/25/2021    K 4.3 03/25/2021 K 3.3 01/16/2020     03/25/2021    CO2 24 03/25/2021    BUN 22 03/25/2021    CREATININE 1.0 03/25/2021    GLUCOSE 89 03/25/2021    CALCIUM 8.9 03/25/2021        Lab Results   Component Value Date    CHOL 131 11/24/2019     Lab Results   Component Value Date    TRIG 142 11/24/2019     Lab Results   Component Value Date    HDL 45 11/24/2019     Lab Results   Component Value Date    LDLCALC 58 11/24/2019     No results found for: LABVLDL, VLDL  No results found for: [facility]    Lab Results   Component Value Date    TSH 2.070 09/21/2021       Lab Results   Component Value Date    WBC 9.3 03/25/2021    HGB 28.5 (A) 08/03/2021    HCT 9.6 (A) 08/03/2021    MCV 94.8 03/25/2021     03/25/2021       Please note orders entered on site at facility after discussion with appropriate facility nursing/therapy/ / nutritional staff. Current longstanding medical problems and acute medical issues addressed with staff. Available data and data elements in on site paper chart reviewed and analyzed. Current external consultant notes reviewed in on site chart. Ordered laboratory testing and imaging will be reviewed when available.

## 2021-12-06 ENCOUNTER — OFFICE VISIT (OUTPATIENT)
Dept: GERIATRIC MEDICINE | Age: 81
End: 2021-12-06
Payer: COMMERCIAL

## 2021-12-06 DIAGNOSIS — G40.909 SEIZURE DISORDER (HCC): ICD-10-CM

## 2021-12-06 DIAGNOSIS — J42 CHRONIC BRONCHITIS, UNSPECIFIED CHRONIC BRONCHITIS TYPE (HCC): ICD-10-CM

## 2021-12-06 DIAGNOSIS — I50.32 CHRONIC DIASTOLIC HEART FAILURE (HCC): Primary | ICD-10-CM

## 2021-12-06 PROCEDURE — G8484 FLU IMMUNIZE NO ADMIN: HCPCS | Performed by: INTERNAL MEDICINE

## 2021-12-06 PROCEDURE — 1123F ACP DISCUSS/DSCN MKR DOCD: CPT | Performed by: INTERNAL MEDICINE

## 2021-12-06 PROCEDURE — 99309 SBSQ NF CARE MODERATE MDM 30: CPT | Performed by: INTERNAL MEDICINE

## 2021-12-09 ENCOUNTER — OFFICE VISIT (OUTPATIENT)
Dept: GERIATRIC MEDICINE | Age: 81
End: 2021-12-09
Payer: COMMERCIAL

## 2021-12-09 DIAGNOSIS — U09.9 POST COVID-19 CONDITION, UNSPECIFIED: Primary | ICD-10-CM

## 2021-12-09 DIAGNOSIS — J42 CHRONIC BRONCHITIS, UNSPECIFIED CHRONIC BRONCHITIS TYPE (HCC): ICD-10-CM

## 2021-12-09 PROCEDURE — 99308 SBSQ NF CARE LOW MDM 20: CPT | Performed by: PHYSICIAN ASSISTANT

## 2021-12-09 PROCEDURE — 1123F ACP DISCUSS/DSCN MKR DOCD: CPT | Performed by: PHYSICIAN ASSISTANT

## 2021-12-15 ENCOUNTER — OFFICE VISIT (OUTPATIENT)
Dept: GERIATRIC MEDICINE | Age: 81
End: 2021-12-15
Payer: COMMERCIAL

## 2021-12-15 DIAGNOSIS — U09.9 POST COVID-19 CONDITION, UNSPECIFIED: Primary | ICD-10-CM

## 2021-12-15 DIAGNOSIS — Z87.01 HX OF VIRAL PNEUMONIA: ICD-10-CM

## 2021-12-15 PROCEDURE — 1123F ACP DISCUSS/DSCN MKR DOCD: CPT | Performed by: PHYSICIAN ASSISTANT

## 2021-12-15 PROCEDURE — 99308 SBSQ NF CARE LOW MDM 20: CPT | Performed by: PHYSICIAN ASSISTANT

## 2021-12-15 PROCEDURE — G8484 FLU IMMUNIZE NO ADMIN: HCPCS | Performed by: PHYSICIAN ASSISTANT

## 2021-12-18 NOTE — PROGRESS NOTES
SUBJECTIVE:    This d 79 ear-old woman seen in her room visit for her seizure disorder COPD heart failure. Patient has had intermittent shortness of breath has been eval for COVID-19. Patient has been globally weak. Patient has been on precautions limiting exposures. Patient has has not had new chest palpitation change in her bowel bladder habits is globally frail. ROS: Limited by cognition  The rest of the 14 point ROS negative    PHYSICAL EXAM: VSS per facility record  Is are small PERRL reactive oral mucosa dry chest showed no crackles no wheezing cardiovascular showed a regular 1 of 6 toxics murmur abdomen soft nontender extremity trace terrestrial    ASSESSMENT & PLAN:   Diagnosis Orders   1. Seizure disorder ([address]     2. Chronic bronchitis, unspecified chronic bronchitis type ([address]     3. Chronic diastolic heart failure (HCC)         Monitor for seizure activity continue with antiepileptic agents. Continue respiratory treatments status.           Past Medical History:   Diagnosis Date    Aortic stenosis     Chronic kidney disease     COPD (chronic obstructive pulmonary disease) (HCC)     Heart failure ([facility])     High cholesterol     HTN (hypertension)     Hypothyroid     New onset a-fib [facility])          Past Surgical History:   Procedure Laterality Date    KNEE SURGERY Right 4/14/2020    RIGHT KNEE REMOVAL OF HARDWARE [facility] 112 Right 1/16/2020    RIGHT QUADRICEPS MUSCLE BIOPSY performed by Radha Ramon MD at [facility] 37 Right 11/18/2019    RIGHT PATELLA OPEN REDUCTION INTERNAL FIXATION  ROOM 180 performed by Deena Ghosh MD at [facility] N/A 12/17/2019    EGD performed by Doris Albert MD at [facility]         Current Outpatient Medications on File Prior to Visit   Medication Sig Dispense Refill    ferrous sulfate (IRON 325) 325 (65 Fe) MG tablet Take 1 tablet by mouth 2 times daily 60 tablet 5    magnesium hydroxide (MILK OF MAGNESIA) 400 MG/5ML suspension Take 30 mLs by mouth      potassium chloride (KLOR-CON M) 20 MEQ extended release tablet Take 40 mEq by mouth      acetaminophen (TYLENOL) 650 MG suppository Place 650 mg rectally every 4 hours as needed for Fever      Aluminum & Magnesium Hydroxide (MAGNESIUM-ALUMINUM PO) Take by mouth 225-200 MG/5ML      albuterol-ipratropium (COMBIVENT RESPIMAT)  MCG/ACT AERS inhaler Inhale 1 puff into the lungs every 6 hours      Sodium Phosphates (FLEET) 7-19 GM/118ML Place 1 enema rectally once as needed      glucagon 1 MG injection Inject 1 kit into the muscle once      Glucose-Cholecalciferol (TRUEPLUS GLUCOSE) GEL Take by mouth      GUAIFENESIN PO Take 10 mLs by mouth every 4 hours as needed      ibuprofen (ADVIL;MOTRIN) 400 MG tablet Take 1 tablet by mouth every 6 hours as needed for Pain 30 tablet 0    levothyroxine (SYNTHROID) 125 MCG tablet Take 1 tablet by mouth Daily 30 tablet 03    alendronate (FOSAMAX) 70 MG tablet Take 70 mg by mouth      amLODIPine (NORVASC) 5 MG tablet Take 1 tablet by mouth daily 30 tablet 0    bisacodyl (DULCOLAX) 10 MG suppository Place 10 mg rectally daily as needed for Constipation      calcium-vitamin D (OSCAL-500) 500-200 MG-UNIT per tablet Take 1 tablet by mouth 2 times daily      mirtazapine (REMERON) 15 MG tablet Take 7.5 mg by mouth nightly      guaiFENesin-dextromethorphan (ROBITUSSIN DM) 100-10 MG/5ML syrup Take 5 mLs by mouth every 6 hours as needed for Cough      sennosides-docusate sodium (SENOKOT-S) 8.6-50 MG tablet Take 2 tablets by mouth nightly HOLD FOR LOOSE STOOL      acetaminophen (TYLENOL) 325 MG tablet Take 650 mg by mouth every 4 hours as needed for Pain      pantoprazole (PROTONIX) 40 MG tablet Take 1 tablet by mouth 2 times daily (before meals) 60 tablet 0    vitamin B-12 (CYANOCOBALAMIN) 500 MCG tablet Take 500 mcg by mouth daily      folic acid (FOLVITE) 1 MG tablet Take 1 mg by mouth daily      aspirin 81 MG chewable tablet Take 1 tablet by mouth daily 30 tablet 3    amiodarone (CORDARONE) 200 MG tablet Take 1 tablet by mouth daily 30 tablet 3    ipratropium-albuterol (DUONEB) 0.5-2.5 (3) MG/3ML SOLN nebulizer solution Inhale 3 mLs into the lungs every 4 hours as needed for Shortness of Breath 360 mL 0    carBAMazepine (TEGRETOL) 200 MG tablet Take 1 tablet by mouth 2 times daily 90 tablet 3    coenzyme Q10 100 MG CAPS capsule Take 1 capsule by mouth 2 times daily (before meals) 60 capsule 0    vitamin D (ERGOCALCIFEROL) 1.25 MG (97555 UT) CAPS capsule Take 1 capsule by mouth once a week (Patient taking differently: Take 50,000 Units by mouth once a week mondays) 5 capsule 0    metoprolol tartrate (LOPRESSOR) 25 MG tablet Take 0.5 tablets by mouth 2 times daily 60 tablet 3     No current facility-administered medications on file prior to visit. No family history on file. Social History     Socioeconomic History    Marital status:       Spouse name: Not on file    Number of children: 2    Years of education: 15    Highest education level: 12th grade   Occupational History    Occupation:      Comment: [employer] [place]   Tobacco Use    Smoking status: Former Smoker     Packs/day: 0.50     Years: 25.00     Pack years: 12.50    Smokeless tobacco: Never Used   Substance and Sexual Activity    Alcohol use: Never    Drug use: Never    Sexual activity: Not on file   Other Topics Concern    Not on file   Social History Narrative         Lives With: Florence Tao lives in [place] fulltime t the [address] [place] [address] office    Type of Home: Apartment(1st floor) in [facility]: Two level, Able to Live on Main level with bedroom/bathroom(Full flight to second floor)    Home Access: Stairs to enter without rails    Entrance Stairs - Number of Steps: 2    Bathroom Shower/Tub: Tub/Shower unit    Bathroom Equipment: (None)    Home Equipment: (N/A)    ADL Assistance: Independent    Homemaking Assistance: Independent    Homemaking Responsibilities: Yes    Meal Prep Responsibility: Primary    Laundry Responsibility: Primary    Cleaning Responsibility: Primary    Ambulation Assistance: Independent(No AD)    Transfer Assistance: Independent    Active : Yes    Additional Comments: States that she was down on ground X 72 hours following fall     Social Determinants of Health     Financial Resource Strain:     Difficulty of Paying Living Expenses: Not on file   Food Insecurity:     Worried About Running Out of Food in the Last Year: Not on file    Wander of Food in the Last Year: Not on file   Transportation Needs:     Lack of Transportation (Medical): Not on file    Lack of Transportation (Non-Medical):  Not on file   Physical Activity:     Days of Exercise per Week: Not on file    Minutes of Exercise per Session: Not on file   Stress:     Feeling of Stress : Not on file   Social Connections:     Frequency of Communication with Friends and Family: Not on file    Frequency of Social Gatherings with Friends and Family: Not on file    Attends Muslim Services: Not on file    Active Member of [address] [place] [address] or Organizations: Not on file    Attends Club or Organization Meetings: Not on file    Marital Status: Not on file   Intimate Partner Violence:     Fear of Current or Ex-Partner: Not on file    Emotionally Abused: Not on file    Physically Abused: Not on file    Sexually Abused: Not on file   Housing Stability:     Unable to Pay for Housing in the Last Year: Not on file    Number of [place] in the Last Year: Not on file    Unstable Housing in the Last Year: Not on file         No results found for: LABA1C  No results found for: EAG    Lab Results   Component Value Date     03/25/2021    K 4.3 03/25/2021    K 3.3 01/16/2020     03/25/2021    CO2 24 03/25/2021    BUN 22 03/25/2021    CREATININE 1.0 03/25/2021 GLUCOSE 89 03/25/2021    CALCIUM 8.9 03/25/2021        Lab Results   Component Value Date    CHOL 131 11/24/2019     Lab Results   Component Value Date    TRIG 142 11/24/2019     Lab Results   Component Value Date    HDL 45 11/24/2019     Lab Results   Component Value Date    LDLCALC 58 11/24/2019     No results found for: LABVLDL, VLDL  No results found for: [facility]    Lab Results   Component Value Date    TSH 2.070 09/21/2021       Lab Results   Component Value Date    WBC 9.3 03/25/2021    HGB 28.5 (A) 08/03/2021    HCT 9.6 (A) 08/03/2021    MCV 94.8 03/25/2021     03/25/2021       Please note orders entered on site at facility after discussion with appropriate facility nursing/therapy/ / nutritional staff. Current longstanding medical problems and acute medical issues addressed with staff. Available data and data elements in on site paper chart reviewed and analyzed. Current external consultant notes reviewed in on site chart. Ordered laboratory testing and imaging will be reviewed when available.

## 2021-12-23 NOTE — PROGRESS NOTES
PATIENT:  Eliecer Salmon    :      DOS:  2021    [facility]    VITAL SIGNS:  Reviewed. See [facility]. HPI:  Patient seen today for left great toe ingrown toenail. No increased pain, redness/erythema noted over the past 3 to 4 days. There is mild-to-moderate exudate noted on the lateral corner of nail bed. None present now. Currently being cleaned daily, dried, and small Band-Aid/bandage placed along with Triple antibiotic ointment. Patient will be placed on a list to see podiatry for procedural excision of toenail edge. Patient has had repeat bouts of this in the past.  This appeared to be spontaneous. Will follow up as needed. No evidence of further acute infection, cellulitis otherwise to the affected left toe. MEDICATIONS:  Reviewed. ALLERGIES:  Reviewed. REVIEW OF SYSTEMS:  Constitutional:  No new fever, chills, nausea, vomiting, or fatigue beyond baseline. Cardiopulmonary:  No new complaints. Skin:  Left great toe lateral side showing signs of erythema and redness and increased evidence of infection and ingrown nature over the past 4 days with generalized tenderness and swelling to the area. Psychiatric:  Mood is overall stable. No acute complaints otherwise. Remaining 14-point ROS unremarkable. PHYSICAL EXAM:  GENERAL:  Overall good hygiene, subdued affect, no acute distress, pleasant, cooperative, A and Ox3. Pupils equal, round, and reactive equally to light. Regular rate, rhythm. No murmurs, rubs, or gallops noted beyond baseline. No significant lower leg edema. LSCTA. SKIN:  Left great toe mildly red, mildly tender to palpation. Mild evidence of crusted exudate noted from left great toenail bed. Patient is awake, alert, oriented 2/3. ASSESSMENT AND PLAN:  Left great ingrown toenail - will continue treatment of minor wound. Patient is on podiatry list to have evaluated and treated.   Monitor for worsening infection or cellulitis to the left great toe.         Electronically Signed By: Mira Johnson PA-C on 12/21/2021 20:19:15  ______________________________  Mira Johnson PA-C  [id number]  D: 12/20/2021 18:10:14  T: 12/20/2021 19:06:22    cc: - [id number] [place] [address]

## 2022-01-03 NOTE — PROGRESS NOTES
PATIENT:  Jasmyne Forrest    :  1940    DOS:  2021    [facility]    Vital signs reviewed within normal limits. HISTORY OF PRESENT ILLNESS:  The patient is seen today for right great toe follow up. The patient had right great toe ingrown toenail with exudate that was treated with Bactroban and simple dressing, did have adequate recovery. Currently much improved in viewing result. No new exudate, erythema, or redness to right great toe. The patient denies any new changes. The patient does state some minor pain; however, much improved compared to baseline. We will continue monitoring; however, no new treatment protocol at this time. MEDICATIONS:  Reviewed. ALLERGIES:  Reviewed. REVIEW OF SYSTEMS:  Constitutional:  No new fever, chills, nausea, vomiting, weakness, fatigue beyond baseline. Skin:  No new evident cellulitis or acute skin infection to right great toe or lower leg. Some mild tenderness to palpation with moderate palpation. Psychiatric:  Mood is overall stable. No acute changes otherwise. PHYSICAL EXAMINATION:  General:  Good hygiene, bright affect. No acute distress, pleasant, cooperative. Cardiopulmonary:  RRR. No significant JVD or lower leg edema present. Lung sounds are clear to auscultation in anterior lung fields. Mental Status: The patient is awake, alert, oriented 3/3. Skin:  Right toe clean, dry, intact. Some mild serosanguineous fluid. No exudate present at this time. Overall improved appearance compared to last visit. Small bandage present covering area currently. ASSESSMENT AND PLAN:  Right great ingrown toenail No new sequelae noted. Mild serosanguineous fluid. Overall improved, will follow up with podiatry as needed.          Electronically Signed By: Jose Alberto Gamez PA-C on 2021 10:27:49  ______________________________  Jose Alberto Gamez PA-C  [id number]  D: 2021 18:58:23  T: 2021 20:22:09    cc: [facility]

## 2022-01-03 NOTE — PROGRESS NOTES
SUBJECTIVE:  This 41-year-old woman is seen for follow-up visit for her heart failure seizure shoulder bronchitis. Patient has been clinically stable notes no acute pain crisis no recent emesis fevers or chills no change in her bowel bladder habits no bleeding diathesis. ROS: Denies chest palpitations nausea vomiting  The rest of the 14 point ROS negative    PHYSICAL EXAM: VSS per facility record  Frail-appearing pupils are small oral mucosa moist chest showed faint extra wheezing cardiovascular showed a regular rate abdomen soft nontender    ASSESSMENT & PLAN:   Diagnosis Orders   1. Chronic diastolic heart failure (HCC)     2. Seizure disorder ([facility]     3.  Chronic bronchitis, unspecified chronic bronchitis type ([facility] [address]         Weights monitor renal function monitoring, antiepileptic agents at this time is on Tegretol parting it well          Past Medical History:   Diagnosis Date    Aortic stenosis     Chronic kidney disease     COPD (chronic obstructive pulmonary disease) (HCC)     Heart failure (HCC)     High cholesterol     HTN (hypertension)     Hypothyroid     New onset a-fib [facility])          Past Surgical History:   Procedure Laterality Date    KNEE SURGERY Right 4/14/2020    RIGHT KNEE REMOVAL OF HARDWARE performed by Andrae Faulkner MD at [facility] Right 1/16/2020    RIGHT QUADRICEPS MUSCLE BIOPSY performed by Melody Irizarry MD at [place] [address] Right 11/18/2019    RIGHT PATELLA OPEN REDUCTION INTERNAL FIXATION  ROOM 180 performed by Andrae Faulkner MD at [address] [place] [address] Rd 12/17/2019    EGD performed by Rika Lawrence MD at [facility]         Current Outpatient Medications on File Prior to Visit   Medication Sig Dispense Refill    ferrous sulfate (IRON 325) 325 (65 Fe) MG tablet Take 1 tablet by mouth 2 times daily 60 tablet 5    magnesium hydroxide (MILK OF MAGNESIA) 400 MG/5ML suspension Take 30 mLs by mouth      potassium chloride (KLOR-CON M) 20 MEQ extended release tablet Take 40 mEq by mouth      acetaminophen (TYLENOL) 650 MG suppository Place 650 mg rectally every 4 hours as needed for Fever      Aluminum & Magnesium Hydroxide (MAGNESIUM-ALUMINUM PO) Take by mouth 225-200 MG/5ML      albuterol-ipratropium (COMBIVENT RESPIMAT)  MCG/ACT AERS inhaler Inhale 1 puff into the lungs every 6 hours      Sodium Phosphates (FLEET) 7-19 GM/118ML Place 1 enema rectally once as needed      glucagon 1 MG injection Inject 1 kit into the muscle once      Glucose-Cholecalciferol (TRUEPLUS GLUCOSE) GEL Take by mouth      GUAIFENESIN PO Take 10 mLs by mouth every 4 hours as needed      ibuprofen (ADVIL;MOTRIN) 400 MG tablet Take 1 tablet by mouth every 6 hours as needed for Pain 30 tablet 0    levothyroxine (SYNTHROID) 125 MCG tablet Take 1 tablet by mouth Daily 30 tablet 03    alendronate (FOSAMAX) 70 MG tablet Take 70 mg by mouth      amLODIPine (NORVASC) 5 MG tablet Take 1 tablet by mouth daily 30 tablet 0    bisacodyl (DULCOLAX) 10 MG suppository Place 10 mg rectally daily as needed for Constipation      calcium-vitamin D (OSCAL-500) 500-200 MG-UNIT per tablet Take 1 tablet by mouth 2 times daily      mirtazapine (REMERON) 15 MG tablet Take 7.5 mg by mouth nightly      guaiFENesin-dextromethorphan (ROBITUSSIN DM) 100-10 MG/5ML syrup Take 5 mLs by mouth every 6 hours as needed for Cough      sennosides-docusate sodium (SENOKOT-S) 8.6-50 MG tablet Take 2 tablets by mouth nightly HOLD FOR LOOSE STOOL      acetaminophen (TYLENOL) 325 MG tablet Take 650 mg by mouth every 4 hours as needed for Pain      pantoprazole (PROTONIX) 40 MG tablet Take 1 tablet by mouth 2 times daily (before meals) 60 tablet 0    vitamin B-12 (CYANOCOBALAMIN) 500 MCG tablet Take 500 mcg by mouth daily      folic acid (FOLVITE) 1 MG tablet Take 1 mg by mouth daily      aspirin 81 MG chewable tablet Take 1 tablet by mouth daily 30 tablet 3    Responsibilities: Yes    Meal Prep Responsibility: Primary    Laundry Responsibility: Primary    Cleaning Responsibility: Primary    Ambulation Assistance: Independent(No AD)    Transfer Assistance: Independent    Active : Yes    Additional Comments: States that she was down on ground X 72 hours following fall     Social Determinants of Health     Financial Resource Strain:     Difficulty of Paying Living Expenses: Not on file   Food Insecurity:     Worried About Running Out of Food in the Last Year: Not on file    Wander of Food in the Last Year: Not on file   Transportation Needs:     Lack of Transportation (Medical): Not on file    Lack of Transportation (Non-Medical):  Not on file   Physical Activity:     Days of Exercise per Week: Not on file    Minutes of Exercise per Session: Not on file   Stress:     Feeling of Stress : Not on file   Social Connections:     Frequency of Communication with Friends and Family: Not on file    Frequency of Social Gatherings with Friends and Family: Not on file    Attends Mu-ism Services: Not on file    Active Member of [address] [place] [address] or Organizations: Not on file    Attends Club or Organization Meetings: Not on file    Marital Status: Not on file   Intimate Partner Violence:     Fear of Current or Ex-Partner: Not on file    Emotionally Abused: Not on file    Physically Abused: Not on file    Sexually Abused: Not on file   Housing Stability:     Unable to Pay for Housing in the Last Year: Not on file    Number of [place] in the Last Year: Not on file    Unstable Housing in the Last Year: Not on file         No results found for: LABA1C  No results found for: EAG    Lab Results   Component Value Date     03/25/2021    K 4.3 03/25/2021    K 3.3 01/16/2020     03/25/2021    CO2 24 03/25/2021    BUN 22 03/25/2021    CREATININE 1.0 03/25/2021    GLUCOSE 89 03/25/2021    CALCIUM 8.9 03/25/2021        Lab Results   Component Value Date    CHOL 131 11/24/2019     Lab Results   Component Value Date    TRIG 142 11/24/2019     Lab Results   Component Value Date    HDL 45 11/24/2019     Lab Results   Component Value Date    LDLCALC 58 11/24/2019     No results found for: LABVLDL, VLDL  No results found for: [facility]    Lab Results   Component Value Date    TSH 2.070 09/21/2021       Lab Results   Component Value Date    WBC 9.3 03/25/2021    HGB 28.5 (A) 08/03/2021    HCT 9.6 (A) 08/03/2021    MCV 94.8 03/25/2021     03/25/2021       Please note orders entered on site at facility after discussion with appropriate facility nursing/therapy/ / nutritional staff. Current longstanding medical problems and acute medical issues addressed with staff. Available data and data elements in on site paper chart reviewed and analyzed. Current external consultant notes reviewed in on site chart. Ordered laboratory testing and imaging will be reviewed when available.

## 2022-01-05 ENCOUNTER — OFFICE VISIT (OUTPATIENT)
Dept: GERIATRIC MEDICINE | Age: 82
End: 2022-01-05
Payer: MEDICAID

## 2022-01-05 DIAGNOSIS — M25.561 ACUTE PAIN OF RIGHT KNEE: Primary | ICD-10-CM

## 2022-01-05 PROCEDURE — 99308 SBSQ NF CARE LOW MDM 20: CPT | Performed by: PHYSICIAN ASSISTANT

## 2022-01-05 PROCEDURE — G8484 FLU IMMUNIZE NO ADMIN: HCPCS | Performed by: PHYSICIAN ASSISTANT

## 2022-01-05 PROCEDURE — 1123F ACP DISCUSS/DSCN MKR DOCD: CPT | Performed by: PHYSICIAN ASSISTANT

## 2022-01-06 NOTE — PROGRESS NOTES
Roxie Clay    :  14-    DOS:  2021    Vital signs reviewed. See [facility]. HPI:  Patient seen today for congestion and cough that is a little fairly new onset over the past 48 hours. Patient has had bouts of CHF related symptoms in the past with increased edema and cardiopulmonary congestion. Patient denies any pain with cough or productive cough. Her breath sounds are mildly coarse in the upper lung fields bilaterally. Slightly diminished in the bases due to increased concern of COVID. We will do work up with chest x-ray, BNP, BMP, CBC, stat today, DuoNebs q.4 hours routine x3 days, as well as Mucinex. Patient is in agreement. Patient is very inactive. Spends most of her time lying in bed. We will do our best to encourage incentive spirometry and getting up off of her back an as much as tolerated. We will perform further routine labs if indicated. MEDICATIONS:  Reviewed. ALLERGIES:  Reviewed. Review of Systems   Constitutional: Positive for fatigue. Negative for activity change, appetite change, chills and fever. Respiratory: Positive for cough and shortness of breath. Negative for wheezing and stridor. Cardiovascular: Negative for chest pain. Skin: Negative for color change and pallor. Psychiatric/Behavioral: Negative for agitation, confusion and dysphoric mood. The patient is not nervous/anxious. All other systems reviewed and are negative. Physical Exam  Vitals and nursing note reviewed. Constitutional:       General: She is not in acute distress. Appearance: Normal appearance. She is normal weight. She is not ill-appearing. HENT:      Head: Normocephalic and atraumatic. Mouth/Throat:      Mouth: Mucous membranes are moist.      Pharynx: Oropharynx is clear. Eyes:      Extraocular Movements: Extraocular movements intact.       Conjunctiva/sclera: Conjunctivae normal.   Cardiovascular:      Rate and Rhythm: Normal rate and regular rhythm. Pulses: Normal pulses. Pulmonary:      Breath sounds: Wheezing and rhonchi present. Skin:     General: Skin is warm and dry. Findings: No erythema. Neurological:      General: No focal deficit present. Mental Status: She is alert and oriented to person, place, and time. Motor: Weakness (chronic) present. Psychiatric:         Mood and Affect: Mood normal.         Behavior: Behavior normal.           ASSESSMENT AND PLAN:  Pulmonary congestion/cough-we will do 2-view PA, lateral chest x-ray, BNP, CBC, diff, stat. We will add DuoNeb q.4 hours inhale q.3 days, as well as Mucinex 600 mg p.o. b.i.d. routine for the next 7 to 10 days.          Electronically Signed By: Xavi Greenberg PA-C on 01/02/2022 23:08:21  ______________________________  RADHA Madden/[id number]  D: 01/01/2022 16:33:51  T: 01/01/2022 17:31:48

## 2022-01-07 ENCOUNTER — OFFICE VISIT (OUTPATIENT)
Dept: GERIATRIC MEDICINE | Age: 82
End: 2022-01-07
Payer: MEDICAID

## 2022-01-07 VITALS
SYSTOLIC BLOOD PRESSURE: 98 MMHG | RESPIRATION RATE: 20 BRPM | DIASTOLIC BLOOD PRESSURE: 52 MMHG | TEMPERATURE: 97.2 F | HEART RATE: 62 BPM | OXYGEN SATURATION: 97 %

## 2022-01-07 VITALS
HEART RATE: 64 BPM | SYSTOLIC BLOOD PRESSURE: 110 MMHG | DIASTOLIC BLOOD PRESSURE: 71 MMHG | RESPIRATION RATE: 16 BRPM | TEMPERATURE: 97.2 F | OXYGEN SATURATION: 96 %

## 2022-01-07 DIAGNOSIS — M25.461 PAIN AND SWELLING OF RIGHT KNEE: Primary | ICD-10-CM

## 2022-01-07 DIAGNOSIS — M25.561 PAIN AND SWELLING OF RIGHT KNEE: Primary | ICD-10-CM

## 2022-01-07 PROCEDURE — 99309 SBSQ NF CARE MODERATE MDM 30: CPT | Performed by: PHYSICIAN ASSISTANT

## 2022-01-07 PROCEDURE — 1123F ACP DISCUSS/DSCN MKR DOCD: CPT | Performed by: PHYSICIAN ASSISTANT

## 2022-01-07 PROCEDURE — G8484 FLU IMMUNIZE NO ADMIN: HCPCS | Performed by: PHYSICIAN ASSISTANT

## 2022-01-07 NOTE — PROGRESS NOTES
PATIENT:  Bakari Multani    :  1940    DOS:  11-    [facility]    VITAL SIGNS:  98/52, 62 bpm, 20 RR, 97% SpO2 on room air, 97.2 F. Patient seen today for hyponatremia over the weekend, approximately 130 mg/dL. On-call did add fluid restriction due to low sodium. BMP pending today. Patient was having some mild-to-moderate adventitious breath sounds noted with no new dyspnea, likely upper respiratory tract infection. Awaiting to review x-ray and current labs to add antibiotic if needed. Patient has been afebrile over the weekend. Denies any worsening of symptoms today. Nursing staff reports the same. Will follow up later in the week if any change in symptoms occur. MEDICATIONS:  Reviewed. ALLERGIES:  Reviewed. REVIEW OF SYSTEMS:  Constitutional:  Denies any NVD, chronic weakness. Mild increase in fatigue. Weight has been stable. HEENT:  Denies headache, confusion, lightheadedness. No new LOC. HEENT:  Some minor rhinorrhea. No new stuffiness, sneezing, sore throat. Otherwise, denies neck pain or swollen nodes. Cardiopulmonary:  Denies chest pain, PND, or VARELA. Does have some mild orthopnea. Propped up in bed, 1+ pillow. Denies any POI, cough, shortness of breath, or wheezing. GI:  Negative. :  Negative. Psychiatric:  Mood stable, pleasant overall, quiet. No new memory deficits. Remaining 14-point ROS is unremarkable. PHYSICAL EXAM:  Good hygiene overall. Flat affect. No acute distress. Pleasant, cooperative. Mood brightens with dialog. HEENT:  PERRLA.  MMM. No erythema, exudate on oropharyngeal exam.  Trachea is midline. CARDIOPULMONARY:  RRR. Minor lower extremity edema, nonpitting. Intact distal pulses bilaterally. lung sounds show mild wheezing midlung fields bilaterally. Minor crackles on right base areas. Left side clear in decubitus position. ABDOMINAL:  Normal bowel sounds. SKIN:  Skin turgor is fair.   MENTAL STATUS: Patient is awake, alert, oriented 3/3. ASSESSMENT AND PLAN:  1. Hyponatremia Continue fluid restriction. Monitor BMP today. Review effectiveness of fluid restriction protocol. 2.   Adventitious breath sounds review x-ray. Consider antibiotics if patient has fever or any worsening cough/productive cough.         Electronically Signed By: Jose Alberto Gamez PA-C on 01/04/2022 11:10:09  ______________________________  Jose Alberto Gamez PA-C  /[id number]  D: 01/03/2022 14:12:56  T: 01/03/2022 14:49:59    cc: - [phone] [facility]

## 2022-01-09 ASSESSMENT — ENCOUNTER SYMPTOMS
SHORTNESS OF BREATH: 1
COUGH: 1
STRIDOR: 0
COLOR CHANGE: 0
WHEEZING: 0

## 2022-01-18 VITALS
RESPIRATION RATE: 18 BRPM | DIASTOLIC BLOOD PRESSURE: 68 MMHG | HEART RATE: 60 BPM | SYSTOLIC BLOOD PRESSURE: 110 MMHG | OXYGEN SATURATION: 95 %

## 2022-01-18 NOTE — PROGRESS NOTES
[facility]    Vital signs:  110/68, 60 bpm, 18 RR, 95% SpO2 on room air. Patient is afebrile. HPI:  Patient seen today for followup on chest congestion. Overall great improvement with p.r.n. medication. Patient denies any acute chest pain, shortness of breath, POI or wheezing. Denies any dyspnea on exertion, however patient is extremely sedentary at baseline. No new lethargy. Patient says mild improvement in overall energy levels. Overall good overall improvement. We will maintain close monitoring and if any further changes, we will follow up with labs and/or chest x-ray as needed. Otherwise no new additional concerns today. MEDICATIONS:  Reviewed. ALLERGIES:  Reviewed. REVIEW OF SYSTEMS:  See HPI. PHYSICAL EXAMINATION:  General:  Overall good hygiene, bright affect, pleasant and cooperative. HEENT:  PERRLA. Reactive to light. No icterus. No injection. MMM. No erythema or exudate on oropharyngeal examination. Cardiopulmonary:  Regular rate and rhythm. No JVD. No lower leg edema. Patient is lying in bed today, cuff is around. Skin:  Patient's skin overall warm to touch and blanching with pressure. No evident wounds on bilateral lower leg and bilateral upper peripheral skin examination. No new open areas on buttocks or coccygeal areas. Pulmonary:  Normal entry and effort. Some minor expiratory wheezing upper and mid lung fields bilaterally. No rhonchi or rales noted. Mental status:  Patient is awake, alert, oriented 3. ASSESSMENT AND PLAN:  History of chest congestion/history of pneumonia-significant improvement overall. Lung sounds clearing up. We will continue p.r.n. medications if needed. Overall we will monitor over the next week for signs of any further decompensation.         Electronically Signed By: Cassidy Kimball PA-C on 01/13/2022 15:55:26  ______________________________  Cassidy Kimball PA-C  /[id number]  D: 01/12/2022 18:05:44  T: 01/12/2022 19:17:25    cc: - [phone] [facility]

## 2022-01-19 ENCOUNTER — OFFICE VISIT (OUTPATIENT)
Dept: GERIATRIC MEDICINE | Age: 82
End: 2022-01-19
Payer: MEDICAID

## 2022-01-19 DIAGNOSIS — S82.001S CLOSED NONDISPLACED FRACTURE OF RIGHT PATELLA, UNSPECIFIED FRACTURE MORPHOLOGY, SEQUELA: Primary | ICD-10-CM

## 2022-01-19 DIAGNOSIS — M25.561 ACUTE PAIN OF RIGHT KNEE: ICD-10-CM

## 2022-01-19 PROCEDURE — 99308 SBSQ NF CARE LOW MDM 20: CPT | Performed by: PHYSICIAN ASSISTANT

## 2022-01-19 PROCEDURE — 1123F ACP DISCUSS/DSCN MKR DOCD: CPT | Performed by: PHYSICIAN ASSISTANT

## 2022-01-19 PROCEDURE — G8484 FLU IMMUNIZE NO ADMIN: HCPCS | Performed by: PHYSICIAN ASSISTANT

## 2022-01-21 ENCOUNTER — OFFICE VISIT (OUTPATIENT)
Dept: GERIATRIC MEDICINE | Age: 82
End: 2022-01-21
Payer: MEDICAID

## 2022-01-21 DIAGNOSIS — N18.30 CHRONIC RENAL IMPAIRMENT, STAGE 3 (MODERATE), UNSPECIFIED WHETHER STAGE 3A OR 3B CKD (HCC): ICD-10-CM

## 2022-01-21 DIAGNOSIS — I50.32 CHRONIC DIASTOLIC HEART FAILURE (HCC): Primary | ICD-10-CM

## 2022-01-21 DIAGNOSIS — J42 CHRONIC BRONCHITIS, UNSPECIFIED CHRONIC BRONCHITIS TYPE (HCC): ICD-10-CM

## 2022-01-21 PROCEDURE — G8484 FLU IMMUNIZE NO ADMIN: HCPCS | Performed by: PHYSICIAN ASSISTANT

## 2022-01-21 PROCEDURE — 1123F ACP DISCUSS/DSCN MKR DOCD: CPT | Performed by: PHYSICIAN ASSISTANT

## 2022-01-21 PROCEDURE — 99308 SBSQ NF CARE LOW MDM 20: CPT | Performed by: PHYSICIAN ASSISTANT

## 2022-01-31 VITALS
OXYGEN SATURATION: 99 % | TEMPERATURE: 97.6 F | HEART RATE: 70 BPM | DIASTOLIC BLOOD PRESSURE: 55 MMHG | SYSTOLIC BLOOD PRESSURE: 101 MMHG

## 2022-01-31 NOTE — PROGRESS NOTES
PATIENT:  Robynn Gilford    :  34-    DOS:  12-    [facility]    Vital signs reviewed, within normal limits, see [facility]. HISTORY OF PRESENT ILLNESS:  The patient is seen today for followup on COVID-19 infection. She is back in her room at this time at her quarantine. The patient overall is doing well. No new changes in cardiorespiratory status. No sequelae noted. Uneventful infection overall. The patient is currently asymptomatic. We will continue monitoring the patient closely. We will DC supplementation within a week. We will continue monitoring for any acute changes over the next week. No further concerns or orders today. MEDICATIONS:  Reviewed. ALLERGIES:  Reviewed. REVIEW OF SYSTEMS:  Constitutional:  No fever, chills. Denies any NVD. Ongoing chronic fatigue and weakness, but no change in her baseline. Cardiopulmonary:  Denies any CP, orthopnea, PND, or VARELA. Very sedentary at baseline. No new SOB, POI, or wheezing. GI:  Negative. :  Negative. MSK:  No new changes. Psychiatric:  Mood is overall stable. No acute change. Remaining 14-point ROS unremarkable. PHYSICAL EXAMINATION:  General:  Good hygiene overall, bright affect upon establishing dialog. No acute distress. HEENT:  PERRLA.  MMM. No erythema or exudate. Cardiopulmonary:  Regular rate and rhythm on auscultation. No significant lower leg edema. LSCTA bilaterally. Slightly diminished breath sounds in the bases. Abdominal:  Normal bowel sounds, nontender throughout. Mental Status:  Awake, alert, oriented 3/3. ASSESSMENT AND PLAN:  Status post COVID-19 infection/history of pneumonia Uneventful visit today, asymptomatic currently. We will DC supplements and monitor further next week.          Electronically Signed By: Miya Escoto PA-C on 2022 12:55:25  ______________________________  Miya Escoto PA-C  [id number]  D: 2022 10:45:15  T: 2022 11:41:16    cc: - [phone] [facility]

## 2022-01-31 NOTE — PROGRESS NOTES
2021    TELEHEALTH EVALUATION -- Audio/Visual (During MIFDD- public health emergency)    HPI:    Mary Anne Liner (:  1940) has requested an audio/video evaluation for the following concern(s):    PATIENT:  Yanna Alcantar    :  1940    DOS:  2021    [facility]    Vital signs reviewed, 101/55, 97.6 F, 99% SpO2 on room air, 70 bpm.  Seen via virtual visit. The patient is currently being seen today for positive COVID-19 infection. The patient is overall asymptomatic at this time. No respiratory distress or ARDS related complaints. She is breathing well, evenly controlled. She appears to be in bed like she typically is throughout today watching television. Denies any symptoms of respiratory distress. The patient is on regular routine monitoring labs for COVID-19 standing orders as well as additional Pepcid, zinc, vitamin D3 and vitamin C. We will continue monitoring patient over the course of the next several days. The patient has received both series of vaccinations, we will review. Make sure Pneumovax and flu are in place as well. Electronically Signed By: Vicenta Flores PA-C on 2022 12:55:55  ______________________________  RADHA Yo/[id number]  D: 2022 12:42:31  T: 2022 13:19:20    cc: - [facility]    Review of Systems    Prior to Visit Medications    Medication Sig Taking?  Authorizing Provider   ferrous sulfate (IRON 325) 325 (65 Fe) MG tablet Take 1 tablet by mouth 2 times daily  Ankur Johnson MD   magnesium hydroxide (MILK OF MAGNESIA) 400 MG/5ML suspension Take 30 mLs by mouth  Historical Provider, MD   potassium chloride (KLOR-CON M) 20 MEQ extended release tablet Take 40 mEq by mouth  Historical Provider, MD   acetaminophen (TYLENOL) 650 MG suppository Place 650 mg rectally every 4 hours as needed for Fever  Historical Provider, MD   Aluminum & Magnesium Hydroxide (MAGNESIUM-ALUMINUM PO) Take by mouth 225-200 MG/5ML  Historical Provider, MD   albuterol-ipratropium (COMBIVENT RESPIMAT)  MCG/ACT AERS inhaler Inhale 1 puff into the lungs every 6 hours  Historical Provider, MD   Sodium Phosphates (FLEET) 7-19 GM/118ML Place 1 enema rectally once as needed  Historical Provider, MD   glucagon 1 MG injection Inject 1 kit into the muscle once  Historical Provider, MD   Glucose-Cholecalciferol (TRUEPLUS GLUCOSE) GEL Take by mouth  Historical Provider, MD   GUAIFENESIN PO Take 10 mLs by mouth every 4 hours as needed  Historical Provider, MD   ibuprofen (ADVIL;MOTRIN) 400 MG tablet Take 1 tablet by mouth every 6 hours as needed for Pain  Tegan Shah MD   levothyroxine (SYNTHROID) 125 MCG tablet Take 1 tablet by mouth Daily  Hollie Khanna MD   alendronate (FOSAMAX) 70 MG tablet Take 70 mg by mouth  Historical Provider, MD   amLODIPine (NORVASC) 5 MG tablet Take 1 tablet by mouth daily  Radha Westbrook,    bisacodyl (DULCOLAX) 10 MG suppository Place 10 mg rectally daily as needed for Constipation  Historical Provider, MD   calcium-vitamin D (OSCAL-500) 500-200 MG-UNIT per tablet Take 1 tablet by mouth 2 times daily  Historical Provider, MD   mirtazapine (REMERON) 15 MG tablet Take 7.5 mg by mouth nightly  Historical Provider, MD   guaiFENesin-dextromethorphan (ROBITUSSIN DM) 100-10 MG/5ML syrup Take 5 mLs by mouth every 6 hours as needed for Cough  Historical Provider, MD   sennosides-docusate sodium (SENOKOT-S) 8.6-50 MG tablet Take 2 tablets by mouth nightly [address]  Historical Provider, MD   acetaminophen (TYLENOL) 325 MG tablet Take 650 mg by mouth every 4 hours as needed for Pain  Historical Provider, MD   pantoprazole (PROTONIX) 40 MG tablet Take 1 tablet by mouth 2 times daily (before meals)  Ana Johnson DO   vitamin B-12 (CYANOCOBALAMIN) 500 MCG tablet Take 500 mcg by mouth daily  Historical Provider, MD   folic acid (FOLVITE) 1 MG tablet Take 1 mg by mouth daily  Historical Provider, MD aspirin 81 MG chewable tablet Take 1 tablet by mouth daily  Dacia Scullin, DO   amiodarone (CORDARONE) 200 MG tablet Take 1 tablet by mouth daily  Dacia Scullin, DO   ipratropium-albuterol (DUONEB) 0.5-2.5 (3) MG/3ML SOLN nebulizer solution Inhale 3 mLs into the lungs every 4 hours as needed for Shortness of Breath  Dacia Scullin, DO   carBAMazepine (TEGRETOL) 200 MG tablet Take 1 tablet by mouth 2 times daily  Dacia Scullin, DO   coenzyme Q10 100 MG CAPS capsule Take 1 capsule by mouth 2 times daily (before meals)  Dacia Scullin, DO   vitamin D (ERGOCALCIFEROL) 1.25 MG (71367 UT) CAPS capsule Take 1 capsule by mouth once a week  Patient taking differently: Take 50,000 Units by mouth once a week mondays  Dacia Scullin, DO   metoprolol tartrate (LOPRESSOR) 25 MG tablet Take 0.5 tablets by mouth 2 times daily  Yosvany Cotton,        Social History     Tobacco Use    Smoking status: Former Smoker     Packs/day: 0.50     Years: 25.00     Pack years: 12.50    Smokeless tobacco: Never Used   Substance Use Topics    Alcohol use: Never    Drug use: Never        Allergies   Allergen Reactions    Amoxicillin      rash and GI upset    Cefdinir Hives    Sulfa Antibiotics    ,   Past Medical History:   Diagnosis Date    Aortic stenosis     Chronic kidney disease     COPD (chronic obstructive pulmonary disease) (HCC)     Heart failure (HCC)     High cholesterol     HTN (hypertension)     Hypothyroid     New onset a-fib ([facility]    ,   Past Surgical History:   Procedure Laterality Date    KNEE SURGERY Right 4/14/2020    RIGHT KNEE REMOVAL OF HARDWARE [facility] Right 1/16/2020    RIGHT QUADRICEPS MUSCLE BIOPSY performed by Ciara Boyd MD at [facility] Right 11/18/2019    RIGHT PATELLA OPEN REDUCTION INTERNAL FIXATION  ROOM 180 performed by Jian Low MD at [place] [address] ENDOSCOPY N/A 12/17/2019 EGD performed by Daron Mayfield MD at [facility]   ,   Social History     Tobacco Use    Smoking status: Former Smoker     Packs/day: 0.50     Years: 25.00     Pack years: 12.50    Smokeless tobacco: Never Used   Substance Use Topics    Alcohol use: Never    Drug use: Never   , No family history on file.,   Immunization History   Administered Date(s) Administered    COVID-19, Pfizer Purple top, DILUTE for use, 12+ yrs, 30mcg/0.3mL dose 01/06/2021, 01/27/2021, 10/14/2021       PHYSICAL EXAMINATION:  [ INSTRUCTIONS:  \"[x]\" Indicates a positive item  \"[]\" Indicates a negative item  -- DELETE ALL ITEMS NOT EXAMINED]  Vital Signs: (As obtained by patient/caregiver or practitioner observation)    VS: See HPI. Constitutional: [x] Appears well-developed and well-nourished [x] No apparent distress      [] Abnormal-   Mental status  [x] Alert and awake  [x] Oriented to person/place/time [x]Able to follow commands      Eyes:  EOM    [x]  Normal  [] Abnormal-  Sclera  [x]  Normal  [] Abnormal -         Discharge [x]  None visible  [] Abnormal -    HENT:   [x] Normocephalic, atraumatic. [] Abnormal   [x] Mouth/Throat: Mucous membranes are moist.     External Ears [x] Normal  [] Abnormal-     Neck: [] No visualized mass     Pulmonary/Chest: [x] Respiratory effort normal.  [x] No visualized signs of difficulty breathing or respiratory distress        [] Abnormal-       Neurological:        [x] No Facial Asymmetry (Cranial nerve 7 motor function) (limited exam to video visit)          [x] No gaze palsy        [] Abnormal-         Skin:        [x] No significant exanthematous lesions or discoloration noted on facial skin         [] Abnormal-            Psychiatric:       [x] Normal Affect [x] No Hallucinations        [] Abnormal-     Other pertinent observable physical exam findings-     ASSESSMENT/PLAN:  1. Post covid-19 condition, unspecified  No new orders, see HPI    2.  Chronic bronchitis, unspecified chronic bronchitis type ([facility]  No new orders, see HPI. Vital signs stable      No follow-ups on file. Deirdre Coker, was evaluated through a synchronous (real-time) audio-video encounter. The patient (or guardian if applicable) is aware that this is a billable service, which includes applicable co-pays. This Virtual Visit was conducted with patient's (and/or legal guardian's) consent. The visit was conducted pursuant to the emergency declaration under the [address] [place] [address] authority and the [employer] General Act. Patient identification was verified, and a caregiver was present when appropriate. The patient was located at home in a state where the provider was licensed to provide care. Total time spent on this encounter: Not billed by time    --MELVINA Sharma on 2/1/2022 at 11:31 AM    An electronic signature was used to authenticate this note.

## 2022-02-01 VITALS
RESPIRATION RATE: 16 BRPM | HEART RATE: 59 BPM | DIASTOLIC BLOOD PRESSURE: 58 MMHG | TEMPERATURE: 98.1 F | OXYGEN SATURATION: 96 % | SYSTOLIC BLOOD PRESSURE: 110 MMHG

## 2022-02-01 NOTE — PROGRESS NOTES
DANYELL WOODWARD     PATIENT:  Abbe Iyer    :  76-    DOS:  2022    VITAL SIGNS:  110/58, 59 bpm, 96% SpO2 on room air, 16 RR, 98.1 degrees Fahrenheit. HPI:  The patient complaining of right knee pain today. The patient does have history of right knee revision with infection. Currently states \"10/10 pain\" with pressure and manipulation. It is mildly red. No new evidence of wound, open skin, around circumference of knee, slightly red near patella and distal patella tendon. Patient's patella is mobile from side to side. Does not appear to be dislocated. Prior surgical scar is intact. No openings. We will provide patient with some increased pain medication either APAP or tramadol. We will do CBC with diff and BMP STAT today with a right knee 3-view x-ray of affected knee. We will have patient set up for ortho follow up to assess. MEDICATIONS:  Reviewed. ALLERGIES:  Reviewed. REVIEW OF SYSTEMS:  Constitutional:  No new fever or chills, NVD, or increasing fatigue. Denies any confusion, lightheadedness. No headache or trauma to head or neck. Neurologic:  No new syncope, slurring speech, recent fall. Denies any numbness, tingling to bilateral lower legs. Cardiopulmonary:  Negative. Skin:  Does have some redness around right patella. Denies any itching. MSK:  Does describe pain and tenderness with moderate palpation. Hip range of motion is normal.  However, pain with near ends of ROM and flexion and extension. Some mild redness and swelling noted in the area; however, only some minor redness and swelling noted in the area. Left knee unchanged. Remaining 14-point ROS unremarkable. PHYSICAL EXAMINATION:  General:  Hygiene is within normal limits. No acute distress. Seen in her room lying in bed today. HEENT:  PERRLA.  MMM. No erythema, exudate. Limited exam.  Cardiopulmonary:  RRR. Normal distal pulses. No significant lower leg edema.   No JVD.  lung sounds clear bilaterally. Skin/MSK:  Right knee mildly swollen. Patella mobile, but in place. Surgical scars unchanged. No evidence of wound or skin opening at site. Weight bearing is tolerable; however, very painful per patient. Mental Status:  Patient is awake, alert, oriented 3/3. ASSESSMENT AND PLAN:  Right knee pain / hx of right patellar fracture repair - will encourage patient Tylenol use. Monitor site. CBC with diff and BMP STAT along with 3-view right knee x-ray. Ortho follow up to be scheduled today.         Electronically Signed By: Lizbeth Long PA-C on 02/01/2022 12:38:21  ______________________________  RADHA Mcdonald/[id number]  D: 01/31/2022 11:49:32  T: 01/31/2022 12:42:47

## 2022-02-07 NOTE — PROGRESS NOTES
[facility] [place] [facility]    PATIENT: Blaze Demarco    : 1940    DOS: 2022    Vital signs today within normal limits, see [address] HISTORY OF PRESENT ILLNESS:  Seen today for right knee swollen red, history of patellar fracture. Monitoring WBCs. No new acute elevation yet from the past several days. The patient does have ortho consult on 2022. She states that she is having some increased pain, approximately 8/10 with movement, both passive and active. There is mild redness to the area. No evidence of exudate. No pruritus, no drainage. Old surgical site intact. The patient is able to actively flex and extend her right knee; however, does cause increased pain and grimace. No evidence of fluctuant knee or fluid wave. Patella is mildly mobile. Pulses to bilateral lower legs are within normal limits. No new edema or swelling of the areas above and below the knee or at the knee itself. We will wait ortho consult. We will give patient tramadol 50 mg p.o. t.i.d. p.r.n. until pain subsides. Monitor CRP and ESR as well. MEDICATIONS:  Reviewed. ALLERGIES:  Reviewed. REVIEW OF SYSTEMS:  See HPI. PHYSICAL EXAMINATION:  See HPI. ASSESSMENT AND PLAN:  Right knee swelling - CRP/ESR, monitor WBC via CBC. Ortho consult on 2022 scheduled. Add tramadol 50 mg p.o. t.i.d. p.r.n. for the time being. We will DC when pain more tolerable. Utilize APAP as well in between tramadol doses.

## 2022-02-24 NOTE — PROGRESS NOTES
Subjective:      Patient ID: Lenora Jorge is a pleasant [de-identified] y.o. female who presents today for:  No chief complaint on file. Patient seen today for right knee pain and swelling history. Closed fracture of right patella and subsequent surgery. On inspection today it appears to be redness/erythema and pain overall is reducing significantly. Patient CRP is 1.4 mg/dL ESR was 52 mm/HR. Patient states that pain is well controlled at the moment and she has increased range of motion both passive and active. Overall strength is decreased however this is a baseline finding. Patient is extremely sedentary and spends her primary amount of time in bed, occasionally getting up in wheelchair for limited activities. Otherwise on inspection knee. No new drainage, increased erythema, increased swelling, no open areas, no new reports per nursing staff. Overall uneventful visit and inspection today.   Hand was sent for further Ortho eval if necessary    Patient Active Problem List   Diagnosis    Acute renal impairment    Anxiety state    Aortic valve stenosis    Gastroesophageal reflux disease    Hyperlipidemia    Senile hyperkeratosis    [facility] ascending aorta ([facility])    Tobacco user    Vitamin D deficiency    Closed fracture of right patella    Abnormal gait    Paroxysmal atrial fibrillation ([facility])    Hypertensive disorder    Chronic renal insufficiency, stage III (moderate) ([facility])    Rhabdomyolysis    Hyponatremia    Hypothyroidism    Adult body mass index 25-29    Acute posthemorrhagic anemia    Myositis    Muscle wasting and atrophy, not elsewhere classified, right thigh    Retained orthopedic hardware    Open wound with complication    Bone fixation device infection ([address]    Closed fracture of distal end of radius    Fracture of patella    History of seizure    Pressure injury of right heel, unstageable ([facility])    COPD (chronic obstructive pulmonary disease) ([facility])    Seizure disorder ([address]  Chronic diastolic heart failure (HCC)     Past Medical History:   Diagnosis Date    Aortic stenosis     Chronic kidney disease     COPD (chronic obstructive pulmonary disease) (HCC)     Heart failure (HCC)     High cholesterol     HTN (hypertension)     Hypothyroid     New onset a-fib [facility])      Past Surgical History:   Procedure Laterality Date    KNEE SURGERY Right 4/14/2020    RIGHT KNEE REMOVAL OF HARDWARE performed by Jian Low MD at [address] Right 1/16/2020    RIGHT QUADRICEPS MUSCLE BIOPSY performed by Ciara Boyd MD at [facility] 37 Right 11/18/2019    RIGHT PATELLA OPEN REDUCTION INTERNAL FIXATION  ROOM 180 [facility] N[facility]A 12/17/2019    EGD performed by Pavel Conte MD at [address] [place] [address] History     Socioeconomic History    Marital status:       Spouse name: Not on file    Number of children: 2    Years of education: 15    Highest education level: 12th grade   Occupational History    Occupation:      Comment: [employer]   Tobacco Use    Smoking status: Former Smoker     Packs/day: 0.50     Years: 25.00     Pack years: 12.50    Smokeless tobacco: Never Used   Substance and Sexual Activity    Alcohol use: Never    Drug use: Never    Sexual activity: Not on file   Other Topics Concern    Not on file   Social History Narrative         Lives With: Florence Tao lives in [place] fulltime t the [address] [place] [address] office    Type of Home: Apartment(1st floor) in [facility] [address]: Two level, Able to Live on Main level with bedroom/bathroom(Full flight to second floor)    Home Access: Stairs to enter without rails    Entrance Stairs - Number of Steps: 2    Bathroom Shower/Tub: Tub/Shower unit    Bathroom Equipment: (None)    Home Equipment: (N/A)    ADL Assistance: Independent    Homemaking Assistance: Independent    Homemaking Responsibilities: Yes    Meal Prep Responsibility: Primary    Laundry Responsibility: Primary    Cleaning Responsibility: Primary    Ambulation Assistance: Independent(No AD)    Transfer Assistance: Independent    Active : Yes    Additional Comments: States that she was down on ground X 72 hours following fall     Social Determinants of Health     Financial Resource Strain:     Difficulty of Paying Living Expenses: Not on file   Food Insecurity:     Worried About Running Out of Food in the Last Year: Not on file    Wander of Food in the Last Year: Not on file   Transportation Needs:     Lack of Transportation (Medical): Not on file    Lack of Transportation (Non-Medical): Not on file   Physical Activity:     Days of Exercise per Week: Not on file    Minutes of Exercise per Session: Not on file   Stress:     Feeling of Stress : Not on file   Social Connections:     Frequency of Communication with Friends and Family: Not on file    Frequency of Social Gatherings with Friends and Family: Not on file    Attends [employer]ism Services: Not on file    Active Member of [address] [place] [address] or Organizations: Not on file    Attends Club or Organization Meetings: Not on file    Marital Status: Not on file   Intimate Partner Violence:     Fear of Current or Ex-Partner: Not on file    Emotionally Abused: Not on file    Physically Abused: Not on file    Sexually Abused: Not on file   Housing Stability:     Unable to Pay for Housing in the Last Year: Not on file    Number of [place] in the Last Year: Not on file    Unstable Housing in the Last Year: Not on file     No family history on file. Allergies   Allergen Reactions    Amoxicillin      rash and GI upset    Cefdinir Hives    Sulfa Antibiotics          Review of Systems  See HPI other relevant ROS. Fatigue or weakness. No fever. No new increase in weakness, strength is improved albeit mildly. Objective:   LMP  (LMP Unknown)     Physical Exam  Vitals reviewed. Constitutional:       General: She is not in acute distress. Appearance: Normal appearance. She is normal weight. She is not ill-appearing. HENT:      Head: Normocephalic and atraumatic. Mouth/Throat:      Mouth: Mucous membranes are moist.      Pharynx: Oropharynx is clear. Eyes:      Extraocular Movements: Extraocular movements intact. Conjunctiva/sclera: Conjunctivae normal.   Cardiovascular:      Rate and Rhythm: Normal rate and regular rhythm. Pulses: Normal pulses. Skin:     General: Skin is warm and dry. Findings: No erythema. Neurological:      General: No focal deficit present. Mental Status: She is alert and oriented to person, place, and time. Motor: Weakness (chronic; extension improved since initial visit) present. Psychiatric:         Mood and Affect: Mood normal.         Behavior: Behavior normal.         Assessment:       Diagnosis Orders   1. Closed nondisplaced fracture of right patella, unspecified fracture morphology, sequela     2. Acute pain of right knee           Plan:      No orders of the defined types were placed in this encounter. No orders of the defined types were placed in this encounter. Continue monitoring CRP/ESR. Pain is improving. Utilize current pain medication techniques medications. No follow-ups on file. Side effects, adverse effects of the medication prescribed today, as well as treatment plan and result expectations have been discussed withthe patient who expresses understanding and desires to proceed.     Mira Johnson, [id number]

## 2022-02-26 NOTE — PROGRESS NOTES
Subjective:      Patient ID: Slade Botello is a pleasant [de-identified] y.o. female who presents today for:  No chief complaint on file. Patient is being seen today for monthly follow-up visit for chronic medical conditions including history of heart failure, CKD, and COPD. Patient also has a history of COVID-19 infection. Asymptomatic at this time. No new cardiorespiratory complaints. Currently denies any fever, nausea, fatigue, weakness given her current baseline. Weight has been stable. No headache or confusion. Denies any new syncope or collapse. No new CP, orthopnea, PND or, or VARELA. Patient sedentary at baseline continues to be so without any acute change in activity. No new cough, S OB, POI wheezing. Patient is not on any oxygen at this time. GI negative,  negative at this point. Overall mood is stable without any acute psychiatric concern. Patient is here urinating well without issue. Eating and drinking moderately well. We will follow-up for any acute issues. Currently no new sequelae from COVID-19. We will see the next monthly visit or for acute concern if warranted.       Patient Active Problem List   Diagnosis    Acute renal impairment    Anxiety state    Aortic valve stenosis    Gastroesophageal reflux disease    Hyperlipidemia    Senile hyperkeratosis    Aneurysm of ascending aorta (HCC)    Tobacco user    Vitamin D deficiency    Closed fracture of right patella    Abnormal gait    Paroxysmal atrial fibrillation (HCC)    Hypertensive disorder    Chronic renal insufficiency, stage III (moderate) ([facility])    Rhabdomyolysis    Hyponatremia    Hypothyroidism    Adult body mass index 25-29    Acute posthemorrhagic anemia    Myositis    Muscle wasting and atrophy, not elsewhere classified, right thigh    Retained orthopedic hardware    Open wound with complication    Bone fixation device infection (HCC)    Closed fracture of distal end of radius    Fracture of patella    History of seizure    Pressure injury of right heel, unstageable (HCC)    COPD (chronic obstructive pulmonary disease) (HCC)    Seizure disorder (HCC)    Chronic diastolic heart failure (HCC)     Past Medical History:   Diagnosis Date    Aortic stenosis     Chronic kidney disease     COPD (chronic obstructive pulmonary disease) (HCC)     Heart failure (HCC)     High cholesterol     HTN (hypertension)     Hypothyroid     New onset a-fib [facility])      Past Surgical History:   Procedure Laterality Date    KNEE SURGERY Right 4/14/2020    RIGHT KNEE REMOVAL OF HARDWARE [facility] Right 1/16/2020    RIGHT QUADRICEPS MUSCLE BIOPSY performed by Carolee Villegas MD at [facility] 37 Right 11/18/2019    RIGHT PATELLA OPEN REDUCTION INTERNAL FIXATION  ROOM 180 performed by Leeanne Calderón MD at [facility] 26 N/A 12/17/2019    EGD performed by Aaron Munson MD at [address] [place] [address] History     Socioeconomic History    Marital status:       Spouse name: Not on file    Number of children: 2    Years of education: 15    Highest education level: 12th grade   Occupational History    Occupation:      Comment: [employer]   Tobacco Use    Smoking status: Former Smoker     Packs/day: 0.50     Years: 25.00     Pack years: 12.50    Smokeless tobacco: Never Used   Substance and Sexual Activity    Alcohol use: Never    Drug use: Never    Sexual activity: Not on file   Other Topics Concern    Not on file   Social History Narrative         Lives With: Florence Tao lives in [place] fulltime t the [address] [place] [address]    Type of Home: Apartment(1st floor) in [facility]: Two level, Able to Live on Main level with bedroom/bathroom(Full flight to second floor)    Home Access: Stairs to enter without rails    Entrance Stairs - Number of Steps: 2    Bathroom Shower/Tub: Tub/Shower unit Bathroom Equipment: (None)    Home Equipment: (N/A)    ADL Assistance: Independent    Homemaking Assistance: Independent    Homemaking Responsibilities: Yes    Meal Prep Responsibility: Primary    Laundry Responsibility: Primary    Cleaning Responsibility: Primary    Ambulation Assistance: Independent(No AD)    Transfer Assistance: Independent    Active : Yes    Additional Comments: States that she was down on ground X 72 hours following fall     Social Determinants of Health     Financial Resource Strain:     Difficulty of Paying Living Expenses: Not on file   Food Insecurity:     Worried About Running Out of Food in the Last Year: Not on file    Wander of Food in the Last Year: Not on file   Transportation Needs:     Lack of Transportation (Medical): Not on file    Lack of Transportation (Non-Medical): Not on file   Physical Activity:     Days of Exercise per Week: Not on file    Minutes of Exercise per Session: Not on file   Stress:     Feeling of Stress : Not on file   Social Connections:     Frequency of Communication with Friends and Family: Not on file    Frequency of Social Gatherings with Friends and Family: Not on file    Attends Anglican Services: Not on file    Active Member of [address] [place] [address] [employer] or Organizations: Not on file    Attends Club or Organization Meetings: Not on file    Marital Status: Not on file   Intimate Partner Violence:     Fear of Current or Ex-Partner: Not on file    Emotionally Abused: Not on file    Physically Abused: Not on file    Sexually Abused: Not on file   Housing Stability:     Unable to Pay for Housing in the Last Year: Not on file    Number of [place] in the Last Year: Not on file    Unstable Housing in the Last Year: Not on file     No family history on file. Allergies   Allergen Reactions    Amoxicillin      rash and GI upset    Cefdinir Hives    Sulfa Antibiotics          Review of Systems  See HPI.   Objective:   LMP  (LMP Unknown)     Physical Exam  Constitutional:       Appearance: She is normal weight. HENT:      Head: Normocephalic and atraumatic. Mouth/Throat:      Mouth: Mucous membranes are moist.      Pharynx: Oropharynx is clear. Eyes:      Extraocular Movements: Extraocular movements intact. Conjunctiva/sclera: Conjunctivae normal.      Pupils: Pupils are equal, round, and reactive to light. Cardiovascular:      Rate and Rhythm: Normal rate and regular rhythm. Pulses: Normal pulses. Heart sounds: Normal heart sounds. Pulmonary:      Effort: Pulmonary effort is normal. No respiratory distress. Breath sounds: Normal breath sounds. No wheezing or rhonchi. Skin:     General: Skin is warm and dry. Neurological:      General: No focal deficit present. Mental Status: She is alert and oriented to person, place, and time. Mental status is at baseline. Motor: Weakness present. Psychiatric:         Mood and Affect: Mood normal.         Behavior: Behavior normal.         Assessment:       Diagnosis Orders   1. Chronic diastolic heart failure ([address]     2. Chronic bronchitis, unspecified chronic bronchitis type ([address]     3. Chronic renal impairment, stage 3 (moderate), unspecified whether stage 3a or 3b CKD ([facility]           Plan:      No orders of the defined types were placed in this encounter. No orders of the defined types were placed in this encounter. 1.  No new sequelae noted. Overall stable. Continue current medications. Monitor for pulmonary congestion/edema  2. Patient is breathing well without oxygen. Vital signs are stable including pulse ox. Continue monitoring without change to medications or management. New sequelae noted from COVID-19.  3.  Producing urine. Adequate intake of fluids. Skin turgor WNL. Last renal panel showing levels within normal notes. NNO. No follow-ups on file.       Side effects, adverse effects of the medication prescribed today, as well as treatment plan and result expectations have been discussed withthe patient who expresses understanding and desires to proceed.     Bg Agosto

## 2022-03-11 ENCOUNTER — OFFICE VISIT (OUTPATIENT)
Dept: GERIATRIC MEDICINE | Age: 82
End: 2022-03-11
Payer: MEDICAID

## 2022-03-11 DIAGNOSIS — I48.0 PAROXYSMAL ATRIAL FIBRILLATION (HCC): Primary | ICD-10-CM

## 2022-03-11 DIAGNOSIS — I35.0 AORTIC VALVE STENOSIS, ETIOLOGY OF CARDIAC VALVE DISEASE UNSPECIFIED: ICD-10-CM

## 2022-03-11 DIAGNOSIS — I50.32 CHRONIC DIASTOLIC HEART FAILURE (HCC): ICD-10-CM

## 2022-03-11 DIAGNOSIS — R13.13 PHARYNGEAL DYSPHAGIA: ICD-10-CM

## 2022-03-11 PROCEDURE — G8484 FLU IMMUNIZE NO ADMIN: HCPCS | Performed by: PHYSICIAN ASSISTANT

## 2022-03-11 PROCEDURE — 1123F ACP DISCUSS/DSCN MKR DOCD: CPT | Performed by: PHYSICIAN ASSISTANT

## 2022-03-11 PROCEDURE — 99308 SBSQ NF CARE LOW MDM 20: CPT | Performed by: PHYSICIAN ASSISTANT

## 2022-03-15 NOTE — DISCHARGE INSTR - COC
Therapy:  Current Nutrition Therapy:   [address] Diet List:549655170}    Routes of Feeding: {CHP DME Other Feedings:953488339}  Liquids: {Slp liquid thickness:40574}  Daily Fluid Restriction: {CHP DME Yes amt example:716308579}  Last Modified Barium Swallow with Video (Video Swallowing Test): {Done Not Done VKOA:102105638}    Treatments at the Time of Hospital Discharge:   Respiratory Treatments: ***  Oxygen Therapy:  {Therapy; copd oxygen:24385}  Ventilator:    { CC Vent SLLH:260651477}    Rehab Therapies: {THERAPEUTIC INTERVENTION:8127483097}  Weight Bearing Status/Restrictions: { CC Weight Bearin}  Other Medical Equipment (for information only, NOT a DME order):  {EQUIPMENT:685064445}  Other Treatments: ***    Patient's personal belongings (please select all that are sent with patient):  {[facility] DME Belongings:105630222}    RN SIGNATURE:  {Esignature:660836600}    CASE MANAGEMENT/SOCIAL WORK SECTION    Inpatient Status Date: ***    Readmission Risk Assessment Score:  Readmission Risk              Risk of Unplanned Readmission:        26           Discharging to Facility/ Agency   · Name: [facility]  · Address:  · Phone:[phone]  · Fax:    Dialysis Facility (if applicable)   · Name:  · Address:  · Dialysis Schedule:  · Phone:  · Fax:    / signature: Electronically signed by Janiece Schlatter, LSW on 20 at 10:22 AM    PHYSICIAN SECTION    Prognosis: {Prognosis:7800154567}    Condition at Discharge: [address] Patient Condition:651868513}    Rehab Potential (if transferring to Rehab): {Prognosis:6824539240}    Recommended Labs or Other Treatments After Discharge: ***    Physician Certification: I certify the above information and transfer of Richard Deutsch  is necessary for the continuing treatment of the diagnosis listed and that she requires {Admit to Appropriate Level of Care:74896} for {GREATER/LESS:440380323} 30 days.      Update Admission H&P: {CHP DME Changes in YBXII:582536466}    PHYSICIAN SIGNATURE:  {Esignature:709667395} Tetracycline Counseling: Patient counseled regarding possible photosensitivity and increased risk for sunburn.  Patient instructed to avoid sunlight, if possible.  When exposed to sunlight, patients should wear protective clothing, sunglasses, and sunscreen.  The patient was instructed to call the office immediately if the following severe adverse effects occur:  hearing changes, easy bruising/bleeding, severe headache, or vision changes.  The patient verbalized understanding of the proper use and possible adverse effects of tetracycline.  All of the patient's questions and concerns were addressed. Patient understands to avoid pregnancy while on therapy due to potential birth defects.

## 2022-03-21 PROBLEM — R13.13 PHARYNGEAL DYSPHAGIA: Status: ACTIVE | Noted: 2022-03-21

## 2022-03-21 ASSESSMENT — ENCOUNTER SYMPTOMS
COLOR CHANGE: 0
CHOKING: 0
WHEEZING: 0
COUGH: 0
SHORTNESS OF BREATH: 0

## 2022-03-21 NOTE — PROGRESS NOTES
Subjective:      Patient ID: Mary Anne Case is a pleasant [de-identified] y.o. female who presents today for:  No chief complaint on file. [facility]    Patient is seen today for monthly follow-up visit for history of A. fib unspecified, dysphagia, as well as heart failure/none rheumatic aortic valve stenosis. Patient currently stable. She denies any new complaints. Cardiac rate is WNL. Vital signs 118/66, 76 bpm, 18 RR, 90% SPO2 on room air, 143 pounds. Patient overall compliant with care. Denies any right knee pain or changes in skin of right knee. She was having some issues with inflammation of the right knee several weeks prior. No new swelling issues, aspiration complaints or difficulty breathing. Does have history of COPD. Vital signs all together stable. We will follow-up next month visit or for any acute issues as they arise.       Patient Active Problem List   Diagnosis    Acute renal impairment    Anxiety state    Aortic valve stenosis    Gastroesophageal reflux disease    Hyperlipidemia    Senile hyperkeratosis    Aneurysm of ascending aorta ([facility]    Tobacco user    Vitamin D deficiency    Closed fracture of right patella    Abnormal gait    Paroxysmal atrial fibrillation ([facility])    Hypertensive disorder    Chronic renal insufficiency, stage III (moderate) ([facility])    Rhabdomyolysis    Hyponatremia    Hypothyroidism    Adult body mass index 25-29    Acute posthemorrhagic anemia    Myositis    Muscle wasting and atrophy, not elsewhere classified, right thigh    Retained orthopedic hardware    Open wound with complication    Bone fixation device infection ([facility])    Closed fracture of distal end of radius    Fracture of patella    History of seizure    Pressure injury of right heel, unstageable ([facility])    COPD (chronic obstructive pulmonary disease) ([facility])    Seizure disorder ([facility])    Chronic diastolic heart failure ([facility])    Pharyngeal dysphagia     Past Medical History: Primary    Cleaning Responsibility: Primary    Ambulation Assistance: Independent(No AD)    Transfer Assistance: Independent    Active : Yes    Additional Comments: States that she was down on ground X 72 hours following fall     Social Determinants of Health     Financial Resource Strain:     Difficulty of Paying Living Expenses: Not on file   Food Insecurity:     Worried About Running Out of Food in the Last Year: Not on file    Wander of Food in the Last Year: Not on file   Transportation Needs:     Lack of Transportation (Medical): Not on file    Lack of Transportation (Non-Medical): Not on file   Physical Activity:     Days of Exercise per Week: Not on file    Minutes of Exercise per Session: Not on file   Stress:     Feeling of Stress : Not on file   Social Connections:     Frequency of Communication with Friends and Family: Not on file    Frequency of Social Gatherings with Friends and Family: Not on file    Attends Latter-day Services: Not on file    Active Member of [address] [place] [address] or Organizations: Not on file    Attends Club or Organization Meetings: Not on file    Marital Status: Not on file   Intimate Partner Violence:     Fear of Current or Ex-Partner: Not on file    Emotionally Abused: Not on file    Physically Abused: Not on file    Sexually Abused: Not on file   Housing Stability:     Unable to Pay for Housing in the Last Year: Not on file    Number of [place] in the Last Year: Not on file    Unstable Housing in the Last Year: Not on file     No family history on file. Allergies   Allergen Reactions    Amoxicillin      rash and GI upset    Cefdinir Hives    Sulfa Antibiotics          Review of Systems   Constitutional: Negative for activity change, appetite change, fatigue, fever and unexpected weight change. Respiratory: Negative for cough, choking, shortness of breath and wheezing. Cardiovascular: Negative for chest pain, palpitations and leg swelling. Musculoskeletal: Positive for arthralgias (mild; no worsengin). Negative for joint swelling. Skin: Negative for color change. Neurological: Positive for weakness. Negative for syncope, light-headedness and headaches. Psychiatric/Behavioral: Negative for agitation, confusion and dysphoric mood. The patient is not nervous/anxious. All other systems reviewed and are negative. Objective:   LMP  (LMP Unknown)     Physical Exam  Vitals reviewed. Constitutional:       Appearance: Normal appearance. She is normal weight. She is not ill-appearing. HENT:      Head: Normocephalic and atraumatic. Mouth/Throat:      Mouth: Mucous membranes are moist.      Pharynx: Oropharynx is clear. Eyes:      Extraocular Movements: Extraocular movements intact. Conjunctiva/sclera: Conjunctivae normal.      Pupils: Pupils are equal, round, and reactive to light. Neck:      Vascular: No carotid bruit. Cardiovascular:      Rate and Rhythm: Normal rate and regular rhythm. Pulses: Normal pulses. Heart sounds: Murmur (2/6 murmur over aortic space) heard. Pulmonary:      Effort: Pulmonary effort is normal. No respiratory distress. Breath sounds: Normal breath sounds. No wheezing or rhonchi. Abdominal:      General: Abdomen is flat. Bowel sounds are normal. There is no distension. Palpations: Abdomen is soft. Musculoskeletal:      Cervical back: Normal range of motion and neck supple. Skin:     General: Skin is warm and dry. Neurological:      General: No focal deficit present. Mental Status: She is alert and oriented to person, place, and time. Mental status is at baseline. Psychiatric:         Mood and Affect: Mood normal.         Behavior: Behavior normal.         Assessment:       Diagnosis Orders   1. Paroxysmal atrial fibrillation (HCC)     2. Chronic diastolic heart failure ([address]     3. Aortic valve stenosis, etiology of cardiac valve disease unspecified     4. Pharyngeal dysphagia           Plan:        1. Continue current medications. Vital signs are stable today, regularly regular rate. 2.  Continue monitoring for pulmonary edema and poor pulse ox. Currently stable at this point time. No changes to meds. 3.  See #1 and 2. No new changes today. No new worsening of murmur on physical exam.  Denies any new dizziness or syncopal episodes. Denies any chest pain, SOB,  or associated symptoms with advanced aortic valve stenosis. Does have ongoing tiredness, which is a chronic finding. 4.  Continues to eat modified diet without issue. No new changes at this time. No follow-ups on file. Side effects, adverse effects of the medication prescribed today, as well as treatment plan and result expectations have been discussed withthe patient who expresses understanding and desires to proceed.     Kathleen Guerrero, [address]

## 2022-04-19 ENCOUNTER — OFFICE VISIT (OUTPATIENT)
Dept: GERIATRIC MEDICINE | Age: 82
End: 2022-04-19
Payer: MEDICAID

## 2022-04-19 DIAGNOSIS — I50.32 CHRONIC DIASTOLIC HEART FAILURE (HCC): Primary | ICD-10-CM

## 2022-04-19 DIAGNOSIS — I48.0 PAROXYSMAL ATRIAL FIBRILLATION (HCC): ICD-10-CM

## 2022-04-19 DIAGNOSIS — J42 CHRONIC BRONCHITIS, UNSPECIFIED CHRONIC BRONCHITIS TYPE (HCC): ICD-10-CM

## 2022-04-19 PROCEDURE — 1123F ACP DISCUSS/DSCN MKR DOCD: CPT | Performed by: INTERNAL MEDICINE

## 2022-04-19 PROCEDURE — 99309 SBSQ NF CARE MODERATE MDM 30: CPT | Performed by: INTERNAL MEDICINE

## 2022-05-12 ENCOUNTER — OFFICE VISIT (OUTPATIENT)
Dept: GERIATRIC MEDICINE | Age: 82
End: 2022-05-12
Payer: MEDICAID

## 2022-05-12 DIAGNOSIS — F32.A DEPRESSION, UNSPECIFIED DEPRESSION TYPE: Primary | ICD-10-CM

## 2022-05-12 PROCEDURE — 99308 SBSQ NF CARE LOW MDM 20: CPT | Performed by: PSYCHIATRY & NEUROLOGY

## 2022-05-13 NOTE — PROGRESS NOTES
[facility]. FOLLOW-UP NOTE     PLEASE NOTE THAT THIS DOCUMENTATION DOES NOT INCLUDE RECONCILIATION OF PATIENT PHYSICAL MEDICATION BUT FOCUSED ON PSYCHOTROPICS. ALL MEDICATION REVIEWED PER MAR THAT HAS BEEN PRINTED FOR ME FOR THE ASSESSMENT         Patient was seen and examined in person, Chart reviewed   Patient's case discussed with staff/team    Chief Complaint: depresion anxiety    Interim History:     Pt remain stable with mood  Less anxiety symptoms  No agitation  No hopeless and worthless feeling  No active SI or HI    Appetite:   [x] Normal/Unchanged  [] Increased  [] Decreased      Sleep:       [x] Normal/Unchanged  [] Fair       [] Poor              Energy:    [x] Normal/Unchanged  [] Increased  [] Decreased        SI [] Present  [x] Absent    HI  []Present  [x] Absent     Aggression:  [] yes  [x] no    Patient is [x] able  [] unable to CONTRACT FOR SAFETY     PAST MEDICAL/PSYCHIATRIC HISTORY:   Past Medical History:   Diagnosis Date    Aortic stenosis     Chronic kidney disease     COPD (chronic obstructive pulmonary disease) (HCC)     Heart failure ([facility]     High cholesterol     HTN (hypertension)     Hypothyroid     New onset a-fib ([facility]        FAMILY/SOCIAL HISTORY:  No family history on file. Social History     Socioeconomic History    Marital status:       Spouse name: Not on file    Number of children: 2    Years of education: 15    Highest education level: 12th grade   Occupational History    Occupation:      Comment: [employer]   Tobacco Use    Smoking status: Former Smoker     Packs/day: 0.50     Years: 25.00     Pack years: 12.50    Smokeless tobacco: Never Used   Substance and Sexual Activity    Alcohol use: Never    Drug use: Never    Sexual activity: Not on file   Other Topics Concern    Not on file   Social History Narrative         Lives With: Florence Tao lives in [place] fulltime t the [facility] Type of Home: Apartment(1st floor) in [facility]: Two level, Able to Live on Main level with bedroom/bathroom(Full flight to second floor)    Home Access: Stairs to enter without rails    Entrance Stairs - Number of Steps: 2    Bathroom Shower/Tub: Tub/Shower unit    Bathroom Equipment: (None)    Home Equipment: (N/A)    ADL Assistance: Independent    Homemaking Assistance: Independent    Homemaking Responsibilities: Yes    Meal Prep Responsibility: Primary    Laundry Responsibility: Primary    Cleaning Responsibility: Primary    Ambulation Assistance: Independent(No AD)    Transfer Assistance: Independent    Active : Yes    Additional Comments: States that she was down on ground X 72 hours following fall     Social Determinants of Health     Financial Resource Strain:     Difficulty of Paying Living Expenses: Not on file   Food Insecurity:     Worried About Running Out of Food in the Last Year: Not on file    Wander of Food in the Last Year: Not on file   Transportation Needs:     Lack of Transportation (Medical): Not on file    Lack of Transportation (Non-Medical):  Not on file   Physical Activity:     Days of Exercise per Week: Not on file    Minutes of Exercise per Session: Not on file   Stress:     Feeling of Stress : Not on file   Social Connections:     Frequency of Communication with Friends and Family: Not on file    Frequency of Social Gatherings with Friends and Family: Not on file    Attends Advent Services: Not on file    Active Member of Clubs or Organizations: Not on file    Attends Club or Organization Meetings: Not on file    Marital Status: Not on file   Intimate Partner Violence:     Fear of Current or Ex-Partner: Not on file    Emotionally Abused: Not on file    Physically Abused: Not on file    Sexually Abused: Not on file   Housing Stability:     Unable to Pay for Housing in the Last Year: Not on file    Number of [place] in the Last Year: Not on file    Unstable Housing in the Last Year: Not on file           ROS:  [x] All negative/unchanged except if checked. Explain positive(checked items) below:  [] Constitutional  [] Eyes  [] Ear/Nose/Mouth/Throat  [] Respiratory  [] CV  [] GI  []   [] Musculoskeletal  [] Skin/Breast  [] Neurological  [] Endocrine  [] Heme/Lymph  [] Allergic/Immunologic    Explanation:     MEDICATIONS:  Remeron 15 mg  MEDICATION SIDE EFFECTS: no s/e      Examination:  LMP  (LMP Unknown)   Gait - in bed      Mental Status Examination:    Level of consciousness:  within normal limits   Appearance:  fair grooming and fair hygiene  Behavior/Motor:  psychomotor retardation  Attitude toward examiner:  attentive  Speech:  normal rate   Mood: anxious  Affect:  mood congruent  Thought processes:  coherent   Thought content:  Suicidal Ideation:  denies suicidal ideation  Cognition:  oriented to person, place, and time   Concentration intact  Insight fair   Judgement fair     ASSESSMENT:   Patient symptoms are:  [] Well controlled  [] Improving  [] Worsening  [] No change      Diagnosis:   Depression unspecified  Anxiety NOS    LABS:  No visits with results within 2 Day(s) from this visit. Latest known visit with results is:   Orders Only on 09/21/2021   Component Date Value Ref Range Status    T4 Free 09/21/2021 1.4   Final    TSH 09/21/2021 2.070  uIU/mL Final         RISK ASSESSMENT:     Treatment Plan:  Reviewed current Medications with the patient. Risks, benefits, side effects, drug-to-drug interactions and alternatives to treatment were discussed. Discussed with the nurse about the treatment plan.   Future labs   Call me if needed   Follow up in 3 months      GRADUAL DOSE REDUCTION ATTEMPTED : [] YES    [x] NO      REASON FOR NOT REDUCING MEDICATION DOSE:   [] NA  [x] HIGH RISK OF PATIENT DETERIORATION  [] MEDICATION RECENTLY REDUCED  [] PRIOR MEDICATION DOSE REDUCTION UNSUCCESSFUL  [] Other            Electronically signed by Gaye Horton MD on 5/13/2022 at 3:28 PM

## 2022-05-19 NOTE — PROGRESS NOTES
SUBJECTIVE:  This 78-year-old woman is seen in her room for follow-up visit for her bronchitis hypertension hypothyroidism. Patient is functionally stable remains on amiodarone at this time patient has been on Synthroid patient had new recent chest palpitation no change in her bowel bladder habits and notes acute functional decline in terms of her heart failure no evidence of new dyspnea or cyanosis. ROS: Denied chest pain palpitations nausea vomiting intermittently coughing but not short of breath  The rest of the 14 point ROS negative    PHYSICAL EXAM: VSS per facility record  Alert oriented x2 pupils are small oral mucosa is moist chest showed no crackles no wheezing cardiovascular showed a regular rate abdomen soft nontender extremity trace radial pulse pedal edema    ASSESSMENT & PLAN:   Diagnosis Orders   1. Chronic diastolic heart failure ([facility]     2. Chronic bronchitis, unspecified chronic bronchitis type ([address]     3. Paroxysmal atrial fibrillation (HCC)       Patient rate controlled at this time remains anticoagulated rate controlled is on amiodarone along with beta-blocker. Continue to monitor for decompensation of heart failure or her COPD. Remains on COVID-19 precautions.             Past Medical History:   Diagnosis Date    Aortic stenosis     Chronic kidney disease     COPD (chronic obstructive pulmonary disease) (HCC)     Heart failure (HCC)     High cholesterol     HTN (hypertension)     Hypothyroid     New onset a-fib [facility])          Past Surgical History:   Procedure Laterality Date    KNEE SURGERY Right 4/14/2020    RIGHT KNEE REMOVAL OF HARDWARE [facility] Right 1/16/2020    RIGHT QUADRICEPS MUSCLE BIOPSY performed by Anatoliy Champagne MD at [place] [address] Right 11/18/2019    RIGHT PATELLA OPEN REDUCTION INTERNAL FIXATION  ROOM 180 performed by Jie Phillips MD at [facility] N/A 12/17/2019    EGD performed by Alisa Campos MD at [facility]         Current Outpatient Medications on File Prior to Visit   Medication Sig Dispense Refill    ferrous sulfate (IRON 325) 325 (65 Fe) MG tablet Take 1 tablet by mouth 2 times daily 60 tablet 5    magnesium hydroxide (MILK OF MAGNESIA) 400 MG/5ML suspension Take 30 mLs by mouth      potassium chloride (KLOR-CON M) 20 MEQ extended release tablet Take 40 mEq by mouth      acetaminophen (TYLENOL) 650 MG suppository Place 650 mg rectally every 4 hours as needed for Fever      Aluminum & Magnesium Hydroxide (MAGNESIUM-ALUMINUM PO) Take by mouth 225-200 MG/5ML      albuterol-ipratropium (COMBIVENT RESPIMAT)  MCG/ACT AERS inhaler Inhale 1 puff into the lungs every 6 hours      Sodium Phosphates (FLEET) 7-19 GM/118ML Place 1 enema rectally once as needed      glucagon 1 MG injection Inject 1 kit into the muscle once      Glucose-Cholecalciferol (TRUEPLUS GLUCOSE) GEL Take by mouth      GUAIFENESIN PO Take 10 mLs by mouth every 4 hours as needed      ibuprofen (ADVIL;MOTRIN) 400 MG tablet Take 1 tablet by mouth every 6 hours as needed for Pain 30 tablet 0    levothyroxine (SYNTHROID) 125 MCG tablet Take 1 tablet by mouth Daily 30 tablet 03    alendronate (FOSAMAX) 70 MG tablet Take 70 mg by mouth      amLODIPine (NORVASC) 5 MG tablet Take 1 tablet by mouth daily 30 tablet 0    bisacodyl (DULCOLAX) 10 MG suppository Place 10 mg rectally daily as needed for Constipation      calcium-vitamin D (OSCAL-500) 500-200 MG-UNIT per tablet Take 1 tablet by mouth 2 times daily      mirtazapine (REMERON) 15 MG tablet Take 7.5 mg by mouth nightly      guaiFENesin-dextromethorphan (ROBITUSSIN DM) 100-10 MG/5ML syrup Take 5 mLs by mouth every 6 hours as needed for Cough      sennosides-docusate sodium (SENOKOT-S) 8.6-50 MG tablet Take 2 tablets by mouth nightly HOLD FOR LOOSE STOOL      acetaminophen (TYLENOL) 325 MG tablet Take 650 mg by mouth every 4 Able to Live on Main level with bedroom/bathroom(Full flight to second floor)    Home Access: Stairs to enter without rails    Entrance Stairs - Number of Steps: 2    Bathroom Shower/Tub: Tub/Shower unit    Bathroom Equipment: (None)    Home Equipment: (N/A)    ADL Assistance: Independent    Homemaking Assistance: Independent    Homemaking Responsibilities: Yes    Meal Prep Responsibility: Primary    Laundry Responsibility: Primary    Cleaning Responsibility: Primary    Ambulation Assistance: Independent(No AD)    Transfer Assistance: Independent    Active : Yes    Additional Comments: States that she was down on ground X 72 hours following fall     Social Determinants of Health     Financial Resource Strain:     Difficulty of Paying Living Expenses: Not on file   Food Insecurity:     Worried About Running Out of Food in the Last Year: Not on file    Wander of Food in the Last Year: Not on file   Transportation Needs:     Lack of Transportation (Medical): Not on file    Lack of Transportation (Non-Medical):  Not on file   Physical Activity:     Days of Exercise per Week: Not on file    Minutes of Exercise per Session: Not on file   Stress:     Feeling of Stress : Not on file   Social Connections:     Frequency of Communication with Friends and Family: Not on file    Frequency of Social Gatherings with Friends and Family: Not on file    Attends [employer] Services: Not on file    Active Member of [address] [place] [address] or Organizations: Not on file    Attends Club or Organization Meetings: Not on file    Marital Status: Not on file   Intimate Partner Violence:     Fear of Current or Ex-Partner: Not on file    Emotionally Abused: Not on file    Physically Abused: Not on file    Sexually Abused: Not on file   Housing Stability:     Unable to Pay for Housing in the Last Year: Not on file    Number of [place] in the Last Year: Not on file    Unstable Housing in the Last Year: Not on file         No results found for: LABA1C  No results found for: EAG    Lab Results   Component Value Date     03/25/2021    K 4.3 03/25/2021    K 3.3 01/16/2020     03/25/2021    CO2 24 03/25/2021    BUN 22 03/25/2021    CREATININE 1.0 03/25/2021    GLUCOSE 89 03/25/2021    CALCIUM 8.9 03/25/2021        Lab Results   Component Value Date    CHOL 131 11/24/2019     Lab Results   Component Value Date    TRIG 142 11/24/2019     Lab Results   Component Value Date    HDL 45 11/24/2019     Lab Results   Component Value Date    LDLCALC 58 11/24/2019     No results found for: LABVLDL, VLDL  No results found for: [facility]    Lab Results   Component Value Date    TSH 2.070 09/21/2021       Lab Results   Component Value Date    WBC 9.3 03/25/2021    HGB 28.5 (A) 08/03/2021    HCT 9.6 (A) 08/03/2021    MCV 94.8 03/25/2021     03/25/2021       Please note orders entered on site at facility after discussion with appropriate facility nursing/therapy/ / nutritional staff. Current longstanding medical problems and acute medical issues addressed with staff. Available data and data elements in on site paper chart reviewed and analyzed. Current external consultant notes reviewed in on site chart. Ordered laboratory testing and imaging will be reviewed when available.

## 2022-06-02 ENCOUNTER — OFFICE VISIT (OUTPATIENT)
Dept: GERIATRIC MEDICINE | Age: 82
End: 2022-06-02
Payer: MEDICARE

## 2022-06-02 DIAGNOSIS — J42 CHRONIC BRONCHITIS, UNSPECIFIED CHRONIC BRONCHITIS TYPE (HCC): Primary | ICD-10-CM

## 2022-06-02 DIAGNOSIS — I50.32 CHRONIC DIASTOLIC HEART FAILURE (HCC): ICD-10-CM

## 2022-06-02 DIAGNOSIS — G40.909 SEIZURE DISORDER (HCC): ICD-10-CM

## 2022-06-02 PROCEDURE — 1123F ACP DISCUSS/DSCN MKR DOCD: CPT | Performed by: INTERNAL MEDICINE

## 2022-06-02 PROCEDURE — 99308 SBSQ NF CARE LOW MDM 20: CPT | Performed by: INTERNAL MEDICINE

## 2022-06-28 NOTE — PROGRESS NOTES
SUBJECTIVE:  This 76-year-old woman seen follow-up visit for bronchitis heart failure seizure shoulder patient has been on antiepileptic agents the past seizure-free at this time patient has not recent emesis fever chills patient neuro status stable functionally at her baseline      ROS: Cough intermittently no acute pain crisis  The rest of the 14 point ROS negative    PHYSICAL EXAM: VSS per facility record  Alert x2 pupils are small oral mucosa moist chest showed faint extra wheezing cardiovascular showed a regular rate abdomen soft nontender extremity trace peripheral pulse trace edema skin showed no rash    ASSESSMENT & PLAN:   Diagnosis Orders   1. Chronic bronchitis, unspecified chronic bronchitis type ([address]     2. Chronic diastolic heart failure (HCC)     3. Seizure disorder (HCC)       Continue antiepileptic agent.   There is intermittently            Past Medical History:   Diagnosis Date    Aortic stenosis     Chronic kidney disease     COPD (chronic obstructive pulmonary disease) (HCC)     Heart failure (HCC)     High cholesterol     HTN (hypertension)     Hypothyroid     New onset a-fib [facility])          Past Surgical History:   Procedure Laterality Date    KNEE SURGERY Right 4/14/2020    RIGHT KNEE REMOVAL OF HARDWARE [facility] Right 1/16/2020    RIGHT QUADRICEPS MUSCLE BIOPSY performed by Marcy Nava MD at [facility] Right 11/18/2019    RIGHT PATELLA OPEN REDUCTION INTERNAL FIXATION  ROOM 180 performed by Teetee Lutz MD at [address] [place] [address] N/A 12/17/2019    EGD performed by Lisa Horan MD at [facility]         Current Outpatient Medications on File Prior to Visit   Medication Sig Dispense Refill    ferrous sulfate (IRON 325) 325 (65 Fe) MG tablet Take 1 tablet by mouth 2 times daily 60 tablet 5    magnesium hydroxide (MILK OF MAGNESIA) 400 MG/5ML suspension Take 30 mLs by mouth      potassium chloride (KLOR-CON M) 20 MEQ extended release tablet Take 40 mEq by mouth      acetaminophen (TYLENOL) 650 MG suppository Place 650 mg rectally every 4 hours as needed for Fever      Aluminum & Magnesium Hydroxide (MAGNESIUM-ALUMINUM PO) Take by mouth 225-200 MG/5ML      albuterol-ipratropium (COMBIVENT RESPIMAT)  MCG/ACT AERS inhaler Inhale 1 puff into the lungs every 6 hours      Sodium Phosphates (FLEET) 7-19 GM/118ML Place 1 enema rectally once as needed      glucagon 1 MG injection Inject 1 kit into the muscle once      Glucose-Cholecalciferol (TRUEPLUS GLUCOSE) GEL Take by mouth      GUAIFENESIN PO Take 10 mLs by mouth every 4 hours as needed      ibuprofen (ADVIL;MOTRIN) 400 MG tablet Take 1 tablet by mouth every 6 hours as needed for Pain 30 tablet 0    levothyroxine (SYNTHROID) 125 MCG tablet Take 1 tablet by mouth Daily 30 tablet 03    alendronate (FOSAMAX) 70 MG tablet Take 70 mg by mouth      amLODIPine (NORVASC) 5 MG tablet Take 1 tablet by mouth daily 30 tablet 0    bisacodyl (DULCOLAX) 10 MG suppository Place 10 mg rectally daily as needed for Constipation      calcium-vitamin D (OSCAL-500) 500-200 MG-UNIT per tablet Take 1 tablet by mouth 2 times daily      mirtazapine (REMERON) 15 MG tablet Take 7.5 mg by mouth nightly      guaiFENesin-dextromethorphan (ROBITUSSIN DM) 100-10 MG/5ML syrup Take 5 mLs by mouth every 6 hours as needed for Cough      sennosides-docusate sodium (SENOKOT-S) 8.6-50 MG tablet Take 2 tablets by mouth nightly HOLD FOR LOOSE STOOL      acetaminophen (TYLENOL) 325 MG tablet Take 650 mg by mouth every 4 hours as needed for Pain      pantoprazole (PROTONIX) 40 MG tablet Take 1 tablet by mouth 2 times daily (before meals) 60 tablet 0    vitamin B-12 (CYANOCOBALAMIN) 500 MCG tablet Take 500 mcg by mouth daily      folic acid (FOLVITE) 1 MG tablet Take 1 mg by mouth daily      aspirin 81 MG chewable tablet Take 1 tablet by mouth daily 30 tablet 3 11/24/2019     Lab Results   Component Value Date    TRIG 142 11/24/2019     Lab Results   Component Value Date    HDL 45 11/24/2019     Lab Results   Component Value Date    LDLCALC 58 11/24/2019     No results found for: LABVLDL, VLDL  No results found for: [facility]    Lab Results   Component Value Date    TSH 2.070 09/21/2021       Lab Results   Component Value Date    WBC 9.3 03/25/2021    HGB 28.5 (A) 08/03/2021    HCT 9.6 (A) 08/03/2021    MCV 94.8 03/25/2021     03/25/2021       Please note orders entered on site at facility after discussion with appropriate facility nursing/therapy/ / nutritional staff. Current longstanding medical problems and acute medical issues addressed with staff. Available data and data elements in on site paper chart reviewed and analyzed. Current external consultant notes reviewed in on site chart. Ordered laboratory testing and imaging will be reviewed when available.

## 2022-07-01 ENCOUNTER — OFFICE VISIT (OUTPATIENT)
Dept: GERIATRIC MEDICINE | Age: 82
End: 2022-07-01
Payer: MEDICARE

## 2022-07-01 DIAGNOSIS — I50.32 CHRONIC DIASTOLIC HEART FAILURE (HCC): ICD-10-CM

## 2022-07-01 DIAGNOSIS — G89.29 OTHER CHRONIC PAIN: ICD-10-CM

## 2022-07-01 DIAGNOSIS — J42 CHRONIC BRONCHITIS, UNSPECIFIED CHRONIC BRONCHITIS TYPE (HCC): Primary | ICD-10-CM

## 2022-07-01 DIAGNOSIS — N18.30 CHRONIC RENAL IMPAIRMENT, STAGE 3 (MODERATE), UNSPECIFIED WHETHER STAGE 3A OR 3B CKD (HCC): ICD-10-CM

## 2022-07-01 PROCEDURE — 99309 SBSQ NF CARE MODERATE MDM 30: CPT | Performed by: FAMILY MEDICINE

## 2022-07-01 PROCEDURE — 1123F ACP DISCUSS/DSCN MKR DOCD: CPT | Performed by: FAMILY MEDICINE

## 2022-07-05 VITALS
BODY MASS INDEX: 27.95 KG/M2 | TEMPERATURE: 97.1 F | DIASTOLIC BLOOD PRESSURE: 78 MMHG | WEIGHT: 143.1 LBS | OXYGEN SATURATION: 92 % | RESPIRATION RATE: 18 BRPM | HEART RATE: 66 BPM | SYSTOLIC BLOOD PRESSURE: 126 MMHG

## 2022-07-05 ASSESSMENT — ENCOUNTER SYMPTOMS
SHORTNESS OF BREATH: 1
COLOR CHANGE: 0
COUGH: 0
WHEEZING: 0
STRIDOR: 0

## 2022-07-05 NOTE — PROGRESS NOTES
Subjective:      Patient was examined at [facility]  Address: [address] [place] [address], [facility], [address]  Phone: [phone]      Patient ID: Clovis Olivas is a [de-identified] y.o. female being seen today. Patient at baseline mentation of alert and oriented x3. Does have occasional episodes of scant confusion/forgetfulness. SOB and edema at baseline. Denies excessive fatigue, fever, chills, myalgias, increased sputum production or cough, nausea, vomiting, chest pain, or orthopnea. Does take ibuprofen as needed for pain feels that this helps greatly history of osteoarthritis. Denies any adverse effects on current medication regime      Past Medical History:   Diagnosis Date    Aortic stenosis     Chronic kidney disease     COPD (chronic obstructive pulmonary disease) (HCC)     Heart failure (HCC)     High cholesterol     HTN (hypertension)     Hypothyroid     New onset a-fib [facility])      Past Surgical History:   Procedure Laterality Date    KNEE SURGERY Right 4/14/2020    RIGHT KNEE REMOVAL OF HARDWARE performed by Allyson Goldstein MD at [facility] 112 Right 1/16/2020    RIGHT QUADRICEPS MUSCLE BIOPSY performed by Felicita Dumont MD at [place] [address] Right 11/18/2019    RIGHT PATELLA OPEN REDUCTION INTERNAL FIXATION  ROOM 180 performed by Allyson Goldstein MD at [address] [place] [address] Rd 12/17/2019    EGD performed by Yenni Gonzalez MD at [facility]     History reviewed. No pertinent family history. Social History     Socioeconomic History    Marital status:       Spouse name: Not on file    Number of children: 2    Years of education: 15    Highest education level: 12th grade   Occupational History    Occupation:      Comment: [employer]   Tobacco Use    Smoking status: Former Smoker     Packs/day: 0.50     Years: 25.00     Pack years: 12.50    Smokeless tobacco: Never Used   Substance and Sexual Activity    Alcohol use: Never    Drug use: Never    Sexual activity: Not on file   Other Topics Concern    Not on file   Social History Narrative         Lives With: Alone  Son Janell Benedict lives in [place] fulltime t the [address] [place] [address] office    Type of Home: Apartment(1st floor) in [address] [place] [address] Ave: Two level, Able to Live on Main level with bedroom/bathroom(Full flight to second floor)    Home Access: Stairs to enter without rails    Entrance Stairs - Number of Steps: 2    Bathroom Shower/Tub: Tub/Shower unit    Bathroom Equipment: (None)    Home Equipment: (N/A)    ADL Assistance: Independent    Homemaking Assistance: Independent    Homemaking Responsibilities: Yes    Meal Prep Responsibility: Primary    Laundry Responsibility: Primary    Cleaning Responsibility: Primary    Ambulation Assistance: Independent(No AD)    Transfer Assistance: Independent    Active : Yes    Additional Comments: States that she was down on ground X 72 hours following fall     Social Determinants of Health     Financial Resource Strain:     Difficulty of Paying Living Expenses: Not on file   Food Insecurity:     Worried About Running Out of Food in the Last Year: Not on file    Wander of Food in the Last Year: Not on file   Transportation Needs:     Lack of Transportation (Medical): Not on file    Lack of Transportation (Non-Medical):  Not on file   Physical Activity:     Days of Exercise per Week: Not on file    Minutes of Exercise per Session: Not on file   Stress:     Feeling of Stress : Not on file   Social Connections:     Frequency of Communication with Friends and Family: Not on file    Frequency of Social Gatherings with Friends and Family: Not on file    Attends [employer] Services: Not on file    Active Member of Clubs or Organizations: Not on file    Attends Club or Organization Meetings: Not on file    Marital Status: Not on file   Intimate Partner Violence:     Fear of Current or Ex-Partner: Not on file    Emotionally Abused: Not on file    Physically Abused: Not on file    Sexually Abused: Not on file   Housing Stability:     Unable to Pay for Housing in the Last Year: Not on file    Number of Places Lived in the Last Year: Not on file    Unstable Housing in the Last Year: Not on file       Allergies: Amoxicillin, Cefdinir, and Sulfa antibiotics  NF MEDICATIONS REVIEWED    Review of Systems   Constitutional: Positive for fatigue. Negative for activity change, appetite change, chills and fever. Respiratory: Positive for shortness of breath. Negative for cough, wheezing and stridor. Cardiovascular: Negative for chest pain. Musculoskeletal: Positive for arthralgias. Skin: Negative for color change and pallor. Neurological: Positive for weakness. Psychiatric/Behavioral: Positive for confusion. Negative for agitation and dysphoric mood. The patient is not nervous/anxious. All other systems reviewed and are negative. Objective:   /78   Pulse 66   Temp 97.1 °F (36.2 °C)   Resp 18   Wt 143 lb 1.6 oz (64.9 kg)   LMP  (LMP Unknown)   SpO2 92%   BMI 27.95 kg/m²   Physical Exam  Vitals reviewed. Constitutional:       Appearance: She is well-developed. She is ill-appearing. HENT:      Head: Normocephalic. Eyes:      Pupils: Pupils are equal, round, and reactive to light. Cardiovascular:      Rate and Rhythm: Normal rate and regular rhythm. Heart sounds: Murmur heard. Pulmonary:      Effort: Pulmonary effort is normal.      Breath sounds: Normal breath sounds. Abdominal:      General: Bowel sounds are normal. There is no distension. Palpations: Abdomen is soft. Tenderness: There is no abdominal tenderness. There is no guarding. Musculoskeletal:         General: Normal range of motion. Cervical back: Normal range of motion. Right lower leg: No edema. Left lower leg: No edema. Skin:     General: Skin is warm and dry.    Neurological: Mental Status: She is alert and oriented to person, place, and time. Motor: Weakness present. Gait: Gait abnormal.   Psychiatric:         Behavior: Behavior normal.         Weight Trends  Wt Readings from Last 10 Encounters:   07/05/22 143 lb 1.6 oz (64.9 kg)   01/05/21 135 lb 3.2 oz (61.3 kg)   12/15/20 131 lb 9.6 oz (59.7 kg)   06/05/20 126 lb 6.4 oz (57.3 kg)   04/14/20 132 lb (59.9 kg)   03/06/20 136 lb (61.7 kg)   03/03/20 136 lb (61.7 kg)   02/07/20 136 lb (61.7 kg)   01/21/20 135 lb 9.6 oz (61.5 kg)   01/15/20 151 lb 0.2 oz (68.5 kg)        Assessment and Plan:      Diagnosis Orders   1. Chronic bronchitis, unspecified chronic bronchitis type ([facility]     2. Chronic diastolic heart failure ([facility] [address]     3. Chronic renal impairment, stage 3 (moderate), unspecified whether stage 3a or 3b CKD (HCC)     4. Other chronic pain        Patient remains at baseline mentation. Weight and appetite stable. States pain well controlled on ibuprofen as needed. Shortness of breath and edema at baseline. Vital signs stable. TSH and BMP ordered. adhere to the JNC VIII guidelines for HTN managementand the NCEP ATP III guidelines for LDL-C management. No orders of the defined types were placed in this encounter. No orders of the defined types were placed in this encounter. Return if symptoms worsen or fail to improve. Encourage fluids and good nutrition. Stress fall prevention strategies. Electronically signed by EDDIE Reis CNP    Total time includes reviewing Plan of Care, labs, reviewing history, medications, and allergies, counseling patient, performing medically appropriate evaluation and examination, ordering medications, and documenting in the medical record. Please note this report is partially produced by using speech recognition hardware.   It may contain errors related to the system, including grammar, punctuation and spelling as well as words and phrases that may seem inaccurate.   For any questions or concerns feel free to contact me for clarification

## 2022-07-06 LAB
BUN BLDV-MCNC: 9 MG/DL
CALCIUM SERPL-MCNC: 8.5 MG/DL
CHLORIDE BLD-SCNC: 94 MMOL/L
CO2: 23 MMOL/L
CREAT SERPL-MCNC: 0.8 MG/DL
GFR CALCULATED: NORMAL
GLUCOSE BLD-MCNC: 86 MG/DL
POTASSIUM SERPL-SCNC: 4.1 MMOL/L
SODIUM BLD-SCNC: 126 MMOL/L

## 2022-08-04 ENCOUNTER — OFFICE VISIT (OUTPATIENT)
Dept: GERIATRIC MEDICINE | Age: 82
End: 2022-08-04
Payer: MEDICARE

## 2022-08-04 DIAGNOSIS — J42 CHRONIC BRONCHITIS, UNSPECIFIED CHRONIC BRONCHITIS TYPE (HCC): ICD-10-CM

## 2022-08-04 DIAGNOSIS — I50.32 CHRONIC DIASTOLIC HEART FAILURE (HCC): Primary | ICD-10-CM

## 2022-08-04 DIAGNOSIS — G40.909 SEIZURE DISORDER (HCC): ICD-10-CM

## 2022-08-04 PROCEDURE — 1123F ACP DISCUSS/DSCN MKR DOCD: CPT | Performed by: INTERNAL MEDICINE

## 2022-08-04 PROCEDURE — 99309 SBSQ NF CARE MODERATE MDM 30: CPT | Performed by: INTERNAL MEDICINE

## 2022-08-29 ENCOUNTER — OFFICE VISIT (OUTPATIENT)
Dept: GERIATRIC MEDICINE | Age: 82
End: 2022-08-29
Payer: MEDICARE

## 2022-08-29 ENCOUNTER — OFFICE VISIT (OUTPATIENT)
Dept: GERIATRIC MEDICINE | Age: 82
End: 2022-08-29
Payer: COMMERCIAL

## 2022-08-29 DIAGNOSIS — F41.9 ANXIETY DISORDER, UNSPECIFIED TYPE: Primary | ICD-10-CM

## 2022-08-29 DIAGNOSIS — J42 CHRONIC BRONCHITIS, UNSPECIFIED CHRONIC BRONCHITIS TYPE (HCC): ICD-10-CM

## 2022-08-29 DIAGNOSIS — G40.909 SEIZURE DISORDER (HCC): ICD-10-CM

## 2022-08-29 DIAGNOSIS — I50.32 CHRONIC DIASTOLIC HEART FAILURE (HCC): ICD-10-CM

## 2022-08-29 DIAGNOSIS — E87.1 HYPONATREMIA: Primary | ICD-10-CM

## 2022-08-29 PROCEDURE — 1123F ACP DISCUSS/DSCN MKR DOCD: CPT | Performed by: FAMILY MEDICINE

## 2022-08-29 PROCEDURE — 99309 SBSQ NF CARE MODERATE MDM 30: CPT | Performed by: FAMILY MEDICINE

## 2022-08-29 PROCEDURE — 1123F ACP DISCUSS/DSCN MKR DOCD: CPT | Performed by: PSYCHIATRY & NEUROLOGY

## 2022-08-29 PROCEDURE — 99308 SBSQ NF CARE LOW MDM 20: CPT | Performed by: PSYCHIATRY & NEUROLOGY

## 2022-09-03 NOTE — PROGRESS NOTES
[facility]. FOLLOW-UP NOTE     PLEASE NOTE THAT THIS DOCUMENTATION DOES NOT INCLUDE RECONCILIATION OF PATIENT PHYSICAL MEDICATION BUT FOCUSED ON PSYCHOTROPICS. ALL MEDICATION REVIEWED PER MAR THAT HAS BEEN PRINTED FOR ME FOR THE ASSESSMENT         Patient was seen and examined in person, Chart reviewed   Patient's case discussed with staff/team    Chief Complaint: depresion anxiety    Interim History:     Pt remain stable with mood  Less anxiety symptoms  No agitation  No hopeless and worthless feeling  No active SI or HI    Appetite:   [x] Normal/Unchanged  [] Increased  [] Decreased      Sleep:       [x] Normal/Unchanged  [] Fair       [] Poor              Energy:    [x] Normal/Unchanged  [] Increased  [] Decreased        SI [] Present  [x] Absent    HI  []Present  [x] Absent     Aggression:  [] yes  [x] no    Patient is [x] able  [] unable to CONTRACT FOR SAFETY     PAST MEDICAL/PSYCHIATRIC HISTORY:   Past Medical History:   Diagnosis Date    Aortic stenosis     Chronic kidney disease     COPD (chronic obstructive pulmonary disease) (HCC)     Heart failure (HCC)     High cholesterol     HTN (hypertension)     Hypothyroid     New onset a-fib ([facility] [address]        FAMILY/SOCIAL HISTORY:  No family history on file. 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   Tobacco Use    Smoking status: Former     Packs/day: 0.50     Years: 25.00     Pack years: 12.50     Types: Cigarettes    Smokeless tobacco: Never   Substance and Sexual Activity    Alcohol use: Never    Drug use: Never    Sexual activity: Not on file   Other Topics Concern    Not on file   Social History Narrative         Lives With: Florence Tao lives in [place] fulltime t the [address] [place] [address]    Type of Home: Apartment(1st floor) in [facility]: Two level, Able to Live on Main level with bedroom/bathroom(Full flight to second floor)    Home Access: Stairs to enter without rails    Entrance Stairs - Number of Steps: 2    Bathroom Shower/Tub: Tub/Shower unit    Bathroom Equipment: (None)    Home Equipment: (N/A)    ADL Assistance: Independent    Homemaking Assistance: Independent    Homemaking Responsibilities: Yes    Meal Prep Responsibility: Primary    Laundry Responsibility: Primary    Cleaning Responsibility: Primary    Ambulation Assistance: Independent(No AD)    Transfer Assistance: Independent    Active : Yes    Additional Comments: States that she was down on ground X 72 hours following fall     Social Determinants of Health     Financial Resource Strain: Not on file   Food Insecurity: Not on file   Transportation Needs: Not on file   Physical Activity: Not on file   Stress: Not on file   Social Connections: Not on file   Intimate Partner Violence: Not on file   Housing Stability: Not on file           ROS:  [x] All negative/unchanged except if checked.  Explain positive(checked items) below:  [] Constitutional  [] Eyes  [] Ear/Nose/Mouth/Throat  [] Respiratory  [] CV  [] GI  []   [] Musculoskeletal  [] Skin/Breast  [] Neurological  [] Endocrine  [] Heme/Lymph  [] Allergic/Immunologic    Explanation:     MEDICATIONS:  Remeron 15 mg  MEDICATION SIDE EFFECTS: no s/e      Examination:  LMP  (LMP Unknown)   Gait - in bed      Mental Status Examination:    Level of consciousness:  within normal limits   Appearance:  fair grooming and fair hygiene  Behavior/Motor:  psychomotor retardation  Attitude toward examiner:  attentive  Speech:  normal rate   Mood: anxious  Affect:  mood congruent  Thought processes:  coherent   Thought content:  Suicidal Ideation:  denies suicidal ideation  Cognition:  oriented to person, place, and time   Concentration intact  Insight fair   Judgement fair ASSESSMENT:   Patient symptoms are:  [] Well controlled  [] Improving  [] Worsening  [] No change      Diagnosis:   Depression unspecified  Anxiety NOS    LABS:  No visits with results within 2 Day(s) from this visit. Latest known visit with results is:   Orders Only on 07/07/2022   Component Date Value Ref Range Status    Sodium 07/06/2022 126  mmol/L Final    Chloride 07/06/2022 94  mmol/L Final    Potassium 07/06/2022 4.1  mmol/L Final    BUN 07/06/2022 9  mg/dL Final    Creatinine 07/06/2022 0.8   Final    Glucose 07/06/2022 86  mg/dL Final    CO2 07/06/2022 23  mmol/L Final    Calcium 07/06/2022 8.5  mg/dL Final         RISK ASSESSMENT:     Treatment Plan:  Reviewed current Medications with the patient. Risks, benefits, side effects, drug-to-drug interactions and alternatives to treatment were discussed. Discussed with the nurse about the treatment plan.   Future labs   Call me if needed   Follow up in 3 months      GRADUAL DOSE REDUCTION ATTEMPTED : [] YES    [x] NO      REASON FOR NOT REDUCING MEDICATION DOSE:   [] NA  [x] HIGH RISK OF PATIENT DETERIORATION  [] MEDICATION RECENTLY REDUCED  [] PRIOR MEDICATION DOSE REDUCTION UNSUCCESSFUL  [] Other            Electronically signed by Joseluis Blair MD on 9/3/2022 at 9:49 AM

## 2022-09-03 NOTE — PROGRESS NOTES
Dispense Refill    ferrous sulfate (IRON 325) 325 (65 Fe) MG tablet Take 1 tablet by mouth 2 times daily 60 tablet 5    magnesium hydroxide (MILK OF MAGNESIA) 400 MG/5ML suspension Take 30 mLs by mouth      potassium chloride (KLOR-CON M) 20 MEQ extended release tablet Take 40 mEq by mouth      acetaminophen (TYLENOL) 650 MG suppository Place 650 mg rectally every 4 hours as needed for Fever      Aluminum & Magnesium Hydroxide (MAGNESIUM-ALUMINUM PO) Take by mouth 225-200 MG/5ML      albuterol-ipratropium (COMBIVENT RESPIMAT)  MCG/ACT AERS inhaler Inhale 1 puff into the lungs every 6 hours      Sodium Phosphates (FLEET) 7-19 GM/118ML Place 1 enema rectally once as needed      glucagon 1 MG injection Inject 1 kit into the muscle once      Glucose-Cholecalciferol (TRUEPLUS GLUCOSE) GEL Take by mouth      GUAIFENESIN PO Take 10 mLs by mouth every 4 hours as needed      ibuprofen (ADVIL;MOTRIN) 400 MG tablet Take 1 tablet by mouth every 6 hours as needed for Pain 30 tablet 0    levothyroxine (SYNTHROID) 125 MCG tablet Take 1 tablet by mouth Daily 30 tablet 03    alendronate (FOSAMAX) 70 MG tablet Take 70 mg by mouth      amLODIPine (NORVASC) 5 MG tablet Take 1 tablet by mouth daily 30 tablet 0    bisacodyl (DULCOLAX) 10 MG suppository Place 10 mg rectally daily as needed for Constipation      calcium-vitamin D (OSCAL-500) 500-200 MG-UNIT per tablet Take 1 tablet by mouth 2 times daily      mirtazapine (REMERON) 15 MG tablet Take 7.5 mg by mouth nightly      guaiFENesin-dextromethorphan (ROBITUSSIN DM) 100-10 MG/5ML syrup Take 5 mLs by mouth every 6 hours as needed for Cough      sennosides-docusate sodium (SENOKOT-S) 8.6-50 MG tablet Take 2 tablets by mouth nightly HOLD FOR LOOSE STOOL      acetaminophen (TYLENOL) 325 MG tablet Take 650 mg by mouth every 4 hours as needed for Pain      pantoprazole (PROTONIX) 40 MG tablet Take 1 tablet by mouth 2 times daily (before meals) 60 tablet 0    vitamin B-12 (CYANOCOBALAMIN) 500 MCG tablet Take 500 mcg by mouth daily      folic acid (FOLVITE) 1 MG tablet Take 1 mg by mouth daily      aspirin 81 MG chewable tablet Take 1 tablet by mouth daily 30 tablet 3    amiodarone (CORDARONE) 200 MG tablet Take 1 tablet by mouth daily 30 tablet 3    ipratropium-albuterol (DUONEB) 0.5-2.5 (3) MG/3ML SOLN nebulizer solution Inhale 3 mLs into the lungs every 4 hours as needed for Shortness of Breath 360 mL 0    carBAMazepine (TEGRETOL) 200 MG tablet Take 1 tablet by mouth 2 times daily 90 tablet 3    coenzyme Q10 100 MG CAPS capsule Take 1 capsule by mouth 2 times daily (before meals) 60 capsule 0    vitamin D (ERGOCALCIFEROL) 1.25 MG (24826 UT) CAPS capsule Take 1 capsule by mouth once a week (Patient taking differently: Take 50,000 Units by mouth once a week mondays) 5 capsule 0    metoprolol tartrate (LOPRESSOR) 25 MG tablet Take 0.5 tablets by mouth 2 times daily 60 tablet 3     No current facility-administered medications on file prior to visit. No family history on file. 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[place]   Tobacco Use    Smoking status: Former     Packs/day: 0.50     Years: 25.00     Pack years: 12.50     Types: Cigarettes    Smokeless tobacco: Never   Substance and Sexual Activity    Alcohol use: Never    Drug use: Never    Sexual activity: Not on file   Other Topics Concern    Not on file   Social History Narrative         Lives With: Florence Tao lives in [place] fulltime t the [address] [place] [address] office    Type of Home: Apartment(1st floor) in [facility]: Two level, Able to Live on Main level with bedroom/bathroom(Full flight to second floor)    Home Access: Stairs to enter without rails    Entrance Stairs - Number of Steps: issues addressed with staff. Available data and data elements in on site paper chart reviewed and analyzed. Current external consultant notes reviewed in on site chart. Ordered laboratory testing and imaging will be reviewed when available.

## 2022-09-05 VITALS
TEMPERATURE: 97.4 F | HEART RATE: 66 BPM | DIASTOLIC BLOOD PRESSURE: 75 MMHG | SYSTOLIC BLOOD PRESSURE: 117 MMHG | RESPIRATION RATE: 18 BRPM | WEIGHT: 142.9 LBS | BODY MASS INDEX: 27.91 KG/M2 | OXYGEN SATURATION: 96 %

## 2022-09-05 ASSESSMENT — ENCOUNTER SYMPTOMS
SHORTNESS OF BREATH: 1
COLOR CHANGE: 0
WHEEZING: 0
STRIDOR: 0
COUGH: 0

## 2022-09-06 NOTE — PROGRESS NOTES
Subjective:      Patient was examined at [facility]  Address: [address] [place] [address] [place], [address] [facility]  Phone: [phone]      Patient ID: Blaise Esparza is a [de-identified] y.o. female being seen today. Patient at baseline mentation of alert and oriented x3. Does have occasional episodes of scant confusion/forgetfulness. Recent sodium level came back at 127. Tegretol level noted to be 6.8. GFR greater than 60. Denies any increasing muscle twitching/spasticity/fatigue. SOB and edema at baseline. Does take ibuprofen as needed for pain feels that this helps greatly history of osteoarthritis. Past Medical History:   Diagnosis Date    Aortic stenosis     Chronic kidney disease     COPD (chronic obstructive pulmonary disease) (HCC)     Heart failure (HCC)     High cholesterol     HTN (hypertension)     Hypothyroid     New onset a-fib [facility])      Past Surgical History:   Procedure Laterality Date    KNEE SURGERY Right 4/14/2020    RIGHT KNEE REMOVAL OF HARDWARE performed by Mi Borjas MD at [address] [facility] Right 1/16/2020    RIGHT QUADRICEPS MUSCLE BIOPSY performed by Tadeo Hale MD at [address] Right 11/18/2019    RIGHT PATELLA OPEN REDUCTION INTERNAL FIXATION  ROOM 180 performed by Mi Borjas MD at [address] N/A 12/17/2019    EGD performed by Any Arshad MD at [facility]     History reviewed. No pertinent family history. 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[place]   Tobacco Use    Smoking status: Former     Packs/day: 0.50     Years: 25.00     Pack years: 12.50     Types: Cigarettes    Smokeless tobacco: Never   Substance and Sexual Activity    Alcohol use: Never    Drug use: Never Sexual activity: Not on file   Other Topics Concern    Not on file   Social History Narrative         Lives With: Florence Tao lives in [place] fulltime t the [address] [place] [address] office    Type of Home: Apartment(1st floor) in [facility] 6: Two level, Able to Live on Main level with bedroom/bathroom(Full flight to second floor)    Home Access: Stairs to enter without rails    Entrance Stairs - Number of Steps: 2    Bathroom Shower/Tub: Tub/Shower unit    Bathroom Equipment: (None)    Home Equipment: (N/A)    ADL Assistance: Independent    Homemaking Assistance: Independent    Homemaking Responsibilities: Yes    Meal Prep Responsibility: Primary    Laundry Responsibility: Primary    Cleaning Responsibility: Primary    Ambulation Assistance: Independent(No AD)    Transfer Assistance: Independent    Active : Yes    Additional Comments: States that she was down on ground X 72 hours following fall     Social Determinants of Health     Financial Resource Strain: Not on file   Food Insecurity: Not on file   Transportation Needs: Not on file   Physical Activity: Not on file   Stress: Not on file   Social Connections: Not on file   Intimate Partner Violence: Not on file   Housing Stability: Not on file       Allergies: Amoxicillin, Cefdinir, and Sulfa antibiotics  NF MEDICATIONS REVIEWED    Review of Systems   Constitutional:  Positive for fatigue. Negative for activity change, appetite change, chills and fever. Respiratory:  Positive for shortness of breath. Negative for cough, wheezing and stridor. Cardiovascular:  Negative for chest pain. Musculoskeletal:  Positive for arthralgias. Skin:  Negative for color change and pallor. Neurological:  Positive for weakness. Psychiatric/Behavioral:  Positive for confusion. Negative for agitation and dysphoric mood. The patient is not nervous/anxious. All other systems reviewed and are negative.     Objective:   /75   Pulse 66   Temp 97.4 °F (36.3 °C)   Resp 18   Wt 142 lb 14.4 oz (64.8 kg)   LMP  (LMP Unknown)   SpO2 96%   BMI 27.91 kg/m²   Physical Exam  Vitals reviewed. Constitutional:       Appearance: She is well-developed. She is ill-appearing. HENT:      Head: Normocephalic. Eyes:      Pupils: Pupils are equal, round, and reactive to light. Cardiovascular:      Rate and Rhythm: Normal rate and regular rhythm. Heart sounds: Murmur heard. Pulmonary:      Effort: Pulmonary effort is normal.      Breath sounds: Normal breath sounds. Abdominal:      General: Bowel sounds are normal. There is no distension. Palpations: Abdomen is soft. Tenderness: There is no abdominal tenderness. There is no guarding. Musculoskeletal:         General: Normal range of motion. Cervical back: Normal range of motion. Right lower leg: No edema. Left lower leg: No edema. Skin:     General: Skin is warm and dry. Neurological:      Mental Status: She is alert and oriented to person, place, and time. Motor: Weakness present. Gait: Gait abnormal.   Psychiatric:         Behavior: Behavior normal.       Weight Trends  Wt Readings from Last 10 Encounters:   09/05/22 142 lb 14.4 oz (64.8 kg)   07/05/22 143 lb 1.6 oz (64.9 kg)   01/05/21 135 lb 3.2 oz (61.3 kg)   12/15/20 131 lb 9.6 oz (59.7 kg)   06/05/20 126 lb 6.4 oz (57.3 kg)   04/14/20 132 lb (59.9 kg)   03/06/20 136 lb (61.7 kg)   03/03/20 136 lb (61.7 kg)   02/07/20 136 lb (61.7 kg)   01/21/20 135 lb 9.6 oz (61.5 kg)        Assessment and Plan:      Diagnosis Orders   1. Hyponatremia        2. Seizure disorder ([facility] [address]        3. Chronic diastolic heart failure ([facility] [address]        4. Chronic bronchitis, unspecified chronic bronchitis type [facility])           Patient remains at baseline mentation. Weight and appetite stable. Shortness of breath and edema at baseline. Vital signs stable.   Has had a noted history of seizure disorder and on carBAMazepine 200 mg p.o. twice daily for such which is likely causative factor and low sodium levels started patient on sodium chloride 1 tab p.o. twice daily. Ordered BMP in 2 weeks. adhere to the JNC VIII guidelines for HTN managementand the NCEP ATP III guidelines for LDL-C management. No orders of the defined types were placed in this encounter. No orders of the defined types were placed in this encounter. No follow-ups on file. Encourage fluids and good nutrition. Stress fall prevention strategies. Electronically signed by EDDIE Reis CNP    Total time includes reviewing Plan of Care, labs, reviewing history, medications, and allergies, counseling patient, performing medically appropriate evaluation and examination, ordering medications, and documenting in the medical record. Please note this report is partially produced by using speech recognition hardware. It may contain errors related to the system, including grammar, punctuation and spelling as well as words and phrases that may seem inaccurate.   For any questions or concerns feel free to contact me for clarification

## 2022-09-12 LAB
BUN BLDV-MCNC: 6 MG/DL
CALCIUM SERPL-MCNC: 8.4 MG/DL
CHLORIDE BLD-SCNC: 98 MMOL/L
CO2: 22 MMOL/L
CREAT SERPL-MCNC: 0.8 MG/DL
GFR CALCULATED: NORMAL
GLUCOSE BLD-MCNC: 87 MG/DL
POTASSIUM SERPL-SCNC: 3.8 MMOL/L
SODIUM BLD-SCNC: 132 MMOL/L

## 2022-09-16 ENCOUNTER — OFFICE VISIT (OUTPATIENT)
Dept: GERIATRIC MEDICINE | Age: 82
End: 2022-09-16
Payer: MEDICARE

## 2022-09-16 DIAGNOSIS — G40.909 SEIZURE DISORDER (HCC): ICD-10-CM

## 2022-09-16 DIAGNOSIS — E87.1 HYPONATREMIA: Primary | ICD-10-CM

## 2022-09-16 DIAGNOSIS — J42 CHRONIC BRONCHITIS, UNSPECIFIED CHRONIC BRONCHITIS TYPE (HCC): ICD-10-CM

## 2022-09-16 DIAGNOSIS — N18.30 CHRONIC RENAL IMPAIRMENT, STAGE 3 (MODERATE), UNSPECIFIED WHETHER STAGE 3A OR 3B CKD (HCC): ICD-10-CM

## 2022-09-16 PROCEDURE — 99309 SBSQ NF CARE MODERATE MDM 30: CPT | Performed by: FAMILY MEDICINE

## 2022-09-16 PROCEDURE — 1123F ACP DISCUSS/DSCN MKR DOCD: CPT | Performed by: FAMILY MEDICINE

## 2022-09-22 VITALS
SYSTOLIC BLOOD PRESSURE: 108 MMHG | HEART RATE: 63 BPM | BODY MASS INDEX: 27.83 KG/M2 | OXYGEN SATURATION: 97 % | RESPIRATION RATE: 16 BRPM | WEIGHT: 142.5 LBS | DIASTOLIC BLOOD PRESSURE: 62 MMHG | TEMPERATURE: 97.3 F

## 2022-09-23 ASSESSMENT — ENCOUNTER SYMPTOMS
COUGH: 0
COLOR CHANGE: 0
WHEEZING: 0
SHORTNESS OF BREATH: 1
STRIDOR: 0

## 2022-09-23 NOTE — PROGRESS NOTES
Subjective:      Patient was examined at [facility]  Address: [address] [place] [address], [place], [address] [facility]  Phone: [phone]      Patient ID: Morgan Phalen is a [de-identified] y.o. female being seen today. Patient at baseline mentation of alert and oriented x3. Recent labs show hemoglobin 9.3 sodium increased to 132 from 127. Remains on sodium chloride tablet 1 g p.o. twice daily. SOB and edema at baseline. Past Medical History:   Diagnosis Date    Aortic stenosis     Chronic kidney disease     COPD (chronic obstructive pulmonary disease) (HCC)     Heart failure (HCC)     High cholesterol     HTN (hypertension)     Hypothyroid     New onset a-fib [facility])      Past Surgical History:   Procedure Laterality Date    KNEE SURGERY Right 4/14/2020    RIGHT KNEE REMOVAL OF HARDWARE performed by Radha Haq MD at [address] [facility] Right 1/16/2020    RIGHT QUADRICEPS MUSCLE BIOPSY performed by Diamante Desir MD at [address] [facility] Right 11/18/2019    RIGHT PATELLA OPEN REDUCTION INTERNAL FIXATION  ROOM 180 performed by Radha Haq MD at [address] [place] [address] N/A 12/17/2019    EGD performed by Alison Kennedy MD at [facility]     History reviewed. No pertinent family history. 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[place]   Tobacco Use    Smoking status: Former     Packs/day: 0.50     Years: 25.00     Pack years: 12.50     Types: Cigarettes    Smokeless tobacco: Never   Substance and Sexual Activity    Alcohol use: Never    Drug use: Never    Sexual activity: Not on file   Other Topics Concern    Not on file   Social History Narrative         Lives With: Florence Tao lives in [place] fulltime t the [facility] Type of Home: Apartment(1st floor) in [facility]: Two level, Able to Live on Main level with bedroom/bathroom(Full flight to second floor)    Home Access: Stairs to enter without rails    Entrance Stairs - Number of Steps: 2    Bathroom Shower/Tub: Tub/Shower unit    Bathroom Equipment: (None)    Home Equipment: (N/A)    ADL Assistance: Independent    Homemaking Assistance: Independent    Homemaking Responsibilities: Yes    Meal Prep Responsibility: Primary    Laundry Responsibility: Primary    Cleaning Responsibility: Primary    Ambulation Assistance: Independent(No AD)    Transfer Assistance: Independent    Active : Yes    Additional Comments: States that she was down on ground X 72 hours following fall     Social Determinants of Health     Financial Resource Strain: Not on file   Food Insecurity: Not on file   Transportation Needs: Not on file   Physical Activity: Not on file   Stress: Not on file   Social Connections: Not on file   Intimate Partner Violence: Not on file   Housing Stability: Not on file       Allergies: Amoxicillin, Cefdinir, and Sulfa antibiotics  NF MEDICATIONS REVIEWED    Review of Systems   Constitutional:  Positive for fatigue. Negative for activity change, appetite change, chills and fever. Respiratory:  Positive for shortness of breath. Negative for cough, wheezing and stridor. Cardiovascular:  Negative for chest pain. Musculoskeletal:  Positive for arthralgias. Skin:  Negative for color change and pallor. Neurological:  Positive for weakness. Psychiatric/Behavioral:  Positive for confusion. Negative for agitation and dysphoric mood. The patient is not nervous/anxious. All other systems reviewed and are negative. Objective:   /62   Pulse 63   Temp 97.3 °F (36.3 °C)   Resp 16   Wt 142 lb 8 oz (64.6 kg)   LMP  (LMP Unknown)   SpO2 97%   BMI 27.83 kg/m²   Physical Exam  Vitals reviewed.    Constitutional:       Appearance: She is well-developed. She is ill-appearing. HENT:      Head: Normocephalic. Eyes:      Pupils: Pupils are equal, round, and reactive to light. Cardiovascular:      Rate and Rhythm: Normal rate and regular rhythm. Heart sounds: Murmur heard. Pulmonary:      Effort: Pulmonary effort is normal.      Breath sounds: Normal breath sounds. Abdominal:      General: Bowel sounds are normal. There is no distension. Palpations: Abdomen is soft. Tenderness: There is no abdominal tenderness. There is no guarding. Musculoskeletal:         General: Normal range of motion. Cervical back: Normal range of motion. Right lower leg: No edema. Left lower leg: No edema. Skin:     General: Skin is warm and dry. Neurological:      Mental Status: She is alert and oriented to person, place, and time. Motor: Weakness present. Gait: Gait abnormal.   Psychiatric:         Behavior: Behavior normal.       Weight Trends  Wt Readings from Last 10 Encounters:   09/22/22 142 lb 8 oz (64.6 kg)   09/05/22 142 lb 14.4 oz (64.8 kg)   07/05/22 143 lb 1.6 oz (64.9 kg)   01/05/21 135 lb 3.2 oz (61.3 kg)   12/15/20 131 lb 9.6 oz (59.7 kg)   06/05/20 126 lb 6.4 oz (57.3 kg)   04/14/20 132 lb (59.9 kg)   03/06/20 136 lb (61.7 kg)   03/03/20 136 lb (61.7 kg)   02/07/20 136 lb (61.7 kg)        Assessment and Plan:      Diagnosis Orders   1. Hyponatremia        2. Seizure disorder ([facility] [address]        3. Chronic bronchitis, unspecified chronic bronchitis type ([facility] [address]        4. Chronic renal impairment, stage 3 (moderate), unspecified whether stage 3a or 3b CKD ([facility] [address]           Patient remains at baseline mentation. Weight and appetite stable. Shortness of breath and edema at baseline. Vital signs stable. Sodium levels increased to 132 from 127. Continue sodium chloride 1 tab p.o. twice daily. Ordered repeat BMP in 1 month.      adhere to the JNC VIII guidelines for HTN managementand the NCEP ATP III guidelines for LDL-C management. No orders of the defined types were placed in this encounter. No orders of the defined types were placed in this encounter. Return if symptoms worsen or fail to improve. Encourage fluids and good nutrition. Stress fall prevention strategies. Electronically signed by EDDIE Perez CNP    Total time includes reviewing Plan of Care, labs, reviewing history, medications, and allergies, counseling patient, performing medically appropriate evaluation and examination, ordering medications, and documenting in the medical record. Please note this report is partially produced by using speech recognition hardware. It may contain errors related to the system, including grammar, punctuation and spelling as well as words and phrases that may seem inaccurate.   For any questions or concerns feel free to contact me for clarification

## 2022-11-08 ENCOUNTER — OFFICE VISIT (OUTPATIENT)
Dept: GERIATRIC MEDICINE | Age: 82
End: 2022-11-08

## 2022-11-08 DIAGNOSIS — G20 PARKINSON DISEASE (HCC): Primary | ICD-10-CM

## 2022-11-17 ENCOUNTER — OFFICE VISIT (OUTPATIENT)
Dept: GERIATRIC MEDICINE | Age: 82
End: 2022-11-17
Payer: COMMERCIAL

## 2022-11-17 DIAGNOSIS — J42 CHRONIC BRONCHITIS, UNSPECIFIED CHRONIC BRONCHITIS TYPE (HCC): ICD-10-CM

## 2022-11-17 DIAGNOSIS — E87.1 HYPONATREMIA: ICD-10-CM

## 2022-11-17 DIAGNOSIS — I50.32 CHRONIC DIASTOLIC HEART FAILURE (HCC): ICD-10-CM

## 2022-11-17 DIAGNOSIS — G40.909 SEIZURE DISORDER (HCC): Primary | ICD-10-CM

## 2022-11-17 DIAGNOSIS — N18.30 CHRONIC RENAL IMPAIRMENT, STAGE 3 (MODERATE), UNSPECIFIED WHETHER STAGE 3A OR 3B CKD (HCC): ICD-10-CM

## 2022-11-17 PROCEDURE — 99309 SBSQ NF CARE MODERATE MDM 30: CPT | Performed by: FAMILY MEDICINE

## 2022-11-17 PROCEDURE — G8484 FLU IMMUNIZE NO ADMIN: HCPCS | Performed by: FAMILY MEDICINE

## 2022-11-17 PROCEDURE — 1123F ACP DISCUSS/DSCN MKR DOCD: CPT | Performed by: FAMILY MEDICINE

## 2022-11-23 NOTE — PROGRESS NOTES
Subjective:      Patient was examined at [facility]  Address: [address] [place] [address], [address] [place] [address]  Phone: [phone]      Patient ID: Sweetie Tee is a [de-identified] y.o. female being seen today. Patient at baseline mentation of alert and oriented x3. Recent Tegretol level 7.9 on labs. Carbamazepine 200mg bid for seizure monthly  Last hemoglobin 10.4. Remains on sodium chloride 1 g twice daily for hyponatremia without edema increase. No issues with acid reflux on pantoprazole 40 mg daily. Prantoprazole sodium 40 mg bid. SOB and edema at baseline. Past Medical History:   Diagnosis Date    Aortic stenosis     Chronic kidney disease     COPD (chronic obstructive pulmonary disease) (HCC)     Heart failure (HCC)     High cholesterol     HTN (hypertension)     Hypothyroid     New onset a-fib [facility])      Past Surgical History:   Procedure Laterality Date    KNEE SURGERY Right 4/14/2020    RIGHT KNEE REMOVAL OF HARDWARE performed by Estelle Kurtz MD at [address] [place] [address] Right 1/16/2020    RIGHT QUADRICEPS MUSCLE BIOPSY performed by Clara Loyd MD at [address] [place] [address] Right 11/18/2019    RIGHT PATELLA OPEN REDUCTION INTERNAL FIXATION  ROOM 180 performed by Estelle Kurtz MD at [facility] N/A 12/17/2019    EGD performed by Julito Pratt MD at [facility]     History reviewed. No pertinent family history. 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   Tobacco Use    Smoking status: Former     Packs/day: 0.50     Years: 25.00     Pack years: 12.50     Types: Cigarettes    Smokeless tobacco: Never   Substance and Sexual Activity    Alcohol use: Never    Drug use: Never    Sexual activity: Not on file   Other Topics Concern    Not on file   Social History Narrative         Lives With: Alone  Son Jason Stafford lives in [place] fulltime t the [address] [place] [address] office    Type of Home: Apartment(1st floor) in [facility] 6: Two level, Able to Live on Main level with bedroom/bathroom(Full flight to second floor)    Home Access: Stairs to enter without rails    Entrance Stairs - Number of Steps: 2    Bathroom Shower/Tub: Tub/Shower unit    Bathroom Equipment: (None)    Home Equipment: (N/A)    ADL Assistance: Independent    Homemaking Assistance: Independent    Homemaking Responsibilities: Yes    Meal Prep Responsibility: Primary    Laundry Responsibility: Primary    Cleaning Responsibility: Primary    Ambulation Assistance: Independent(No AD)    Transfer Assistance: Independent    Active : Yes    Additional Comments: States that she was down on ground X 72 hours following fall     Social Determinants of Health     Financial Resource Strain: Not on file   Food Insecurity: Not on file   Transportation Needs: Not on file   Physical Activity: Not on file   Stress: Not on file   Social Connections: Not on file   Intimate Partner Violence: Not on file   Housing Stability: Not on file       Allergies: Amoxicillin, Cefdinir, and Sulfa antibiotics  NF MEDICATIONS REVIEWED    Review of Systems   Constitutional:  Positive for fatigue. Negative for activity change, appetite change, chills and fever. Respiratory:  Positive for shortness of breath. Negative for cough, wheezing and stridor. Cardiovascular:  Negative for chest pain. Musculoskeletal:  Positive for arthralgias. Skin:  Negative for color change and pallor. Neurological:  Positive for weakness. Psychiatric/Behavioral:  Positive for confusion. Negative for agitation and dysphoric mood. The patient is not nervous/anxious. All other systems reviewed and are negative.     Objective:   /73   Pulse 62   Temp 97 °F (36.1 °C)   Resp 16   Wt 135 lb 3.2 oz (61.3 kg)   LMP  (LMP Unknown)   SpO2 97%   BMI 26.40 kg/m²   Physical Exam  Vitals reviewed. Constitutional:       Appearance: She is well-developed. She is ill-appearing. HENT:      Head: Normocephalic. Eyes:      Pupils: Pupils are equal, round, and reactive to light. Cardiovascular:      Rate and Rhythm: Normal rate and regular rhythm. Heart sounds: Murmur heard. Pulmonary:      Effort: Pulmonary effort is normal.      Breath sounds: Normal breath sounds. Abdominal:      General: Bowel sounds are normal. There is no distension. Palpations: Abdomen is soft. Tenderness: There is no abdominal tenderness. There is no guarding. Musculoskeletal:         General: Normal range of motion. Cervical back: Normal range of motion. Right lower leg: No edema. Left lower leg: No edema. Skin:     General: Skin is warm and dry. Neurological:      Mental Status: She is alert and oriented to person, place, and time. Motor: Weakness present. Gait: Gait abnormal.   Psychiatric:         Behavior: Behavior normal.       Weight Trends  Wt Readings from Last 10 Encounters:   11/24/22 135 lb 3.2 oz (61.3 kg)   09/22/22 142 lb 8 oz (64.6 kg)   09/05/22 142 lb 14.4 oz (64.8 kg)   07/05/22 143 lb 1.6 oz (64.9 kg)   01/05/21 135 lb 3.2 oz (61.3 kg)   12/15/20 131 lb 9.6 oz (59.7 kg)   06/05/20 126 lb 6.4 oz (57.3 kg)   04/14/20 132 lb (59.9 kg)   03/06/20 136 lb (61.7 kg)   03/03/20 136 lb (61.7 kg)        Assessment and Plan:      Diagnosis Orders   1. Seizure disorder ([address]        2. Hyponatremia        3. Chronic bronchitis, unspecified chronic bronchitis type ([address]        4. Chronic renal impairment, stage 3 (moderate), unspecified whether stage 3a or 3b CKD ([address]        5. Chronic diastolic heart failure (HCC)            Remain on Carbamazepine 200mg bid for seizure as last level therapeutic. Hemoglobin 10.4. Last sodium level on labs therapeutic at 136 on October 28.   Remain on sodium chloride tabs 1 g twice daily for hyponatremia as patient has no increase in edema on such. Shortness of breath and edema at baseline. No other acute complaints or concerns today. Weight and appetite stable. adhere to the JNC VIII guidelines for HTN managementand the NCEP ATP III guidelines for LDL-C management. No orders of the defined types were placed in this encounter. No orders of the defined types were placed in this encounter. No follow-ups on file. Encourage fluids and good nutrition. Stress fall prevention strategies. Electronically signed by EDDIE Katz CNP    Total time includes reviewing Plan of Care, labs, reviewing history, medications, and allergies, counseling patient, performing medically appropriate evaluation and examination, ordering medications, and documenting in the medical record. Please note this report is partially produced by using speech recognition hardware. It may contain errors related to the system, including grammar, punctuation and spelling as well as words and phrases that may seem inaccurate.   For any questions or concerns feel free to contact me for clarification

## 2022-11-24 VITALS
TEMPERATURE: 97 F | RESPIRATION RATE: 16 BRPM | WEIGHT: 135.2 LBS | OXYGEN SATURATION: 97 % | HEART RATE: 62 BPM | BODY MASS INDEX: 26.4 KG/M2 | SYSTOLIC BLOOD PRESSURE: 107 MMHG | DIASTOLIC BLOOD PRESSURE: 73 MMHG

## 2022-11-24 ASSESSMENT — ENCOUNTER SYMPTOMS
COUGH: 0
WHEEZING: 0
STRIDOR: 0
COLOR CHANGE: 0
SHORTNESS OF BREATH: 1

## 2022-12-08 NOTE — PROGRESS NOTES
SUBJECTIVE:  This 25-year-old woman seen in room for follow-up visit for Parkinson's heart failure bronchitis. Patient is at her baseline without evidence no acute functional decline no recent emesis fevers or chills patient has been on antiepileptic agents for seizure disorder mood status stable her prior pain reduction was improved      ROS: Chest pain palpitations nausea vomiting  The rest of the 14 point ROS negative    PHYSICAL EXAM: VSS per facility record  Alert to self pupils are reactive oral mucosa moist no thrush chest with coarse breath sounds cardiovascular showed a regular rate Abdo obese positive bowel sounds EXTR show trace dorsal pedal pulse    ASSESSMENT & PLAN:   Diagnosis Orders   1. Parkinson disease ([address]        2. Chronic diastolic heart failure ([address]        3.  Chronic bronchitis, unspecified chronic bronchitis type ([address]          Current Rester treatments diurese intermittently monitoring skin for further breakdown continue local care to right heel            Past Medical History:   Diagnosis Date    Aortic stenosis     Chronic kidney disease     COPD (chronic obstructive pulmonary disease) (HCC)     Heart failure (HCC)     High cholesterol     HTN (hypertension)     Hypothyroid     New onset a-fib [facility])          Past Surgical History:   Procedure Laterality Date    KNEE SURGERY Right 4/14/2020    RIGHT KNEE REMOVAL OF HARDWARE performed by Sandra Tsai MD at [address] [place] [address] Right 1/16/2020    RIGHT QUADRICEPS MUSCLE BIOPSY performed by Leah Lopez MD at [address] [place] [address] Right 11/18/2019    RIGHT PATELLA OPEN REDUCTION INTERNAL FIXATION  ROOM 180 performed by Sandra Tsai MD at [address] [place] [address] N/A 12/17/2019    EGD performed by Juan Carlos Carvajal MD at [facility]         Current Outpatient Medications on File Prior to Visit   Medication Sig Dispense Refill    ferrous sulfate (IRON 325) 325 (65 Fe) MG tablet Take 1 tablet by mouth 2 times daily 60 tablet 5    magnesium hydroxide (MILK OF MAGNESIA) 400 MG/5ML suspension Take 30 mLs by mouth      potassium chloride (KLOR-CON M) 20 MEQ extended release tablet Take 40 mEq by mouth      acetaminophen (TYLENOL) 650 MG suppository Place 650 mg rectally every 4 hours as needed for Fever      Aluminum & Magnesium Hydroxide (MAGNESIUM-ALUMINUM PO) Take by mouth 225-200 MG/5ML      albuterol-ipratropium (COMBIVENT RESPIMAT)  MCG/ACT AERS inhaler Inhale 1 puff into the lungs every 6 hours      Sodium Phosphates (FLEET) 7-19 GM/118ML Place 1 enema rectally once as needed      glucagon 1 MG injection Inject 1 kit into the muscle once      Glucose-Cholecalciferol (TRUEPLUS GLUCOSE) GEL Take by mouth      GUAIFENESIN PO Take 10 mLs by mouth every 4 hours as needed      ibuprofen (ADVIL;MOTRIN) 400 MG tablet Take 1 tablet by mouth every 6 hours as needed for Pain 30 tablet 0    levothyroxine (SYNTHROID) 125 MCG tablet Take 1 tablet by mouth Daily 30 tablet 03    alendronate (FOSAMAX) 70 MG tablet Take 70 mg by mouth      amLODIPine (NORVASC) 5 MG tablet Take 1 tablet by mouth daily 30 tablet 0    bisacodyl (DULCOLAX) 10 MG suppository Place 10 mg rectally daily as needed for Constipation      calcium-vitamin D (OSCAL-500) 500-200 MG-UNIT per tablet Take 1 tablet by mouth 2 times daily      mirtazapine (REMERON) 15 MG tablet Take 7.5 mg by mouth nightly      guaiFENesin-dextromethorphan (ROBITUSSIN DM) 100-10 MG/5ML syrup Take 5 mLs by mouth every 6 hours as needed for Cough      sennosides-docusate sodium (SENOKOT-S) 8.6-50 MG tablet Take 2 tablets by mouth nightly HOLD FOR LOOSE STOOL      acetaminophen (TYLENOL) 325 MG tablet Take 650 mg by mouth every 4 hours as needed for Pain      pantoprazole (PROTONIX) 40 MG tablet Take 1 tablet by mouth 2 times daily (before meals) 60 tablet 0    vitamin B-12 (CYANOCOBALAMIN) 500 MCG tablet Take 500 mcg by mouth daily      folic acid (FOLVITE) 1 MG tablet Take 1 mg by mouth daily      aspirin 81 MG chewable tablet Take 1 tablet by mouth daily 30 tablet 3    amiodarone (CORDARONE) 200 MG tablet Take 1 tablet by mouth daily 30 tablet 3    ipratropium-albuterol (DUONEB) 0.5-2.5 (3) MG/3ML SOLN nebulizer solution Inhale 3 mLs into the lungs every 4 hours as needed for Shortness of Breath 360 mL 0    carBAMazepine (TEGRETOL) 200 MG tablet Take 1 tablet by mouth 2 times daily 90 tablet 3    coenzyme Q10 100 MG CAPS capsule Take 1 capsule by mouth 2 times daily (before meals) 60 capsule 0    vitamin D (ERGOCALCIFEROL) 1.25 MG (92053 UT) CAPS capsule Take 1 capsule by mouth once a week (Patient taking differently: Take 50,000 Units by mouth once a week mondays) 5 capsule 0    metoprolol tartrate (LOPRESSOR) 25 MG tablet Take 0.5 tablets by mouth 2 times daily 60 tablet 3     No current facility-administered medications on file prior to visit. No family history on file. 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   Tobacco Use    Smoking status: Former     Packs/day: 0.50     Years: 25.00     Pack years: 12.50     Types: Cigarettes    Smokeless tobacco: Never   Substance and Sexual Activity    Alcohol use: Never    Drug use: Never    Sexual activity: Not on file   Other Topics Concern    Not on file   Social History Narrative         Lives With: Florence Tao lives in [place] fulltime t the [address] [place] [address] office    Type of Home: Apartment(1st floor) in [facility] 6: Two level, Able to Live on Main level with bedroom/bathroom(Full flight to second floor)    Home Access: Stairs to enter without rails    Entrance Stairs - Number of Steps: 2    Bathroom Shower/Tub: Tub/Shower unit    Bathroom Equipment: (None)    Home Equipment: (N/A)    ADL Assistance: Independent    Homemaking Assistance: Independent    Homemaking Responsibilities: Yes    Meal Prep Responsibility: Primary    Laundry Responsibility: Primary    Cleaning Responsibility: Primary    Ambulation Assistance: Independent(No AD)    Transfer Assistance: Independent    Active : Yes    Additional Comments: States that she was down on ground X 72 hours following fall     Social Determinants of Health     Financial Resource Strain: Not on file   Food Insecurity: Not on file   Transportation Needs: Not on file   Physical Activity: Not on file   Stress: Not on file   Social Connections: Not on file   Intimate Partner Violence: Not on file   Housing Stability: Not on file         No results found for: LABA1C  No results found for: EAG    Lab Results   Component Value Date/Time     09/12/2022 12:00 AM    K 3.8 09/12/2022 12:00 AM    K 3.3 01/16/2020 10:04 AM    CL 98 09/12/2022 12:00 AM    CO2 22 09/12/2022 12:00 AM    BUN 6 09/12/2022 12:00 AM    CREATININE 0.8 09/12/2022 12:00 AM    GLUCOSE 87 09/12/2022 12:00 AM    CALCIUM 8.4 09/12/2022 12:00 AM        Lab Results   Component Value Date    CHOL 131 11/24/2019     Lab Results   Component Value Date    TRIG 142 11/24/2019     Lab Results   Component Value Date    HDL 45 11/24/2019     Lab Results   Component Value Date    LDLCALC 58 11/24/2019     No results found for: LABVLDL, VLDL  No results found for: [facility]    Lab Results   Component Value Date    TSH 2.070 09/21/2021       Lab Results   Component Value Date    WBC 9.3 03/25/2021    HGB 28.5 (A) 08/03/2021    HCT 9.6 (A) 08/03/2021    MCV 94.8 03/25/2021     03/25/2021       Please note orders entered on site at facility after discussion with appropriate facility nursing/therapy/ / nutritional staff. Current longstanding medical problems and acute medical issues addressed with staff.  Available data and data elements in on site paper chart reviewed and analyzed. Current external consultant notes reviewed in on site chart. Ordered laboratory testing and imaging will be reviewed when available.

## 2022-12-22 ENCOUNTER — OFFICE VISIT (OUTPATIENT)
Dept: GERIATRIC MEDICINE | Age: 82
End: 2022-12-22
Payer: COMMERCIAL

## 2022-12-22 DIAGNOSIS — G20 PARKINSON DISEASE (HCC): ICD-10-CM

## 2022-12-22 DIAGNOSIS — J44.1 COPD EXACERBATION (HCC): Primary | ICD-10-CM

## 2022-12-22 DIAGNOSIS — G40.909 SEIZURE DISORDER (HCC): ICD-10-CM

## 2022-12-22 DIAGNOSIS — N18.30 CHRONIC RENAL IMPAIRMENT, STAGE 3 (MODERATE), UNSPECIFIED WHETHER STAGE 3A OR 3B CKD (HCC): ICD-10-CM

## 2022-12-22 DIAGNOSIS — I50.32 CHRONIC DIASTOLIC HEART FAILURE (HCC): ICD-10-CM

## 2022-12-22 LAB
BASOPHILS ABSOLUTE: 0.1 /ΜL
BASOPHILS RELATIVE PERCENT: 1 %
BUN BLDV-MCNC: 9 MG/DL
CALCIUM SERPL-MCNC: 8 MG/DL
CHLORIDE BLD-SCNC: 101 MMOL/L
CO2: 26 MMOL/L
CREAT SERPL-MCNC: 0.8 MG/DL
EOSINOPHILS ABSOLUTE: 0.3 /ΜL
EOSINOPHILS RELATIVE PERCENT: 3 %
GFR CALCULATED: 69
GLUCOSE BLD-MCNC: 115 MG/DL
HCT VFR BLD CALC: 30.6 % (ref 36–46)
HEMOGLOBIN: 10.2 G/DL (ref 12–16)
LYMPHOCYTES ABSOLUTE: 2.8 /ΜL
LYMPHOCYTES RELATIVE PERCENT: 29.2 %
MCH RBC QN AUTO: 30.3 PG
MCHC RBC AUTO-ENTMCNC: 33.5 G/DL
MCV RBC AUTO: 90.5 FL
MONOCYTES ABSOLUTE: 0.9 /ΜL
MONOCYTES RELATIVE PERCENT: 9.4 %
NEUTROPHILS ABSOLUTE: 5.6 /ΜL
NEUTROPHILS RELATIVE PERCENT: 57.4 %
PLATELET # BLD: 279 K/ΜL
PMV BLD AUTO: 10.1 FL
POTASSIUM SERPL-SCNC: 4 MMOL/L
RBC # BLD: 3.37 10^6/ΜL
SODIUM BLD-SCNC: 136 MMOL/L
WBC # BLD: 9.7 10^3/ML

## 2022-12-22 PROCEDURE — 99310 SBSQ NF CARE HIGH MDM 45: CPT | Performed by: FAMILY MEDICINE

## 2022-12-22 PROCEDURE — G8484 FLU IMMUNIZE NO ADMIN: HCPCS | Performed by: FAMILY MEDICINE

## 2022-12-22 PROCEDURE — 1123F ACP DISCUSS/DSCN MKR DOCD: CPT | Performed by: FAMILY MEDICINE

## 2022-12-27 LAB
BASOPHILS ABSOLUTE: 0.1 /ΜL
BASOPHILS RELATIVE PERCENT: 0.8 %
BUN BLDV-MCNC: 22 MG/DL
CALCIUM SERPL-MCNC: 7.7 MG/DL
CHLORIDE BLD-SCNC: 103 MMOL/L
CO2: 23 MMOL/L
CREAT SERPL-MCNC: 0.9 MG/DL
EOSINOPHILS ABSOLUTE: 0.2 /ΜL
EOSINOPHILS RELATIVE PERCENT: 1.7 %
GFR CALCULATED: 60
GLUCOSE BLD-MCNC: 77 MG/DL
HCT VFR BLD CALC: 30 % (ref 36–46)
HEMOGLOBIN: 9.8 G/DL (ref 12–16)
LYMPHOCYTES ABSOLUTE: 7.3 /ΜL
LYMPHOCYTES RELATIVE PERCENT: 54.4 %
MCH RBC QN AUTO: 30.5 PG
MCHC RBC AUTO-ENTMCNC: 32.8 G/DL
MCV RBC AUTO: 92.8 FL
MONOCYTES ABSOLUTE: 1 /ΜL
MONOCYTES RELATIVE PERCENT: 7.8 %
NEUTROPHILS ABSOLUTE: 4.7 /ΜL
NEUTROPHILS RELATIVE PERCENT: 35.3 %
PLATELET # BLD: 346 K/ΜL
PMV BLD AUTO: 10 FL
POTASSIUM SERPL-SCNC: 4.1 MMOL/L
RBC # BLD: 3.23 10^6/ΜL
SODIUM BLD-SCNC: 136 MMOL/L
WBC # BLD: 13.4 10^3/ML

## 2022-12-29 ENCOUNTER — OFFICE VISIT (OUTPATIENT)
Dept: GERIATRIC MEDICINE | Age: 82
End: 2022-12-29
Payer: COMMERCIAL

## 2022-12-29 DIAGNOSIS — E87.1 HYPONATREMIA: ICD-10-CM

## 2022-12-29 DIAGNOSIS — J42 CHRONIC BRONCHITIS, UNSPECIFIED CHRONIC BRONCHITIS TYPE (HCC): ICD-10-CM

## 2022-12-29 DIAGNOSIS — G89.29 OTHER CHRONIC PAIN: ICD-10-CM

## 2022-12-29 DIAGNOSIS — U07.1 COVID-19: Primary | ICD-10-CM

## 2022-12-29 PROCEDURE — G8484 FLU IMMUNIZE NO ADMIN: HCPCS | Performed by: FAMILY MEDICINE

## 2022-12-29 PROCEDURE — 99310 SBSQ NF CARE HIGH MDM 45: CPT | Performed by: FAMILY MEDICINE

## 2022-12-29 PROCEDURE — 1123F ACP DISCUSS/DSCN MKR DOCD: CPT | Performed by: FAMILY MEDICINE

## 2023-01-03 NOTE — PROGRESS NOTES
Subjective:      Patient was examined at [facility]  Address: [address] [place] [address] [place] [address] [facility]  Phone: [phone]      Patient ID: Chinedu Valencia is a 81 y.o. female being seen today. Patient at baseline mentation of alert and oriented x3. Patient having increasing cough new onset wheezing. Noted expiratory wheezing throughout on auscultation. Edema at baseline. Tremors from parkinsonian disorder at baseline. Past Medical History:   Diagnosis Date    Aortic stenosis     Chronic kidney disease     COPD (chronic obstructive pulmonary disease) (HCC)     Heart failure (HCC)     High cholesterol     HTN (hypertension)     Hypothyroid     New onset a-fib [facility])      Past Surgical History:   Procedure Laterality Date    KNEE SURGERY Right 4/14/2020    RIGHT KNEE REMOVAL OF HARDWARE performed by Suman Boyd MD at [address] [facility] Right 1/16/2020    RIGHT QUADRICEPS MUSCLE BIOPSY performed by Akil Dee MD at [facility] Right 11/18/2019    RIGHT PATELLA OPEN REDUCTION INTERNAL FIXATION  ROOM 180 performed by Suman Boyd MD at [address] [place] [address] N/A 12/17/2019    EGD performed by Brandi Posadas MD at [facility]     No family history on file. 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   Tobacco Use    Smoking status: Former     Packs/day: 0.50     Years: 25.00     Pack years: 12.50     Types: Cigarettes    Smokeless tobacco: Never   Substance and Sexual Activity    Alcohol use: Never    Drug use: Never    Sexual activity: Not on file   Other Topics Concern    Not on file   Social History Narrative         Lives With: Florence Tao lives in [place] fulltime t [employer] office    Type of Home: Apartment(1st floor) in [facility]: Two level, Able to Live on Main level with bedroom/bathroom(Full flight to second floor)    Home Access: Stairs to enter without rails    Entrance Stairs - Number of Steps: 2    Bathroom Shower/Tub: Tub/Shower unit    Bathroom Equipment: (None)    Home Equipment: (N/A)    ADL Assistance: Independent    Homemaking Assistance: Independent    Homemaking Responsibilities: Yes    Meal Prep Responsibility: Primary    Laundry Responsibility: Primary    Cleaning Responsibility: Primary    Ambulation Assistance: Independent(No AD)    Transfer Assistance: Independent    Active : Yes    Additional Comments: States that she was down on ground X 72 hours following fall     Social Determinants of Health     Financial Resource Strain: Not on file   Food Insecurity: Not on file   Transportation Needs: Not on file   Physical Activity: Not on file   Stress: Not on file   Social Connections: Not on file   Intimate Partner Violence: Not on file   Housing Stability: Not on file       Allergies: Amoxicillin, Cefdinir, and Sulfa antibiotics  NF MEDICATIONS REVIEWED    Review of Systems   Constitutional:  Positive for fatigue. Negative for activity change, appetite change, chills and fever. Respiratory:  Positive for shortness of breath and wheezing. Negative for cough and stridor. Cardiovascular:  Negative for chest pain. Musculoskeletal:  Positive for arthralgias. Skin:  Negative for color change and pallor. Neurological:  Positive for weakness. Psychiatric/Behavioral:  Positive for confusion. Negative for agitation and dysphoric mood. The patient is not nervous/anxious. All other systems reviewed and are negative. Objective:   BP: 130/69 mmHg  Temp:97.9 °F  Pulse:78 bpm  Weight:137 Lbs  Resp:18 Breaths/min  O2:97 %    Physical Exam  Vitals reviewed. Constitutional:       Appearance: She is well-developed. She is ill-appearing.    HENT: Head: Normocephalic. Eyes:      Pupils: Pupils are equal, round, and reactive to light. Cardiovascular:      Rate and Rhythm: Normal rate and regular rhythm. Heart sounds: Murmur heard. Pulmonary:      Effort: Pulmonary effort is normal.      Breath sounds: Wheezing present. Abdominal:      General: Bowel sounds are normal. There is no distension. Palpations: Abdomen is soft. Tenderness: There is no abdominal tenderness. There is no guarding. Musculoskeletal:         General: Normal range of motion. Cervical back: Normal range of motion. Right lower leg: No edema. Left lower leg: No edema. Skin:     General: Skin is warm and dry. Neurological:      Mental Status: She is alert and oriented to person, place, and time. Motor: Weakness present. Gait: Gait abnormal.   Psychiatric:         Behavior: Behavior normal.       Weight Trends  Wt Readings from Last 10 Encounters:   01/05/23 137 lb (62.1 kg)   01/04/23 137 lb (62.1 kg)   11/24/22 135 lb 3.2 oz (61.3 kg)   09/22/22 142 lb 8 oz (64.6 kg)   09/05/22 142 lb 14.4 oz (64.8 kg)   07/05/22 143 lb 1.6 oz (64.9 kg)   01/05/21 135 lb 3.2 oz (61.3 kg)   12/15/20 131 lb 9.6 oz (59.7 kg)   06/05/20 126 lb 6.4 oz (57.3 kg)   04/14/20 132 lb (59.9 kg)        Assessment and Plan:      Diagnosis Orders   1. COPD exacerbation ([address]        2. Chronic diastolic heart failure (HCC)        3. Seizure disorder ([address]        4. Chronic renal impairment, stage 3 (moderate), unspecified whether stage 3a or 3b CKD ([address]        5. Parkinson disease ([address]            Start patient on prednisone 20 mg oral daily x5 days. Start patient on doxycycline 100 mg p.o. twice daily x7 days and probiotic. Weight and appetite stable. Edema at baseline. adhere to the JNC VIII guidelines for HTN managementand the NCEP ATP III guidelines for LDL-C management. No orders of the defined types were placed in this encounter.     No orders of the defined types were placed in this encounter. No follow-ups on file. Encourage fluids and good nutrition. Stress fall prevention strategies. Electronically signed by EDDIE Blackwood CNP    Total time includes reviewing Plan of Care, labs, reviewing history, medications, and allergies, counseling patient, performing medically appropriate evaluation and examination, ordering medications, and documenting in the medical record. Please note this report is partially produced by using speech recognition hardware. It may contain errors related to the system, including grammar, punctuation and spelling as well as words and phrases that may seem inaccurate.   For any questions or concerns feel free to contact me for clarification

## 2023-01-03 NOTE — PROGRESS NOTES
Subjective:      Patient was examined at [facility]  Address: [address] [place] [address] [place], [address] [facility]  Phone: [phone]    Chief Complaint   Patient presents with    COPD    Positive For Covid-19    Other    Follow-up         Patient ID: Gil Samuels is a 81 y.o. female being seen today. Patient at baseline mentation of alert and oriented x3. Patient recently tested positive for COVID-19. He was started on doxycycline 100 mg oral twice daily preventative given history COPD and will remain on such until 12/30. Does take carbamazepine not a candidate for paxlovid therapy. Does have noted increase in cough shortness of breath. Past Medical History:   Diagnosis Date    Aortic stenosis     Chronic kidney disease     COPD (chronic obstructive pulmonary disease) (HCC)     Heart failure (HCC)     High cholesterol     HTN (hypertension)     Hypothyroid     New onset a-fib [facility])      Past Surgical History:   Procedure Laterality Date    KNEE SURGERY Right 4/14/2020    RIGHT KNEE REMOVAL OF HARDWARE performed by Jasmin Schafer MD at [address] Right 1/16/2020    RIGHT QUADRICEPS MUSCLE BIOPSY performed by En Vidal MD at [facility] Right 11/18/2019    RIGHT PATELLA OPEN REDUCTION INTERNAL FIXATION  ROOM 180 performed by Jasmin Schafer MD at [address] N/A 12/17/2019    EGD performed by Robin Bellamy MD at [facility]     History reviewed. No pertinent family history. 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[place]   Tobacco Use    Smoking status: Former     Packs/day: 0.50     Years: 25.00     Pack years: 12.50     Types: Cigarettes    Smokeless tobacco: Never   Substance and Sexual Activity    Alcohol use: Never    Drug use: Never    Sexual activity: Not on file   Other Topics Concern    Not on file   Social History Narrative         Lives With: Florence Tao lives in [place] fulltime t the [address] [place] [address] office    Type of Home: Apartment(1st floor) in [facility] 6: Two level, Able to Live on Main level with bedroom/bathroom(Full flight to second floor)    Home Access: Stairs to enter without rails    Entrance Stairs - Number of Steps: 2    Bathroom Shower/Tub: Tub/Shower unit    Bathroom Equipment: (None)    Home Equipment: (N/A)    ADL Assistance: Independent    Homemaking Assistance: Independent    Homemaking Responsibilities: Yes    Meal Prep Responsibility: Primary    Laundry Responsibility: Primary    Cleaning Responsibility: Primary    Ambulation Assistance: Independent(No AD)    Transfer Assistance: Independent    Active : Yes    Additional Comments: States that she was down on ground X 72 hours following fall     Social Determinants of Health     Financial Resource Strain: Not on file   Food Insecurity: Not on file   Transportation Needs: Not on file   Physical Activity: Not on file   Stress: Not on file   Social Connections: Not on file   Intimate Partner Violence: Not on file   Housing Stability: Not on file       Allergies: Amoxicillin, Cefdinir, and Sulfa antibiotics  NF MEDICATIONS REVIEWED    Review of Systems   Constitutional:  Positive for fatigue. Negative for activity change, appetite change, chills and fever. Respiratory:  Positive for shortness of breath. Negative for cough, wheezing and stridor. Cardiovascular:  Negative for chest pain. Musculoskeletal:  Positive for arthralgias. Skin:  Negative for color change and pallor. Neurological:  Positive for weakness. Psychiatric/Behavioral:  Positive for confusion. Negative for agitation and dysphoric mood. The patient is not nervous/anxious.     All other systems reviewed and are negative. Objective:   blood pressure 130 / 72 temperature 98.4F pulse 68 weight 137 lbs respiration 20 oxygen 96    Physical Exam  Vitals reviewed. Constitutional:       Appearance: She is well-developed. She is ill-appearing. HENT:      Head: Normocephalic. Eyes:      Pupils: Pupils are equal, round, and reactive to light. Cardiovascular:      Rate and Rhythm: Normal rate and regular rhythm. Heart sounds: Murmur heard. Pulmonary:      Effort: Pulmonary effort is normal.      Breath sounds: Normal breath sounds. Abdominal:      General: Bowel sounds are normal. There is no distension. Palpations: Abdomen is soft. Tenderness: There is no abdominal tenderness. There is no guarding. Musculoskeletal:         General: Normal range of motion. Cervical back: Normal range of motion. Right lower leg: No edema. Left lower leg: No edema. Skin:     General: Skin is warm and dry. Neurological:      Mental Status: She is alert and oriented to person, place, and time. Motor: Weakness present. Gait: Gait abnormal.   Psychiatric:         Behavior: Behavior normal.       Weight Trends  Wt Readings from Last 10 Encounters:   01/05/23 137 lb (62.1 kg)   01/04/23 137 lb (62.1 kg)   11/24/22 135 lb 3.2 oz (61.3 kg)   09/22/22 142 lb 8 oz (64.6 kg)   09/05/22 142 lb 14.4 oz (64.8 kg)   07/05/22 143 lb 1.6 oz (64.9 kg)   01/05/21 135 lb 3.2 oz (61.3 kg)   12/15/20 131 lb 9.6 oz (59.7 kg)   06/05/20 126 lb 6.4 oz (57.3 kg)   04/14/20 132 lb (59.9 kg)        Assessment and Plan:      Diagnosis Orders   1. COVID-19        2. Chronic bronchitis, unspecified chronic bronchitis type ([address]        3. Hyponatremia        4. Other chronic pain          Recent labs show hemoglobin 9.8 lymphocytes 7.30 GFR 60. Chest x-ray pending. Lung sounds clear to auscultation. Patient is at high risk for physical/medical decline given multiple comorbidities in the presence of COVID-19.   Advised staff to call me/office with any decline in patient condition. adhere to the JNC VIII guidelines for HTN managementand the NCEP ATP III guidelines for LDL-C management. No orders of the defined types were placed in this encounter. No orders of the defined types were placed in this encounter. No follow-ups on file. Encourage fluids and good nutrition. Stress fall prevention strategies. Electronically signed by EDDIE Campoverde CNP    Total time includes reviewing Plan of Care, labs, reviewing history, medications, and allergies, counseling patient, performing medically appropriate evaluation and examination, ordering medications, and documenting in the medical record. Please note this report is partially produced by using speech recognition hardware. It may contain errors related to the system, including grammar, punctuation and spelling as well as words and phrases that may seem inaccurate.   For any questions or concerns feel free to contact me for clarification

## 2023-01-04 VITALS — WEIGHT: 137 LBS | BODY MASS INDEX: 26.76 KG/M2

## 2023-01-04 ASSESSMENT — ENCOUNTER SYMPTOMS
COLOR CHANGE: 0
COUGH: 0
SHORTNESS OF BREATH: 1
STRIDOR: 0

## 2023-01-05 ENCOUNTER — OFFICE VISIT (OUTPATIENT)
Dept: GERIATRIC MEDICINE | Age: 83
End: 2023-01-05
Payer: COMMERCIAL

## 2023-01-05 VITALS — WEIGHT: 137 LBS | BODY MASS INDEX: 26.76 KG/M2

## 2023-01-05 DIAGNOSIS — J42 CHRONIC BRONCHITIS, UNSPECIFIED CHRONIC BRONCHITIS TYPE (HCC): ICD-10-CM

## 2023-01-05 DIAGNOSIS — D72.820 LYMPHOCYTOSIS: Primary | ICD-10-CM

## 2023-01-05 DIAGNOSIS — I50.32 CHRONIC DIASTOLIC HEART FAILURE (HCC): ICD-10-CM

## 2023-01-05 DIAGNOSIS — G89.29 OTHER CHRONIC PAIN: ICD-10-CM

## 2023-01-05 DIAGNOSIS — R05.1 ACUTE COUGH: ICD-10-CM

## 2023-01-05 DIAGNOSIS — U07.1 COVID-19: ICD-10-CM

## 2023-01-05 PROCEDURE — G8484 FLU IMMUNIZE NO ADMIN: HCPCS | Performed by: FAMILY MEDICINE

## 2023-01-05 PROCEDURE — 1123F ACP DISCUSS/DSCN MKR DOCD: CPT | Performed by: FAMILY MEDICINE

## 2023-01-05 PROCEDURE — 99309 SBSQ NF CARE MODERATE MDM 30: CPT | Performed by: FAMILY MEDICINE

## 2023-01-05 ASSESSMENT — ENCOUNTER SYMPTOMS
STRIDOR: 0
SHORTNESS OF BREATH: 1
COUGH: 0
WHEEZING: 0
COLOR CHANGE: 0

## 2023-01-09 ASSESSMENT — ENCOUNTER SYMPTOMS: WHEEZING: 1

## 2023-01-11 VITALS — BODY MASS INDEX: 26.01 KG/M2 | WEIGHT: 133.2 LBS

## 2023-01-11 ASSESSMENT — ENCOUNTER SYMPTOMS
COUGH: 0
WHEEZING: 0
COLOR CHANGE: 0
STRIDOR: 0
SHORTNESS OF BREATH: 1

## 2023-01-12 NOTE — PROGRESS NOTES
Subjective:      Patient was examined at [facility]  Address: [address] [place] [address], [place], [address] [facility]  Phone: [phone]    Chief Complaint   Patient presents with    Cough    Post-COVID Symptoms    Other    Follow-up           Patient ID: Nichole Leung is a 81 y.o. female being seen today. Patient at baseline mentation of alert and oriented x3. Patient doing well post COVID-19. Finish doxycycline as prescribed. Was not a candidate for paxlovid therapy as on carbamazepine . Patient's most recent labs show white blood cell 13.4 hemoglobin 10.5 neutrophil within normal limit lymphocytes elevated at 6.2 GFR 53. Remains having dry chronic cough though no noted increased shortness of breath with exertion/conversation. Past Medical History:   Diagnosis Date    Aortic stenosis     Chronic kidney disease     COPD (chronic obstructive pulmonary disease) (HCC)     Heart failure (HCC)     High cholesterol     HTN (hypertension)     Hypothyroid     New onset a-fib [facility])      Past Surgical History:   Procedure Laterality Date    KNEE SURGERY Right 4/14/2020    RIGHT KNEE REMOVAL OF HARDWARE performed by Migue Cooper MD at [address] [place] [address] Right 1/16/2020    RIGHT QUADRICEPS MUSCLE BIOPSY performed by Truman Prader, MD at [address] [place] [address] Right 11/18/2019    RIGHT PATELLA OPEN REDUCTION INTERNAL FIXATION  ROOM 180 performed by Migue Cooper MD at [address] [place] [address] N/A 12/17/2019    EGD performed by Frandy Mehta MD at [facility]     No family history on file. 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[place]   Tobacco Use    Smoking status: Former     Packs/day: 0.50     Years: 25.00     Pack years: 12.50 Types: Cigarettes    Smokeless tobacco: Never   Substance and Sexual Activity    Alcohol use: Never    Drug use: Never    Sexual activity: Not on file   Other Topics Concern    Not on file   Social History Narrative         Lives With: Alone  Son Jey Simms lives in [place] fulltime t the [address] [place] [address] office    Type of Home: Apartment(1st floor) in [facility] 6: Two level, Able to Live on Main level with bedroom/bathroom(Full flight to second floor)    Home Access: Stairs to enter without rails    Entrance Stairs - Number of Steps: 2    Bathroom Shower/Tub: Tub/Shower unit    Bathroom Equipment: (None)    Home Equipment: (N/A)    ADL Assistance: Independent    Homemaking Assistance: Independent    Homemaking Responsibilities: Yes    Meal Prep Responsibility: Primary    Laundry Responsibility: Primary    Cleaning Responsibility: Primary    Ambulation Assistance: Independent(No AD)    Transfer Assistance: Independent    Active : Yes    Additional Comments: States that she was down on ground X 72 hours following fall     Social Determinants of Health     Financial Resource Strain: Not on file   Food Insecurity: Not on file   Transportation Needs: Not on file   Physical Activity: Not on file   Stress: Not on file   Social Connections: Not on file   Intimate Partner Violence: Not on file   Housing Stability: Not on file       Allergies: Amoxicillin, Cefdinir, and Sulfa antibiotics  NF MEDICATIONS REVIEWED    Review of Systems   Constitutional:  Positive for fatigue. Negative for activity change, appetite change, chills and fever. Respiratory:  Positive for shortness of breath. Negative for cough, wheezing and stridor. Cardiovascular:  Negative for chest pain. Musculoskeletal:  Positive for arthralgias. Skin:  Negative for color change and pallor. Neurological:  Positive for weakness. Psychiatric/Behavioral:  Positive for confusion. Negative for agitation and dysphoric mood.  The patient is not nervous/anxious. All other systems reviewed and are negative. Objective:   Blood Pressure 121 / 81 Temperature 98.4F Pulse 68 Weight 133.2 lbs Respiration 20 Oxygen 96    Physical Exam  Vitals reviewed. Constitutional:       Appearance: She is well-developed. She is ill-appearing. HENT:      Head: Normocephalic. Eyes:      Pupils: Pupils are equal, round, and reactive to light. Cardiovascular:      Rate and Rhythm: Normal rate and regular rhythm. Heart sounds: Murmur heard. Pulmonary:      Effort: Pulmonary effort is normal.      Breath sounds: Normal breath sounds. Abdominal:      General: Bowel sounds are normal. There is no distension. Palpations: Abdomen is soft. Tenderness: There is no abdominal tenderness. There is no guarding. Musculoskeletal:         General: Normal range of motion. Cervical back: Normal range of motion. Right lower leg: No edema. Left lower leg: No edema. Skin:     General: Skin is warm and dry. Neurological:      Mental Status: She is alert and oriented to person, place, and time. Motor: Weakness present. Gait: Gait abnormal.   Psychiatric:         Behavior: Behavior normal.       Weight Trends  Wt Readings from Last 10 Encounters:   01/11/23 133 lb 3.2 oz (60.4 kg)   01/05/23 137 lb (62.1 kg)   01/04/23 137 lb (62.1 kg)   11/24/22 135 lb 3.2 oz (61.3 kg)   09/22/22 142 lb 8 oz (64.6 kg)   09/05/22 142 lb 14.4 oz (64.8 kg)   07/05/22 143 lb 1.6 oz (64.9 kg)   01/05/21 135 lb 3.2 oz (61.3 kg)   12/15/20 131 lb 9.6 oz (59.7 kg)   06/05/20 126 lb 6.4 oz (57.3 kg)        Assessment and Plan:      Diagnosis Orders   1. Lymphocytosis        2. Chronic bronchitis, unspecified chronic bronchitis type ([address]        3. COVID-19        4. Acute cough        5. Other chronic pain        6. Chronic diastolic heart failure ([address]          Patient at baseline orientation.   Does have some post COVID weight loss of approximately 4 pounds noted. Ordered for CBC and BMP in a week. Lung sounds clear to auscultation likely elevated white blood cells lymphocytes and cough residual from COVID-19.      adhere to the JNC VIII guidelines for HTN managementand the NCEP ATP III guidelines for LDL-C management. No orders of the defined types were placed in this encounter. No orders of the defined types were placed in this encounter. No follow-ups on file. Encourage fluids and good nutrition. Stress fall prevention strategies. Electronically signed by EDDIE Muniz CNP    Total time includes reviewing Plan of Care, labs, reviewing history, medications, and allergies, counseling patient, performing medically appropriate evaluation and examination, ordering medications, and documenting in the medical record. Please note this report is partially produced by using speech recognition hardware. It may contain errors related to the system, including grammar, punctuation and spelling as well as words and phrases that may seem inaccurate.   For any questions or concerns feel free to contact me for clarification

## 2023-01-26 ENCOUNTER — OFFICE VISIT (OUTPATIENT)
Dept: GERIATRIC MEDICINE | Age: 83
End: 2023-01-26

## 2023-01-26 DIAGNOSIS — I50.32 CHRONIC DIASTOLIC HEART FAILURE (HCC): ICD-10-CM

## 2023-01-26 DIAGNOSIS — J42 CHRONIC BRONCHITIS, UNSPECIFIED CHRONIC BRONCHITIS TYPE (HCC): Primary | ICD-10-CM

## 2023-01-26 DIAGNOSIS — J44.1 COPD EXACERBATION (HCC): ICD-10-CM

## 2023-01-26 DIAGNOSIS — G20 PARKINSON DISEASE (HCC): ICD-10-CM

## 2023-02-08 VITALS — BODY MASS INDEX: 26.01 KG/M2 | WEIGHT: 133.2 LBS

## 2023-02-08 ASSESSMENT — ENCOUNTER SYMPTOMS
STRIDOR: 0
COLOR CHANGE: 0
SHORTNESS OF BREATH: 1
WHEEZING: 0
COUGH: 0

## 2023-02-08 NOTE — PROGRESS NOTES
Subjective:      Patient was examined at [facility]  Address: [address] [place] [address] [place], [address] [place] [address]  Phone: [phone]    Chief Complaint   Patient presents with    Shortness of Breath    Edema    Other    Follow-up           Patient ID: Jhonny Guzmán is a 81 y.o. female being seen today. Patient at baseline mentation of alert and oriented x3. Patient's recent calcium level 7.9. Shortness of breath and edema at baseline. Mood pleasant and cooperative at today's visit. Dry chronic cough has dissipated since prior. Past Medical History:   Diagnosis Date    Aortic stenosis     Chronic kidney disease     COPD (chronic obstructive pulmonary disease) (HCC)     Heart failure (HCC)     High cholesterol     HTN (hypertension)     Hypothyroid     New onset a-fib [facility])      Past Surgical History:   Procedure Laterality Date    KNEE SURGERY Right 4/14/2020    RIGHT KNEE REMOVAL OF HARDWARE performed by Anna Kimble MD at [facility] Right 1/16/2020    RIGHT QUADRICEPS MUSCLE BIOPSY performed by Mere Sands MD at [facility] Right 11/18/2019    RIGHT PATELLA OPEN REDUCTION INTERNAL FIXATION  [address] 180 performed by Anna Kimble MD at [facility] N/A 12/17/2019    EGD performed by Maylene Nyhan, MD at [facility]     No family history on file. 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   Tobacco Use    Smoking status: Former     Packs/day: 0.50     Years: 25.00     Pack years: 12.50     Types: Cigarettes    Smokeless tobacco: Never   Substance and Sexual Activity    Alcohol use: Never    Drug use: Never    Sexual activity: Not on file   Other Topics Concern    Not on file   Social History Narrative         Lives With: Alone  Son Yosi Simmons lives in [place] fulltime t the [address] [place] [address] office    Type of Home: Apartment(1st floor) in [facility] 6: Two level, Able to Live on Main level with bedroom/bathroom(Full flight to second floor)    Home Access: Stairs to enter without rails    Entrance Stairs - Number of Steps: 2    Bathroom Shower/Tub: Tub/Shower unit    Bathroom Equipment: (None)    Home Equipment: (N/A)    ADL Assistance: Independent    Homemaking Assistance: Independent    Homemaking Responsibilities: Yes    Meal Prep Responsibility: Primary    Laundry Responsibility: Primary    Cleaning Responsibility: Primary    Ambulation Assistance: Independent(No AD)    Transfer Assistance: Independent    Active : Yes    Additional Comments: States that she was down on ground X 72 hours following fall     Social Determinants of Health     Financial Resource Strain: Not on file   Food Insecurity: Not on file   Transportation Needs: Not on file   Physical Activity: Not on file   Stress: Not on file   Social Connections: Not on file   Intimate Partner Violence: Not on file   Housing Stability: Not on file       Allergies: Amoxicillin, Cefdinir, and Sulfa antibiotics  NF MEDICATIONS REVIEWED    Review of Systems   Constitutional:  Positive for fatigue. Negative for activity change, appetite change, chills and fever. Respiratory:  Positive for shortness of breath. Negative for cough, wheezing and stridor. Cardiovascular:  Negative for chest pain. Musculoskeletal:  Positive for arthralgias. Skin:  Negative for color change and pallor. Neurological:  Positive for weakness. Psychiatric/Behavioral:  Positive for confusion. Negative for agitation and dysphoric mood. The patient is not nervous/anxious. All other systems reviewed and are negative.     Objective:   BP: 115/58 mmHg  Temp:97.3 °F  Pulse:57 bpm  Weight:133.2 Lbs  Resp:18 Breaths/min  O2:97 %    Physical Exam  Vitals reviewed. Constitutional:       Appearance: She is well-developed. She is ill-appearing. HENT:      Head: Normocephalic. Eyes:      Pupils: Pupils are equal, round, and reactive to light. Cardiovascular:      Rate and Rhythm: Normal rate and regular rhythm. Heart sounds: Murmur heard. Pulmonary:      Effort: Pulmonary effort is normal.      Breath sounds: Normal breath sounds. Abdominal:      General: Bowel sounds are normal. There is no distension. Palpations: Abdomen is soft. Tenderness: There is no abdominal tenderness. There is no guarding. Musculoskeletal:         General: Normal range of motion. Cervical back: Normal range of motion. Right lower leg: No edema. Left lower leg: No edema. Skin:     General: Skin is warm and dry. Neurological:      Mental Status: She is alert and oriented to person, place, and time. Motor: Weakness present. Gait: Gait abnormal.   Psychiatric:         Behavior: Behavior normal.       Weight Trends  Wt Readings from Last 10 Encounters:   02/08/23 133 lb 3.2 oz (60.4 kg)   01/11/23 133 lb 3.2 oz (60.4 kg)   01/05/23 137 lb (62.1 kg)   01/04/23 137 lb (62.1 kg)   11/24/22 135 lb 3.2 oz (61.3 kg)   09/22/22 142 lb 8 oz (64.6 kg)   09/05/22 142 lb 14.4 oz (64.8 kg)   07/05/22 143 lb 1.6 oz (64.9 kg)   01/05/21 135 lb 3.2 oz (61.3 kg)   12/15/20 131 lb 9.6 oz (59.7 kg)        Assessment and Plan:      Diagnosis Orders   1. Chronic bronchitis, unspecified chronic bronchitis type ([address]        2. Chronic diastolic heart failure ([address]        3. COPD exacerbation ([address]        4. Parkinson disease ([address]          Started on calcium supplementation regarding calcium 7.9. Overall well. Appetite and weight stable. Vital signs stable. adhere to the JNC VIII guidelines for HTN managementand the NCEP ATP III guidelines for LDL-C management. No orders of the defined types were placed in this encounter.     No orders of the defined types were placed in this encounter. No follow-ups on file. Encourage fluids and good nutrition. Stress fall prevention strategies. Electronically signed by EDDIE Gardner CNP    Total time includes reviewing Plan of Care, labs, reviewing history, medications, and allergies, counseling patient, performing medically appropriate evaluation and examination, ordering medications, and documenting in the medical record. Please note this report is partially produced by using speech recognition hardware. It may contain errors related to the system, including grammar, punctuation and spelling as well as words and phrases that may seem inaccurate.   For any questions or concerns feel free to contact me for clarification

## 2023-03-01 ENCOUNTER — OFFICE VISIT (OUTPATIENT)
Dept: GERIATRIC MEDICINE | Age: 83
End: 2023-03-01

## 2023-03-01 DIAGNOSIS — I48.0 PAROXYSMAL ATRIAL FIBRILLATION (HCC): Primary | ICD-10-CM

## 2023-03-13 ENCOUNTER — OFFICE VISIT (OUTPATIENT)
Dept: GERIATRIC MEDICINE | Age: 83
End: 2023-03-13
Payer: COMMERCIAL

## 2023-03-13 DIAGNOSIS — G20 PARKINSON DISEASE (HCC): ICD-10-CM

## 2023-03-13 DIAGNOSIS — I50.32 CHRONIC DIASTOLIC HEART FAILURE (HCC): ICD-10-CM

## 2023-03-13 DIAGNOSIS — G89.29 OTHER CHRONIC PAIN: ICD-10-CM

## 2023-03-13 DIAGNOSIS — J42 CHRONIC BRONCHITIS, UNSPECIFIED CHRONIC BRONCHITIS TYPE (HCC): Primary | ICD-10-CM

## 2023-03-13 PROCEDURE — 1123F ACP DISCUSS/DSCN MKR DOCD: CPT | Performed by: FAMILY MEDICINE

## 2023-03-13 PROCEDURE — 99309 SBSQ NF CARE MODERATE MDM 30: CPT | Performed by: FAMILY MEDICINE

## 2023-03-13 PROCEDURE — G8484 FLU IMMUNIZE NO ADMIN: HCPCS | Performed by: FAMILY MEDICINE

## 2023-03-14 LAB
HCT VFR BLD CALC: 9.5 % (ref 36–46)
HEMOGLOBIN: 28.5 G/DL (ref 12–16)
TSH SERPL DL<=0.05 MIU/L-ACNC: 13.76 UIU/ML

## 2023-03-16 ENCOUNTER — OFFICE VISIT (OUTPATIENT)
Dept: GERIATRIC MEDICINE | Age: 83
End: 2023-03-16
Payer: COMMERCIAL

## 2023-03-16 DIAGNOSIS — I50.32 CHRONIC DIASTOLIC HEART FAILURE (HCC): ICD-10-CM

## 2023-03-16 DIAGNOSIS — J42 CHRONIC BRONCHITIS, UNSPECIFIED CHRONIC BRONCHITIS TYPE (HCC): ICD-10-CM

## 2023-03-16 DIAGNOSIS — E03.9 HYPOTHYROIDISM, UNSPECIFIED TYPE: Primary | ICD-10-CM

## 2023-03-16 DIAGNOSIS — G89.29 OTHER CHRONIC PAIN: ICD-10-CM

## 2023-03-16 PROCEDURE — 1123F ACP DISCUSS/DSCN MKR DOCD: CPT | Performed by: FAMILY MEDICINE

## 2023-03-16 PROCEDURE — G8484 FLU IMMUNIZE NO ADMIN: HCPCS | Performed by: FAMILY MEDICINE

## 2023-03-16 PROCEDURE — 99308 SBSQ NF CARE LOW MDM 20: CPT | Performed by: FAMILY MEDICINE

## 2023-03-27 VITALS — WEIGHT: 132.4 LBS | BODY MASS INDEX: 25.86 KG/M2

## 2023-03-27 ASSESSMENT — ENCOUNTER SYMPTOMS
WHEEZING: 0
COLOR CHANGE: 0
COUGH: 0
STRIDOR: 0
SHORTNESS OF BREATH: 1

## 2023-03-27 NOTE — PROGRESS NOTES
Subjective:      Patient was examined at [facility]  Address: [address] [place] [address], [address] [place] [address]  Phone: [phone]    Chief Complaint   Patient presents with    COPD    Congestive Heart Failure    Other    Follow-up           Patient ID: Tegan Portillo is a 81 y.o. female being seen today. Patient presents to baseline mentation. Weight and appetite stable. Remains on Remeron 15 mg at at bedtime for appetite stimulation. No noted issues with with constipation or abdominal pain. Remains oncarbamezepine 200 po bid. Past Medical History:   Diagnosis Date    Aortic stenosis     Chronic kidney disease     COPD (chronic obstructive pulmonary disease) (HCC)     Heart failure (HCC)     High cholesterol     HTN (hypertension)     Hypothyroid     New onset a-fib [facility])      Past Surgical History:   Procedure Laterality Date    KNEE SURGERY Right 4/14/2020    RIGHT KNEE REMOVAL OF HARDWARE performed by Alisa Rosario MD at [address] [facility] Right 1/16/2020    RIGHT QUADRICEPS MUSCLE BIOPSY performed by Carlos Whitney MD at [facility] Right 11/18/2019    RIGHT PATELLA OPEN REDUCTION INTERNAL FIXATION  RO[address] 180 performed by Alisa Rosario MD at [address] N/A 12/17/2019    EGD performed by Jaclyn Mejía MD at [facility]     No family history on file. 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   Tobacco Use    Smoking status: Former     Packs/day: 0.50     Years: 25.00     Pack years: 12.50     Types: Cigarettes    Smokeless tobacco: Never   Substance and Sexual Activity    Alcohol use: Never    Drug use: Never    Sexual activity: Not on file   Other Topics Concern    Not on file   Social

## 2023-04-05 VITALS — BODY MASS INDEX: 25.86 KG/M2 | WEIGHT: 132.4 LBS

## 2023-04-05 ASSESSMENT — ENCOUNTER SYMPTOMS
COLOR CHANGE: 0
WHEEZING: 0
COUGH: 0
STRIDOR: 0
SHORTNESS OF BREATH: 1

## 2023-04-05 NOTE — PROGRESS NOTES
signed by EDDIE Price CNP    Total time includes reviewing Plan of Care, labs, reviewing history, medications, and allergies, counseling patient, performing medically appropriate evaluation and examination, ordering medications, and documenting in the medical record. Please note this report is partially produced by using speech recognition hardware. It may contain errors related to the system, including grammar, punctuation and spelling as well as words and phrases that may seem inaccurate.   For any questions or concerns feel free to contact me for clarification

## 2023-05-08 ENCOUNTER — OFFICE VISIT (OUTPATIENT)
Dept: GERIATRIC MEDICINE | Age: 83
End: 2023-05-08
Payer: COMMERCIAL

## 2023-05-08 DIAGNOSIS — G40.909 SEIZURE DISORDER (HCC): Primary | ICD-10-CM

## 2023-05-08 DIAGNOSIS — I50.32 CHRONIC DIASTOLIC HEART FAILURE (HCC): ICD-10-CM

## 2023-05-08 DIAGNOSIS — G20 PARKINSON DISEASE (HCC): ICD-10-CM

## 2023-05-08 DIAGNOSIS — J42 CHRONIC BRONCHITIS, UNSPECIFIED CHRONIC BRONCHITIS TYPE (HCC): ICD-10-CM

## 2023-05-08 PROCEDURE — 1123F ACP DISCUSS/DSCN MKR DOCD: CPT | Performed by: FAMILY MEDICINE

## 2023-05-08 PROCEDURE — 99309 SBSQ NF CARE MODERATE MDM 30: CPT | Performed by: FAMILY MEDICINE

## 2023-05-09 ENCOUNTER — OFFICE VISIT (OUTPATIENT)
Dept: GERIATRIC MEDICINE | Age: 83
End: 2023-05-09
Payer: COMMERCIAL

## 2023-05-09 DIAGNOSIS — I48.0 PAROXYSMAL ATRIAL FIBRILLATION (HCC): Primary | ICD-10-CM

## 2023-05-09 DIAGNOSIS — E55.9 VITAMIN D DEFICIENCY: ICD-10-CM

## 2023-05-09 DIAGNOSIS — J42 CHRONIC BRONCHITIS, UNSPECIFIED CHRONIC BRONCHITIS TYPE (HCC): ICD-10-CM

## 2023-05-09 PROCEDURE — 99309 SBSQ NF CARE MODERATE MDM 30: CPT | Performed by: INTERNAL MEDICINE

## 2023-05-09 PROCEDURE — 1123F ACP DISCUSS/DSCN MKR DOCD: CPT | Performed by: INTERNAL MEDICINE

## 2023-05-16 LAB
CHOLESTEROL, TOTAL: 193 MG/DL
CHOLESTEROL/HDL RATIO: 2.8
HDLC SERPL-MCNC: 41 MG/DL (ref 35–70)
LDL CHOLESTEROL CALCULATED: 116 MG/DL (ref 0–160)
NONHDLC SERPL-MCNC: NORMAL MG/DL
TRIGL SERPL-MCNC: 179 MG/DL
VLDLC SERPL CALC-MCNC: 36 MG/DL

## 2023-05-23 VITALS — BODY MASS INDEX: 26.29 KG/M2 | WEIGHT: 134.6 LBS

## 2023-05-23 LAB
HCT VFR BLD CALC: 30.2 % (ref 36–46)
HEMOGLOBIN: 10.1 G/DL (ref 12–16)

## 2023-05-23 ASSESSMENT — ENCOUNTER SYMPTOMS
WHEEZING: 0
SHORTNESS OF BREATH: 1
STRIDOR: 0
COUGH: 0
COLOR CHANGE: 0

## 2023-05-23 NOTE — PROGRESS NOTES
Subjective:      Patient was examined at [facility]  Address: [address] [place] [address] Evonne, [address] [place] [address]  Phone: [phone]    Chief Complaint   Patient presents with    Other    Follow-up    Seizures     Patient ID: Marga Garcia is a 81 y.o. female being seen today. Patient presents to baseline mentation. Weight and appetite stable. Remains on Remeron 15 mg at at bedtime for appetite stimulation. No noted issues with with constipation or abdominal pain. Remains on carbamezepine 200 po bid. Past Medical History:   Diagnosis Date    Aortic stenosis     Chronic kidney disease     COPD (chronic obstructive pulmonary disease) (HCC)     Heart failure (HCC)     High cholesterol     HTN (hypertension)     Hypothyroid     New onset a-fib [facility])      Past Surgical History:   Procedure Laterality Date    KNEE SURGERY Right 4/14/2020    RIGHT KNEE REMOVAL OF HARDWARE performed by Liya Grajeda MD at [facility] Right 1/16/2020    RIGHT QUADRICEPS MUSCLE BIOPSY performed by Sharmila Hope MD at [facility] Right 11/18/2019    RIGHT PATELLA OPEN REDUCTION INTERNAL FIXATION  ROOM 180 performed by Liya Grajeda MD at [address] [facility] N/A 12/17/2019    EGD performed by Patricia Mensah MD at [facility]     No family history on file. 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[place]   Tobacco Use    Smoking status: Former     Packs/day: 0.50     Years: 25.00     Pack years: 12.50     Types: Cigarettes    Smokeless tobacco: Never   Substance and Sexual Activity    Alcohol use: Never    Drug use: Never    Sexual activity: Not on file   Other Topics Concern    Not on file   Social History Narrative         Lives

## 2023-06-08 NOTE — PROGRESS NOTES
SUBJECTIVE:  81-year-old woman seen in room for follow-up visit for COPD atrial fibrillation of apnea deficiency. Patient function stable without any nausea vomiting or recent emesis fevers or chills. Patient's oral intake has been good no evidence acute pain crisis no new lightheadedness. ROS: Denies headaches fevers chills  The rest of the 14 point ROS negative    PHYSICAL EXAM: VSS per facility record  Pupils are small with are reactive oral mucosa moist chest with coarse breath sounds cardiovascular showed a regular rate abdomen soft nontender extremity trace edema skin skin with no rash    ASSESSMENT & PLAN:   Diagnosis Orders   1. Paroxysmal atrial fibrillation (HCC)        2. Vitamin D deficiency        3. Chronic bronchitis, unspecified chronic bronchitis type ([address]          Continue regular treatments function stable this time for vitamin D supplementation.   Is on beta-blocker after new RVR has been on amiodarone repeat TSH in the next 6 months pending            Past Medical History:   Diagnosis Date    Aortic stenosis     Chronic kidney disease     COPD (chronic obstructive pulmonary disease) (HCC)     Heart failure (HCC)     High cholesterol     HTN (hypertension)     Hypothyroid     New onset a-fib [facility])          Past Surgical History:   Procedure Laterality Date    KNEE SURGERY Right 4/14/2020    RIGHT KNEE REMOVAL OF HARDWARE performed by Salvador Chu MD at [address] [facility] Right 1/16/2020    RIGHT QUADRICEPS MUSCLE BIOPSY performed by Dakotah Hdz MD at [facility] Right 11/18/2019    RIGHT PATELLA OPEN REDUCTION INTERNAL FIXATION  ROOM 180 performed by Salvador Chu MD at [facility] N/A 12/17/2019    EGD performed by Talita Celestin MD at [facility]         Current Outpatient Medications on File Prior to Visit   Medication Sig Dispense Refill    ferrous sulfate (IRON 325) 325 (65 Fe) MG tablet Take 1 tablet by mouth 2 times

## 2023-06-13 ENCOUNTER — OFFICE VISIT (OUTPATIENT)
Dept: GERIATRIC MEDICINE | Age: 83
End: 2023-06-13

## 2023-06-13 DIAGNOSIS — I50.32 CHRONIC DIASTOLIC HEART FAILURE (HCC): ICD-10-CM

## 2023-06-13 DIAGNOSIS — I48.0 PAROXYSMAL ATRIAL FIBRILLATION (HCC): Primary | ICD-10-CM

## 2023-06-13 DIAGNOSIS — E03.9 HYPOTHYROIDISM, UNSPECIFIED TYPE: ICD-10-CM

## 2023-07-24 LAB
AVERAGE GLUCOSE: 28.8
HBA1C MFR BLD: 9.7 %

## 2023-07-27 ENCOUNTER — OFFICE VISIT (OUTPATIENT)
Dept: GERIATRIC MEDICINE | Age: 83
End: 2023-07-27

## 2023-07-27 DIAGNOSIS — I48.0 PAROXYSMAL ATRIAL FIBRILLATION (HCC): ICD-10-CM

## 2023-07-27 DIAGNOSIS — J42 CHRONIC BRONCHITIS, UNSPECIFIED CHRONIC BRONCHITIS TYPE (HCC): ICD-10-CM

## 2023-07-27 DIAGNOSIS — E55.9 VITAMIN D DEFICIENCY: Primary | ICD-10-CM

## 2023-08-02 ENCOUNTER — OFFICE VISIT (OUTPATIENT)
Dept: GERIATRIC MEDICINE | Age: 83
End: 2023-08-02

## 2023-08-02 DIAGNOSIS — E03.9 HYPOTHYROIDISM, UNSPECIFIED TYPE: ICD-10-CM

## 2023-08-02 DIAGNOSIS — I50.32 CHRONIC DIASTOLIC HEART FAILURE (HCC): ICD-10-CM

## 2023-08-02 DIAGNOSIS — I48.0 PAROXYSMAL ATRIAL FIBRILLATION (HCC): Primary | ICD-10-CM

## 2023-08-03 NOTE — PROGRESS NOTES
[address] [facility]. [facility]    8/2/2023    Rogerio Bagley  is a 81 y.o. in the  being seen for a f/u of   Chief Complaint   Patient presents with    Follow-up       Patient is an 81year old female seen for follow up of atrial fibrillation, hypothyroidism and chf. Patient resting in bed at time of visit. No changes in bowel habits, diet or sleep reported. No episodes of nausea/vomiting. Past Medical History:   Diagnosis Date    Aortic stenosis     Chronic kidney disease     COPD (chronic obstructive pulmonary disease) (HCC)     Heart failure (HCC)     High cholesterol     HTN (hypertension)     Hypothyroid     New onset a-fib [facility])      Past Surgical History:   Procedure Laterality Date    KNEE SURGERY Right 4/14/2020    RIGHT KNEE REMOVAL OF HARDWARE performed by Alvin Armenta MD at [address] Right 1/16/2020    RIGHT QUADRICEPS MUSCLE BIOPSY performed by Eileen Pena MD at [facility] Right 11/18/2019    RIGHT PATELLA OPEN REDUCTION INTERNAL FIXATION  ROOM 180 performed by Alvin Armenta MD at [address] N/A 12/17/2019    EGD performed by Sarah Jones MD at [address]     No family history on file. 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[place]   Tobacco Use    Smoking status: Former     Packs/day: 0.50     Years: 25.00     Pack years: 12.50     Types: Cigarettes    Smokeless tobacco: Never   Substance and Sexual Activity    Alcohol use: Never    Drug use: Never    Sexual activity: Not on file   Other Topics Concern    Not on file   Social History Narrative         Lives With: [address] lives in [facility] 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

## 2023-08-17 ENCOUNTER — OFFICE VISIT (OUTPATIENT)
Dept: GERIATRIC MEDICINE | Age: 83
End: 2023-08-17

## 2023-08-17 DIAGNOSIS — I48.0 PAROXYSMAL ATRIAL FIBRILLATION (HCC): Primary | ICD-10-CM

## 2023-08-17 DIAGNOSIS — I50.32 CHRONIC DIASTOLIC HEART FAILURE (HCC): ICD-10-CM

## 2023-08-17 DIAGNOSIS — E03.9 HYPOTHYROIDISM, UNSPECIFIED TYPE: ICD-10-CM

## 2023-08-17 DIAGNOSIS — J42 CHRONIC BRONCHITIS, UNSPECIFIED CHRONIC BRONCHITIS TYPE (HCC): ICD-10-CM

## 2023-08-26 NOTE — PROGRESS NOTES
SUBJECTIVE:        ROS:  The rest of the 14 point ROS negative    PHYSICAL EXAM: VSS per facility record      ASSESSMENT & PLAN:   Diagnosis Orders   1. Vitamin D deficiency        2. Paroxysmal atrial fibrillation (HCC)        3.  Chronic bronchitis, unspecified chronic bronchitis type ([address] [facility])                      Past Medical History:   Diagnosis Date    Aortic stenosis     Chronic kidney disease     COPD (chronic obstructive pulmonary disease) (HCC)     Heart failure (HCC)     High cholesterol     HTN (hypertension)     Hypothyroid     New onset a-fib [facility])          Past Surgical History:   Procedure Laterality Date    KNEE SURGERY Right 4/14/2020    RIGHT KNEE REMOVAL OF HARDWARE performed by Maddi Webber MD at [address] Right 1/16/2020    RIGHT QUADRICEPS MUSCLE BIOPSY performed by Zana Szymanski MD at [facility] Right 11/18/2019    RIGHT PATELLA OPEN REDUCTION INTERNAL FIXATION  ROOM 180 performed by Maddi Webber MD at [address] [place] [address] N/A 12/17/2019    EGD performed by Anh Culp MD at [address]         Current Outpatient Medications on File Prior to Visit   Medication Sig Dispense Refill    ferrous sulfate (IRON 325) 325 (65 Fe) MG tablet Take 1 tablet by mouth 2 times daily 60 tablet 5    magnesium hydroxide (MILK OF MAGNESIA) 400 MG/5ML suspension Take 30 mLs by mouth      potassium chloride (KLOR-CON M) 20 MEQ extended release tablet Take 40 mEq by mouth      acetaminophen (TYLENOL) 650 MG suppository Place 650 mg rectally every 4 hours as needed for Fever      Aluminum & Magnesium Hydroxide (MAGNESIUM-ALUMINUM PO) Take by mouth 225-200 MG/5ML      albuterol-ipratropium (COMBIVENT RESPIMAT)  MCG/ACT AERS inhaler Inhale 1 puff into the lungs every 6 hours      Sodium Phosphates (FLEET) 7-19 GM/118ML Place 1 enema rectally once as needed      glucagon 1 MG injection Inject 1 kit into the muscle once      Glucose-Cholecalciferol

## 2023-09-13 ENCOUNTER — OFFICE VISIT (OUTPATIENT)
Dept: GERIATRIC MEDICINE | Age: 83
End: 2023-09-13

## 2023-09-13 DIAGNOSIS — I48.0 PAROXYSMAL ATRIAL FIBRILLATION (HCC): Primary | ICD-10-CM

## 2023-09-13 DIAGNOSIS — G40.909 SEIZURE DISORDER (HCC): ICD-10-CM

## 2023-09-13 DIAGNOSIS — I50.32 CHRONIC DIASTOLIC HEART FAILURE (HCC): ICD-10-CM

## 2023-09-15 NOTE — PROGRESS NOTES
file   Social Connections: Not on file   Intimate Partner Violence: Not on file   Housing Stability: Not on file         Lab Results   Component Value Date    LABA1C 9.7 07/24/2023     No results found for: \"EAG\"    Lab Results   Component Value Date/Time     12/27/2022 12:00 AM    K 4.1 12/27/2022 12:00 AM    K 3.3 01/16/2020 10:04 AM     12/27/2022 12:00 AM    CO2 23 12/27/2022 12:00 AM    BUN 22 12/27/2022 12:00 AM    CREATININE 0.9 12/27/2022 12:00 AM    GLUCOSE 77 12/27/2022 12:00 AM    CALCIUM 7.7 12/27/2022 12:00 AM        Lab Results   Component Value Date    CHOL 193 05/16/2023    CHOL 131 11/24/2019     Lab Results   Component Value Date    TRIG 179 05/16/2023    TRIG 142 11/24/2019     Lab Results   Component Value Date    HDL 41 05/16/2023    HDL 45 11/24/2019     Lab Results   Component Value Date    LDLCALC 116 05/16/2023    LDLCALC 58 11/24/2019     Lab Results   Component Value Date    VLDL 36 05/16/2023     Lab Results   Component Value Date    CHOLHDLRATIO 2.8 05/16/2023       Lab Results   Component Value Date    TSH 13.761 03/14/2023       Lab Results   Component Value Date    WBC 13.4 12/27/2022    HGB 10.1 (A) 05/23/2023    HCT 30.2 (A) 05/23/2023    MCV 92.8 12/27/2022     12/27/2022       Please note orders entered on site at facility after discussion with appropriate facility nursing/therapy/ / nutritional staff. Current longstanding medical problems and acute medical issues addressed with staff. Available data and data elements in on site paper chart reviewed and analyzed. Current external consultant notes reviewed in on site chart. Ordered laboratory testing and imaging will be reviewed when available.

## 2023-09-20 ASSESSMENT — ENCOUNTER SYMPTOMS
GASTROINTESTINAL NEGATIVE: 1
RESPIRATORY NEGATIVE: 1

## 2023-09-20 NOTE — PROGRESS NOTES
Gait: Gait abnormal.   Psychiatric:         Mood and Affect: Mood normal.         Behavior: Behavior normal.         Refer to detailed nursing notes for skin assessment   VS per facility records      Diagnosis Orders   1. Paroxysmal atrial fibrillation (HCC)        2. Chronic diastolic heart failure (HCC)        3. Seizure disorder (HCC)            PLAN:   No seizure activity reported, continue Carbamazepine 200mg BID. Daily aspirin. No s/s of acute exacerbation. Agrees with POC     No orders of the defined types were placed in this encounter. No orders of the defined types were placed in this encounter. No follow-ups on file. Pertinent POC, labs, have been reviewed, continue same. Encourage fluids and good nutrition. Stress fall prevention strategies. Nursing to notify Pt/POA for agreement to [facility]     15\" spent on visit    Electronically signed by: EDDIE Thakkar CNP on 9/13/2023    Please note orders entered on site at facility after discussion with appropriate facility nursing/therapy/ / nutritional staff. Current longstanding medical problems and acute medical issues addressed with staff. Available data and data elements in on site paper chart reviewed and analyzed. Current external consultant notes reviewed in on site chart. Ordered laboratory testing and imaging will be reviewed when available. Please note this report is partially produced by using speech recognition hardware. It may contain errors related to the system, including grammar, punctuation and spelling as well as words and phrases that may seem inaccurate.   For any questions or concerns feel free to contact me for clarification

## 2023-09-26 ENCOUNTER — OFFICE VISIT (OUTPATIENT)
Dept: GERIATRIC MEDICINE | Age: 83
End: 2023-09-26
Payer: MEDICARE

## 2023-09-26 DIAGNOSIS — E55.9 VITAMIN D DEFICIENCY: Primary | ICD-10-CM

## 2023-09-26 DIAGNOSIS — E03.9 HYPOTHYROIDISM, UNSPECIFIED TYPE: ICD-10-CM

## 2023-09-26 DIAGNOSIS — J42 CHRONIC BRONCHITIS, UNSPECIFIED CHRONIC BRONCHITIS TYPE (HCC): ICD-10-CM

## 2023-09-26 DIAGNOSIS — I48.0 PAROXYSMAL ATRIAL FIBRILLATION (HCC): ICD-10-CM

## 2023-09-26 PROCEDURE — 1123F ACP DISCUSS/DSCN MKR DOCD: CPT | Performed by: INTERNAL MEDICINE

## 2023-09-26 PROCEDURE — 99309 SBSQ NF CARE MODERATE MDM 30: CPT | Performed by: INTERNAL MEDICINE

## 2023-10-02 ENCOUNTER — OFFICE VISIT (OUTPATIENT)
Dept: GERIATRIC MEDICINE | Age: 83
End: 2023-10-02

## 2023-10-02 DIAGNOSIS — G40.909 SEIZURE DISORDER (HCC): ICD-10-CM

## 2023-10-02 DIAGNOSIS — I48.0 PAROXYSMAL ATRIAL FIBRILLATION (HCC): Primary | ICD-10-CM

## 2023-10-02 DIAGNOSIS — I50.32 CHRONIC DIASTOLIC HEART FAILURE (HCC): ICD-10-CM

## 2023-10-02 DIAGNOSIS — J42 CHRONIC BRONCHITIS, UNSPECIFIED CHRONIC BRONCHITIS TYPE (HCC): ICD-10-CM

## 2023-10-10 NOTE — PROGRESS NOTES
SUBJECTIVE:  This 41-year-old woman seen follow-up visit for 5 deficiency inflation bronchitis patient is a recent nausea vomiting emesis fever chills no change in bowel bladder with coughing currently globally weak. ROS: Coughing intermittently  The rest of the 14 point ROS negative    PHYSICAL EXAM: VSS per facility record  Are small oral mucosa moist chest with coarse breath sounds cardiovascular showed a regular rate abdomen soft nontender extremity trace edema    ASSESSMENT & PLAN:   Diagnosis Orders   1. Vitamin D deficiency        2. Paroxysmal atrial fibrillation (HCC)        3. Hypothyroidism, unspecified type        4.  Chronic bronchitis, unspecified chronic bronchitis type ([address])          Nutritional support goal maximize functional status notes no RVR globally weak at this time            Past Medical History:   Diagnosis Date    Aortic stenosis     Chronic kidney disease     COPD (chronic obstructive pulmonary disease) (HCC)     Heart failure (HCC)     High cholesterol     HTN (hypertension)     Hypothyroid     New onset a-fib [facility])          Past Surgical History:   Procedure Laterality Date    KNEE SURGERY Right 4/14/2020    RIGHT KNEE REMOVAL OF HARDWARE performed by Jose Penn MD at [address] Right 1/16/2020    RIGHT QUADRICEPS MUSCLE BIOPSY performed by Aaron Castañeda MD at [facility] Right 11/18/2019    RIGHT PATELLA OPEN REDUCTION INTERNAL FIXATION  ROOM 180 performed by Jose Penn MD at [address] N/A 12/17/2019    EGD performed by Mahogany Antunez MD at [facility]         Current Outpatient Medications on File Prior to Visit   Medication Sig Dispense Refill    ferrous sulfate (IRON 325) 325 (65 Fe) MG tablet Take 1 tablet by mouth 2 times daily 60 tablet 5    magnesium hydroxide (MILK OF MAGNESIA) 400 MG/5ML suspension Take 30 mLs by mouth      potassium chloride (KLOR-CON M) 20 MEQ extended release tablet Take 40

## 2023-11-06 ENCOUNTER — OFFICE VISIT (OUTPATIENT)
Dept: GERIATRIC MEDICINE | Age: 83
End: 2023-11-06

## 2023-11-06 DIAGNOSIS — G40.909 SEIZURE DISORDER (HCC): ICD-10-CM

## 2023-11-06 DIAGNOSIS — J42 CHRONIC BRONCHITIS, UNSPECIFIED CHRONIC BRONCHITIS TYPE (HCC): ICD-10-CM

## 2023-11-06 DIAGNOSIS — I48.0 PAROXYSMAL ATRIAL FIBRILLATION (HCC): Primary | ICD-10-CM

## 2023-12-02 NOTE — PROGRESS NOTES
Vital Sign     Worried About Running Out of Food in the Last Year: Never true     Ran Out of Food in the Last Year: Never true   Transportation Needs: No Transportation Needs (11/19/2019)    PRAPARE - Transportation     Lack of Transportation (Medical): No     Lack of Transportation (Non-Medical): No   Physical Activity: Unknown (11/19/2019)    Exercise Vital Sign     Days of Exercise per Week: 0 days     Minutes of Exercise per Session: Not on file   Stress: No Stress Concern Present (11/19/2019)    [facility]     Feeling of Stress : Only a little   Social Connections: Moderately Integrated (11/19/2019)    Social Connection and Isolation Panel [NHANES]     Frequency of Communication with Friends and Family: More than three times a week     Frequency of Social Gatherings with Friends and Family: Once a week     Attends Christianity Services: More than 4 times per year     Active Member of [employer] or Organizations: Yes     Attends Club or Organization Meetings: 1 to 4 times per year     Marital Status:     Intimate Partner Violence: Not At Risk (11/19/2019)    Humiliation, Afraid, Rape, and Kick questionnaire     Fear of Current or Ex-Partner: No     Emotionally Abused: No     Physically Abused: No     Sexually Abused: No   Housing Stability: Not on file         Lab Results   Component Value Date    LABA1C 9.7 07/24/2023     No results found for: \"EAG\"    Lab Results   Component Value Date/Time     12/27/2022 12:00 AM    K 4.1 12/27/2022 12:00 AM    K 3.3 01/16/2020 10:04 AM     12/27/2022 12:00 AM    CO2 23 12/27/2022 12:00 AM    BUN 22 12/27/2022 12:00 AM    CREATININE 0.9 12/27/2022 12:00 AM    GLUCOSE 77 12/27/2022 12:00 AM    CALCIUM 7.7 12/27/2022 12:00 AM        Lab Results   Component Value Date    CHOL 193 05/16/2023    CHOL 131 11/24/2019     Lab Results   Component Value Date    TRIG 179 05/16/2023    TRIG 142 11/24/2019     Lab

## 2024-02-23 ENCOUNTER — OFFICE VISIT (OUTPATIENT)
Dept: GERIATRIC MEDICINE | Age: 84
End: 2024-02-23
Payer: MEDICARE

## 2024-02-23 DIAGNOSIS — I48.0 PAROXYSMAL ATRIAL FIBRILLATION (HCC): ICD-10-CM

## 2024-02-23 DIAGNOSIS — G40.909 SEIZURE DISORDER (HCC): Primary | ICD-10-CM

## 2024-02-23 DIAGNOSIS — E03.9 HYPOTHYROIDISM, UNSPECIFIED TYPE: ICD-10-CM

## 2024-02-23 PROCEDURE — G8484 FLU IMMUNIZE NO ADMIN: HCPCS | Performed by: NURSE PRACTITIONER

## 2024-02-23 PROCEDURE — 99308 SBSQ NF CARE LOW MDM 20: CPT | Performed by: NURSE PRACTITIONER

## 2024-02-23 PROCEDURE — 1123F ACP DISCUSS/DSCN MKR DOCD: CPT | Performed by: NURSE PRACTITIONER

## 2024-02-28 NOTE — PROGRESS NOTES
[facility] [address] [facility] [place] [address]    2/23/2024    Gely Delgado  is a 83 y.o. in the  being seen for a f/u of   Chief Complaint   Patient presents with    Atrial Fibrillation    Seizures    Hypothyroidism       Gely Delgado is an 83-year-old female being seen for monthly visit.  Follow-up of A-fib seizures and hypothyroidism.  Patient evaluated while sitting in wheelchair in dining room.  Patient is alert and oriented x 1.  Patient denies nausea vomiting diarrhea constipation.  No recent fevers or falls.  No change in bowel or bladder habits.           Past Medical History:   Diagnosis Date    Aortic stenosis     Chronic kidney disease     COPD (chronic obstructive pulmonary disease) (HCC)     Heart failure (HCC)     High cholesterol     HTN (hypertension)     Hypothyroid     New onset a-fib (HCC)      Past Surgical History:   Procedure Laterality Date    KNEE SURGERY Right 4/14/2020    RIGHT KNEE REMOVAL OF HARDWARE performed [facility] Raul Spaulding MD [facility]    MUSCLE BIOPSY Right 1/16/2020    RIGHT QUADRICEPS MUSCLE BIOPSY performed by Javi Simms MD [facility]    PATELLA FRACTURE SURGERY Right 11/18/2019    RIGHT PATELLA OPEN REDUCTION INTERNAL FIXATION  ROOM 180 performed by Raul Spaulding MD [facility]    UPPER GASTROINTESTINAL ENDOSCOPY N/A 12/17/2019    EGD performed by Juan Alberto Lopes MD [facility]     No family history on file.  Social History     Socioeconomic History    Marital status:      Spouse name: Not on file    Number of children: 2    Years of education: 12    Highest education level: 12th grade   Occupational History    Occupation:      Comment: [employer] [place]   Tobacco Use    Smoking status: Former     Current packs/day: 0.50     Average packs/day: 0.5 packs/day for 25.0 years (12.5 ttl pk-yrs)     Types: Cigarettes    Smokeless tobacco: Never   Substance and Sexual Activity    Alcohol use: Never    Drug use: Never    Sexual activity: Not on file   Other Topics

## 2024-02-29 ENCOUNTER — OFFICE VISIT (OUTPATIENT)
Dept: GERIATRIC MEDICINE | Age: 84
End: 2024-02-29

## 2024-02-29 DIAGNOSIS — J42 CHRONIC BRONCHITIS, UNSPECIFIED CHRONIC BRONCHITIS TYPE (HCC): ICD-10-CM

## 2024-02-29 DIAGNOSIS — N18.30 CHRONIC RENAL IMPAIRMENT, STAGE 3 (MODERATE), UNSPECIFIED WHETHER STAGE 3A OR 3B CKD (HCC): ICD-10-CM

## 2024-02-29 DIAGNOSIS — I48.0 PAROXYSMAL ATRIAL FIBRILLATION (HCC): Primary | ICD-10-CM

## 2024-02-29 DIAGNOSIS — E03.9 HYPOTHYROIDISM, UNSPECIFIED TYPE: ICD-10-CM

## 2024-03-13 ENCOUNTER — OFFICE VISIT (OUTPATIENT)
Dept: GERIATRIC MEDICINE | Age: 84
End: 2024-03-13
Payer: MEDICARE

## 2024-03-13 DIAGNOSIS — G40.909 SEIZURE DISORDER (HCC): Primary | ICD-10-CM

## 2024-03-13 DIAGNOSIS — E03.9 HYPOTHYROIDISM, UNSPECIFIED TYPE: ICD-10-CM

## 2024-03-13 DIAGNOSIS — I95.9 HYPOTENSION, UNSPECIFIED HYPOTENSION TYPE: ICD-10-CM

## 2024-03-13 PROCEDURE — 99308 SBSQ NF CARE LOW MDM 20: CPT | Performed by: NURSE PRACTITIONER

## 2024-03-13 PROCEDURE — G8484 FLU IMMUNIZE NO ADMIN: HCPCS | Performed by: NURSE PRACTITIONER

## 2024-03-13 PROCEDURE — 1123F ACP DISCUSS/DSCN MKR DOCD: CPT | Performed by: NURSE PRACTITIONER

## 2024-03-18 PROBLEM — S82.009A FRACTURE OF PATELLA: Status: RESOLVED | Noted: 2020-06-10 | Resolved: 2024-03-18

## 2024-03-18 PROBLEM — T84.60XA: Status: RESOLVED | Noted: 2020-06-10 | Resolved: 2024-03-18

## 2024-03-18 PROBLEM — M62.551 MUSCLE WASTING AND ATROPHY, NOT ELSEWHERE CLASSIFIED, RIGHT THIGH: Status: RESOLVED | Noted: 2020-03-07 | Resolved: 2024-03-18

## 2024-03-18 PROBLEM — M62.82 RHABDOMYOLYSIS: Status: RESOLVED | Noted: 2019-11-20 | Resolved: 2024-03-18

## 2024-03-18 PROBLEM — S82.001A CLOSED FRACTURE OF RIGHT PATELLA: Status: RESOLVED | Noted: 2019-11-19 | Resolved: 2024-03-18

## 2024-03-18 PROBLEM — T14.8XXA OPEN WOUND WITH COMPLICATION: Status: RESOLVED | Noted: 2020-04-14 | Resolved: 2024-03-18

## 2024-03-18 PROBLEM — N28.9 ACUTE RENAL IMPAIRMENT: Status: RESOLVED | Noted: 2019-11-15 | Resolved: 2024-03-18

## 2024-03-18 PROBLEM — S52.509A CLOSED FRACTURE OF DISTAL END OF RADIUS: Status: RESOLVED | Noted: 2020-06-10 | Resolved: 2024-03-18

## 2024-03-18 ASSESSMENT — ENCOUNTER SYMPTOMS
GASTROINTESTINAL NEGATIVE: 1
RESPIRATORY NEGATIVE: 1

## 2024-03-18 NOTE — PROGRESS NOTES
[facility] [address] [place] [address]    3/13/2024    Gely Delgado  is a 83 y.o. in the  being seen for a f/u of   Chief Complaint   Patient presents with    Follow-up       Gely Delgado is an 83-year-old female being seen as follow-up for hypotension and hypothyroidism and seizure disorder.  Patient sitting in wheelchair in dining room at time of visit.  No recent fever or falls.  No change in bowel or bladder habits.           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    COPD (chronic obstructive pulmonary disease) (HCC)     Fracture of patella 06/10/2020    Heart failure (HCC)     High cholesterol     HTN (hypertension)     Hypothyroid     New onset a-fib (HCC)      Past Surgical History:   Procedure Laterality Date    KNEE SURGERY Right 4/14/2020    RIGHT KNEE REMOVAL OF HARDWARE performed by Raul Spaulding MD at [facility]    MUSCLE BIOPSY Right 1/16/2020    RIGHT QUADRICEPS MUSCLE BIOPSY performed by Javi Simms MD at [facility]    PATELLA FRACTURE SURGERY Right 11/18/2019    RIGHT PATELLA OPEN REDUCTION INTERNAL FIXATION  ROOM 180 performed by Raul Spaulding MD at [facility]    UPPER GASTROINTESTINAL ENDOSCOPY N/A 12/17/2019    EGD performed by Juan Alberto Lopes MD at [facility]     No family history on file.  Social History     Socioeconomic History    Marital status:      Spouse name: Not on file    Number of children: 2    Years of education: 12    Highest education level: 12th grade   Occupational History    Occupation:      Comment: [employer] [place]   Tobacco Use    Smoking status: Former     Current packs/day: 0.50     Average packs/day: 0.5 packs/day for 25.0 years (12.5 ttl pk-yrs)     Types: Cigarettes    Smokeless tobacco: Never   Substance and

## 2024-05-01 ENCOUNTER — OFFICE VISIT (OUTPATIENT)
Dept: GERIATRIC MEDICINE | Age: 84
End: 2024-05-01

## 2024-05-01 DIAGNOSIS — I50.32 CHRONIC DIASTOLIC HEART FAILURE (HCC): ICD-10-CM

## 2024-05-01 DIAGNOSIS — G40.909 SEIZURE DISORDER (HCC): ICD-10-CM

## 2024-05-01 DIAGNOSIS — I48.0 PAROXYSMAL ATRIAL FIBRILLATION (HCC): Primary | ICD-10-CM

## 2024-05-01 DIAGNOSIS — J42 CHRONIC BRONCHITIS, UNSPECIFIED CHRONIC BRONCHITIS TYPE (HCC): ICD-10-CM

## 2024-05-14 ENCOUNTER — OFFICE VISIT (OUTPATIENT)
Dept: GERIATRIC MEDICINE | Age: 84
End: 2024-05-14

## 2024-05-14 DIAGNOSIS — I48.0 PAROXYSMAL ATRIAL FIBRILLATION (HCC): Primary | ICD-10-CM

## 2024-05-29 ENCOUNTER — OFFICE VISIT (OUTPATIENT)
Dept: GERIATRIC MEDICINE | Age: 84
End: 2024-05-29

## 2024-05-29 DIAGNOSIS — G40.909 SEIZURE DISORDER (HCC): ICD-10-CM

## 2024-05-29 DIAGNOSIS — I50.32 CHRONIC DIASTOLIC HEART FAILURE (HCC): Primary | ICD-10-CM

## 2024-05-29 DIAGNOSIS — J42 CHRONIC BRONCHITIS, UNSPECIFIED CHRONIC BRONCHITIS TYPE (HCC): ICD-10-CM

## 2024-05-30 ASSESSMENT — ENCOUNTER SYMPTOMS
RESPIRATORY NEGATIVE: 1
GASTROINTESTINAL NEGATIVE: 1

## 2024-05-30 NOTE — PROGRESS NOTES
SUBJECTIVE:  This 83-year-old woman with history of afibrillation bronchitis seizure shoulder problems    Was seen here 3 patient's functional weak at her baseline cough intermittently no recent acute psychosis no recent history of fever chills no change in her bowel or bladder habits.      ROS: Coughing at her baseline  The rest of the 14 point ROS negative    PHYSICAL EXAM: VSS per facility record  Pupils are reactive oral mucosa is dry chest showed no crackles no wheezing cardiovascular showed a regular rate abdomen soft nontender EXTR trace edema    ASSESSMENT & PLAN:   Diagnosis Orders   1. Paroxysmal atrial fibrillation ([facility])        2. Chronic bronchitis, unspecified chronic bronchitis type ([facility])        3. Seizure disorder ([facility])        4. Chronic diastolic heart failure ([facility])            Monitor heart rate    Past continue current antibiotic regimen monitor weights diurese intermittently.          Past Medical History:   Diagnosis Date    Aortic stenosis     Bone fixation device infection ([facility]) 06/10/2020    Chronic kidney disease     Closed fracture of distal end of radius 06/10/2020    Closed fracture of right patella 11/19/2019    fracture of the mid body of the patella.    COPD (chronic obstructive pulmonary disease) ([facility])     Fracture of patella 06/10/2020    Heart failure ([facility])     High cholesterol     HTN (hypertension)     Hypothyroid     New onset a-fib ([facility])          Past Surgical History:   Procedure Laterality Date    KNEE SURGERY Right 4/14/2020    RIGHT KNEE REMOVAL OF HARDWARE performed by [place] [facility] [facility]    MUSCLE BIOPSY Right 1/16/2020    RIGHT QUADRICEPS MUSCLE BIOPSY performed by Javi Simms [facility]    PATELLA FRACTURE SURGERY Right 11/18/2019    RIGHT PATELLA OPEN REDUCTION INTERNAL FIXATION  ROOM 180 performed by Raul Sp[facility] [facility]    UPPER GASTROINTESTINAL ENDOSCOPY N/A 12/17/2019    EGD performed by Juan Alberto Lopes [place] [facility]         Current Outpatient

## 2024-05-30 NOTE — PROGRESS NOTES
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    Type of Home: Apartment(1st floor) in [place]    Home Layout: Two level, Able to Live on Main level with bedroom/bathroom(Full flight to second floor)    Home Access: Stairs to enter without rails    Entrance Stairs - Number of Steps: 2    Bathroom Shower/Tub: Tub/Shower unit    Bathroom Equipment: (None)    Home Equipment: (N/A)    ADL Assistance: Independent    Homemaking Assistance: Independent    Homemaking Responsibilities: Yes    Meal Prep Responsibility: Primary    Laundry Responsibility: Primary    Cleaning Responsibility: Primary    Ambulation Assistance: Independent(No AD)    Transfer Assistance: Independent    Active : Yes    Additional Comments: States that she was down on ground X 72 hours following fall     Social Determinants of Health     Financial Resource Strain: Low Risk  (11/19/2019)    Overall Financial Resource Strain (CARDIA)     Difficulty of Paying Living Expenses: Not hard at all   Food Insecurity: No Food Insecurity (11/19/2019)    Hunger Vital Sign     Worried About Running Out of Food in the Last Year: Never true     Ran Out of Food in the Last Year: Never true   Transportation Needs: No Transportation Needs (11/19/2019)    PRAPARE - Transportation     Lack of Transportation (Medical): No     Lack of Transportation (Non-Medical): No   Physical Activity: Unknown (11/19/2019)    Exercise Vital Sign     Days of Exercise per Week: 0 days     Minutes of Exercise per Session: Not on file   Stress: No Stress Concern Present (11/19/2019)    [employer] [place] [employer] - Occupational Stress Questionnaire     Feeling of Stress : Only a little   Social Connections: Moderately

## 2024-06-13 NOTE — PROGRESS NOTES
(CARDIA)     Difficulty of Paying Living Expenses: Not hard at all   Food Insecurity: No Food Insecurity (11/19/2019)    Hunger Vital Sign     Worried About Running Out of Food in the Last Year: Never true     Ran Out of Food in the Last Year: Never true   Transportation Needs: No Transportation Needs (11/19/2019)    PRAPARE - Transportation     Lack of Transportation (Medical): No     Lack of Transportation (Non-Medical): No   Physical Activity: Unknown (11/19/2019)    Exercise Vital Sign     Days of Exercise per Week: 0 days     Minutes of Exercise per Session: Not on file   Stress: No Stress Concern Present (11/19/2019)    [employer] [place] [employer] - Occupational Stress Questionnaire     Feeling of Stress : Only a little   Social Connections: Moderately Integrated (11/19/2019)    Social Connection and Isolation Panel [NHANES]     Frequency of Communication with Friends and Family: More than three times a week     Frequency of Social Gatherings with Friends and Family: Once a week     Attends [employer] Services: More than 4 times per year     Active Member of Clubs or Organizations: Yes     Attends Club or Organization Meetings: 1 to 4 times per year     Marital Status:    Intimate Partner Violence: Not At Risk (11/19/2019)    Humiliation, Afraid, Rape, and Kick questionnaire     Fear of Current or Ex-Partner: No     Emotionally Abused: No     Physically Abused: No     Sexually Abused: No   Housing Stability: Not on file         Lab Results   Component Value Date    LABA1C 9.7 07/24/2023     Lab Results   Component Value Date    EAG 28.8 07/24/2023       Lab Results   Component Value Date/Time     12/27/2022 12:00 AM    K 4.1 12/27/2022 12:00 AM    K 3.3 01/16/2020 10:04 AM     12/27/2022 12:00 AM    CO2 23 12/27/2022 12:00 AM    BUN 22 12/27/2022 12:00 AM    CREATININE 0.9 12/27/2022 12:00 AM    GLUCOSE 77 12/27/2022 12:00 AM    CALCIUM 7.7 12/27/2022 12:00 AM        Lab Results

## 2024-07-02 ENCOUNTER — OFFICE VISIT (OUTPATIENT)
Dept: GERIATRIC MEDICINE | Age: 84
End: 2024-07-02
Payer: MEDICARE

## 2024-07-02 DIAGNOSIS — G40.909 SEIZURE DISORDER (HCC): Primary | ICD-10-CM

## 2024-07-02 DIAGNOSIS — E03.9 HYPOTHYROIDISM, UNSPECIFIED TYPE: ICD-10-CM

## 2024-07-02 PROCEDURE — 1123F ACP DISCUSS/DSCN MKR DOCD: CPT | Performed by: NURSE PRACTITIONER

## 2024-07-02 PROCEDURE — 99308 SBSQ NF CARE LOW MDM 20: CPT | Performed by: NURSE PRACTITIONER

## 2024-07-05 ENCOUNTER — OFFICE VISIT (OUTPATIENT)
Dept: GERIATRIC MEDICINE | Age: 84
End: 2024-07-05

## 2024-07-05 DIAGNOSIS — E03.9 HYPOTHYROIDISM, UNSPECIFIED TYPE: Primary | ICD-10-CM

## 2024-07-29 NOTE — PROGRESS NOTES
Name: Gely Delgado   Facility: [address]  [address]    Date: 7/2/2024     Subjective:     Chief Complaint   Patient presents with    Follow-up    1 Month Follow-Up       HPI  Gely Delgado is a 83 y.o. female being seen today for evaluation of seizure disorder and hypothyroidism.  Resident in recliner wheelchair, cooperative with care.  Alert and oriented x 3 in no acute distress.  No seizure activity per nursing staff, appetite is good, urine output is good based on intake.  No complaints of constipation.  Weight is stable.    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    COPD (chronic obstructive pulmonary disease) ([facility])     Fracture of patella 06/10/2020    Heart failure ([facility])     High cholesterol     HTN (hypertension)     Hypothyroid     New onset a-fib ([facility])          Allergies:  Reviewed in nursing facility records  Medications:  Reviewed and reconciled in nursing facility records    Review of Systems  A 10 Point review of systems was performed and is negative unless previously stated      Objective:   See nursing facility record for vital signs.      Physical Exam  Constitutional:  up in wheelchair, well-developed, in no acute distess  Pulmonary/Chest:  lung sounds clear and diminished in lower lobes, no shortness of breath  Cardiovascular:  S1-S2 regular, no edema  Abd/GI:  abdomen soft, round, active bowel sounds x 4 quadrants  MS/Extremities:  TRIPLETT, ROM limited, abnormal gait  Psych:  A/O x 3, cooperative with care        Diagnosis Orders   1. Seizure disorder (HCC)        2. Hypothyroidism, unspecified type              Assessment and Plan:      1. Seizure disorder (HCC)  No seizure activity, continue camazepam as ordered.    2.

## 2024-07-30 ENCOUNTER — OFFICE VISIT (OUTPATIENT)
Dept: GERIATRIC MEDICINE | Age: 84
End: 2024-07-30

## 2024-07-30 DIAGNOSIS — E03.9 HYPOTHYROIDISM, UNSPECIFIED TYPE: Primary | ICD-10-CM

## 2024-07-31 NOTE — PROGRESS NOTES
"Patient: Selvin Garcia    Procedure Summary       Date: 07/31/24 Room / Location:  [facility]  /  [facility]    Anesthesia Start: 1137 Anesthesia Stop: 1216    Procedure: LEFT MIDDLE FINGER INCISION AND DEBRIDEMENT (Left: Arm Upper) Diagnosis: (L03.012)    Surgeons: Josef Miller MD Provider: Collin Edwards CRNA    Anesthesia Type: general ASA Status: 2            Anesthesia Type: general    Vitals  Vitals Value Taken Time   /87 07/31/24 1309   Temp 97.6 °F (36.4 °C) 07/31/24 1302   Pulse 67 07/31/24 1309   Resp 18 07/31/24 1309   SpO2 98 % 07/31/24 1309           Post Anesthesia Care and Evaluation    Patient location during evaluation: PACU  Patient participation: complete - patient participated  Level of consciousness: awake and awake and alert  Pain score: 0  Pain management: adequate    Airway patency: patent  Anesthetic complications: No anesthetic complications  PONV Status: none  Cardiovascular status: acceptable  Respiratory status: acceptable  Hydration status: acceptable    Comments: Patient discharged according to acceptable Nelida score per RN assessment. See nursing records for further information.     Blood pressure 136/87, pulse 67, temperature 97.6 °F (36.4 °C), resp. rate 18, height 175 cm (68.9\"), weight 86.1 kg (189 lb 13.1 oz), SpO2 98%.      " WITH MUCH HESITATION TRANSFERRED PT UP OUT OF BED AND INTO CHAIR WITH 2 HEAVY ASSIST, PT NOT ABLE TO PARTICIPATE IN ANY BODY MECHANICS, IS UP IN CHAIR FALLS PRECAUTION IMPLEMENTED.

## 2024-07-31 NOTE — PROGRESS NOTES
Name: Gely Delgado   [address] [place]  [address]    Date: 7/5/2024     Subjective:     Chief Complaint   Patient presents with    Follow-up       HPI  Gely Delgado is a 83 y.o. female being seen today for evaluation of abnormal labs.  Resident is on levothyroxine for hypothyroidism.  Nursing staff report low TSH level and normal free T4.  She is on levothyroxine 175 mcg daily.  Resident's not losing weight, current weight 127 pounds.  Vital signs stable no fever.  She denies chest pain chest palpitation irregular heartbeat.  Will adjust dose.    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    COPD (chronic obstructive pulmonary disease) ([facility])     Fracture of patella 06/10/2020    Heart failure ([facility])     High cholesterol     HTN (hypertension)     Hypothyroid     New onset a-fib ([facility])          Allergies:  Reviewed in nursing facility records  Medications:  Reviewed and reconciled in nursing facility records    Review of Systems  A 10 Point review of systems was performed and is negative unless previously stated      Objective:   See nursing facility record for vital signs.      Physical Exam  Constitutional:  up in tilt-n-space wheelchair, elderly, thin, in no acute distess  Pulmonary/Chest:  lung sounds clear and diminished in lower lobes, no shortness of breath  Cardiovascular:  S1-S2 regular, no edema  Abd/GI:  abdomen soft, round, active bowel sounds x 4 quadrants  MS/Extremities:  TRIPLETT, ROM limited  Psych:  A/O x 3, cooperative with care      Diagnosis Orders   1. Hypothyroidism, unspecified type              Assessment and Plan:      1. Hypothyroidism, unspecified type  Decrease Levothyroxine to 150 mcg daily.  Repeat TSH and free T4 in 3

## 2024-08-20 ENCOUNTER — OFFICE VISIT (OUTPATIENT)
Dept: GERIATRIC MEDICINE | Age: 84
End: 2024-08-20
Payer: MEDICARE

## 2024-08-20 DIAGNOSIS — E03.9 HYPOTHYROIDISM, UNSPECIFIED TYPE: Primary | ICD-10-CM

## 2024-08-20 PROCEDURE — 1123F ACP DISCUSS/DSCN MKR DOCD: CPT | Performed by: NURSE PRACTITIONER

## 2024-08-20 PROCEDURE — 99308 SBSQ NF CARE LOW MDM 20: CPT | Performed by: NURSE PRACTITIONER

## 2024-08-20 NOTE — PROGRESS NOTES
Name: Gely Delgado   [address] [place]  [address]    Date: 8/20/2024     Subjective:     Chief Complaint   Patient presents with    Hypothyroidism       HPI  Gely Delgado is a 83 y.o. female being seen today for evaluation of hypothyroidism.  Nursing staff report abnormal labs today, low TSH, on 137 mcg of levothyroxine daily.  Weight has been going down over the last 4 months, was 130 pounds in April now down to 125 pounds.  Vital signs stable no fever.  Resident denies chest palpitations, irregular heart beat, insomnia.    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    COPD (chronic obstructive pulmonary disease) (HCC)     Fracture of patella 06/10/2020    Heart failure (HCC)     High cholesterol     HTN (hypertension)     Hypothyroid     New onset a-fib (HCC)          Allergies:  Reviewed in nursing facility records  Medications:  Reviewed and reconciled in nursing facility records    Review of Systems  A 10 Point review of systems was performed and is negative unless previously stated      Objective:   See nursing facility record for vital signs.      Physical Exam  Constitutional:  up in chair, thin, frail, in no acute distess  Pulmonary/Chest:  lung sounds clear and diminished in lower lobes, no shortness of breath  Cardiovascular:  S1-S2 regular, no edema  Abd/GI:  abdomen soft, round, active bowel sounds x 4 quadrants  MS/Extremities:  TRIPLETT, ROM limited, non ambulatory  Psych:  A/O x 3, cooperative with care     Diagnosis Orders   1. Hypothyroidism, unspecified type                 Assessment and Plan:      1. Hypothyroidism, unspecified type  Decrease levothyroxine to 112 mcg p.o. daily.  Repeat TSH and free T4 in 3 weeks.      Reviewed

## 2024-08-21 ENCOUNTER — OFFICE VISIT (OUTPATIENT)
Dept: GERIATRIC MEDICINE | Age: 84
End: 2024-08-21

## 2024-08-21 DIAGNOSIS — I48.0 PAROXYSMAL ATRIAL FIBRILLATION (HCC): Primary | ICD-10-CM

## 2024-08-21 DIAGNOSIS — J42 CHRONIC BRONCHITIS, UNSPECIFIED CHRONIC BRONCHITIS TYPE (HCC): ICD-10-CM

## 2024-08-21 DIAGNOSIS — E03.9 HYPOTHYROIDISM, UNSPECIFIED TYPE: ICD-10-CM

## 2024-08-28 ENCOUNTER — OFFICE VISIT (OUTPATIENT)
Dept: GERIATRIC MEDICINE | Age: 84
End: 2024-08-28

## 2024-08-28 DIAGNOSIS — E03.9 HYPOTHYROIDISM, UNSPECIFIED TYPE: ICD-10-CM

## 2024-08-28 DIAGNOSIS — I48.0 PAROXYSMAL ATRIAL FIBRILLATION (HCC): Primary | ICD-10-CM

## 2024-08-28 DIAGNOSIS — J42 CHRONIC BRONCHITIS, UNSPECIFIED CHRONIC BRONCHITIS TYPE (HCC): ICD-10-CM

## 2024-08-28 DIAGNOSIS — I50.32 CHRONIC DIASTOLIC HEART FAILURE (HCC): ICD-10-CM

## 2024-08-30 NOTE — PROGRESS NOTES
Name: Gely Delgado   Facility: [address]  [address]    Date: 7/30/2024     Subjective:     Chief Complaint   Patient presents with    Hypothyroidism       HPI  Gely Delgado is a 83 y.o. female being seen today for evaluation of abnormal labs.  Resident is on levothyroxine for hypothyroidism.  Nursing staff report low TSH level and normal free T4.  She is on levothyroxine 150 mcg daily.  Resident's not losing weight, current weight 127 pounds.  Vital signs stable no fever.  She denies chest pain chest palpitation irregular heartbeat.  Will adjust dose.    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    COPD (chronic obstructive pulmonary disease) ([facility])     Fracture of patella 06/10/2020    Heart failure ([facility])     High cholesterol     HTN (hypertension)     Hypothyroid     New onset a-fib ([facility])          Allergies:  Reviewed in nursing facility records  Medications:  Reviewed and reconciled in nursing facility records    Review of Systems  A 10 Point review of systems was performed and is negative unless previously stated      Objective:   See nursing facility record for vital signs.      Physical Exam  Constitutional:  up in tilt-n-space wheelchair, elderly, thin, in no acute distess  Pulmonary/Chest:  lung sounds clear and diminished in lower lobes, no shortness of breath  Cardiovascular:  S1-S2 regular, no edema  Abd/GI:  abdomen soft, round, active bowel sounds x 4 quadrants  MS/Extremities:  TRIPLETT, ROM limited  Psych:  A/O x 3, cooperative with care      Diagnosis Orders   1. Hypothyroidism, unspecified type              Assessment and Plan:      1. Hypothyroidism, unspecified type  Decrease Levothyroxine to 137 mcg daily.  Repeat TSH and free T4 in 3

## 2024-09-02 ENCOUNTER — OFFICE VISIT (OUTPATIENT)
Dept: GERIATRIC MEDICINE | Age: 84
End: 2024-09-02

## 2024-09-02 DIAGNOSIS — I50.32 CHRONIC DIASTOLIC HEART FAILURE (HCC): ICD-10-CM

## 2024-09-02 DIAGNOSIS — J42 CHRONIC BRONCHITIS, UNSPECIFIED CHRONIC BRONCHITIS TYPE (HCC): ICD-10-CM

## 2024-09-02 DIAGNOSIS — I48.0 PAROXYSMAL ATRIAL FIBRILLATION (HCC): ICD-10-CM

## 2024-09-02 DIAGNOSIS — N18.30 CHRONIC RENAL IMPAIRMENT, STAGE 3 (MODERATE), UNSPECIFIED WHETHER STAGE 3A OR 3B CKD (HCC): Primary | ICD-10-CM

## 2024-09-24 ENCOUNTER — OFFICE VISIT (OUTPATIENT)
Dept: GERIATRIC MEDICINE | Age: 84
End: 2024-09-24

## 2024-09-24 DIAGNOSIS — G40.909 SEIZURE DISORDER (HCC): ICD-10-CM

## 2024-09-24 DIAGNOSIS — E03.9 HYPOTHYROIDISM, UNSPECIFIED TYPE: ICD-10-CM

## 2024-09-24 DIAGNOSIS — I95.9 HYPOTENSION, UNSPECIFIED HYPOTENSION TYPE: Primary | ICD-10-CM

## 2024-10-07 ENCOUNTER — OFFICE VISIT (OUTPATIENT)
Dept: GERIATRIC MEDICINE | Age: 84
End: 2024-10-07
Payer: MEDICARE

## 2024-10-07 DIAGNOSIS — I48.0 PAROXYSMAL ATRIAL FIBRILLATION (HCC): Primary | ICD-10-CM

## 2024-10-07 PROCEDURE — 1123F ACP DISCUSS/DSCN MKR DOCD: CPT | Performed by: INTERNAL MEDICINE

## 2024-10-07 PROCEDURE — 99308 SBSQ NF CARE LOW MDM 20: CPT | Performed by: INTERNAL MEDICINE

## 2024-10-07 PROCEDURE — G8484 FLU IMMUNIZE NO ADMIN: HCPCS | Performed by: INTERNAL MEDICINE

## 2024-10-20 PROBLEM — I95.9 HYPOTENSION: Status: ACTIVE | Noted: 2024-10-20

## 2024-11-05 ENCOUNTER — OFFICE VISIT (OUTPATIENT)
Dept: GERIATRIC MEDICINE | Age: 84
End: 2024-11-05
Payer: MEDICARE

## 2024-11-05 DIAGNOSIS — G40.909 SEIZURE DISORDER (HCC): ICD-10-CM

## 2024-11-05 DIAGNOSIS — I95.9 HYPOTENSION, UNSPECIFIED HYPOTENSION TYPE: Primary | ICD-10-CM

## 2024-11-05 DIAGNOSIS — E03.9 HYPOTHYROIDISM, UNSPECIFIED TYPE: ICD-10-CM

## 2024-11-05 PROCEDURE — G8484 FLU IMMUNIZE NO ADMIN: HCPCS | Performed by: NURSE PRACTITIONER

## 2024-11-05 PROCEDURE — 99308 SBSQ NF CARE LOW MDM 20: CPT | Performed by: NURSE PRACTITIONER

## 2024-11-05 PROCEDURE — 1123F ACP DISCUSS/DSCN MKR DOCD: CPT | Performed by: NURSE PRACTITIONER

## 2024-11-06 NOTE — PROGRESS NOTES
SUBJECTIVE:  This is a 83-year-old woman with history tribulation seen today for follow-up visit patient has been on amiodarone has been on beta-blocker in the past without symptomatic hypoglycemia patient is function stable bradycardia no bleeding at this has not been aggressive anticoagulant at this time secondary to concern for falls      ROS: Denies chest  The rest of the 14 point ROS negative    PHYSICAL EXAM: VSS per facility record  Pupils are reactive oral mucosas dry chest with no crackles or wheezing cardiovascular showed a regular abdomen soft nontender extremity trace    ASSESSMENT & PLAN:   Diagnosis Orders   1. Paroxysmal atrial fibrillation (HCC)          Notes no new RVR repeat thyroid level repeat amiodarone level.  Monitor  Systolic pressure heart rate monitor for signs of new endorgan disease or distal embolization          Past Medical History:   Diagnosis Date    Aortic stenosis     Bone fixation device infection ([facility]) 06/10/2020    Chronic kidney disease     Closed fracture of distal end of radius 06/10/2020    Closed fracture of right patella 11/19/2019    fracture of the mid body of the patella.    COPD (chronic obstructive pulmonary disease) ([facility])     Fracture of patella 06/10/2020    Heart failure ([facility])     High cholesterol     HTN (hypertension)     Hypothyroid     New onset a-fib ([facility])          Past Surgical History:   Procedure Laterality Date    KNEE SURGERY Right 4/14/2020    RIGHT KNEE REMOVAL OF HARDWARE performed by Raul Spaulding MD at [facility]    MUSCLE BIOPSY Right 1/16/2020    RIGHT QUADRICEPS MUSCLE BIOPSY performed by Javi Simms MD at [facility]    PATELLA FRACTURE SURGERY Right 11/18/2019    RIGHT PATELLA OPEN REDUCTION INTERNAL FIXATION  ROOM 180 performed by Raul Spaulding MD at [facility]    UPPER GASTROINTESTINAL ENDOSCOPY N/A 12/17/2019    EGD performed by Juan Alberto Lopes MD at [facility]         Current Outpatient Medications on File Prior to Visit   Medication Sig Dispense

## 2024-11-13 ENCOUNTER — OFFICE VISIT (OUTPATIENT)
Dept: GERIATRIC MEDICINE | Age: 84
End: 2024-11-13

## 2024-11-13 DIAGNOSIS — I48.0 PAROXYSMAL ATRIAL FIBRILLATION (HCC): Primary | ICD-10-CM

## 2024-11-13 DIAGNOSIS — E03.9 HYPOTHYROIDISM, UNSPECIFIED TYPE: ICD-10-CM

## 2024-11-13 DIAGNOSIS — I50.32 CHRONIC DIASTOLIC HEART FAILURE (HCC): ICD-10-CM

## 2024-11-13 DIAGNOSIS — J42 CHRONIC BRONCHITIS, UNSPECIFIED CHRONIC BRONCHITIS TYPE (HCC): ICD-10-CM

## 2024-11-27 NOTE — PROGRESS NOTES
Name: Gely Delgado   Facility: [address] [place]  [address]    Date: 11/5/2024     Subjective:     Chief Complaint   Patient presents with    Follow-up       HPI  Gely Delgado is a 83 y.o. female being seen today for evaluation of hypotension seizure disorder and hypothyroidism.  Resident up in Tilt-In-Space wheelchair in the dining room, appetite is good, no acute distress.  She is alert and orient x 2 in no acute distress.  Lung sounds are clear no edema.  Blood pressure within acceptable range no seizure activity thyroid studies are normal.  Vital signs stable no fever.  Global weakness, sari lift into wheelchair.  Denies pain.     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    COPD (chronic obstructive pulmonary disease) ([facility])     Fracture of patella 06/10/2020    Heart failure ([facility])     High cholesterol     HTN (hypertension)     Hypothyroid     New onset a-fib (HCC)          Allergies:  Reviewed in nursing facility records  Medications:  Reviewed and reconciled in nursing facility records    Review of Systems  A 10 Point review of systems was performed and is negative unless previously stated      Objective:   See nursing facility record for vital signs.      Physical Exam  Constitutional:  up in wheelchair, well-developed, in no acute distess  Pulmonary/Chest:  lung sounds clear and diminished in lower lobes, no shortness of breath  Cardiovascular:  S1-S2 regular, no edema  Abd/GI:  abdomen soft, round, active bowel sounds x 4 quadrants  MS/Extremities:  TRIPLETT, ROM limited, abnormal gait  Psych:  A/O x 2, cooperative with care      Diagnosis Orders   1. Hypotension, unspecified hypotension type        2. Seizure disorder (HCC)        3. Hypothyroidism,

## 2024-12-13 NOTE — PROGRESS NOTES
(11/19/2019)    Humiliation, Afraid, Rape, and Kick questionnaire     Fear of Current or Ex-Partner: No     Emotionally Abused: No     Physically Abused: No     Sexually Abused: No   Housing Stability: Not on file         Lab Results   Component Value Date    LABA1C 9.7 07/24/2023     Lab Results   Component Value Date    EAG 28.8 07/24/2023       Lab Results   Component Value Date/Time     12/27/2022 12:00 AM    K 4.1 12/27/2022 12:00 AM    K 3.3 01/16/2020 10:04 AM     12/27/2022 12:00 AM    CO2 23 12/27/2022 12:00 AM    BUN 22 12/27/2022 12:00 AM    CREATININE 0.9 12/27/2022 12:00 AM    GLUCOSE 77 12/27/2022 12:00 AM    CALCIUM 7.7 12/27/2022 12:00 AM        Lab Results   Component Value Date    CHOL 193 05/16/2023    CHOL 131 11/24/2019     Lab Results   Component Value Date    TRIG 179 05/16/2023    TRIG 142 11/24/2019     Lab Results   Component Value Date    HDL 41 05/16/2023    HDL 45 11/24/2019     No components found for: \"LDLCHOLESTEROL\", \"LDLCALC\"  Lab Results   Component Value Date    VLDL 36 05/16/2023     Lab Results   Component Value Date    CHOLHDLRATIO 2.8 05/16/2023       Lab Results   Component Value Date    TSH 3.201 09/11/2024       Lab Results   Component Value Date    WBC 13.4 12/27/2022    HGB 8.8 (A) 05/28/2024    HCT 25.6 (A) 05/28/2024    MCV 92.8 12/27/2022     12/27/2022       Please note orders entered on site at facility after discussion with appropriate facility nursing/therapy/ / nutritional staff. Current longstanding medical problems and acute medical issues addressed with staff. Available data and data elements in on site paper chart reviewed and analyzed.  Current external consultant notes reviewed in on site chart. Ordered laboratory testing and imaging will be reviewed when available.

## 2024-12-26 ENCOUNTER — OFFICE VISIT (OUTPATIENT)
Dept: GERIATRIC MEDICINE | Age: 84
End: 2024-12-26

## 2024-12-26 DIAGNOSIS — I48.0 PAROXYSMAL ATRIAL FIBRILLATION (HCC): Primary | ICD-10-CM

## 2024-12-26 DIAGNOSIS — J42 CHRONIC BRONCHITIS, UNSPECIFIED CHRONIC BRONCHITIS TYPE (HCC): ICD-10-CM

## 2024-12-26 DIAGNOSIS — E03.9 HYPOTHYROIDISM, UNSPECIFIED TYPE: ICD-10-CM

## 2024-12-26 DIAGNOSIS — I50.32 CHRONIC DIASTOLIC HEART FAILURE (HCC): ICD-10-CM

## 2025-01-14 ENCOUNTER — OFFICE VISIT (OUTPATIENT)
Dept: GERIATRIC MEDICINE | Age: 85
End: 2025-01-14
Payer: MEDICARE

## 2025-01-14 DIAGNOSIS — I50.32 CHRONIC DIASTOLIC HEART FAILURE (HCC): Primary | ICD-10-CM

## 2025-01-14 DIAGNOSIS — I95.9 HYPOTENSION, UNSPECIFIED HYPOTENSION TYPE: ICD-10-CM

## 2025-01-14 DIAGNOSIS — D64.9 ANEMIA, UNSPECIFIED TYPE: ICD-10-CM

## 2025-01-14 DIAGNOSIS — I48.0 PAROXYSMAL ATRIAL FIBRILLATION (HCC): ICD-10-CM

## 2025-01-14 PROCEDURE — 99308 SBSQ NF CARE LOW MDM 20: CPT | Performed by: NURSE PRACTITIONER

## 2025-01-14 PROCEDURE — 1123F ACP DISCUSS/DSCN MKR DOCD: CPT | Performed by: NURSE PRACTITIONER

## 2025-01-24 NOTE — PROGRESS NOTES
SUBJECTIVE:  84-year-old woman seen for follow-up visit for her atrial relation heart failure bronchitis patient's function at baseline without nausea vomiting emesis or chills no change in her bowel bladder habits      ROS: Coughing and  The rest of the 14 point ROS negative    PHYSICAL EXAM: VSS per facility record  Alert pupils reactive oral mucosa is moist chest with no crackles or wheezing cardiovascular showed a regular rate abdomen soft tender trace edema    ASSESSMENT & PLAN:   Diagnosis Orders   1. Paroxysmal atrial fibrillation (HCC)        2. Hypothyroidism, unspecified type        3. Chronic diastolic heart failure (HCC)        4. Chronic bronchitis, unspecified chronic bronchitis type (HCC)          No acute functional decline monitoring weights diurese intermittently is on amiodarone tolerating well is on Synthroid.  Monitor TSH.  Is on calcium blocker functional weak continue respiratory treatments            Past Medical History:   Diagnosis Date    Aortic stenosis     Bone fixation device infection ([facility]) 06/10/2020    Chronic kidney disease     Closed fracture of distal end of radius 06/10/2020    Closed fracture of right patella 11/19/2019    fracture of the mid body of the patella.    COPD (chronic obstructive pulmonary disease) ([facility])     Fracture of patella 06/10/2020    Heart failure ([facility])     High cholesterol     HTN (hypertension)     Hypothyroid     New onset a-fib ([facility])          Past Surgical History:   Procedure Laterality Date    KNEE SURGERY Right 4/14/2020    RIGHT KNEE REMOVAL OF HARDWARE performed by Raul Spaulding MD at [facility]    MUSCLE BIOPSY Right 1/16/2020    RIGHT QUADRICEPS MUSCLE BIOPSY performed by Javi Simms MD at [facility]    PATELLA FRACTURE SURGERY Right 11/18/2019    RIGHT PATELLA OPEN REDUCTION INTERNAL FIXATION  ROOM 180 performed by Raul Spaulding MD at [facility]    UPPER GASTROINTESTINAL ENDOSCOPY N/A 12/17/2019    EGD performed by Juan Alberto Lopes MD at [facility]

## 2025-02-10 PROBLEM — D64.9 ANEMIA: Status: ACTIVE | Noted: 2025-02-10

## 2025-02-10 NOTE — PROGRESS NOTES
Name: Gely Delgado   Facility: [address]  [address]    Date: 1/14/2025     Subjective:     Chief Complaint   Patient presents with    1 Month Follow-Up       HPI  Gely Delgado is a 84 y.o. female being seen today for evaluation of chronic heart failure A-fib anemia and hypotension.  Resident is up in chair, elderly, frail, in no acute distress.  She is pleasant and cooperative with care.  Appetite is fair, urine output good based on intake.  She denies any complaints of pain or discomfort.  Heart failure symptoms are at baseline, no congestion no edema.  Resident has history of atrial fibrillation, not on anticoagulation not on beta-blocker, heart rate stable.  Baseline systolic blood pressure 100-120's over 50-70's diastolic, with occasional systolic blood pressure in the 80s.  No complaints of lightheadedness dizziness.  No seizure type activity observed, Tegretol level therapeutic.  Continue current plan of care.    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    COPD (chronic obstructive pulmonary disease) (HCC)     Fracture of patella 06/10/2020    Heart failure (HCC)     High cholesterol     HTN (hypertension)     Hypothyroid     New onset a-fib (HCC)          Allergies:  Reviewed in nursing facility records  Medications:  Reviewed and reconciled in nursing facility records    Review of Systems  A 10 Point review of systems was performed and is negative unless previously stated      Objective:   See nursing facility record for vital signs.      Physical Exam  Constitutional:  up in chair, elderly, frail, in no acute distess  Pulmonary/Chest:  lung sounds clear and diminished in lower lobes, no shortness of breath  Cardiovascular:  S1-S2 regular,

## 2025-03-14 ENCOUNTER — OFFICE VISIT (OUTPATIENT)
Dept: GERIATRIC MEDICINE | Age: 85
End: 2025-03-14
Payer: MEDICARE

## 2025-03-14 DIAGNOSIS — D64.9 ANEMIA, UNSPECIFIED TYPE: ICD-10-CM

## 2025-03-14 DIAGNOSIS — I50.32 CHRONIC DIASTOLIC HEART FAILURE (HCC): Primary | ICD-10-CM

## 2025-03-14 DIAGNOSIS — I48.0 PAROXYSMAL ATRIAL FIBRILLATION (HCC): ICD-10-CM

## 2025-03-14 PROCEDURE — 99308 SBSQ NF CARE LOW MDM 20: CPT | Performed by: NURSE PRACTITIONER

## 2025-03-14 PROCEDURE — 1123F ACP DISCUSS/DSCN MKR DOCD: CPT | Performed by: NURSE PRACTITIONER

## 2025-04-05 ENCOUNTER — OFFICE VISIT (OUTPATIENT)
Dept: GERIATRIC MEDICINE | Age: 85
End: 2025-04-05
Payer: MEDICARE

## 2025-04-05 DIAGNOSIS — I50.32 CHRONIC DIASTOLIC HEART FAILURE (HCC): ICD-10-CM

## 2025-04-05 DIAGNOSIS — I48.0 PAROXYSMAL ATRIAL FIBRILLATION (HCC): ICD-10-CM

## 2025-04-05 DIAGNOSIS — J44.9 CHRONIC OBSTRUCTIVE PULMONARY DISEASE, UNSPECIFIED COPD TYPE (HCC): Primary | ICD-10-CM

## 2025-04-05 DIAGNOSIS — G40.909 SEIZURE DISORDER (HCC): ICD-10-CM

## 2025-04-05 PROCEDURE — 99309 SBSQ NF CARE MODERATE MDM 30: CPT | Performed by: INTERNAL MEDICINE

## 2025-04-05 PROCEDURE — 1123F ACP DISCUSS/DSCN MKR DOCD: CPT | Performed by: INTERNAL MEDICINE

## 2025-04-14 NOTE — PROGRESS NOTES
Name: Gely Delgado   Facility: [address]  [address]    Date: 3/14/2025     Subjective:     Chief Complaint   Patient presents with    1 Month Follow-Up       HPI  Gely Delgado is a 84 y.o. female being seen today for evaluation of chronic heart failure A-fib anemia.  Resident is up in chair, elderly, frail, in no acute distress.  She is pleasant and cooperative with care.  Appetite is fair, urine output good based on intake.  She denies any complaints of pain or discomfort.  No complaints of lightheadedness dizziness.  No seizure type activity observed, Tegretol level therapeutic.  Continue current plan of care.    Past Medical History:   Diagnosis Date    Aortic stenosis     Bone fixation device infection 06/10/2020    Chronic kidney disease     Closed fracture of distal end of radius 06/10/2020    Closed fracture of right patella 11/19/2019    fracture of the mid body of the patella.    COPD (chronic obstructive pulmonary disease) (HCC)     Fracture of patella 06/10/2020    Heart failure (HCC)     High cholesterol     HTN (hypertension)     Hypothyroid     New onset a-fib (HCC)          Allergies:  Reviewed in nursing facility records  Medications:  Reviewed and reconciled in nursing facility records    Review of Systems  A 10 Point review of systems was performed and is negative unless previously stated      Objective:   See nursing facility record for vital signs.      Physical Exam  Constitutional:  up in chair, elderly, frail, in no acute distess  Pulmonary/Chest:  lung sounds clear and diminished in lower lobes, no shortness of breath  Cardiovascular:  S1-S2 regular, no edema  Abd/GI:  abdomen soft, round, active bowel sounds x 4 quadrants  MS/Extremities:  TRIPLETT, ROM limited, strength 3/5, Mat lift  Psych:  A/O x 2, cooperative with care      Diagnosis Orders   1. Chronic diastolic heart failure (HCC)        2. Paroxysmal atrial fibrillation (HCC)        3. Anemia,

## 2025-04-21 NOTE — PROGRESS NOTES
Number of Steps: 2    Bathroom Shower/Tub: Tub/Shower unit    Bathroom Equipment: (None)    Home Equipment: (N/A)    ADL Assistance: Independent    Homemaking Assistance: Independent    Homemaking Responsibilities: Yes    Meal Prep Responsibility: Primary    Laundry Responsibility: Primary    Cleaning Responsibility: Primary    Ambulation Assistance: Independent(No AD)    Transfer Assistance: Independent    Active : Yes    Additional Comments: States that she was down on ground X 72 hours following fall     Social Drivers of Health     Financial Resource Strain: Low Risk  (11/19/2019)    Overall Financial Resource Strain (CARDIA)     Difficulty of Paying Living Expenses: Not hard at all   Food Insecurity: No Food Insecurity (11/19/2019)    Hunger Vital Sign     Worried About Running Out of Food in the Last Year: Never true     Ran Out of Food in the Last Year: Never true   Transportation Needs: No Transportation Needs (11/19/2019)    PRAPARE - Transportation     Lack of Transportation (Medical): No     Lack of Transportation (Non-Medical): No   Physical Activity: Unknown (11/19/2019)    Exercise Vital Sign     Days of Exercise per Week: 0 days     Minutes of Exercise per Session: Not on file   Stress: No Stress Concern Present (11/19/2019)    [employer] [place] [employer] - Occupational Stress Questionnaire     Feeling of Stress : Only a little   Social Connections: Moderately Integrated (11/19/2019)    Social Connection and Isolation Panel [NHANES]     Frequency of Communication with Friends and Family: More than three times a week     Frequency of Social Gatherings with Friends and Family: Once a week     Attends [employer] Services: More than 4 times per year     Active Member of Clubs or Organizations: Yes     Attends Club or Organization Meetings: 1 to 4 times per year     Marital Status:    Intimate Partner Violence: Not At Risk (11/19/2019)    Humiliation, Afraid, Rape, and Kick

## 2025-05-19 ENCOUNTER — OFFICE VISIT (OUTPATIENT)
Dept: GERIATRIC MEDICINE | Age: 85
End: 2025-05-19

## 2025-05-19 DIAGNOSIS — I48.0 PAROXYSMAL ATRIAL FIBRILLATION (HCC): ICD-10-CM

## 2025-05-19 DIAGNOSIS — I50.32 CHRONIC DIASTOLIC HEART FAILURE (HCC): ICD-10-CM

## 2025-05-19 DIAGNOSIS — J44.9 CHRONIC OBSTRUCTIVE PULMONARY DISEASE, UNSPECIFIED COPD TYPE (HCC): Primary | ICD-10-CM

## 2025-05-27 ENCOUNTER — OFFICE VISIT (OUTPATIENT)
Dept: GERIATRIC MEDICINE | Age: 85
End: 2025-05-27
Payer: MEDICARE

## 2025-05-27 DIAGNOSIS — D64.9 ANEMIA, UNSPECIFIED TYPE: ICD-10-CM

## 2025-05-27 DIAGNOSIS — G40.909 SEIZURE DISORDER (HCC): ICD-10-CM

## 2025-05-27 DIAGNOSIS — E03.9 HYPOTHYROIDISM, UNSPECIFIED TYPE: ICD-10-CM

## 2025-05-27 DIAGNOSIS — I50.32 CHRONIC DIASTOLIC HEART FAILURE (HCC): Primary | ICD-10-CM

## 2025-05-27 PROCEDURE — 99308 SBSQ NF CARE LOW MDM 20: CPT | Performed by: NURSE PRACTITIONER

## 2025-05-27 PROCEDURE — 1123F ACP DISCUSS/DSCN MKR DOCD: CPT | Performed by: NURSE PRACTITIONER

## 2025-05-28 NOTE — PROGRESS NOTES
Name: Gely Delgado   Facility: [address]  [id number]    Date: 5/27/2025     Subjective:     Chief Complaint   Patient presents with    1 Month Follow-Up       HPI  Gely Delgado is a 84 y.o. female being seen today for evaluation of chronic heart failure, seizure disorder, anemia, and hypothyroidism.  Resident is up in Broda chair, elderly, frail, in no acute distress.  She is pleasant and cooperative with care.  Alert and oriented x 2.  Appetite is fair, urine output good based on intake.  She denies any complaints of pain or discomfort.  No complaints of lightheadedness dizziness, globally weak.  Vital signs stable no fever.  No seizure activity observed, Tegretol level therapeutic.  Continue current plan of care.    Past Medical History:   Diagnosis Date    Aortic stenosis     Bone fixation device infection 06/10/2020    Chronic kidney disease     Closed fracture of distal end of radius 06/10/2020    Closed fracture of right patella 11/19/2019    fracture of the mid body of the patella.    COPD (chronic obstructive pulmonary disease) (HCC)     Fracture of patella 06/10/2020    Heart failure (HCC)     High cholesterol     HTN (hypertension)     Hypothyroid     New onset a-fib (HCC)          Allergies:  Reviewed in nursing facility records  Medications:  Reviewed and reconciled in nursing facility records    Review of Systems  A 10 Point review of systems was performed and is negative unless previously stated      Objective:   See nursing facility record for vital signs.      Physical Exam  Constitutional:  up in chair, elderly, frail, in no acute distess  Pulmonary/Chest:  lung sounds clear and diminished in lower lobes, no shortness of breath  Cardiovascular:  S1-S2 regular, no edema  Abd/GI:  abdomen soft, round, active bowel sounds x 4 quadrants  MS/Extremities:  TRIPLETT, ROM limited, strength 3/5, Mat lift  Psych:  A/O x 2, cooperative with care      Diagnosis Orders   1. Chronic

## 2025-06-17 NOTE — PROGRESS NOTES
SUBJECTIVE:  This 84-year-old woman seen follow-up for COPD heart failure atrial fibrillation patient's function stable no recent nausea vomiting oral intake has been good no change in bowel bladder habit      ROS: Denies chest pain  The rest of the 14 point ROS negative    PHYSICAL EXAM: VSS per facility record  Coughing intermittent oral mucosa is moist no thrush chest no crackles abdomen soft nontender extremity trace edema    ASSESSMENT & PLAN:   Diagnosis Orders   1. Chronic obstructive pulmonary disease, unspecified COPD type ([facility])        2. Chronic diastolic heart failure ([facility])        3. Paroxysmal atrial fibrillation ([facility])          Continue monitor blood pressure has been amiodarone repeat TSH is pending has been Tegretol tolerating well.  Monitoring respiratory status function stable at this time.  Is on a beta-blocker [place] metoprolol well            Past Medical History:   Diagnosis Date    Aortic stenosis     Bone fixation device infection 06/10/2020    Chronic kidney disease     Closed fracture of distal end of radius 06/10/2020    Closed fracture of right patella 11/19/2019    fracture of the mid body of the patella.    COPD (chronic obstructive pulmonary disease) ([facility])     Fracture of patella 06/10/2020    Heart failure ([facility])     High cholesterol     HTN (hypertension)     Hypothyroid     New onset a-fib ([facility])          Past Surgical History:   Procedure Laterality Date    KNEE SURGERY Right 4/14/2020    RIGHT KNEE REMOVAL OF HARDWARE performed by Raul Spaulding MD at [facility]    MUSCLE BIOPSY Right 1/16/2020    RIGHT QUADRICEPS MUSCLE BIOPSY performed by Jvai Simms MD at [facility]    PATELLA FRACTURE SURGERY Right 11/18/2019    RIGHT PATELLA OPEN REDUCTION INTERNAL FIXATION  ROOM 180 performed by Raul Spaulding MD at [facility]    UPPER GASTROINTESTINAL ENDOSCOPY N/A 12/17/2019    EGD performed by Juan Alberto Lopes MD at [facility]         Current Outpatient Medications on File Prior to Visit   Medication

## 2025-06-23 ENCOUNTER — OFFICE VISIT (OUTPATIENT)
Dept: GERIATRIC MEDICINE | Age: 85
End: 2025-06-23
Payer: MEDICARE

## 2025-06-23 DIAGNOSIS — I50.32 CHRONIC DIASTOLIC HEART FAILURE (HCC): ICD-10-CM

## 2025-06-23 DIAGNOSIS — J44.9 CHRONIC OBSTRUCTIVE PULMONARY DISEASE, UNSPECIFIED COPD TYPE (HCC): Primary | ICD-10-CM

## 2025-06-23 DIAGNOSIS — I48.0 PAROXYSMAL ATRIAL FIBRILLATION (HCC): ICD-10-CM

## 2025-06-23 DIAGNOSIS — G40.909 SEIZURE DISORDER (HCC): ICD-10-CM

## 2025-06-23 PROCEDURE — 1123F ACP DISCUSS/DSCN MKR DOCD: CPT | Performed by: INTERNAL MEDICINE

## 2025-06-23 PROCEDURE — 99309 SBSQ NF CARE MODERATE MDM 30: CPT | Performed by: INTERNAL MEDICINE

## 2025-07-07 NOTE — PROGRESS NOTES
SUBJECTIVE:  84-year-old woman seen follow-up visit for COPD afibrillation seizure disorder functional weak has been coughing without chest palpitation no change in her bowel bladder habits      ROS: Cough  The rest of the 14 point ROS negative    PHYSICAL EXAM: VSS per facility record  Pupils small poorly reactive oral mucosas moist chest no crackles abdomen soft nontender extremity trace edema no rash    ASSESSMENT & PLAN:   Diagnosis Orders   1. Chronic obstructive pulmonary disease, unspecified COPD type ([facility])        2. Paroxysmal atrial fibrillation ([facility])        3. Seizure disorder ([facility])        4. Chronic diastolic heart failure ([facility])          Continue respiratory function stable.  Has been on amiodarone repeat TSH and amiodarone levels pending.  Has been on Tegretol no new seizure activity.            Past Medical History:   Diagnosis Date    Aortic stenosis     Bone fixation device infection 06/10/2020    Chronic kidney disease     Closed fracture of distal end of radius 06/10/2020    Closed fracture of right patella 11/19/2019    fracture of the mid body of the patella.    COPD (chronic obstructive pulmonary disease) ([facility])     Fracture of patella 06/10/2020    Heart failure ([facility])     High cholesterol     HTN (hypertension)     Hypothyroid     New onset a-fib ([facility])          Past Surgical History:   Procedure Laterality Date    KNEE SURGERY Right 4/14/2020    RIGHT KNEE REMOVAL OF HARDWARE performed by Raul Spaulding MD at [facility]    MUSCLE BIOPSY Right 1/16/2020    RIGHT QUADRICEPS MUSCLE BIOPSY performed by Javi Simms MD at [facility]    PATELLA FRACTURE SURGERY Right 11/18/2019    RIGHT PATELLA OPEN REDUCTION INTERNAL FIXATION  ROOM 180 performed by Raul Spaulding MD at [facility]    UPPER GASTROINTESTINAL ENDOSCOPY N/A 12/17/2019    EGD performed by Juan Alberto Lopes MD at [facility]         Current Outpatient Medications on File Prior to Visit   Medication Sig Dispense Refill    ferrous sulfate (IRON 325) 325

## 2025-07-15 ENCOUNTER — OFFICE VISIT (OUTPATIENT)
Dept: GERIATRIC MEDICINE | Age: 85
End: 2025-07-15

## 2025-07-15 DIAGNOSIS — G40.909 SEIZURE DISORDER (HCC): ICD-10-CM

## 2025-07-15 DIAGNOSIS — I50.32 CHRONIC DIASTOLIC HEART FAILURE (HCC): Primary | ICD-10-CM

## 2025-07-15 DIAGNOSIS — D64.9 ANEMIA, UNSPECIFIED TYPE: ICD-10-CM

## 2025-07-15 DIAGNOSIS — E03.9 HYPOTHYROIDISM, UNSPECIFIED TYPE: ICD-10-CM

## 2025-07-16 ENCOUNTER — OFFICE VISIT (OUTPATIENT)
Dept: GERIATRIC MEDICINE | Age: 85
End: 2025-07-16

## 2025-07-16 DIAGNOSIS — J44.9 CHRONIC OBSTRUCTIVE PULMONARY DISEASE, UNSPECIFIED COPD TYPE (HCC): Primary | ICD-10-CM

## 2025-07-16 DIAGNOSIS — G40.909 SEIZURE DISORDER (HCC): ICD-10-CM

## 2025-07-16 DIAGNOSIS — I50.32 CHRONIC DIASTOLIC HEART FAILURE (HCC): ICD-10-CM

## 2025-07-23 ENCOUNTER — OFFICE VISIT (OUTPATIENT)
Dept: GERIATRIC MEDICINE | Age: 85
End: 2025-07-23

## 2025-07-23 DIAGNOSIS — I50.32 CHRONIC DIASTOLIC HEART FAILURE (HCC): Primary | ICD-10-CM

## (undated) DEVICE — SUTURE VCRL SZ 2-0 L36IN ABSRB UD L36MM CT-1 1/2 CIR J945H

## (undated) DEVICE — PADDING CAST W6INXL4YD RAYON UNDERCAST SOF-ROL

## (undated) DEVICE — TOWEL,OR,DSP,ST,BLUE,STD,4/PK,20PK/CS: Brand: MEDLINE

## (undated) DEVICE — DRESSING FOAM W22XL25CM FILVE LAYR FOAM DP DEF SAFETAC

## (undated) DEVICE — GLOVE SURG SZ 8 L12IN FNGR THK13MIL BRN LTX SYN POLYMER W

## (undated) DEVICE — CLAMP SURG JAW W10MM POLYPR DISP FOR MUSC BX RAYPRT

## (undated) DEVICE — PAD,ABDOMINAL,8"X10",ST,LF: Brand: MEDLINE

## (undated) DEVICE — GLOVE ORANGE PI 8 1/2   MSG9085

## (undated) DEVICE — COVER LT HNDL BLU PLAS

## (undated) DEVICE — GLOVE ORANGE PI 8   MSG9080

## (undated) DEVICE — BANDAGE COMPR M W6INXL10YD WHT BGE VELC E MTRX HK AND LOOP

## (undated) DEVICE — ZIMMER® STERILE DISPOSABLE TOURNIQUET CUFF, DUAL PORT, SINGLE BLADDER, 24 IN. (61 CM)

## (undated) DEVICE — INTENDED FOR TISSUE SEPARATION, AND OTHER PROCEDURES THAT REQUIRE A SHARP SURGICAL BLADE TO PUNCTURE OR CUT.: Brand: BARD-PARKER ® CARBON RIB-BACK BLADES

## (undated) DEVICE — SUTURE VCRL SZ 1 L36IN ABSRB UD L36MM CT-1 1/2 CIR J947H

## (undated) DEVICE — SYRINGE IRRIG 60ML SFT PLIABLE BLB EZ TO GRP 1 HND USE W/

## (undated) DEVICE — MARKER SURG SKIN GENTIAN VLT REG TIP W/ 6IN RUL

## (undated) DEVICE — SUTURE S STL SZ 7 L18IN NONABSORBABLE SIL TIE MFIL DS18

## (undated) DEVICE — 4-PORT MANIFOLD: Brand: NEPTUNE 2

## (undated) DEVICE — BANDAGE COMPR SINGLE LAYERED 9 FTX6 IN EXSANG WHT ESMARCH

## (undated) DEVICE — ELECTRODE PT RET AD L9FT HI MOIST COND ADH HYDRGEL CORDED

## (undated) DEVICE — YANKAUER,SMOOTH HANDLE,HIGH CAPACITY: Brand: MEDLINE INDUSTRIES, INC.

## (undated) DEVICE — SCREW BNE ST 2X24 MM CORTICAL FT T6 STARDRV RECESS SS NS LCP
Type: IMPLANTABLE DEVICE | Site: PATELLA | Status: NON-FUNCTIONAL
Removed: 2019-11-18

## (undated) DEVICE — GOWN,AURORA,NONREINFORCED,LARGE: Brand: MEDLINE

## (undated) DEVICE — SCREW BNE L28MM DIA4MM S STL CANN SHT 1/3 THRD SM HEX SOCK
Type: IMPLANTABLE DEVICE | Site: PATELLA | Status: NON-FUNCTIONAL
Removed: 2019-11-18

## (undated) DEVICE — BRUSH ENDO CLN L90.5IN SHTH DIA1.7MM BRIST DIA5-7MM 2-6MM

## (undated) DEVICE — SUTURE VCRL + SZ 2-0 L36IN ABSRB UD L36MM CT-1 1/2 CIR VCP945H

## (undated) DEVICE — IMPLANTABLE DEVICE
Type: IMPLANTABLE DEVICE | Site: PATELLA | Status: NON-FUNCTIONAL
Removed: 2019-11-18

## (undated) DEVICE — DRESSING FOAM W8.7XL9.1IN SAFETAC LAYR SELF ADH MEPILEX

## (undated) DEVICE — CHLORAPREP 26ML ORANGE

## (undated) DEVICE — TUBING, SUCTION, 1/4" X 10', STRAIGHT: Brand: MEDLINE

## (undated) DEVICE — TUBE SET 96 MM 64 MM H2O PERISTALTIC STD AUX CHANNEL

## (undated) DEVICE — DRAPE, C-ARM, BANDS, 41X120: Brand: MEDLINE

## (undated) DEVICE — PENCIL SMOKE EVAC PUSH BUTTON COATED

## (undated) DEVICE — SPONGE,LAP,18"X18",DLX,XR,ST,5/PK,40/PK: Brand: MEDLINE

## (undated) DEVICE — GLOVE SURG SZ 7 L12IN FNGR THK94MIL TRNSLUC YEL LTX HYDRGEL

## (undated) DEVICE — E-Z CLEAN, NON-STICK, PTFE COATED, ELECTROSURGICAL BLADE ELECTRODE, MODIFIED EXTENDED INSULATION, 2.5 INCH (6.35 CM): Brand: MEGADYNE

## (undated) DEVICE — PENCIL SMK EVAC 10 FT BLADE ELECTRD ROCKER FOR TELSCP

## (undated) DEVICE — BIT DRL L65MM DIA2MM MINI S STL QUIK CPL W/O STP REUSE FOR

## (undated) DEVICE — Z DISCONTINUED PER MEDLINE USE 2741943 DRESSING AQUACEL 10 IN ALG W9XL25CM SIL CVR WTRPRF VIR BACT BARR ANTIMIC

## (undated) DEVICE — ADAPTER FLSH PMP FLD MGMT GI IRRIG OFP 2 DISPOSABLE

## (undated) DEVICE — SYRINGE MED 10ML TRNSLUC BRL PLUNG BLK MRK POLYPR CTRL

## (undated) DEVICE — BANDAGE COBAN 4 IN COMPR W4INXL5YD FOAM COHESIVE QUIK STK SELF ADH SFT

## (undated) DEVICE — Device: Brand: ENDO SMARTCAP

## (undated) DEVICE — BIT DRL L88/57MM DIA1.5MM FOR 2MM SCR PILOT THRD H K WIRE

## (undated) DEVICE — PACK ARTHRO III ST SIRUS

## (undated) DEVICE — SUTURE MCRYL SZ 4-0 L27IN ABSRB UD L19MM PS-2 1/2 CIR PRIM Y426H

## (undated) DEVICE — ALCOHOL RUBBING ISO 16OZ 70%

## (undated) DEVICE — SPONGE GZ W4XL4IN RAYON POLY FILL CVR W/ NONWOVEN FAB

## (undated) DEVICE — 3M™ STERI-DRAPE™ U-DRAPE 1015: Brand: STERI-DRAPE™

## (undated) DEVICE — GOWN,SIRUS,POLYRNF,BRTHSLV,XLN/XL,20/CS: Brand: MEDLINE

## (undated) DEVICE — SHEET,DRAPE,53X77,STERILE: Brand: MEDLINE

## (undated) DEVICE — DRESSING GZ W1XL8IN COT XRFRM N ADH OVERWRAP CURAD

## (undated) DEVICE — GUIDEWIRE ORTH L150MM DIA1.25MM S STL THRD FOR 4MM CANN SCR

## (undated) DEVICE — BANDAGE COMPR W6INXL5YD HI E BGE W/ CLP SURE-WRAP

## (undated) DEVICE — NEEDLE HYPO 25GA L1.5IN BLU POLYPR HUB S STL REG BVL STR

## (undated) DEVICE — BIT DRL L160MM DIA2.7MM CANN QUIK CPL ADJ STP REUSE FOR

## (undated) DEVICE — SPLINT KNEE UNIV FOR LESS THAN 36IN L20IN FOAM LAM E CNTCT

## (undated) DEVICE — COUNTER NDL 40 COUNT HLD 70 FOAM BLK ADH W/ MAG

## (undated) DEVICE — ENDO CARRY-ON PROCEDURE KIT: Brand: ENDO CARRY-ON PROCEDURE KIT

## (undated) DEVICE — GLOVE SURG SZ 65 THK91MIL LTX FREE SYN POLYISOPRENE

## (undated) DEVICE — PADDING UNDERCAST W4INXL12FT RAYON POLY SYN NONADHESIVE

## (undated) DEVICE — SPONGE GZ W4XL4IN COT 12 PLY TYP VII WVN C FLD DSGN

## (undated) DEVICE — LABEL MED MINI W/ MARKER

## (undated) DEVICE — PACK,LAPAROTOMY,NO GOWNS: Brand: MEDLINE

## (undated) DEVICE — K WIRE FIX L150MM DIA1.25MM S STL TRCR PNT
Type: IMPLANTABLE DEVICE | Site: PATELLA | Status: NON-FUNCTIONAL
Removed: 2019-11-18

## (undated) DEVICE — CONTAINER,SPECIMEN,OR STERILE,4OZ: Brand: MEDLINE

## (undated) DEVICE — Z DISCONTINUED PER MEDLINE USE 2741942 DRESSING AQUACEL 6 IN ALG W9XL15CM SIL CVR WTRPRF VIR BACT BARR ANTIMIC

## (undated) DEVICE — CLAMP SURG JAW W16MM POLYPR DISP FOR MUSC BX RAYPRT

## (undated) DEVICE — SONY PRINTER PAPER

## (undated) DEVICE — 3M™ STERI-STRIP™ REINFORCED ADHESIVE SKIN CLOSURES, R1547, 1/2 IN X 4 IN (12 MM X 100 MM), 6 STRIPS/ENVELOPE: Brand: 3M™ STERI-STRIP™

## (undated) DEVICE — GLOVE SURG SZ 85 L12IN FNGR THK13MIL BRN LTX SYN POLYMER W

## (undated) DEVICE — CONMED SCOPE SAVER BITE BLOCK, 20X27 MM: Brand: SCOPE SAVER

## (undated) DEVICE — STAPLER SKIN H3.9MM WIRE DIA0.58MM CRWN 6.9MM 35 STPL FIX

## (undated) DEVICE — SUTURE VCRL SZ 3-0 L27IN ABSRB UD L26MM SH 1/2 CIR J416H

## (undated) DEVICE — SUTURE VCRL SZ 0 L36IN ABSRB UD L36MM CT-1 1/2 CIR J946H

## (undated) DEVICE — GOWN,AURORA,NONRNF,XL,30/CS: Brand: MEDLINE